# Patient Record
Sex: FEMALE | Race: WHITE | Employment: OTHER
[De-identification: names, ages, dates, MRNs, and addresses within clinical notes are randomized per-mention and may not be internally consistent; named-entity substitution may affect disease eponyms.]

---

## 2017-01-10 RX ORDER — ATORVASTATIN CALCIUM 20 MG/1
TABLET, FILM COATED ORAL
Qty: 90 TABLET | Refills: 3 | Status: SHIPPED | OUTPATIENT
Start: 2017-01-10 | End: 2019-04-04 | Stop reason: SDUPTHER

## 2017-01-10 RX ORDER — LEVOTHYROXINE SODIUM 0.12 MG/1
TABLET ORAL
Qty: 90 TABLET | Refills: 3 | Status: SHIPPED | OUTPATIENT
Start: 2017-01-10 | End: 2018-01-29 | Stop reason: SDUPTHER

## 2017-01-10 RX ORDER — LANSOPRAZOLE 15 MG/1
CAPSULE, DELAYED RELEASE ORAL
Qty: 90 CAPSULE | Refills: 3 | Status: SHIPPED | OUTPATIENT
Start: 2017-01-10 | End: 2018-01-29 | Stop reason: SDUPTHER

## 2017-01-10 RX ORDER — TRAMADOL HYDROCHLORIDE 50 MG/1
TABLET ORAL
Qty: 180 TABLET | Refills: 3 | Status: SHIPPED | OUTPATIENT
Start: 2017-01-10 | End: 2017-06-26 | Stop reason: SDUPTHER

## 2017-01-10 RX ORDER — CALCITRIOL 0.25 UG/1
CAPSULE, LIQUID FILLED ORAL
Qty: 36 CAPSULE | Refills: 3 | Status: SHIPPED | OUTPATIENT
Start: 2017-01-10 | End: 2017-11-08 | Stop reason: SDUPTHER

## 2017-01-10 RX ORDER — PEN NEEDLE, DIABETIC 29 G X1/2"
NEEDLE, DISPOSABLE MISCELLANEOUS
Qty: 100 EACH | Refills: 3 | Status: SHIPPED | OUTPATIENT
Start: 2017-01-10 | End: 2018-02-28 | Stop reason: SDUPTHER

## 2017-02-13 ENCOUNTER — OFFICE VISIT (OUTPATIENT)
Dept: INTERNAL MEDICINE | Facility: CLINIC | Age: 77
End: 2017-02-13

## 2017-02-13 VITALS
OXYGEN SATURATION: 95 % | HEIGHT: 60 IN | RESPIRATION RATE: 12 BRPM | WEIGHT: 243.8 LBS | DIASTOLIC BLOOD PRESSURE: 77 MMHG | BODY MASS INDEX: 47.87 KG/M2 | SYSTOLIC BLOOD PRESSURE: 140 MMHG | HEART RATE: 80 BPM

## 2017-02-13 DIAGNOSIS — E66.01 MORBID OBESITY WITH BMI OF 45.0-49.9, ADULT (HCC): ICD-10-CM

## 2017-02-13 DIAGNOSIS — Z12.39 BREAST CANCER SCREENING: ICD-10-CM

## 2017-02-13 DIAGNOSIS — E08.22 DIABETES MELLITUS DUE TO UNDERLYING CONDITION WITH STAGE 3 CHRONIC KIDNEY DISEASE, UNSPECIFIED LONG TERM INSULIN USE STATUS: Primary | ICD-10-CM

## 2017-02-13 DIAGNOSIS — E11.3299 NONPROLIFERATIVE DIABETIC RETINOPATHY (HCC): ICD-10-CM

## 2017-02-13 DIAGNOSIS — N18.3 DIABETES MELLITUS DUE TO UNDERLYING CONDITION WITH STAGE 3 CHRONIC KIDNEY DISEASE, UNSPECIFIED LONG TERM INSULIN USE STATUS: Primary | ICD-10-CM

## 2017-02-13 DIAGNOSIS — N25.81 SECONDARY HYPERPARATHYROIDISM OF RENAL ORIGIN (HCC): ICD-10-CM

## 2017-02-13 DIAGNOSIS — Z23 NEED FOR PNEUMOCOCCAL VACCINATION: ICD-10-CM

## 2017-02-13 DIAGNOSIS — Z12.31 ENCOUNTER FOR SCREENING MAMMOGRAM FOR MALIGNANT NEOPLASM OF BREAST: ICD-10-CM

## 2017-02-13 DIAGNOSIS — I73.9 PERIPHERAL VASCULAR DISEASE, UNSPECIFIED (HCC): ICD-10-CM

## 2017-02-13 DIAGNOSIS — J44.9 OBSTRUCTIVE CHRONIC BRONCHITIS WITHOUT EXACERBATION (HCC): ICD-10-CM

## 2017-02-13 LAB — HBA1C MFR BLD: 6.9 %

## 2017-02-13 PROCEDURE — 83036 HEMOGLOBIN GLYCOSYLATED A1C: CPT | Performed by: INTERNAL MEDICINE

## 2017-02-13 PROCEDURE — 99214 OFFICE O/P EST MOD 30 MIN: CPT | Performed by: INTERNAL MEDICINE

## 2017-02-13 PROCEDURE — 90732 PPSV23 VACC 2 YRS+ SUBQ/IM: CPT | Performed by: INTERNAL MEDICINE

## 2017-02-13 PROCEDURE — 3288F FALL RISK ASSESSMENT DOCD: CPT | Performed by: INTERNAL MEDICINE

## 2017-02-13 PROCEDURE — G0009 ADMIN PNEUMOCOCCAL VACCINE: HCPCS | Performed by: INTERNAL MEDICINE

## 2017-02-13 PROCEDURE — G8510 SCR DEP NEG, NO PLAN REQD: HCPCS | Performed by: INTERNAL MEDICINE

## 2017-02-13 RX ORDER — NITROGLYCERIN 0.4 MG/1
0.4 TABLET SUBLINGUAL PRN
Qty: 25 TABLET | Refills: 11 | Status: SHIPPED | OUTPATIENT
Start: 2017-02-13 | End: 2018-02-13 | Stop reason: SDUPTHER

## 2017-02-13 ASSESSMENT — PATIENT HEALTH QUESTIONNAIRE - PHQ9
2. FEELING DOWN, DEPRESSED OR HOPELESS: 0
1. LITTLE INTEREST OR PLEASURE IN DOING THINGS: 0
SUM OF ALL RESPONSES TO PHQ QUESTIONS 1-9: 0
SUM OF ALL RESPONSES TO PHQ9 QUESTIONS 1 & 2: 0

## 2017-02-13 ASSESSMENT — COPD QUESTIONNAIRES: COPD: 1

## 2017-02-13 ASSESSMENT — ENCOUNTER SYMPTOMS
DIFFICULTY BREATHING: 1
SHORTNESS OF BREATH: 1

## 2017-03-10 ENCOUNTER — TELEPHONE (OUTPATIENT)
Dept: INTERNAL MEDICINE | Facility: CLINIC | Age: 77
End: 2017-03-10

## 2017-04-03 ENCOUNTER — HOSPITAL ENCOUNTER (OUTPATIENT)
Dept: PAIN MANAGEMENT | Age: 77
Discharge: HOME | End: 2017-04-03
Payer: MEDICARE

## 2017-04-03 VITALS
DIASTOLIC BLOOD PRESSURE: 84 MMHG | TEMPERATURE: 98 F | SYSTOLIC BLOOD PRESSURE: 166 MMHG | HEART RATE: 78 BPM | RESPIRATION RATE: 16 BRPM

## 2017-04-03 PROCEDURE — 97032 APPL MODALITY 1+ESTIM EA 15: CPT

## 2017-04-03 PROCEDURE — 97016 VASOPNEUMATIC DEVICE THERAPY: CPT

## 2017-04-03 ASSESSMENT — PAIN SCALES - GENERAL: PAINLEVEL_OUTOF10: 4

## 2017-04-03 ASSESSMENT — PAIN DESCRIPTION - PROGRESSION: CLINICAL_PROGRESSION: NOT CHANGED

## 2017-04-03 ASSESSMENT — PAIN DESCRIPTION - LOCATION: LOCATION: BACK

## 2017-04-03 ASSESSMENT — PAIN DESCRIPTION - ONSET: ONSET: ON-GOING

## 2017-04-03 ASSESSMENT — PAIN DESCRIPTION - FREQUENCY: FREQUENCY: INTERMITTENT

## 2017-04-03 ASSESSMENT — PAIN DESCRIPTION - PAIN TYPE: TYPE: CHRONIC PAIN

## 2017-04-03 ASSESSMENT — PAIN DESCRIPTION - ORIENTATION: ORIENTATION: LOWER

## 2017-04-03 ASSESSMENT — PAIN DESCRIPTION - DESCRIPTORS: DESCRIPTORS: ACHING;NAGGING

## 2017-04-25 RX ORDER — CLOPIDOGREL BISULFATE 75 MG/1
TABLET ORAL
Qty: 90 TABLET | Refills: 3 | Status: SHIPPED | OUTPATIENT
Start: 2017-04-25 | End: 2018-05-04 | Stop reason: SDUPTHER

## 2017-04-25 RX ORDER — DILTIAZEM HYDROCHLORIDE 120 MG/1
CAPSULE, EXTENDED RELEASE ORAL
Qty: 90 CAPSULE | Refills: 3 | Status: SHIPPED | OUTPATIENT
Start: 2017-04-25 | End: 2018-05-04 | Stop reason: SDUPTHER

## 2017-04-25 RX ORDER — LISINOPRIL 20 MG/1
TABLET ORAL
Qty: 180 TABLET | Refills: 3 | Status: SHIPPED | OUTPATIENT
Start: 2017-04-25 | End: 2018-05-04 | Stop reason: SDUPTHER

## 2017-04-25 RX ORDER — METOPROLOL SUCCINATE 50 MG/1
TABLET, EXTENDED RELEASE ORAL
Qty: 90 TABLET | Refills: 3 | Status: ON HOLD | OUTPATIENT
Start: 2017-04-25 | End: 2018-03-27 | Stop reason: HOSPADM

## 2017-04-25 RX ORDER — BLOOD SUGAR DIAGNOSTIC
STRIP MISCELLANEOUS
Qty: 300 STRIP | Refills: 11 | Status: SHIPPED | OUTPATIENT
Start: 2017-04-25 | End: 2018-08-13 | Stop reason: SDUPTHER

## 2017-05-09 ENCOUNTER — HOSPITAL ENCOUNTER (OUTPATIENT)
Age: 77
Discharge: HOME | End: 2017-05-09
Payer: MEDICARE

## 2017-05-09 DIAGNOSIS — N18.30 CKD (CHRONIC KIDNEY DISEASE) STAGE 3, GFR 30-59 ML/MIN (HCC): ICD-10-CM

## 2017-05-09 LAB
ALBUMIN SERPL-MCNC: 4.2 G/DL (ref 3.5–5.2)
ANION GAP SERPL CALCULATED.3IONS-SCNC: 18 MMOL/L (ref 9–17)
BUN BLDV-MCNC: 25 MG/DL (ref 8–23)
BUN/CREAT BLD: ABNORMAL (ref 9–20)
CALCIUM SERPL-MCNC: 10.1 MG/DL (ref 8.6–10.4)
CHLORIDE BLD-SCNC: 103 MMOL/L (ref 98–107)
CO2: 22 MMOL/L (ref 20–31)
CREAT SERPL-MCNC: 1.7 MG/DL (ref 0.5–0.9)
CREATININE URINE: 142.6 MG/DL (ref 28–217)
GFR AFRICAN AMERICAN: 36 ML/MIN
GFR NON-AFRICAN AMERICAN: 29 ML/MIN
GFR SERPL CREATININE-BSD FRML MDRD: ABNORMAL ML/MIN/{1.73_M2}
GFR SERPL CREATININE-BSD FRML MDRD: ABNORMAL ML/MIN/{1.73_M2}
GLUCOSE BLD-MCNC: 63 MG/DL (ref 70–99)
HCT VFR BLD CALC: 38.3 % (ref 36–46)
HEMOGLOBIN: 12.6 G/DL (ref 12–16)
MCH RBC QN AUTO: 27.8 PG (ref 26–34)
MCHC RBC AUTO-ENTMCNC: 32.8 G/DL (ref 31–37)
MCV RBC AUTO: 85 FL (ref 80–100)
PDW BLD-RTO: 16 % (ref 12.5–15.4)
PHOSPHORUS: 3.9 MG/DL (ref 2.6–4.5)
PLATELET # BLD: 205 K/UL (ref 140–450)
PMV BLD AUTO: 9.9 FL (ref 6–12)
POTASSIUM SERPL-SCNC: 4.1 MMOL/L (ref 3.7–5.3)
RBC # BLD: 4.51 M/UL (ref 4–5.2)
SODIUM BLD-SCNC: 143 MMOL/L (ref 135–144)
TOTAL PROTEIN, URINE: 36 MG/DL
WBC # BLD: 7.8 K/UL (ref 3.5–11)

## 2017-05-09 PROCEDURE — 83970 ASSAY OF PARATHORMONE: CPT

## 2017-05-09 PROCEDURE — 82570 ASSAY OF URINE CREATININE: CPT

## 2017-05-09 PROCEDURE — 84100 ASSAY OF PHOSPHORUS: CPT

## 2017-05-09 PROCEDURE — 85027 COMPLETE CBC AUTOMATED: CPT

## 2017-05-09 PROCEDURE — 84156 ASSAY OF PROTEIN URINE: CPT

## 2017-05-09 PROCEDURE — 36415 COLL VENOUS BLD VENIPUNCTURE: CPT

## 2017-05-09 PROCEDURE — 82040 ASSAY OF SERUM ALBUMIN: CPT

## 2017-05-09 PROCEDURE — 80048 BASIC METABOLIC PNL TOTAL CA: CPT

## 2017-05-10 LAB — PTH INTACT: 98.72 PG/ML (ref 15–65)

## 2017-05-22 ENCOUNTER — OFFICE VISIT (OUTPATIENT)
Dept: INTERNAL MEDICINE | Age: 77
End: 2017-05-22

## 2017-05-22 VITALS
WEIGHT: 241.6 LBS | HEART RATE: 84 BPM | RESPIRATION RATE: 12 BRPM | DIASTOLIC BLOOD PRESSURE: 61 MMHG | OXYGEN SATURATION: 98 % | SYSTOLIC BLOOD PRESSURE: 150 MMHG | BODY MASS INDEX: 47.43 KG/M2 | HEIGHT: 60 IN

## 2017-05-22 DIAGNOSIS — N18.30 TYPE 2 DIABETES MELLITUS WITH STAGE 3 CHRONIC KIDNEY DISEASE, WITH LONG-TERM CURRENT USE OF INSULIN (HCC): Primary | ICD-10-CM

## 2017-05-22 DIAGNOSIS — I10 ESSENTIAL HYPERTENSION: ICD-10-CM

## 2017-05-22 DIAGNOSIS — E78.00 HYPERCHOLESTEREMIA: ICD-10-CM

## 2017-05-22 DIAGNOSIS — E11.22 TYPE 2 DIABETES MELLITUS WITH STAGE 3 CHRONIC KIDNEY DISEASE, WITH LONG-TERM CURRENT USE OF INSULIN (HCC): Primary | ICD-10-CM

## 2017-05-22 DIAGNOSIS — N18.4 CHRONIC KIDNEY DISEASE (CKD), STAGE IV (SEVERE) (HCC): ICD-10-CM

## 2017-05-22 DIAGNOSIS — Z79.4 TYPE 2 DIABETES MELLITUS WITH STAGE 3 CHRONIC KIDNEY DISEASE, WITH LONG-TERM CURRENT USE OF INSULIN (HCC): Primary | ICD-10-CM

## 2017-05-22 PROCEDURE — 99999 PR OFFICE/OUTPT VISIT,PROCEDURE ONLY: CPT | Performed by: INTERNAL MEDICINE

## 2017-05-22 RX ORDER — CYANOCOBALAMIN 1000 UG/ML
INJECTION INTRAMUSCULAR; SUBCUTANEOUS
Qty: 6 ML | Refills: 0 | Status: SHIPPED | OUTPATIENT
Start: 2017-05-22 | End: 2017-05-22 | Stop reason: SDUPTHER

## 2017-05-22 RX ORDER — SYRINGE W-NEEDLE,DISPOSAB,3 ML 25GX5/8"
SYRINGE, EMPTY DISPOSABLE MISCELLANEOUS
Qty: 6 EACH | Refills: 0 | Status: SHIPPED | OUTPATIENT
Start: 2017-05-22 | End: 2017-06-27 | Stop reason: SDUPTHER

## 2017-05-22 RX ORDER — CYANOCOBALAMIN 1000 UG/ML
INJECTION INTRAMUSCULAR; SUBCUTANEOUS
Qty: 6 ML | Refills: 0 | Status: SHIPPED | OUTPATIENT
Start: 2017-05-22 | End: 2017-07-12 | Stop reason: SDUPTHER

## 2017-06-09 ENCOUNTER — HOSPITAL ENCOUNTER (OUTPATIENT)
Dept: PAIN MANAGEMENT | Age: 77
Discharge: HOME | End: 2017-06-09
Payer: MEDICARE

## 2017-06-09 VITALS
HEIGHT: 60 IN | RESPIRATION RATE: 18 BRPM | TEMPERATURE: 98.3 F | BODY MASS INDEX: 47.32 KG/M2 | DIASTOLIC BLOOD PRESSURE: 60 MMHG | HEART RATE: 65 BPM | SYSTOLIC BLOOD PRESSURE: 184 MMHG | WEIGHT: 241 LBS

## 2017-06-09 PROCEDURE — 97032 APPL MODALITY 1+ESTIM EA 15: CPT

## 2017-06-09 PROCEDURE — 97016 VASOPNEUMATIC DEVICE THERAPY: CPT

## 2017-06-09 ASSESSMENT — PAIN DESCRIPTION - ORIENTATION: ORIENTATION: LOWER

## 2017-06-09 ASSESSMENT — PAIN SCALES - GENERAL: PAINLEVEL_OUTOF10: 7

## 2017-06-09 ASSESSMENT — PAIN DESCRIPTION - DESCRIPTORS: DESCRIPTORS: ACHING;NAGGING

## 2017-06-09 ASSESSMENT — PAIN DESCRIPTION - ONSET: ONSET: ON-GOING

## 2017-06-09 ASSESSMENT — PAIN DESCRIPTION - LOCATION: LOCATION: BACK

## 2017-06-09 ASSESSMENT — PAIN DESCRIPTION - PAIN TYPE: TYPE: CHRONIC PAIN

## 2017-06-09 ASSESSMENT — PAIN DESCRIPTION - FREQUENCY: FREQUENCY: INTERMITTENT

## 2017-06-09 ASSESSMENT — PAIN DESCRIPTION - PROGRESSION: CLINICAL_PROGRESSION: NOT CHANGED

## 2017-06-27 RX ORDER — CYANOCOBALAMIN 1000 UG/ML
1000 INJECTION INTRAMUSCULAR; SUBCUTANEOUS ONCE
Qty: 6 ML | Refills: 0 | Status: SHIPPED | OUTPATIENT
Start: 2017-06-27 | End: 2017-10-04 | Stop reason: SDUPTHER

## 2017-06-27 RX ORDER — SYRINGE W-NEEDLE,DISPOSAB,3 ML 25GX5/8"
SYRINGE, EMPTY DISPOSABLE MISCELLANEOUS
Qty: 6 EACH | Refills: 0 | Status: SHIPPED | OUTPATIENT
Start: 2017-06-27 | End: 2020-11-23 | Stop reason: SDUPTHER

## 2017-07-12 RX ORDER — CYANOCOBALAMIN 1000 UG/ML
INJECTION INTRAMUSCULAR; SUBCUTANEOUS
Qty: 6 ML | Refills: 0 | Status: SHIPPED | OUTPATIENT
Start: 2017-07-12 | End: 2018-10-17 | Stop reason: SDUPTHER

## 2017-07-27 RX ORDER — INSULIN GLARGINE 100 [IU]/ML
INJECTION, SOLUTION SUBCUTANEOUS
Qty: 70 ML | Refills: 2 | Status: SHIPPED | OUTPATIENT
Start: 2017-07-27 | End: 2018-02-13 | Stop reason: SDUPTHER

## 2017-08-04 ENCOUNTER — HOSPITAL ENCOUNTER (OUTPATIENT)
Dept: PAIN MANAGEMENT | Age: 77
Discharge: HOME | End: 2017-08-04
Payer: MEDICARE

## 2017-08-04 VITALS
HEART RATE: 68 BPM | SYSTOLIC BLOOD PRESSURE: 164 MMHG | TEMPERATURE: 97.8 F | DIASTOLIC BLOOD PRESSURE: 86 MMHG | RESPIRATION RATE: 18 BRPM

## 2017-08-04 PROCEDURE — 97032 APPL MODALITY 1+ESTIM EA 15: CPT

## 2017-08-04 ASSESSMENT — PAIN DESCRIPTION - PROGRESSION: CLINICAL_PROGRESSION: GRADUALLY IMPROVING

## 2017-08-04 ASSESSMENT — PAIN SCALES - GENERAL: PAINLEVEL_OUTOF10: 7

## 2017-08-04 ASSESSMENT — PAIN DESCRIPTION - DESCRIPTORS: DESCRIPTORS: ACHING;NAGGING

## 2017-08-04 ASSESSMENT — PAIN DESCRIPTION - FREQUENCY: FREQUENCY: INTERMITTENT

## 2017-08-04 ASSESSMENT — PAIN DESCRIPTION - ONSET: ONSET: ON-GOING

## 2017-08-04 ASSESSMENT — PAIN DESCRIPTION - ORIENTATION: ORIENTATION: LOWER

## 2017-08-04 ASSESSMENT — PAIN DESCRIPTION - PAIN TYPE: TYPE: CHRONIC PAIN

## 2017-08-04 ASSESSMENT — PAIN DESCRIPTION - LOCATION: LOCATION: BACK

## 2017-08-22 ENCOUNTER — OFFICE VISIT (OUTPATIENT)
Dept: INTERNAL MEDICINE | Age: 77
End: 2017-08-22
Payer: MEDICARE

## 2017-08-22 VITALS
SYSTOLIC BLOOD PRESSURE: 158 MMHG | OXYGEN SATURATION: 96 % | WEIGHT: 242 LBS | DIASTOLIC BLOOD PRESSURE: 72 MMHG | BODY MASS INDEX: 47.26 KG/M2 | HEART RATE: 80 BPM

## 2017-08-22 DIAGNOSIS — I10 ESSENTIAL HYPERTENSION: ICD-10-CM

## 2017-08-22 DIAGNOSIS — Z79.4 TYPE 2 DIABETES MELLITUS WITH STAGE 3 CHRONIC KIDNEY DISEASE, WITH LONG-TERM CURRENT USE OF INSULIN (HCC): Primary | ICD-10-CM

## 2017-08-22 DIAGNOSIS — N18.30 TYPE 2 DIABETES MELLITUS WITH STAGE 3 CHRONIC KIDNEY DISEASE, WITH LONG-TERM CURRENT USE OF INSULIN (HCC): Primary | ICD-10-CM

## 2017-08-22 DIAGNOSIS — E11.22 TYPE 2 DIABETES MELLITUS WITH STAGE 3 CHRONIC KIDNEY DISEASE, WITH LONG-TERM CURRENT USE OF INSULIN (HCC): Primary | ICD-10-CM

## 2017-08-22 DIAGNOSIS — R05.3 CHRONIC COUGH: ICD-10-CM

## 2017-08-22 LAB — HBA1C MFR BLD: 6.8 %

## 2017-08-22 PROCEDURE — 83036 HEMOGLOBIN GLYCOSYLATED A1C: CPT | Performed by: INTERNAL MEDICINE

## 2017-08-22 PROCEDURE — 99214 OFFICE O/P EST MOD 30 MIN: CPT | Performed by: INTERNAL MEDICINE

## 2017-08-22 RX ORDER — TOBRAMYCIN AND DEXAMETHASONE 3; 1 MG/ML; MG/ML
SUSPENSION/ DROPS OPHTHALMIC
COMMUNITY
Start: 2017-08-14 | End: 2017-10-04 | Stop reason: ALTCHOICE

## 2017-08-22 ASSESSMENT — ENCOUNTER SYMPTOMS
GASTROINTESTINAL NEGATIVE: 1
COUGH: 1

## 2017-08-22 ASSESSMENT — COPD QUESTIONNAIRES: COPD: 1

## 2017-10-04 ENCOUNTER — HOSPITAL ENCOUNTER (OUTPATIENT)
Dept: PAIN MANAGEMENT | Age: 77
Discharge: HOME | End: 2017-10-04
Payer: MEDICARE

## 2017-10-04 VITALS
SYSTOLIC BLOOD PRESSURE: 142 MMHG | RESPIRATION RATE: 18 BRPM | DIASTOLIC BLOOD PRESSURE: 56 MMHG | WEIGHT: 240 LBS | HEART RATE: 87 BPM | BODY MASS INDEX: 47.12 KG/M2 | TEMPERATURE: 98.2 F | HEIGHT: 60 IN

## 2017-10-04 PROCEDURE — G8987 SELF CARE CURRENT STATUS: HCPCS | Performed by: ANESTHESIOLOGY

## 2017-10-04 PROCEDURE — 97032 APPL MODALITY 1+ESTIM EA 15: CPT

## 2017-10-04 PROCEDURE — G8989 SELF CARE D/C STATUS: HCPCS | Performed by: ANESTHESIOLOGY

## 2017-10-04 PROCEDURE — 97032 APPL MODALITY 1+ESTIM EA 15: CPT | Performed by: ANESTHESIOLOGY

## 2017-10-04 PROCEDURE — G8988 SELF CARE GOAL STATUS: HCPCS | Performed by: ANESTHESIOLOGY

## 2017-10-04 ASSESSMENT — PAIN DESCRIPTION - ONSET: ONSET: ON-GOING

## 2017-10-04 ASSESSMENT — PAIN DESCRIPTION - FREQUENCY: FREQUENCY: INTERMITTENT

## 2017-10-04 ASSESSMENT — PAIN DESCRIPTION - PAIN TYPE: TYPE: CHRONIC PAIN

## 2017-10-04 ASSESSMENT — PAIN DESCRIPTION - DESCRIPTORS: DESCRIPTORS: ACHING;CONSTANT;NAGGING

## 2017-10-04 ASSESSMENT — PAIN DESCRIPTION - LOCATION: LOCATION: BACK

## 2017-10-04 ASSESSMENT — PAIN DESCRIPTION - PROGRESSION: CLINICAL_PROGRESSION: NOT CHANGED

## 2017-10-04 ASSESSMENT — PAIN SCALES - GENERAL: PAINLEVEL_OUTOF10: 7

## 2017-10-04 ASSESSMENT — PAIN DESCRIPTION - ORIENTATION: ORIENTATION: LOWER

## 2017-10-04 NOTE — PROGRESS NOTES
The patient is here today for Horizontal Treatment # Maintenance treatment     The patient's diagnosis is low back pain M54.5. Today's treatment is to reduce or alleviate pain symptoms, reduce edema, increase range of motion, improve circulation and relaxation of muscle spasms with electrical stimulation and neuromuscular re-education. Today's  treatment is one of a series of ten. A determination to continue treatment will be made after each session. Re-evaluations will be scheduled after every 6 treatments. The treatment plan was reviewed with the patient,  the patient agrees, and the consent was signed. The treatment goal is to reach 50- 70 % physical modality improvement and /or pain relief by the end of the series of treatments. Pain Assessment  Pain Assessment: 0-10  Pain Level: 7  Pain Type: Chronic pain  Pain Location: Back  Pain Orientation: Lower  Pain Descriptors: Aching, Constant, Nagging  Pain Frequency: Intermittent  Pain Onset: On-going  Clinical Progression: Not changed  Effect of Pain on Daily Activities: 4/5 doing basic ADL but limited in walking  or standing in one place   Patient's Stated Pain Goal: No pain    Date of last Horizontal treatment  8-4-17    Was there pain relief : Yes    How long was your pain relief : several days     What percentage of your pain was relieved  80%    ADL (activities of daily living) score is: 4/5    ADL description is stated as : No outpatient prescriptions have been marked as taking for the 10/4/17 encounter [facility] Encounter) with STV PAIN EXAM RM 2.        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      I have written this patient information acting as a scribe for     Electronically signed by Dina Melgoza RN on 10/4/2017 at 12:48 PM

## 2017-10-04 NOTE — IP AVS SNAPSHOT
After Visit Summary  (Discharge Instructions)    Medication List for Home    Based on the information you provided to us as well as any changes during this visit, the following is your updated medication list.  Compare this with your prescription bottles at home. If you have any questions or concerns, contact your primary care physician's office. Daily Medication List (This medication list can be shared with any healthcare provider who is helping you manage your medications)      ASK your doctor about these medications if you have questions        Last Dose    Next Dose Due AM NOON PM NIGHT    aspirin EC 81 MG EC tablet   Take 2 tablets by mouth daily. atorvastatin 20 MG tablet   Commonly known as:  LIPITOR   TAKE 1 TABLET EVERY DAY                                         B-D SINGLE USE SWABS REGULAR Pads                                             BLOOD GLUCOSE TEST STRIPS Strp   Test_3__times daily Diagnosis: 250.0   Diabetes Mellitus__x__Insulin Dependent____Non-Insulin Dependent Life Scan test strips ultra one touch                                         ONE TOUCH ULTRA TEST strip   Generic drug:  glucose blood VI test strips                                             ACCU-CHEK DONG PLUS strip   Generic drug:  glucose blood VI test strips   TEST THREE TIMES DAILY                                         calcitRIOL 0.25 MCG capsule   Commonly known as:  ROCALTROL   TAKE 1 CAPSULE ON MONDAY, WEDNESDAY AND FRIDAY                                         calcium carbonate 500 MG chewable tablet   Commonly known as:  TUMS   Take 2 tablets by mouth 3 times daily.                                          CARTIA  MG extended release capsule   Generic drug:  diltiazem   TAKE 1 CAPSULE EVERY DAY                                         cilostazol 50 MG tablet   Commonly known as:  PLETAL   Take 50 mg by mouth 2 times daily Cinnamon 500 MG Tabs   Take 1 tablet by mouth daily. clopidogrel 75 MG tablet   Commonly known as:  PLAVIX   TAKE 1 TABLET EVERY DAY                                         CRANBERRY PLUS VITAMIN C PO   Take 1 tablet by mouth daily. cyanocobalamin 1000 MCG/ML injection   Give patient 6 -1ml vials   What changed:  Another medication with the same name was removed. Continue taking this medication, and follow the directions you see here. docusate sodium 100 MG capsule   Commonly known as:  COLACE   Take 100 mg by mouth 2 times daily. fish oil 1000 MG Caps   Take 1,000 mg by mouth daily. Green Tea (Camillia sinensis) 315 MG Caps   Take 1 tablet by mouth 2 times daily. INSULIN SYRINGE 1CC/29G 29G X 1/2\" 1 ML Misc   Commonly known as:  CVS INSULIN SYRINGE 1CC/29G   1 each by Does not apply route daily. INSULIN SYRINGE .5CC/30GX1/2\" 30G X 1/2\" 0.5 ML Misc   Use__2__times daily. Dx: 250.00  Prescribe this syringe for insulin dosages under  50 units per injection.                                          B-D INS SYR ULTRAFINE 1CC/30G 30G X 1/2\" 1 ML Misc   Generic drug:  Insulin Syringe-Needle U-100   USE 1 SYRINGE EVERY DAY                                         lansoprazole 15 MG delayed release capsule   Commonly known as:  PREVACID   TAKE 1 CAPSULE EVERY DAY                                         LANTUS 100 UNIT/ML injection vial   Generic drug:  insulin glargine   INJECT 70 UNITS INTO THE SKIN NIGHTLY                                         levothyroxine 125 MCG tablet   Commonly known as:  SYNTHROID   TAKE 1 TABLET EVERY DAY                                         lisinopril 20 MG tablet   Commonly known as:  PRINIVIL;ZESTRIL TAKE 1 TABLET 2 TIMES DAILY NOTE INCREASE PER DR India Hamilton. metoprolol succinate 50 MG extended release tablet   Commonly known as:  TOPROL XL   TAKE 1 TABLET EVERY DAY                                         nitroGLYCERIN 0.4 MG SL tablet   Commonly known as:  NITROSTAT   Place 1 tablet under the tongue as needed for Chest pain                                         OCUVITE ADULT 50+ PO   Take 1 tablet by mouth 2 times daily. What changed:  Another medication with the same name was removed. Continue taking this medication, and follow the directions you see here. ONE TOUCH ULTRA   by Does not apply route. OT ULTRA/FASTTK CNTRL SOLN Soln                                             SYRINGE 3CC/25GX1\" 25G X 1\" 3 ML Misc   Used one every other month. traMADol 50 MG tablet   Commonly known as:  ULTRAM   TAKE 1 TABLET TWICE DAILY                                         vitamin C with mala hips 500 MG tablet   Take 500 mg by mouth daily.                                                  Allergies as of 10/4/2017     No Known Allergies      Immunizations as of 10/4/2017     Name Date Dose VIS Date Route    Influenza Vaccine, unspecified formulation 9/13/2014 -- -- --    Comment: uploaded via claim file    Influenza Virus Vaccine 11/11/2015 0.5 mL 8/7/2015 Intramuscular    Influenza, Quadv, 3 yrs and older, IM, Preservative Free 11/7/2016 -- -- --    External: Patient reported    Comment: [facility]    Pneumococcal 13-valent Conjugate ([id number]) 11/11/2015 0.5 mL 2/27/2013 Intramuscular    Pneumococcal Polysaccharide ([id number]) 2/13/2017 0.5 mL 4/24/2015 Intramuscular    Comment: ndc# [id number] LOT# [id number]    Tetanus 11/11/2015 0.5 mL -- Intramuscular    Zoster 11/11/2013 -- -- --    External: Patient reported    Comment: [facility]      Last Vitals Most Recent Value    Temp  98.2 °F (36.8 °C)    Pulse  87    Resp  18    BP  (!)  142/56         After Visit Summary    This summary was created for you. Thank you for entrusting your care to us. The following information includes details about your hospital/visit stay along with steps you should take to help with your recovery once you leave the hospital.  In this packet, you will find information about the topics listed below:    · Instructions about your medications including a list of your home medications  · A summary of your hospital visit  · Follow-up appointments once you have left the hospital  · Your care plan at home      You may receive a survey regarding the care you received during your stay. Your input is valuable to us. We encourage you to complete and return your survey in the envelope provided. We hope you will choose us in the future for your healthcare needs. Patient Information     Patient Name MONA Guerrero 1940      Care Provided at:     Name Address Phone       [address] [phone]            Your Visit    Here you will find information about your visit, including the reason for your visit. Please take this sheet with you when you visit your doctor or other health care provider in the future. It will help determine the best possible medical care for you at that time. If you have any questions once you leave the hospital, please call the department phone number listed below. Why you were here     Your primary diagnosis was:  Not on File      Visit Information     Date & Time Department Dept. Phone    10/4/2017 [facility] [phone]       Follow-up Appointments    Below is a list of your follow-up and future appointments.  This may not be a complete list as you may have made appointments directly with providers

## 2017-10-04 NOTE — IP AVS SNAPSHOT
Patient Information     Patient Name MONA Denson 1940         This is your updated medication list to keep with you all times      ASK your doctor about these medications     aspirin EC 81 MG EC tablet   Take 2 tablets by mouth daily. atorvastatin 20 MG tablet   Commonly known as:  LIPITOR   TAKE 1 TABLET EVERY DAY       B-D SINGLE USE SWABS REGULAR Pads       * BLOOD GLUCOSE TEST STRIPS Strp   Test_3__times daily Diagnosis: 250.0   Diabetes Mellitus__x__Insulin Dependent____Non-Insulin Dependent Life Scan test strips ultra one touch       * ONE TOUCH ULTRA TEST strip   Generic drug:  glucose blood VI test strips       * ACCU-CHEK DONG PLUS strip   Generic drug:  glucose blood VI test strips   TEST THREE TIMES DAILY       calcitRIOL 0.25 MCG capsule   Commonly known as:  ROCALTROL   TAKE 1 CAPSULE ON MONDAY, WEDNESDAY AND FRIDAY       calcium carbonate 500 MG chewable tablet   Commonly known as:  TUMS       CARTIA  MG extended release capsule   Generic drug:  diltiazem   TAKE 1 CAPSULE EVERY DAY       cilostazol 50 MG tablet   Commonly known as:  PLETAL       Cinnamon 500 MG Tabs       clopidogrel 75 MG tablet   Commonly known as:  PLAVIX   TAKE 1 TABLET EVERY DAY       CRANBERRY PLUS VITAMIN C PO       cyanocobalamin 1000 MCG/ML injection   Give patient 6 -1ml vials       docusate sodium 100 MG capsule   Commonly known as:  COLACE       fish oil 1000 MG Caps       Green Tea (Camillia sinensis) 315 MG Caps       * INSULIN SYRINGE 1CC/29G 29G X 1/2\" 1 ML Misc   Commonly known as:  CVS INSULIN SYRINGE 1CC/29G   1 each by Does not apply route daily. * INSULIN SYRINGE .5CC/30GX1/2\" 30G X 1/2\" 0.5 ML Misc   Use__2__times daily. Dx: 250.00  Prescribe this syringe for insulin dosages under  50 units per injection.        * B-D INS SYR ULTRAFINE 1CC/30G 30G X 1/2\" 1 ML Misc   Generic drug:  Insulin Syringe-Needle U-100   USE 1 SYRINGE EVERY DAY

## 2017-10-31 ENCOUNTER — HOSPITAL ENCOUNTER (OUTPATIENT)
Dept: GENERAL RADIOLOGY | Age: 77
Discharge: HOME | End: 2017-10-31
Payer: MEDICARE

## 2017-10-31 ENCOUNTER — HOSPITAL ENCOUNTER (OUTPATIENT)
Age: 77
Discharge: HOME | End: 2017-10-31
Payer: MEDICARE

## 2017-10-31 DIAGNOSIS — N18.30 TYPE 2 DIABETES MELLITUS WITH STAGE 3 CHRONIC KIDNEY DISEASE, WITH LONG-TERM CURRENT USE OF INSULIN (HCC): ICD-10-CM

## 2017-10-31 DIAGNOSIS — N18.30 CKD (CHRONIC KIDNEY DISEASE) STAGE 3, GFR 30-59 ML/MIN (HCC): ICD-10-CM

## 2017-10-31 DIAGNOSIS — E11.22 TYPE 2 DIABETES MELLITUS WITH STAGE 3 CHRONIC KIDNEY DISEASE, WITH LONG-TERM CURRENT USE OF INSULIN (HCC): ICD-10-CM

## 2017-10-31 DIAGNOSIS — R05.3 CHRONIC COUGH: ICD-10-CM

## 2017-10-31 DIAGNOSIS — Z79.4 TYPE 2 DIABETES MELLITUS WITH STAGE 3 CHRONIC KIDNEY DISEASE, WITH LONG-TERM CURRENT USE OF INSULIN (HCC): ICD-10-CM

## 2017-10-31 LAB
ALBUMIN SERPL-MCNC: 3.7 G/DL (ref 3.5–5.2)
ANION GAP SERPL CALCULATED.3IONS-SCNC: 17 MMOL/L (ref 9–17)
BUN BLDV-MCNC: 16 MG/DL (ref 8–23)
BUN/CREAT BLD: ABNORMAL (ref 9–20)
CALCIUM SERPL-MCNC: 10.1 MG/DL (ref 8.6–10.4)
CHLORIDE BLD-SCNC: 105 MMOL/L (ref 98–107)
CHOLESTEROL, FASTING: 113 MG/DL
CHOLESTEROL/HDL RATIO: 3
CO2: 21 MMOL/L (ref 20–31)
CREAT SERPL-MCNC: 1.49 MG/DL (ref 0.5–0.9)
CREATININE URINE: 98.3 MG/DL (ref 28–217)
GFR AFRICAN AMERICAN: 41 ML/MIN
GFR NON-AFRICAN AMERICAN: 34 ML/MIN
GFR SERPL CREATININE-BSD FRML MDRD: ABNORMAL ML/MIN/{1.73_M2}
GFR SERPL CREATININE-BSD FRML MDRD: ABNORMAL ML/MIN/{1.73_M2}
GLUCOSE BLD-MCNC: 101 MG/DL (ref 70–99)
HCT VFR BLD CALC: 42.7 % (ref 36.3–47.1)
HDLC SERPL-MCNC: 38 MG/DL
HEMOGLOBIN: 12.8 G/DL (ref 11.9–15.1)
LDL CHOLESTEROL: 34 MG/DL (ref 0–130)
MCH RBC QN AUTO: 27.4 PG (ref 25.2–33.5)
MCHC RBC AUTO-ENTMCNC: 30 G/DL (ref 29.9–34.7)
MCV RBC AUTO: 91.4 FL (ref 82.6–102.9)
PDW BLD-RTO: 13.7 % (ref 11.8–14.4)
PHOSPHORUS: 3.5 MG/DL (ref 2.6–4.5)
PLATELET # BLD: 272 K/UL (ref 138–453)
PMV BLD AUTO: 12.2 FL (ref 8.1–13.5)
POTASSIUM SERPL-SCNC: 4.3 MMOL/L (ref 3.7–5.3)
PTH INTACT: 105.5 PG/ML (ref 15–65)
RBC # BLD: 4.67 M/UL (ref 3.95–5.11)
SODIUM BLD-SCNC: 143 MMOL/L (ref 135–144)
TOTAL PROTEIN, URINE: 98 MG/DL
TRIGLYCERIDE, FASTING: 207 MG/DL
VLDLC SERPL CALC-MCNC: ABNORMAL MG/DL (ref 1–30)
WBC # BLD: 9.5 K/UL (ref 3.5–11.3)

## 2017-10-31 PROCEDURE — 85027 COMPLETE CBC AUTOMATED: CPT

## 2017-10-31 PROCEDURE — 82570 ASSAY OF URINE CREATININE: CPT

## 2017-10-31 PROCEDURE — 84100 ASSAY OF PHOSPHORUS: CPT

## 2017-10-31 PROCEDURE — 36415 COLL VENOUS BLD VENIPUNCTURE: CPT

## 2017-10-31 PROCEDURE — 80048 BASIC METABOLIC PNL TOTAL CA: CPT

## 2017-10-31 PROCEDURE — 84156 ASSAY OF PROTEIN URINE: CPT

## 2017-10-31 PROCEDURE — 80061 LIPID PANEL: CPT

## 2017-10-31 PROCEDURE — 83970 ASSAY OF PARATHORMONE: CPT

## 2017-10-31 PROCEDURE — 82040 ASSAY OF SERUM ALBUMIN: CPT

## 2017-10-31 PROCEDURE — 71020 XR CHEST STANDARD TWO VW: CPT

## 2017-11-07 ENCOUNTER — OFFICE VISIT (OUTPATIENT)
Dept: INTERNAL MEDICINE | Age: 77
End: 2017-11-07
Payer: MEDICARE

## 2017-11-07 ENCOUNTER — HOSPITAL ENCOUNTER (OUTPATIENT)
Age: 77
Setting detail: SPECIMEN
Discharge: HOME | End: 2017-11-07
Payer: MEDICARE

## 2017-11-07 VITALS
BODY MASS INDEX: 45.23 KG/M2 | WEIGHT: 230.4 LBS | SYSTOLIC BLOOD PRESSURE: 137 MMHG | HEIGHT: 60 IN | DIASTOLIC BLOOD PRESSURE: 72 MMHG | RESPIRATION RATE: 12 BRPM | HEART RATE: 93 BPM

## 2017-11-07 DIAGNOSIS — Z02.83 ENCOUNTER FOR DRUG SCREENING: ICD-10-CM

## 2017-11-07 DIAGNOSIS — I73.9 PAD (PERIPHERAL ARTERY DISEASE) (HCC): ICD-10-CM

## 2017-11-07 DIAGNOSIS — Z79.4 TYPE 2 DIABETES MELLITUS WITH STAGE 3 CHRONIC KIDNEY DISEASE, WITH LONG-TERM CURRENT USE OF INSULIN (HCC): Primary | ICD-10-CM

## 2017-11-07 DIAGNOSIS — E11.3293 MILD NONPROLIFERATIVE DIABETIC RETINOPATHY OF BOTH EYES WITHOUT MACULAR EDEMA ASSOCIATED WITH TYPE 2 DIABETES MELLITUS (HCC): ICD-10-CM

## 2017-11-07 DIAGNOSIS — E11.22 TYPE 2 DIABETES MELLITUS WITH STAGE 3 CHRONIC KIDNEY DISEASE, WITH LONG-TERM CURRENT USE OF INSULIN (HCC): Primary | ICD-10-CM

## 2017-11-07 DIAGNOSIS — N18.30 TYPE 2 DIABETES MELLITUS WITH STAGE 3 CHRONIC KIDNEY DISEASE, WITH LONG-TERM CURRENT USE OF INSULIN (HCC): Primary | ICD-10-CM

## 2017-11-07 LAB
AMPHETAMINE SCREEN URINE: NEGATIVE
BARBITURATE SCREEN URINE: NEGATIVE
BENZODIAZEPINE SCREEN, URINE: NEGATIVE
BUPRENORPHINE URINE: NORMAL
CANNABINOID SCREEN URINE: NEGATIVE
COCAINE METABOLITE, URINE: NEGATIVE
HBA1C MFR BLD: 6.7 %
MDMA URINE: NORMAL
METHADONE SCREEN, URINE: NEGATIVE
METHAMPHETAMINE, URINE: NORMAL
OPIATES, URINE: NEGATIVE
OXYCODONE SCREEN URINE: NEGATIVE
PHENCYCLIDINE, URINE: NEGATIVE
PROPOXYPHENE, URINE: NORMAL
TEST INFORMATION: NORMAL
TRICYCLIC ANTIDEPRESSANTS, UR: NORMAL

## 2017-11-07 PROCEDURE — 99214 OFFICE O/P EST MOD 30 MIN: CPT | Performed by: INTERNAL MEDICINE

## 2017-11-07 PROCEDURE — G8598 ASA/ANTIPLAT THER USED: HCPCS | Performed by: INTERNAL MEDICINE

## 2017-11-07 PROCEDURE — 1123F ACP DISCUSS/DSCN MKR DOCD: CPT | Performed by: INTERNAL MEDICINE

## 2017-11-07 PROCEDURE — G8484 FLU IMMUNIZE NO ADMIN: HCPCS | Performed by: INTERNAL MEDICINE

## 2017-11-07 PROCEDURE — 1090F PRES/ABSN URINE INCON ASSESS: CPT | Performed by: INTERNAL MEDICINE

## 2017-11-07 PROCEDURE — 4040F PNEUMOC VAC/ADMIN/RCVD: CPT | Performed by: INTERNAL MEDICINE

## 2017-11-07 PROCEDURE — 3044F HG A1C LEVEL LT 7.0%: CPT | Performed by: INTERNAL MEDICINE

## 2017-11-07 PROCEDURE — 3017F COLORECTAL CA SCREEN DOC REV: CPT | Performed by: INTERNAL MEDICINE

## 2017-11-07 PROCEDURE — G8427 DOCREV CUR MEDS BY ELIG CLIN: HCPCS | Performed by: INTERNAL MEDICINE

## 2017-11-07 PROCEDURE — 83036 HEMOGLOBIN GLYCOSYLATED A1C: CPT | Performed by: INTERNAL MEDICINE

## 2017-11-07 PROCEDURE — G8417 CALC BMI ABV UP PARAM F/U: HCPCS | Performed by: INTERNAL MEDICINE

## 2017-11-07 PROCEDURE — 1036F TOBACCO NON-USER: CPT | Performed by: INTERNAL MEDICINE

## 2017-11-07 PROCEDURE — G8399 PT W/DXA RESULTS DOCUMENT: HCPCS | Performed by: INTERNAL MEDICINE

## 2017-11-07 RX ORDER — TRAMADOL HYDROCHLORIDE 50 MG/1
TABLET ORAL
Qty: 180 TABLET | Refills: 0 | Status: SHIPPED | OUTPATIENT
Start: 2017-11-07 | End: 2018-02-06 | Stop reason: SDUPTHER

## 2017-11-07 ASSESSMENT — ENCOUNTER SYMPTOMS
EYES NEGATIVE: 1
ALLERGIC/IMMUNOLOGIC NEGATIVE: 1
COUGH: 1

## 2017-11-07 NOTE — PROGRESS NOTES
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 ([facility] 4/20/2016    Morbid obesity with BMI of 45.0-49.9, adult ([facility] 10/22/2014    Osteoarthritis     Other activity(E029.9)     Acute renal failure secondary to prerenal azotemia    Other activity(E029.9)     Atherosclerotic coronary artery disease status-post bypass surgery in 2003    [facility]     Sciatica     Secondary hyperparathyroidism (of renal origin) 10/22/2014    Not on VDRA    Shortness of breath     USES HOME OXYGEN    Sleep apnea     Sleep apnea     Type II or unspecified type diabetes mellitus without mention of complication, not stated as uncontrolled        Past Surgical History:   Procedure Laterality Date    APPENDECTOMY      CARDIAC CATHETERIZATION  2006, 2014   [address]  2003    CABG X 3/STENTS    NERVE BLOCK Right 10/21/2016    rt MBNB #1 kenalog 40mg       History reviewed. No pertinent family history. Social History   Substance Use Topics    Smoking status: Former Smoker    Smokeless tobacco: Never Used    Alcohol use Yes      Comment: soc. Current Outpatient Prescriptions   Medication Sig Dispense Refill    traMADol (ULTRAM) 50 MG tablet TAKE 1 TABLET TWICE DAILY. 180 tablet 0    Compression Stockings MISC by Does not apply route 20-40 mmHg.  2 each 0    LANTUS 100 UNIT/ML injection vial INJECT 70 UNITS INTO THE SKIN NIGHTLY 70 mL 2    cyanocobalamin 1000 MCG/ML injection Give patient 6 -1ml vials 6 mL 0    Syringe/Needle, Disp, (SYRINGE 3CC/25GX1\") 25G X 1\" 3 ML MISC Used one every other month. 6 each 0    ACCU-CHEK DONG PLUS strip TEST THREE TIMES DAILY 300 strip 11    metoprolol succinate (TOPROL XL) 50 MG extended release tablet TAKE 1 TABLET EVERY DAY 90 tablet 3    CARTIA  MG extended release capsule TAKE 1 CAPSULE EVERY DAY 90 capsule 3    clopidogrel (PLAVIX) 75 MG tablet TAKE 1 TABLET EVERY DAY 90 tablet 3    lisinopril (PRINIVIL;ZESTRIL) 20 MG tablet TAKE 1 TABLET 2 TIMES DAILY NOTE INCREASE PER DR Johny Reeves. 180 tablet 3    nitroGLYCERIN (NITROSTAT) 0.4 MG SL tablet Place 1 tablet under the tongue as needed for Chest pain 25 tablet 11    atorvastatin (LIPITOR) 20 MG tablet TAKE 1 TABLET EVERY DAY (Patient taking differently: 10 MG TAKE 1 TABLET EVERY DAY) 90 tablet 3    levothyroxine (SYNTHROID) 125 MCG tablet TAKE 1 TABLET EVERY DAY 90 tablet 3    B-D INS SYR ULTRAFINE 1CC/30G 30G X 1/2\" 1 ML MISC USE 1 SYRINGE EVERY  each 3    lansoprazole (PREVACID) 15 MG delayed release capsule TAKE 1 CAPSULE EVERY DAY 90 capsule 3    calcitRIOL (ROCALTROL) 0.25 MCG capsule TAKE 1 CAPSULE ON MONDAY, WEDNESDAY AND FRIDAY 36 capsule 3    cilostazol (PLETAL) 50 MG tablet Take 50 mg by mouth 2 times daily      Insulin Syringe-Needle U-100 (INSULIN SYRINGE .5CC/30GX1/2\") 30G X 1/2\" 0.5 ML MISC Use__2__times daily. Dx: 250.00  Prescribe this syringe for insulin dosages under  50 units per injection. 100 Syringe 6    Alcohol Swabs (B-D SINGLE USE SWABS REGULAR) PADS       Blood Glucose Calibration (OT ULTRA/FASTTK CNTRL SOLN) SOLN       ONE TOUCH ULTRA TEST strip       Glucose Blood (BLOOD GLUCOSE TEST STRIPS) STRP Test_3__times daily Diagnosis: 250.0   Diabetes Mellitus__x__Insulin Dependent____Non-Insulin Dependent Life Scan test strips ultra one touch 300 strip 3    INSULIN SYRINGE 1CC/29G (CVS INSULIN SYRINGE 1CC/29G) 29G X 1/2\" 1 ML MISC 1 each by Does not apply route daily.  100 3.  Discussed use, benefit, and side effects of prescribed medications. Barriers to medication compliance addressed. All her questions were answered. Pt voiced understanding. Frederic Beckett will continue current medications, diet and exercise. Orders Placed This Encounter   Medications    traMADol (ULTRAM) 50 MG tablet     Sig: TAKE 1 TABLET TWICE DAILY. Dispense:  180 tablet     Refill:  0    Compression Stockings MISC     Sig: by Does not apply route 20-40 mmHg. Dispense:  2 each     Refill:  0          Completed Refills               Requested Prescriptions     Signed Prescriptions Disp Refills    traMADol (ULTRAM) 50 MG tablet 180 tablet 0     Sig: TAKE 1 TABLET TWICE DAILY.  Compression Stockings MISC 2 each 0     Sig: by Does not apply route 20-40 mmHg. 4. Patient given educational materials - see patient instructions    5. Was a self-tracking handout given in paper form or via [employer]? No  If yes, see orders or list here. Orders Placed This Encounter   Procedures    Urine Drug Screen     Standing Status:   Future     Standing Expiration Date:   11/7/2018     Scheduling Instructions:      Please get this labwork done before your next visit.  POCT glycosylated hemoglobin (Hb A1C)       Return in about 3 months (around 2/7/2018). Patient agreed with treatment plan.     Electronically signed by Maggy Balderrama MD on 11/7/2017 at 10:38 AM

## 2017-11-08 RX ORDER — CALCITRIOL 0.25 UG/1
CAPSULE, LIQUID FILLED ORAL
Qty: 36 CAPSULE | Refills: 3 | Status: SHIPPED | OUTPATIENT
Start: 2017-11-08 | End: 2018-03-26 | Stop reason: ALTCHOICE

## 2017-11-08 NOTE — TELEPHONE ENCOUNTER
stenosis, asymptomatic     Claudication [facility] peripheral vascular disease ([facility]     Diabetes mellitus with peripheral artery disease (HCC)     PAD (peripheral artery disease) (HCC)     Type 2 diabetes mellitus with diabetic chronic kidney disease ([facility]     Coronary artery disease involving coronary bypass graft of native heart without angina pectoris     Essential hypertension     Localized edema     Type 2 diabetes mellitus with stage 3 chronic kidney disease, with long-term current use of insulin (HCC)     Mild nonproliferative diabetic retinopathy of both eyes without macular edema associated with type 2 diabetes mellitus ([facility]

## 2017-11-09 ENCOUNTER — OFFICE VISIT (OUTPATIENT)
Dept: PODIATRY | Age: 77
End: 2017-11-09
Payer: MEDICARE

## 2017-11-09 VITALS
DIASTOLIC BLOOD PRESSURE: 70 MMHG | BODY MASS INDEX: 45.16 KG/M2 | WEIGHT: 230 LBS | HEART RATE: 62 BPM | SYSTOLIC BLOOD PRESSURE: 164 MMHG | HEIGHT: 60 IN

## 2017-11-09 DIAGNOSIS — I73.9 PVD (PERIPHERAL VASCULAR DISEASE) (HCC): ICD-10-CM

## 2017-11-09 DIAGNOSIS — E11.51 TYPE II DIABETES MELLITUS WITH PERIPHERAL CIRCULATORY DISORDER (HCC): Primary | ICD-10-CM

## 2017-11-09 DIAGNOSIS — B35.1 DERMATOPHYTOSIS OF NAIL: ICD-10-CM

## 2017-11-09 DIAGNOSIS — E11.42 DIABETIC POLYNEUROPATHY ASSOCIATED WITH TYPE 2 DIABETES MELLITUS (HCC): ICD-10-CM

## 2017-11-09 PROCEDURE — G8417 CALC BMI ABV UP PARAM F/U: HCPCS | Performed by: PODIATRIST

## 2017-11-09 PROCEDURE — 1123F ACP DISCUSS/DSCN MKR DOCD: CPT | Performed by: PODIATRIST

## 2017-11-09 PROCEDURE — 4040F PNEUMOC VAC/ADMIN/RCVD: CPT | Performed by: PODIATRIST

## 2017-11-09 PROCEDURE — 3017F COLORECTAL CA SCREEN DOC REV: CPT | Performed by: PODIATRIST

## 2017-11-09 PROCEDURE — 3044F HG A1C LEVEL LT 7.0%: CPT | Performed by: PODIATRIST

## 2017-11-09 PROCEDURE — 1090F PRES/ABSN URINE INCON ASSESS: CPT | Performed by: PODIATRIST

## 2017-11-09 PROCEDURE — G8598 ASA/ANTIPLAT THER USED: HCPCS | Performed by: PODIATRIST

## 2017-11-09 PROCEDURE — G8427 DOCREV CUR MEDS BY ELIG CLIN: HCPCS | Performed by: PODIATRIST

## 2017-11-09 PROCEDURE — 99213 OFFICE O/P EST LOW 20 MIN: CPT | Performed by: PODIATRIST

## 2017-11-09 PROCEDURE — 11721 DEBRIDE NAIL 6 OR MORE: CPT | Performed by: PODIATRIST

## 2017-11-09 PROCEDURE — G8399 PT W/DXA RESULTS DOCUMENT: HCPCS | Performed by: PODIATRIST

## 2017-11-09 PROCEDURE — 1036F TOBACCO NON-USER: CPT | Performed by: PODIATRIST

## 2017-11-09 PROCEDURE — G8484 FLU IMMUNIZE NO ADMIN: HCPCS | Performed by: PODIATRIST

## 2017-11-09 NOTE — PROGRESS NOTES
hyperparathyroidism (of renal origin) 10/22/2014    Not on VDRA    Shortness of breath     USES HOME OXYGEN    Sleep apnea     Sleep apnea     Type II or unspecified type diabetes mellitus without mention of complication, not stated as uncontrolled        No Known Allergies  Current Outpatient Prescriptions on File Prior to Visit   Medication Sig Dispense Refill    calcitRIOL (ROCALTROL) 0.25 MCG capsule TAKE 1 CAPSULE ON MONDAY, WEDNESDAY AND FRIDAY 36 capsule 3    traMADol (ULTRAM) 50 MG tablet TAKE 1 TABLET TWICE DAILY. 180 tablet 0    Compression Stockings MISC by Does not apply route 20-40 mmHg. 2 each 0    LANTUS 100 UNIT/ML injection vial INJECT 70 UNITS INTO THE SKIN NIGHTLY 70 mL 2    cyanocobalamin 1000 MCG/ML injection Give patient 6 -1ml vials 6 mL 0    Syringe/Needle, Disp, (SYRINGE 3CC/25GX1\") 25G X 1\" 3 ML MISC Used one every other month.  6 each 0    ACCU-CHEK DONG PLUS strip TEST THREE TIMES DAILY 300 strip 11    metoprolol succinate (TOPROL XL) 50 MG extended release tablet TAKE 1 TABLET EVERY DAY 90 tablet 3    CARTIA  MG extended release capsule TAKE 1 CAPSULE EVERY DAY 90 capsule 3    clopidogrel (PLAVIX) 75 MG tablet TAKE 1 TABLET EVERY DAY 90 tablet 3    lisinopril (PRINIVIL;ZESTRIL) 20 MG tablet TAKE 1 TABLET 2 TIMES DAILY NOTE INCREASE PER DR Adrianna Ruiz. 180 tablet 3    nitroGLYCERIN (NITROSTAT) 0.4 MG SL tablet Place 1 tablet under the tongue as needed for Chest pain 25 tablet 11    atorvastatin (LIPITOR) 20 MG tablet TAKE 1 TABLET EVERY DAY (Patient taking differently: 10 MG TAKE 1 TABLET EVERY DAY) 90 tablet 3    levothyroxine (SYNTHROID) 125 MCG tablet TAKE 1 TABLET EVERY DAY 90 tablet 3    B-D INS SYR ULTRAFINE 1CC/30G 30G X 1/2\" 1 ML MISC USE 1 SYRINGE EVERY  each 3    lansoprazole (PREVACID) 15 MG delayed release capsule TAKE 1 CAPSULE EVERY DAY 90 capsule 3    cilostazol (PLETAL) 50 MG tablet Take 50 mg by mouth 2 times daily      Insulin Syringe-Needle U-100 (INSULIN SYRINGE .5CC/30GX1/2\") 30G X 1/2\" 0.5 ML MISC Use__2__times daily. Dx: 250.00  Prescribe this syringe for insulin dosages under  50 units per injection. 100 Syringe 6    Alcohol Swabs (B-D SINGLE USE SWABS REGULAR) PADS       Blood Glucose Calibration (OT ULTRA/FASTTK CNTRL SOLN) SOLN       ONE TOUCH ULTRA TEST strip       Glucose Blood (BLOOD GLUCOSE TEST STRIPS) STRP Test_3__times daily Diagnosis: 250.0   Diabetes Mellitus__x__Insulin Dependent____Non-Insulin Dependent Life Scan test strips ultra one touch 300 strip 3    INSULIN SYRINGE 1CC/29G (CVS INSULIN SYRINGE 1CC/29G) 29G X 1/2\" 1 ML MISC 1 each by Does not apply route daily. 100 each 11    aspirin EC 81 MG EC tablet Take 2 tablets by mouth daily. 30 tablet 3    calcium carbonate (TUMS) 500 MG chewable tablet Take 2 tablets by mouth 3 times daily.  Multiple Vitamins-Minerals (OCUVITE ADULT 50+ PO) Take 1 tablet by mouth 2 times daily.  Green Tea, Camillia sinensis, 315 MG CAPS Take 1 tablet by mouth 2 times daily.  Cinnamon 500 MG TABS Take 1 tablet by mouth daily.  Cranberry-Vitamin C-Vitamin E (CRANBERRY PLUS VITAMIN C PO) Take 1 tablet by mouth daily.  Ascorbic Acid (VITAMIN C WITH VIVIENNE HIPS) 500 MG tablet Take 500 mg by mouth daily.  Omega-3 Fatty Acids (FISH OIL) 1000 MG CAPS Take 1,000 mg by mouth daily.  docusate sodium (COLACE) 100 MG capsule Take 100 mg by mouth 2 times daily.  Blood Glucose Monitoring Suppl (ONE TOUCH ULTRA) by Does not apply route. No current facility-administered medications on file prior to visit. Review of Systems          Objective:  General: AAO x 3 in NAD.     Derm  Toenail Description  Sites of Onychomycosis Involvement (Check affected area)  [x] [x] [x] [x] [x] [x] [x] [x] [x] [x]  5 4 3 2 1 1 2 3 4 5                          Right                                        Left    Thickness  [x] [x] [x] [x] [x] [x] [x] [x] [x] [x]  5 4 3 2 1 1 2 3 4 5 DEBRIDEMENT OF NAILS, 6 OR MORE     DIABETES FOOT EXAM   2. Diabetic polyneuropathy associated with type 2 diabetes mellitus (HCC)  82703 - PA DEBRIDEMENT OF NAILS, 6 OR MORE    [facility] DIABETES FOOT EXAM   3. Dermatophytosis of nail  39251 - PA DEBRIDEMENT OF NAILS, 6 OR MORE           Q7   []Yes    []No                Q8   [x]Yes    []No                     Q9   []Yes    []No    Plan:   Pt was evaluated and examined. Patient was given personalized discharge instructions. Nails 1-10 were debrided sharply in length and thickness with a nipper and , without incident. Pt will follow up in 9 weeks or sooner if any problems arise. Diagnosis was discussed with the pt and all of their questions were answered in detail. Proper foot hygiene and care was discussed with the pt. Informed patient on proper diabetic foot care and importance of tight glycemic control. Patient to check feet daily and contact the office with any questions/problems/concerns. Other comorbidity noted and will be managed by PCP.   11/9/2017    Electronically signed by Dr. Burton Case DPM on 11/9/2017 at 11:45 AM

## 2017-12-05 ENCOUNTER — HOSPITAL ENCOUNTER (OUTPATIENT)
Dept: PAIN MANAGEMENT | Age: 77
Discharge: HOME | End: 2017-12-05

## 2017-12-06 ENCOUNTER — HOSPITAL ENCOUNTER (OUTPATIENT)
Dept: PAIN MANAGEMENT | Age: 77
Discharge: HOME | End: 2017-12-06
Payer: MEDICARE

## 2017-12-06 VITALS — TEMPERATURE: 98.4 F | DIASTOLIC BLOOD PRESSURE: 80 MMHG | SYSTOLIC BLOOD PRESSURE: 160 MMHG | HEART RATE: 55 BPM

## 2017-12-06 DIAGNOSIS — M47.16 SPONDYLOSIS WITH MYELOPATHY, LUMBAR REGION: Primary | Chronic | ICD-10-CM

## 2017-12-06 PROCEDURE — 99212 OFFICE O/P EST SF 10 MIN: CPT | Performed by: ANESTHESIOLOGY

## 2017-12-06 PROCEDURE — 97112 NEUROMUSCULAR REEDUCATION: CPT

## 2017-12-06 PROCEDURE — 97032 APPL MODALITY 1+ESTIM EA 15: CPT

## 2017-12-06 ASSESSMENT — PAIN DESCRIPTION - ONSET: ONSET: ON-GOING

## 2017-12-06 ASSESSMENT — PAIN DESCRIPTION - LOCATION: LOCATION: BACK

## 2017-12-06 ASSESSMENT — PAIN DESCRIPTION - PROGRESSION: CLINICAL_PROGRESSION: NOT CHANGED

## 2017-12-06 ASSESSMENT — PAIN DESCRIPTION - FREQUENCY: FREQUENCY: INTERMITTENT

## 2017-12-06 ASSESSMENT — PAIN DESCRIPTION - ORIENTATION: ORIENTATION: RIGHT;ANTERIOR;LOWER

## 2017-12-06 ASSESSMENT — PAIN DESCRIPTION - DESCRIPTORS: DESCRIPTORS: ACHING;CONSTANT;NAGGING

## 2017-12-06 ASSESSMENT — PAIN DESCRIPTION - PAIN TYPE: TYPE: CHRONIC PAIN

## 2017-12-06 ASSESSMENT — PAIN SCALES - GENERAL: PAINLEVEL_OUTOF10: 5

## 2017-12-06 NOTE — PROGRESS NOTES
BP (!) 160/80   Pulse 55   Temp 98.4 °F (36.9 °C)      Controlled Substances Monitoring:          General Appearance: alert and oriented to person, place and time, well-developed and well-nourished, in no acute distress and   Musculoskeletal: LBP: with neurostim treatment with positive response. Now on a 2 treatment regimen. Could benefit from TENS. Neurologic: no changes  Mood and affect: alert and oriented to person, place, time and situation    Impression  Patient Active Problem List   Diagnosis    Hypercholesteremia    CAD (coronary artery disease)    Hypothyroidism    Spondylosis with myelopathy, lumbar region    Osteoporosis screening    Screening mammogram, encounter for    S/P cardiac cath 8/19/14-Dr. Loc Rosales    CKD (chronic kidney disease) stage 3, GFR 30-59 ml/min    Morbid obesity with BMI of 45.0-49.9, adult ([address]    At high risk for hyperkalemia    Edema    COPD (chronic obstructive pulmonary disease) ([address]    Secondary renal hyperparathyroidism ([address]    Carotid stenosis, asymptomatic    Claudication in peripheral vascular disease ([address]    Diabetes mellitus with peripheral artery disease ([address]    PAD (peripheral artery disease) ([address]    Type 2 diabetes mellitus with diabetic chronic kidney disease ([address]    Coronary artery disease involving coronary bypass graft of native heart without angina pectoris    Essential hypertension    Localized edema    Type 2 diabetes mellitus with stage 3 chronic kidney disease, with long-term current use of insulin (HCC)    Mild nonproliferative diabetic retinopathy of both eyes without macular edema associated with type 2 diabetes mellitus ([address]       Plan of Care  Continue neurostim\  TENS may benefit         I have made any additions and/or corrections, if necessary, to the above scribed information. I have reviewed and agree with the above information.       The patient is here today for Horizontal Treatment # maint     The patient's diagnosis

## 2018-01-29 RX ORDER — LEVOTHYROXINE SODIUM 0.12 MG/1
TABLET ORAL
Qty: 90 TABLET | Refills: 3 | Status: SHIPPED | OUTPATIENT
Start: 2018-01-29 | End: 2018-10-23 | Stop reason: SDUPTHER

## 2018-01-29 RX ORDER — LANSOPRAZOLE 15 MG/1
CAPSULE, DELAYED RELEASE ORAL
Qty: 90 CAPSULE | Refills: 3 | Status: SHIPPED | OUTPATIENT
Start: 2018-01-29 | End: 2018-10-23 | Stop reason: SDUPTHER

## 2018-01-29 NOTE — TELEPHONE ENCOUNTER
Next Visit Date:  Future Appointments  Date Time Provider Lena Ledezma   2/6/2018 1:00 PM STV PAIN EXAM RM 2 [facility]   2/7/2018 10:40 AM Nichelle Stiles MD [facility]   2/22/2018 10:45 AM RODRÍGUEZ [facility]   5/9/2018 10:30 AM Laverne Mcgarry MD [facility] None       Health Maintenance   Topic Date Due    TSH testing  04/07/2015    DTaP/Tdap/Td vaccine (1 - Tdap) 11/12/2015    Potassium monitoring  10/31/2018    Creatinine monitoring  10/31/2018    Lipid screen  10/31/2022    Zostavax vaccine  Completed    DEXA (modify frequency per FRAX score)  Completed    Flu vaccine  Completed    Pneumococcal high/highest risk  Completed       Hemoglobin A1C (%)   Date Value   11/07/2017 6.7   08/22/2017 6.8   02/13/2017 6.9             ( goal A1C is < 7)   Microalb/Crt.  Ratio (mcg/mg creat)   Date Value   10/26/2016 259 (H)     LDL Cholesterol (mg/dL)   Date Value   10/31/2017 34       (goal LDL is <100)   AST (U/L)   Date Value   06/23/2016 15     ALT (U/L)   Date Value   06/23/2016 9     BUN (mg/dL)   Date Value   10/31/2017 16     BP Readings from Last 3 Encounters:   12/06/17 (!) 160/80   11/15/17 120/60   11/09/17 (!) 164/70          (goal 120/80)    All Future Testing planned in [facility]  Lab Frequency Next Occurrence   Phosphorus     PTH, Intact     CBC     Albumin     Basic Metabolic Panel     Creatinine, Random Urine     Protein, urine, random                 Patient Active Problem List:     Hypercholesteremia     CAD (coronary artery disease)     Hypothyroidism     Spondylosis with myelopathy, lumbar region     Osteoporosis screening     Screening mammogram, encounter for     S/P cardiac cath 8/19/14-Dr. covarrubias     CKD (chronic kidney disease) stage 3, GFR 30-59 ml/min     Morbid obesity with BMI of 45.0-49.9, adult (HCC)     At high risk for hyperkalemia     Edema     COPD (chronic obstructive pulmonary disease) ([facility])     Secondary renal hyperparathyroidism ([facility]     Carotid stenosis, asymptomatic     Claudication in peripheral vascular disease (HCC)     Diabetes mellitus with peripheral artery disease (HCC)     PAD (peripheral artery disease) ([facility]     Type 2 diabetes mellitus with diabetic chronic kidney disease ([facility]     Coronary artery disease involving coronary bypass graft of native heart without angina pectoris     Essential hypertension     Localized edema     Type 2 diabetes mellitus with stage 3 chronic kidney disease, with long-term current use of insulin ([facility])     Mild nonproliferative diabetic retinopathy of both eyes without macular edema associated with type 2 diabetes mellitus ([facility]

## 2018-02-06 DIAGNOSIS — G89.29 CHRONIC BILATERAL LOW BACK PAIN WITHOUT SCIATICA: Primary | ICD-10-CM

## 2018-02-06 DIAGNOSIS — M54.50 CHRONIC BILATERAL LOW BACK PAIN WITHOUT SCIATICA: Primary | ICD-10-CM

## 2018-02-06 RX ORDER — TRAMADOL HYDROCHLORIDE 50 MG/1
TABLET ORAL
Qty: 180 TABLET | Refills: 0 | Status: SHIPPED | OUTPATIENT
Start: 2018-02-06 | End: 2018-05-11 | Stop reason: SDUPTHER

## 2018-02-06 NOTE — TELEPHONE ENCOUNTER
Next Visit Date:  Future Appointments  Date Time Provider Lena Brisa   2/7/2018 10:40 AM MD [facility]   2/14/2018 1:00 PM STV PAIN EXAM RM 2 [facility] PAIN [facility]   2/22/2018 10:45 AM RODRÍGUEZ Frausto [facility]   5/9/2018 10:30 AM Laverne Alejandro MD [facility] None       Health Maintenance   Topic Date Due    TSH testing  04/07/2015    DTaP/Tdap/Td vaccine (1 - Tdap) 11/12/2015    Potassium monitoring  10/31/2018    Creatinine monitoring  10/31/2018    Lipid screen  10/31/2022    Zostavax vaccine  Completed    DEXA (modify frequency per FRAX score)  Completed    Flu vaccine  Completed    Pneumococcal high/highest risk  Completed       Hemoglobin A1C (%)   Date Value   11/07/2017 6.7   08/22/2017 6.8   02/13/2017 6.9             ( goal A1C is < 7)   Microalb/Crt.  Ratio (mcg/mg creat)   Date Value   10/26/2016 259 (H)     LDL Cholesterol (mg/dL)   Date Value   10/31/2017 34       (goal LDL is <100)   AST (U/L)   Date Value   06/23/2016 15     ALT (U/L)   Date Value   06/23/2016 9     BUN (mg/dL)   Date Value   10/31/2017 16     BP Readings from Last 3 Encounters:   12/06/17 (!) 160/80   11/15/17 120/60   11/09/17 (!) 164/70          (goal 120/80)    All Future Testing planned in [facility]  Lab Frequency Next Occurrence   Phosphorus     PTH, Intact     CBC     Albumin     Basic Metabolic Panel     Creatinine, Random Urine     Protein, urine, random                 Patient Active Problem List:     Hypercholesteremia     CAD (coronary artery disease)     Hypothyroidism     Spondylosis with myelopathy, lumbar region     Osteoporosis screening     Screening mammogram, encounter for     S/P cardiac cath 8/19/14-Dr. covarrubias     CKD (chronic kidney disease) stage 3, GFR 30-59 ml/min     Morbid obesity with BMI of 45.0-49.9, adult (HCC)     At high risk for hyperkalemia     Edema     COPD (chronic obstructive pulmonary disease) ([facility])     Secondary renal

## 2018-02-13 ENCOUNTER — HOSPITAL ENCOUNTER (OUTPATIENT)
Age: 78
Discharge: HOME | End: 2018-02-13
Payer: MEDICARE

## 2018-02-13 ENCOUNTER — OFFICE VISIT (OUTPATIENT)
Dept: INTERNAL MEDICINE | Age: 78
End: 2018-02-13
Payer: MEDICARE

## 2018-02-13 VITALS
RESPIRATION RATE: 12 BRPM | WEIGHT: 228.2 LBS | HEART RATE: 85 BPM | SYSTOLIC BLOOD PRESSURE: 160 MMHG | OXYGEN SATURATION: 98 % | DIASTOLIC BLOOD PRESSURE: 72 MMHG | HEIGHT: 60 IN | BODY MASS INDEX: 44.8 KG/M2

## 2018-02-13 DIAGNOSIS — N18.30 TYPE 2 DIABETES MELLITUS WITH STAGE 3 CHRONIC KIDNEY DISEASE, WITH LONG-TERM CURRENT USE OF INSULIN (HCC): Primary | ICD-10-CM

## 2018-02-13 DIAGNOSIS — I10 ESSENTIAL HYPERTENSION: ICD-10-CM

## 2018-02-13 DIAGNOSIS — E66.01 MORBID OBESITY WITH BMI OF 40.0-44.9, ADULT (HCC): ICD-10-CM

## 2018-02-13 DIAGNOSIS — E11.3293 NONPROLIFERATIVE DIABETIC RETINOPATHY OF BOTH EYES (HCC): ICD-10-CM

## 2018-02-13 DIAGNOSIS — I73.9 PAD (PERIPHERAL ARTERY DISEASE) (HCC): ICD-10-CM

## 2018-02-13 DIAGNOSIS — E11.3293 MILD NONPROLIFERATIVE DIABETIC RETINOPATHY OF BOTH EYES WITHOUT MACULAR EDEMA ASSOCIATED WITH TYPE 2 DIABETES MELLITUS (HCC): ICD-10-CM

## 2018-02-13 DIAGNOSIS — E11.51 DM (DIABETES MELLITUS), TYPE 2 WITH PERIPHERAL VASCULAR COMPLICATIONS (HCC): ICD-10-CM

## 2018-02-13 DIAGNOSIS — N18.30 CKD (CHRONIC KIDNEY DISEASE) STAGE 3, GFR 30-59 ML/MIN (HCC): ICD-10-CM

## 2018-02-13 DIAGNOSIS — Z79.4 TYPE 2 DIABETES MELLITUS WITH STAGE 3 CHRONIC KIDNEY DISEASE, WITH LONG-TERM CURRENT USE OF INSULIN (HCC): ICD-10-CM

## 2018-02-13 DIAGNOSIS — E11.22 TYPE 2 DIABETES MELLITUS WITH STAGE 3 CHRONIC KIDNEY DISEASE, WITH LONG-TERM CURRENT USE OF INSULIN (HCC): Primary | ICD-10-CM

## 2018-02-13 DIAGNOSIS — Z12.39 BREAST CANCER SCREENING: ICD-10-CM

## 2018-02-13 DIAGNOSIS — E11.22 TYPE 2 DIABETES MELLITUS WITH STAGE 3 CHRONIC KIDNEY DISEASE, WITH LONG-TERM CURRENT USE OF INSULIN (HCC): ICD-10-CM

## 2018-02-13 DIAGNOSIS — Z79.4 TYPE 2 DIABETES MELLITUS WITH STAGE 3 CHRONIC KIDNEY DISEASE, WITH LONG-TERM CURRENT USE OF INSULIN (HCC): Primary | ICD-10-CM

## 2018-02-13 DIAGNOSIS — N18.30 TYPE 2 DIABETES MELLITUS WITH STAGE 3 CHRONIC KIDNEY DISEASE, WITH LONG-TERM CURRENT USE OF INSULIN (HCC): ICD-10-CM

## 2018-02-13 DIAGNOSIS — J43.1 PANLOBULAR EMPHYSEMA (HCC): ICD-10-CM

## 2018-02-13 LAB
ALBUMIN SERPL-MCNC: 3.8 G/DL (ref 3.5–5.2)
ANION GAP SERPL CALCULATED.3IONS-SCNC: 17 MMOL/L (ref 9–17)
BUN BLDV-MCNC: 14 MG/DL (ref 8–23)
BUN/CREAT BLD: ABNORMAL (ref 9–20)
CALCIUM IONIZED: 1.35 MMOL/L (ref 1.13–1.33)
CALCIUM SERPL-MCNC: 9.5 MG/DL (ref 8.6–10.4)
CHLORIDE BLD-SCNC: 104 MMOL/L (ref 98–107)
CO2: 19 MMOL/L (ref 20–31)
CREAT SERPL-MCNC: 1.67 MG/DL (ref 0.5–0.9)
CREATININE URINE: 217.8 MG/DL (ref 28–217)
GFR AFRICAN AMERICAN: 36 ML/MIN
GFR NON-AFRICAN AMERICAN: 30 ML/MIN
GFR SERPL CREATININE-BSD FRML MDRD: ABNORMAL ML/MIN/{1.73_M2}
GFR SERPL CREATININE-BSD FRML MDRD: ABNORMAL ML/MIN/{1.73_M2}
GLUCOSE BLD-MCNC: 105 MG/DL (ref 70–99)
HBA1C MFR BLD: 6.3 %
HCT VFR BLD CALC: 39.6 % (ref 36.3–47.1)
HEMOGLOBIN: 11.9 G/DL (ref 11.9–15.1)
MCH RBC QN AUTO: 27.4 PG (ref 25.2–33.5)
MCHC RBC AUTO-ENTMCNC: 30.1 G/DL (ref 28.4–34.8)
MCV RBC AUTO: 91 FL (ref 82.6–102.9)
MICROALBUMIN/CREAT 24H UR: 609 MG/L
MICROALBUMIN/CREAT UR-RTO: 280 MCG/MG CREAT
NRBC AUTOMATED: 0 PER 100 WBC
PDW BLD-RTO: 14.4 % (ref 11.8–14.4)
PHOSPHORUS: 3.8 MG/DL (ref 2.6–4.5)
PLATELET # BLD: 192 K/UL (ref 138–453)
PMV BLD AUTO: 12 FL (ref 8.1–13.5)
POTASSIUM SERPL-SCNC: 4.6 MMOL/L (ref 3.7–5.3)
PTH INTACT: 127.8 PG/ML (ref 15–65)
RBC # BLD: 4.35 M/UL (ref 3.95–5.11)
SODIUM BLD-SCNC: 140 MMOL/L (ref 135–144)
TOTAL PROTEIN, URINE: 109 MG/DL
WBC # BLD: 7.4 K/UL (ref 3.5–11.3)

## 2018-02-13 PROCEDURE — 82330 ASSAY OF CALCIUM: CPT

## 2018-02-13 PROCEDURE — 3023F SPIROM DOC REV: CPT | Performed by: INTERNAL MEDICINE

## 2018-02-13 PROCEDURE — 1036F TOBACCO NON-USER: CPT | Performed by: INTERNAL MEDICINE

## 2018-02-13 PROCEDURE — G8926 SPIRO NO PERF OR DOC: HCPCS | Performed by: INTERNAL MEDICINE

## 2018-02-13 PROCEDURE — 84156 ASSAY OF PROTEIN URINE: CPT

## 2018-02-13 PROCEDURE — 83970 ASSAY OF PARATHORMONE: CPT

## 2018-02-13 PROCEDURE — 84100 ASSAY OF PHOSPHORUS: CPT

## 2018-02-13 PROCEDURE — G8484 FLU IMMUNIZE NO ADMIN: HCPCS | Performed by: INTERNAL MEDICINE

## 2018-02-13 PROCEDURE — 4040F PNEUMOC VAC/ADMIN/RCVD: CPT | Performed by: INTERNAL MEDICINE

## 2018-02-13 PROCEDURE — 85027 COMPLETE CBC AUTOMATED: CPT

## 2018-02-13 PROCEDURE — 1090F PRES/ABSN URINE INCON ASSESS: CPT | Performed by: INTERNAL MEDICINE

## 2018-02-13 PROCEDURE — 1123F ACP DISCUSS/DSCN MKR DOCD: CPT | Performed by: INTERNAL MEDICINE

## 2018-02-13 PROCEDURE — 82570 ASSAY OF URINE CREATININE: CPT

## 2018-02-13 PROCEDURE — G8427 DOCREV CUR MEDS BY ELIG CLIN: HCPCS | Performed by: INTERNAL MEDICINE

## 2018-02-13 PROCEDURE — G8598 ASA/ANTIPLAT THER USED: HCPCS | Performed by: INTERNAL MEDICINE

## 2018-02-13 PROCEDURE — 36415 COLL VENOUS BLD VENIPUNCTURE: CPT

## 2018-02-13 PROCEDURE — 83036 HEMOGLOBIN GLYCOSYLATED A1C: CPT | Performed by: INTERNAL MEDICINE

## 2018-02-13 PROCEDURE — G8417 CALC BMI ABV UP PARAM F/U: HCPCS | Performed by: INTERNAL MEDICINE

## 2018-02-13 PROCEDURE — 82043 UR ALBUMIN QUANTITATIVE: CPT

## 2018-02-13 PROCEDURE — G8399 PT W/DXA RESULTS DOCUMENT: HCPCS | Performed by: INTERNAL MEDICINE

## 2018-02-13 PROCEDURE — 99214 OFFICE O/P EST MOD 30 MIN: CPT | Performed by: INTERNAL MEDICINE

## 2018-02-13 PROCEDURE — 82040 ASSAY OF SERUM ALBUMIN: CPT

## 2018-02-13 PROCEDURE — 80048 BASIC METABOLIC PNL TOTAL CA: CPT

## 2018-02-13 RX ORDER — NITROGLYCERIN 0.4 MG/1
0.4 TABLET SUBLINGUAL PRN
Qty: 25 TABLET | Refills: 11 | Status: SHIPPED | OUTPATIENT
Start: 2018-02-13 | End: 2018-10-17 | Stop reason: SDUPTHER

## 2018-02-13 RX ORDER — INSULIN GLARGINE 100 [IU]/ML
INJECTION, SOLUTION SUBCUTANEOUS
Qty: 3 VIAL | Refills: 5 | Status: SHIPPED | OUTPATIENT
Start: 2018-02-13 | End: 2019-02-26 | Stop reason: SDUPTHER

## 2018-02-13 ASSESSMENT — ENCOUNTER SYMPTOMS
GASTROINTESTINAL NEGATIVE: 1
DIFFICULTY BREATHING: 1
WHEEZING: 1
EYES NEGATIVE: 1

## 2018-02-13 ASSESSMENT — COPD QUESTIONNAIRES: COPD: 1

## 2018-02-13 NOTE — PROGRESS NOTES
(ULTRAM) 50 MG tablet TAKE 1 TABLET TWICE DAILY 180 tablet 0    levothyroxine (SYNTHROID) 125 MCG tablet TAKE 1 TABLET EVERY DAY 90 tablet 3    lansoprazole (PREVACID) 15 MG delayed release capsule TAKE 1 CAPSULE EVERY DAY 90 capsule 3    sevelamer (RENVELA) 800 MG tablet Take 1 tablet by mouth 3 times daily (with meals) 270 tablet 3    calcitRIOL (ROCALTROL) 0.25 MCG capsule TAKE 1 CAPSULE ON MONDAY, WEDNESDAY AND FRIDAY 36 capsule 3    Compression Stockings MISC by Does not apply route 20-40 mmHg. 2 each 0    cyanocobalamin 1000 MCG/ML injection Give patient 6 -1ml vials 6 mL 0    Syringe/Needle, Disp, (SYRINGE 3CC/25GX1\") 25G X 1\" 3 ML MISC Used one every other month. 6 each 0    ACCU-CHEK DONG PLUS strip TEST THREE TIMES DAILY 300 strip 11    metoprolol succinate (TOPROL XL) 50 MG extended release tablet TAKE 1 TABLET EVERY DAY 90 tablet 3    CARTIA  MG extended release capsule TAKE 1 CAPSULE EVERY DAY 90 capsule 3    clopidogrel (PLAVIX) 75 MG tablet TAKE 1 TABLET EVERY DAY 90 tablet 3    lisinopril (PRINIVIL;ZESTRIL) 20 MG tablet TAKE 1 TABLET 2 TIMES DAILY NOTE INCREASE PER DR Tony Berrios 180 tablet 3    atorvastatin (LIPITOR) 20 MG tablet TAKE 1 TABLET EVERY DAY (Patient taking differently: 10 MG TAKE 1 TABLET EVERY DAY) 90 tablet 3    B-D INS SYR ULTRAFINE 1CC/30G 30G X 1/2\" 1 ML MISC USE 1 SYRINGE EVERY  each 3    cilostazol (PLETAL) 50 MG tablet Take 50 mg by mouth 2 times daily      Insulin Syringe-Needle U-100 (INSULIN SYRINGE .5CC/30GX1/2\") 30G X 1/2\" 0.5 ML MISC Use__2__times daily. Dx: 250.00  Prescribe this syringe for insulin dosages under  50 units per injection.  100 Syringe 6    Alcohol Swabs (B-D SINGLE USE SWABS REGULAR) PADS       Blood Glucose Calibration (OT ULTRA/FASTTK CNTRL SOLN) SOLN       Glucose Blood (BLOOD GLUCOSE TEST STRIPS) STRP Test_3__times daily Diagnosis: 250.0   Diabetes Mellitus__x__Insulin Dependent____Non-Insulin Dependent Life Scan test strips exposure. Current diabetic treatment includes insulin injections. She is compliant with treatment all of the time. She is following a diabetic diet. Home blood sugar record trend: A1C is 6.5. An ACE inhibitor/angiotensin II receptor blocker is being taken. COPD   She complains of difficulty breathing and wheezing. This is a chronic problem. The current episode started more than 1 year ago. The problem has been unchanged. Her symptoms are aggravated by exposure to smoke. Her symptoms are alleviated by steroid inhaler and beta-agonist. She reports moderate improvement on treatment. Risk factors for lung disease include smoking/tobacco exposure. Her past medical history is significant for emphysema. Hypertension   This is a chronic problem. The current episode started more than 1 year ago. The problem is unchanged. The problem is controlled. There are no associated agents to hypertension. Risk factors for coronary artery disease include diabetes mellitus, dyslipidemia and obesity. Past treatments include ACE inhibitors, beta blockers and calcium channel blockers. There are no compliance problems. Hypertensive end-organ damage includes kidney disease and retinopathy. ROS:     Review of Systems   Constitutional: Negative. HENT: Negative. Eyes: Negative. Respiratory: Positive for wheezing. Cardiovascular: Negative. Gastrointestinal: Negative. Endocrine: Negative. Genitourinary: Negative. Musculoskeletal: Negative. Skin: Negative. Neurological: Negative. Hematological: Negative. Psychiatric/Behavioral: Negative. All other systems reviewed and are negative. Objective:     Physical Exam   Constitutional: She is oriented to person, place, and time. She appears well-developed and well-nourished. HENT:   Head: Normocephalic and atraumatic. Neck: Neck supple. Cardiovascular: Normal rate and regular rhythm.     Pulmonary/Chest: Effort normal and breath sounds normal.

## 2018-02-14 ENCOUNTER — HOSPITAL ENCOUNTER (OUTPATIENT)
Dept: PAIN MANAGEMENT | Age: 78
Discharge: HOME | End: 2018-02-14
Payer: MEDICARE

## 2018-02-14 VITALS
HEART RATE: 77 BPM | TEMPERATURE: 98.1 F | SYSTOLIC BLOOD PRESSURE: 145 MMHG | RESPIRATION RATE: 16 BRPM | WEIGHT: 228 LBS | BODY MASS INDEX: 44.76 KG/M2 | DIASTOLIC BLOOD PRESSURE: 51 MMHG | HEIGHT: 60 IN

## 2018-02-14 DIAGNOSIS — M47.16 SPONDYLOSIS WITH MYELOPATHY, LUMBAR REGION: Primary | Chronic | ICD-10-CM

## 2018-02-14 PROCEDURE — 97032 APPL MODALITY 1+ESTIM EA 15: CPT

## 2018-02-14 PROCEDURE — 99213 OFFICE O/P EST LOW 20 MIN: CPT | Performed by: ANESTHESIOLOGY

## 2018-02-14 ASSESSMENT — PAIN DESCRIPTION - PROGRESSION: CLINICAL_PROGRESSION: NOT CHANGED

## 2018-02-14 ASSESSMENT — PAIN DESCRIPTION - LOCATION: LOCATION: BACK

## 2018-02-14 ASSESSMENT — PAIN DESCRIPTION - DESCRIPTORS: DESCRIPTORS: ACHING;CONSTANT

## 2018-02-14 ASSESSMENT — PAIN DESCRIPTION - ORIENTATION: ORIENTATION: LOWER

## 2018-02-14 ASSESSMENT — PAIN DESCRIPTION - ONSET: ONSET: ON-GOING

## 2018-02-14 ASSESSMENT — PAIN SCALES - GENERAL: PAINLEVEL_OUTOF10: 7

## 2018-02-14 ASSESSMENT — PAIN DESCRIPTION - FREQUENCY: FREQUENCY: CONTINUOUS

## 2018-02-14 NOTE — PROGRESS NOTES
mouth 3 times daily (with meals) 270 tablet 3    Compression Stockings MISC by Does not apply route 20-40 mmHg. 2 each 0    cyanocobalamin 1000 MCG/ML injection Give patient 6 -1ml vials 6 mL 0    Syringe/Needle, Disp, (SYRINGE 3CC/25GX1\") 25G X 1\" 3 ML MISC Used one every other month. 6 each 0    ACCU-CHEK DONG PLUS strip TEST THREE TIMES DAILY 300 strip 11    metoprolol succinate (TOPROL XL) 50 MG extended release tablet TAKE 1 TABLET EVERY DAY 90 tablet 3    CARTIA  MG extended release capsule TAKE 1 CAPSULE EVERY DAY 90 capsule 3    clopidogrel (PLAVIX) 75 MG tablet TAKE 1 TABLET EVERY DAY 90 tablet 3    lisinopril (PRINIVIL;ZESTRIL) 20 MG tablet TAKE 1 TABLET 2 TIMES DAILY NOTE INCREASE PER DR Anna Early. 180 tablet 3    atorvastatin (LIPITOR) 20 MG tablet TAKE 1 TABLET EVERY DAY (Patient taking differently: 10 MG TAKE 1 TABLET EVERY DAY) 90 tablet 3    B-D INS SYR ULTRAFINE 1CC/30G 30G X 1/2\" 1 ML MISC USE 1 SYRINGE EVERY  each 3    cilostazol (PLETAL) 50 MG tablet Take 50 mg by mouth 2 times daily      Insulin Syringe-Needle U-100 (INSULIN SYRINGE .5CC/30GX1/2\") 30G X 1/2\" 0.5 ML MISC Use__2__times daily. Dx: 250.00  Prescribe this syringe for insulin dosages under  50 units per injection. 100 Syringe 6    Alcohol Swabs (B-D SINGLE USE SWABS REGULAR) PADS       Blood Glucose Calibration (OT ULTRA/FASTTK CNT SOLN) SOLN       Glucose Blood (BLOOD GLUCOSE TEST STRIPS) STRP Test_3__times daily Diagnosis: 250.0   Diabetes Mellitus__x__Insulin Dependent____Non-Insulin Dependent Life Scan test strips ultra one touch 300 strip 3    INSULIN SYRINGE 1CC/29G ([employer] INSULIN SYRINGE 1CC/29G) 29G X 1/2\" 1 ML MISC 1 each by Does not apply route daily. 100 each 11    aspirin EC 81 MG EC tablet Take 2 tablets by mouth daily. 30 tablet 3    calcium carbonate (TUMS) 500 MG chewable tablet Take 2 tablets by mouth 3 times daily.       Multiple Vitamins-Minerals (OCUVITE ADULT 50+ PO) Take 1 tablet by mouth 2 times daily.  Green Tea, Camillia sinensis, 315 MG CAPS Take 1 tablet by mouth 2 times daily.  Cinnamon 500 MG TABS Take 1 tablet by mouth daily.  Cranberry-Vitamin C-Vitamin E (CRANBERRY PLUS VITAMIN C PO) Take 1 tablet by mouth daily.  Ascorbic Acid (VITAMIN C WITH VIVIENNE HIPS) 500 MG tablet Take 500 mg by mouth daily.  Omega-3 Fatty Acids (FISH OIL) 1000 MG CAPS Take 1,000 mg by mouth daily.  docusate sodium (COLACE) 100 MG capsule Take 100 mg by mouth 2 times daily.  Blood Glucose Monitoring Suppl (ONE TOUCH ULTRA) by Does not apply route. Are your Current Pain medication (s) controlling the pain  na    Medication Side Effects:  Constipation  No      Sedation  No    Urinary Retention  No  Other Medication Side Effect no        Sleep Pattern, 7 hours per night   generally restful sleep    Working  retired    Home Exercises rarely no regular exercise  occ ride stationary bike    The targeted treatment area today was low back using Channel #1 at program # 15 following the guidelines from page # 16 with the dosage at 63      The treatments for Channel #1 was 45 minutes in length. The patient's pain scale was \"almost a 1/10\" post treatment.      I have written this patient information acting as a scribe for Dr Sahra Abbott    Electronically signed by Edy Nogueira RN on 2/14/2018 at 1:26 PM

## 2018-02-20 ENCOUNTER — TELEPHONE (OUTPATIENT)
Dept: VASCULAR SURGERY | Age: 78
End: 2018-02-20

## 2018-02-22 ENCOUNTER — OFFICE VISIT (OUTPATIENT)
Dept: PODIATRY | Age: 78
End: 2018-02-22
Payer: MEDICARE

## 2018-02-22 VITALS
WEIGHT: 230 LBS | HEIGHT: 62 IN | HEART RATE: 68 BPM | RESPIRATION RATE: 16 BRPM | BODY MASS INDEX: 42.33 KG/M2 | TEMPERATURE: 98.4 F

## 2018-02-22 DIAGNOSIS — M79.672 PAIN IN BOTH FEET: ICD-10-CM

## 2018-02-22 DIAGNOSIS — R60.0 EDEMA OF LOWER EXTREMITY: ICD-10-CM

## 2018-02-22 DIAGNOSIS — D23.71 BENIGN NEOPLASM OF SKIN OF LOWER LIMB, INCLUDING HIP, RIGHT: Primary | ICD-10-CM

## 2018-02-22 DIAGNOSIS — I73.9 PVD (PERIPHERAL VASCULAR DISEASE) (HCC): ICD-10-CM

## 2018-02-22 DIAGNOSIS — M79.671 PAIN IN BOTH FEET: ICD-10-CM

## 2018-02-22 DIAGNOSIS — D23.72 BENIGN NEOPLASM OF SKIN OF LOWER LIMB, INCLUDING HIP, LEFT: ICD-10-CM

## 2018-02-22 DIAGNOSIS — E11.51 TYPE II DIABETES MELLITUS WITH PERIPHERAL CIRCULATORY DISORDER (HCC): ICD-10-CM

## 2018-02-22 DIAGNOSIS — B35.1 DERMATOPHYTOSIS OF NAIL: ICD-10-CM

## 2018-02-22 PROCEDURE — G8427 DOCREV CUR MEDS BY ELIG CLIN: HCPCS | Performed by: PODIATRIST

## 2018-02-22 PROCEDURE — G8484 FLU IMMUNIZE NO ADMIN: HCPCS | Performed by: PODIATRIST

## 2018-02-22 PROCEDURE — 11721 DEBRIDE NAIL 6 OR MORE: CPT | Performed by: PODIATRIST

## 2018-02-22 PROCEDURE — G8598 ASA/ANTIPLAT THER USED: HCPCS | Performed by: PODIATRIST

## 2018-02-22 PROCEDURE — 17110 DESTRUCTION B9 LES UP TO 14: CPT | Performed by: PODIATRIST

## 2018-02-22 PROCEDURE — 99213 OFFICE O/P EST LOW 20 MIN: CPT | Performed by: PODIATRIST

## 2018-02-22 PROCEDURE — G8417 CALC BMI ABV UP PARAM F/U: HCPCS | Performed by: PODIATRIST

## 2018-02-22 PROCEDURE — G8399 PT W/DXA RESULTS DOCUMENT: HCPCS | Performed by: PODIATRIST

## 2018-02-22 PROCEDURE — 1090F PRES/ABSN URINE INCON ASSESS: CPT | Performed by: PODIATRIST

## 2018-02-22 PROCEDURE — 1036F TOBACCO NON-USER: CPT | Performed by: PODIATRIST

## 2018-02-22 PROCEDURE — 1123F ACP DISCUSS/DSCN MKR DOCD: CPT | Performed by: PODIATRIST

## 2018-02-22 PROCEDURE — 4040F PNEUMOC VAC/ADMIN/RCVD: CPT | Performed by: PODIATRIST

## 2018-02-22 NOTE — PROGRESS NOTES
45.0-49.9, adult ([facility] 10/22/2014    Osteoarthritis     Other activity(E029.9)     Acute renal failure secondary to prerenal azotemia    Other activity(E029.9)     Atherosclerotic coronary artery disease status-post bypass surgery in 2003    PVD (peripheral vascular disease) [facility]     Sciatica     Secondary hyperparathyroidism (of renal origin) 10/22/2014    Not on VDRA    Shortness of breath     USES HOME OXYGEN    Sleep apnea     Sleep apnea     Type II or unspecified type diabetes mellitus without mention of complication, not stated as uncontrolled        No Known Allergies  Current Outpatient Prescriptions on File Prior to Visit   Medication Sig Dispense Refill    nitroGLYCERIN (NITROSTAT) 0.4 MG SL tablet Place 1 tablet under the tongue as needed for Chest pain 25 tablet 11    insulin glargine (LANTUS) 100 UNIT/ML injection vial INJECT 70 UNITS INTO THE SKIN NIGHTLY 3 vial 5    traMADol (ULTRAM) 50 MG tablet TAKE 1 TABLET TWICE DAILY 180 tablet 0    levothyroxine (SYNTHROID) 125 MCG tablet TAKE 1 TABLET EVERY DAY 90 tablet 3    lansoprazole (PREVACID) 15 MG delayed release capsule TAKE 1 CAPSULE EVERY DAY 90 capsule 3    sevelamer (RENVELA) 800 MG tablet Take 1 tablet by mouth 3 times daily (with meals) 270 tablet 3    calcitRIOL (ROCALTROL) 0.25 MCG capsule TAKE 1 CAPSULE ON MONDAY, WEDNESDAY AND FRIDAY 36 capsule 3    Compression Stockings MISC by Does not apply route 20-40 mmHg. 2 each 0    cyanocobalamin 1000 MCG/ML injection Give patient 6 -1ml vials 6 mL 0    Syringe/Needle, Disp, (SYRINGE 3CC/25GX1\") 25G X 1\" 3 ML MISC Used one every other month.  6 each 0    ACCU-CHEK DONG PLUS strip TEST THREE TIMES DAILY 300 strip 11    metoprolol succinate (TOPROL XL) 50 MG extended release tablet TAKE 1 TABLET EVERY DAY 90 tablet 3    CARTIA  MG extended release capsule TAKE 1 CAPSULE EVERY DAY 90 capsule 3    clopidogrel (PLAVIX) 75 MG tablet TAKE 1 TABLET EVERY DAY 90 tablet 3    DIABETES FOOT EXAM    14360 - MT DEBRIDEMENT OF NAILS, 6 OR MORE       Plan:    X rays 3 views left foot  taken and show the above findings. Pt was evaluated and examined. Patient was given personalized discharge instructions. The lesion was partially debrided and silver nitrate was applied with an apperature pad under occlusion. The patient will leave in place for 24-48 hours and than remove. Nails 1-10 were debrided sharply in length and thickness with a nipper and , without incident. The patient tolerated the procedure well and without complication.    Pt will follow up in 9 weeks       Electronically signed by Harley Chau DPM on 2/22/2018 at 11:03 AM  2/22/2018

## 2018-02-28 RX ORDER — PEN NEEDLE, DIABETIC 29 G X1/2"
NEEDLE, DISPOSABLE MISCELLANEOUS
Qty: 90 EACH | Refills: 5 | Status: SHIPPED | OUTPATIENT
Start: 2018-02-28

## 2018-03-10 ENCOUNTER — HOSPITAL ENCOUNTER (OUTPATIENT)
Dept: MAMMOGRAPHY | Age: 78
Discharge: HOME | End: 2018-03-12
Payer: MEDICARE

## 2018-03-10 DIAGNOSIS — Z12.39 BREAST CANCER SCREENING: ICD-10-CM

## 2018-03-10 PROCEDURE — 77063 BREAST TOMOSYNTHESIS BI: CPT

## 2018-03-12 ENCOUNTER — INITIAL CONSULT (OUTPATIENT)
Dept: VASCULAR SURGERY | Age: 78
End: 2018-03-12
Payer: MEDICARE

## 2018-03-12 VITALS
SYSTOLIC BLOOD PRESSURE: 122 MMHG | WEIGHT: 229.94 LBS | DIASTOLIC BLOOD PRESSURE: 82 MMHG | BODY MASS INDEX: 42.31 KG/M2 | HEIGHT: 62 IN | RESPIRATION RATE: 19 BRPM

## 2018-03-12 DIAGNOSIS — I73.9 CLAUDICATION IN PERIPHERAL VASCULAR DISEASE (HCC): Primary | ICD-10-CM

## 2018-03-12 PROCEDURE — G8482 FLU IMMUNIZE ORDER/ADMIN: HCPCS | Performed by: SURGERY

## 2018-03-12 PROCEDURE — 99203 OFFICE O/P NEW LOW 30 MIN: CPT | Performed by: SURGERY

## 2018-03-12 PROCEDURE — 1090F PRES/ABSN URINE INCON ASSESS: CPT | Performed by: SURGERY

## 2018-03-12 PROCEDURE — G8427 DOCREV CUR MEDS BY ELIG CLIN: HCPCS | Performed by: SURGERY

## 2018-03-12 PROCEDURE — 4040F PNEUMOC VAC/ADMIN/RCVD: CPT | Performed by: SURGERY

## 2018-03-12 PROCEDURE — G8417 CALC BMI ABV UP PARAM F/U: HCPCS | Performed by: SURGERY

## 2018-03-12 ASSESSMENT — ENCOUNTER SYMPTOMS
SHORTNESS OF BREATH: 0
EYES NEGATIVE: 1
COLOR CHANGE: 0
ALLERGIC/IMMUNOLOGIC NEGATIVE: 1
GASTROINTESTINAL NEGATIVE: 1

## 2018-03-13 NOTE — PROGRESS NOTES
4/20/2016    Morbid obesity with BMI of 45.0-49.9, adult ([facility] 10/22/2014    Osteoarthritis     Other activity(E029.9)     Acute renal failure secondary to prerenal azotemia    Other activity(E029.9)     Atherosclerotic coronary artery disease status-post bypass surgery in 2003    PVD (peripheral vascular disease) ([facility]     Sciatica     Secondary hyperparathyroidism (of renal origin) 10/22/2014    Not on VDRA    Shortness of breath     USES HOME OXYGEN    Sleep apnea     Sleep apnea     Type II or unspecified type diabetes mellitus without mention of complication, not stated as uncontrolled        Surgical History:     Past Surgical History:   Procedure Laterality Date    APPENDECTOMY      CARDIAC CATHETERIZATION  2006, 2014   [address]  2003    CABG X 3/STENTS    NERVE BLOCK Right 10/21/2016    rt MBNB #1 kenalog 40mg       Family History:     History reviewed. No pertinent family history. Allergies:       Patient has no known allergies. Medications:      Current Outpatient Prescriptions   Medication Sig Dispense Refill    B-D INS SYR ULTRAFINE 1CC/30G 30G X 1/2\" 1 ML MISC USE 1 SYRINGE EVERY DAY 90 each 5    nitroGLYCERIN (NITROSTAT) 0.4 MG SL tablet Place 1 tablet under the tongue as needed for Chest pain 25 tablet 11    insulin glargine (LANTUS) 100 UNIT/ML injection vial INJECT 70 UNITS INTO THE SKIN NIGHTLY 3 vial 5    traMADol (ULTRAM) 50 MG tablet TAKE 1 TABLET TWICE DAILY 180 tablet 0    levothyroxine (SYNTHROID) 125 MCG tablet TAKE 1 TABLET EVERY DAY 90 tablet 3    lansoprazole (PREVACID) 15 MG delayed release capsule TAKE 1 CAPSULE EVERY DAY 90 capsule 3    sevelamer (RENVELA) 800 MG tablet Take 1 tablet by mouth 3 times daily (with meals) 270 tablet 3    calcitRIOL (ROCALTROL) 0.25 MCG capsule TAKE 1 CAPSULE ON MONDAY, WEDNESDAY AND FRIDAY 36 capsule 3    Compression Stockings MISC by Does not apply route 20-40 mmHg.  2 each 0    cyanocobalamin 1000 MCG/ML injection Give patient 6 -1ml vials 6 mL 0    Syringe/Needle, Disp, (SYRINGE 3CC/25GX1\") 25G X 1\" 3 ML MISC Used one every other month. 6 each 0    ACCU-CHEK DONG PLUS strip TEST THREE TIMES DAILY 300 strip 11    metoprolol succinate (TOPROL XL) 50 MG extended release tablet TAKE 1 TABLET EVERY DAY 90 tablet 3    CARTIA  MG extended release capsule TAKE 1 CAPSULE EVERY DAY 90 capsule 3    clopidogrel (PLAVIX) 75 MG tablet TAKE 1 TABLET EVERY DAY 90 tablet 3    lisinopril (PRINIVIL;ZESTRIL) 20 MG tablet TAKE 1 TABLET 2 TIMES DAILY NOTE INCREASE PER DR Anna Early. 180 tablet 3    atorvastatin (LIPITOR) 20 MG tablet TAKE 1 TABLET EVERY DAY (Patient taking differently: 10 MG TAKE 1 TABLET EVERY DAY) 90 tablet 3    cilostazol (PLETAL) 50 MG tablet Take 50 mg by mouth 2 times daily      Alcohol Swabs (B-D SINGLE USE SWABS REGULAR) PADS       Blood Glucose Calibration (OT ULTRA/FASTTK CNTRL SOLN) SOLN       Glucose Blood (BLOOD GLUCOSE TEST STRIPS) STRP Test_3__times daily Diagnosis: 250.0   Diabetes Mellitus__x__Insulin Dependent____Non-Insulin Dependent Life Scan test strips ultra one touch 300 strip 3    aspirin EC 81 MG EC tablet Take 2 tablets by mouth daily. 30 tablet 3    calcium carbonate (TUMS) 500 MG chewable tablet Take 2 tablets by mouth 3 times daily.  Multiple Vitamins-Minerals (OCUVITE ADULT 50+ PO) Take 1 tablet by mouth 2 times daily.  Green Tea, Camillia sinensis, 315 MG CAPS Take 1 tablet by mouth 2 times daily.  Cinnamon 500 MG TABS Take 1 tablet by mouth daily.  Cranberry-Vitamin C-Vitamin E (CRANBERRY PLUS VITAMIN C PO) Take 1 tablet by mouth daily.  Ascorbic Acid (VITAMIN C WITH VIVIENNE HIPS) 500 MG tablet Take 500 mg by mouth daily.  Omega-3 Fatty Acids (FISH OIL) 1000 MG CAPS Take 1,000 mg by mouth daily.  docusate sodium (COLACE) 100 MG capsule Take 100 mg by mouth 2 times daily.  Blood Glucose Monitoring Suppl (ONE TOUCH ULTRA) by Does not apply route. No current facility-administered medications for this visit. Social History:     Tobacco:    reports that she has quit smoking. She has never used smokeless tobacco.  Alcohol:      reports that she drinks alcohol. Drug Use:  reports that she does not use drugs. Review of Systems:     Review of Systems   Constitutional: Negative for activity change and fever. HENT: Negative. Eyes: Negative. Respiratory: Negative for shortness of breath. Cardiovascular: Negative for chest pain and leg swelling. Gastrointestinal: Negative. Endocrine: Negative. Genitourinary: Negative. Musculoskeletal: Negative. Skin: Negative for color change and wound. Allergic/Immunologic: Negative. Neurological: Negative for weakness and numbness. Hematological: Negative. Psychiatric/Behavioral: Negative. Physical Exam:     Vitals:  /82 (Site: Right Arm, Position: Sitting, Cuff Size: Large Adult)   Resp 19   Ht 5' 2.01\" (1.575 m)   Wt 229 lb 15 oz (104.3 kg)   BMI 42.05 kg/m²     Physical Exam   Constitutional: She is oriented to person, place, and time. She appears well-developed and well-nourished. Eyes: Conjunctivae and EOM are normal.   Neck: Carotid bruit is not present. Cardiovascular: Normal rate, regular rhythm and normal heart sounds. Pulses:       Radial pulses are 2+ on the right side, and 2+ on the left side. Femoral pulses are 2+ on the right side, and 1+ on the left side. Popliteal pulses are 0 on the right side, and 0 on the left side. Dorsalis pedis pulses are 0 on the right side, and 0 on the left side. Posterior tibial pulses are 0 on the right side, and 0 on the left side. Pulmonary/Chest: Effort normal and breath sounds normal. No respiratory distress. Abdominal: Soft. Normal appearance. She exhibits no pulsatile midline mass. There is no tenderness. Feet:   Right Foot:   Skin Integrity: Negative for ulcer or skin breakdown.

## 2018-03-23 LAB
INR BLD: NORMAL
PARTIAL THROMBOPLASTIN TIME: NORMAL SEC (ref 20.5–30.5)
PROTHROMBIN TIME: NORMAL SEC (ref 9–12)
WBC # BLD: NORMAL K/UL (ref 3.5–11.3)

## 2018-03-23 RX ORDER — ASPIRIN 325 MG
325 TABLET ORAL ONCE
Status: DISCONTINUED | OUTPATIENT
Start: 2018-03-23 | End: 2018-03-23

## 2018-03-23 NOTE — RESEARCH
Asked by Anushka Manzano  to evaluate pt. For protocol Cobra 2015-01, Cobra PzF stenting to Reduce the Duration of Triple Therapy in Patients Treated with Oral Anticoagulation Undergoing Percutaneous Coronary Intervention. Pt met preliminary inclusion/exclusion criteria. Pt  approached at [facility] questions answered. Consent given to pt . Pt. Read study consent. Questions answered. Consent voluntarily signed at 1330. A copy of signed consent given to pt. Safety labs/Ekg obtained.

## 2018-03-26 ENCOUNTER — HOSPITAL ENCOUNTER (OUTPATIENT)
Dept: CARDIAC CATH/INVASIVE PROCEDURES | Age: 78
Discharge: HOME | End: 2018-03-27
Attending: INTERNAL MEDICINE | Admitting: INTERNAL MEDICINE
Payer: MEDICARE

## 2018-03-26 DIAGNOSIS — I25.10 CAD S/P PERCUTANEOUS CORONARY ANGIOPLASTY: ICD-10-CM

## 2018-03-26 DIAGNOSIS — Z98.61 CAD S/P PERCUTANEOUS CORONARY ANGIOPLASTY: ICD-10-CM

## 2018-03-26 DIAGNOSIS — Z95.5 S/P DRUG ELUTING CORONARY STENT PLACEMENT: ICD-10-CM

## 2018-03-26 LAB
ACTIVATED CLOTTING TIME: 266 SEC (ref 79–149)
GFR NON-AFRICAN AMERICAN: 32 ML/MIN
GFR SERPL CREATININE-BSD FRML MDRD: 39 ML/MIN
GFR SERPL CREATININE-BSD FRML MDRD: ABNORMAL ML/MIN/{1.73_M2}
GLUCOSE BLD-MCNC: 146 MG/DL (ref 74–100)
GLUCOSE BLD-MCNC: 147 MG/DL (ref 65–105)
PLATELET # BLD: 184 K/UL (ref 138–453)
POC CHLORIDE: 109 MMOL/L (ref 98–107)
POC CREATININE: 1.56 MG/DL (ref 0.51–1.19)
POC HEMATOCRIT: 39 % (ref 36–46)
POC HEMOGLOBIN: 13.3 G/DL (ref 12–16)
POC POTASSIUM: 3.8 MMOL/L (ref 3.5–4.5)
POC SODIUM: 142 MMOL/L (ref 138–146)
TROPONIN INTERP: NORMAL
TROPONIN T: <0.03 NG/ML

## 2018-03-26 PROCEDURE — 7100000011 HC PHASE II RECOVERY - ADDTL 15 MIN

## 2018-03-26 PROCEDURE — 6360000002 HC RX W HCPCS

## 2018-03-26 PROCEDURE — 6370000000 HC RX 637 (ALT 250 FOR IP): Performed by: INTERNAL MEDICINE

## 2018-03-26 PROCEDURE — 7100000010 HC PHASE II RECOVERY - FIRST 15 MIN

## 2018-03-26 PROCEDURE — C1887 CATHETER, GUIDING: HCPCS

## 2018-03-26 PROCEDURE — 85049 AUTOMATED PLATELET COUNT: CPT

## 2018-03-26 PROCEDURE — C1725 CATH, TRANSLUMIN NON-LASER: HCPCS

## 2018-03-26 PROCEDURE — 84132 ASSAY OF SERUM POTASSIUM: CPT

## 2018-03-26 PROCEDURE — 82435 ASSAY OF BLOOD CHLORIDE: CPT

## 2018-03-26 PROCEDURE — C1874 STENT, COATED/COV W/DEL SYS: HCPCS

## 2018-03-26 PROCEDURE — 84484 ASSAY OF TROPONIN QUANT: CPT

## 2018-03-26 PROCEDURE — 92928 PRQ TCAT PLMT NTRAC ST 1 LES: CPT | Performed by: INTERNAL MEDICINE

## 2018-03-26 PROCEDURE — 84295 ASSAY OF SERUM SODIUM: CPT

## 2018-03-26 PROCEDURE — 82565 ASSAY OF CREATININE: CPT

## 2018-03-26 PROCEDURE — 2709999900 HC NON-CHARGEABLE SUPPLY

## 2018-03-26 PROCEDURE — 85014 HEMATOCRIT: CPT

## 2018-03-26 PROCEDURE — 85347 COAGULATION TIME ACTIVATED: CPT

## 2018-03-26 PROCEDURE — 6360000004 HC RX CONTRAST MEDICATION

## 2018-03-26 PROCEDURE — 36415 COLL VENOUS BLD VENIPUNCTURE: CPT

## 2018-03-26 PROCEDURE — 6370000000 HC RX 637 (ALT 250 FOR IP)

## 2018-03-26 PROCEDURE — 82947 ASSAY GLUCOSE BLOOD QUANT: CPT

## 2018-03-26 PROCEDURE — C1769 GUIDE WIRE: HCPCS

## 2018-03-26 PROCEDURE — 92921 HC PRQ CARDIAC ANGIO ADDL ART: CPT | Performed by: INTERNAL MEDICINE

## 2018-03-26 PROCEDURE — 93459 L HRT ART/GRFT ANGIO: CPT | Performed by: INTERNAL MEDICINE

## 2018-03-26 PROCEDURE — 2580000003 HC RX 258: Performed by: INTERNAL MEDICINE

## 2018-03-26 PROCEDURE — 2500000003 HC RX 250 WO HCPCS

## 2018-03-26 PROCEDURE — C1894 INTRO/SHEATH, NON-LASER: HCPCS

## 2018-03-26 RX ORDER — ASPIRIN 81 MG/1
162 TABLET ORAL DAILY
Status: DISCONTINUED | OUTPATIENT
Start: 2018-03-27 | End: 2018-03-27

## 2018-03-26 RX ORDER — LABETALOL HYDROCHLORIDE 5 MG/ML
10 INJECTION, SOLUTION INTRAVENOUS EVERY 30 MIN PRN
Status: DISCONTINUED | OUTPATIENT
Start: 2018-03-26 | End: 2018-03-27 | Stop reason: HOSPADM

## 2018-03-26 RX ORDER — PANTOPRAZOLE SODIUM 40 MG/1
40 TABLET, DELAYED RELEASE ORAL
Status: DISCONTINUED | OUTPATIENT
Start: 2018-03-27 | End: 2018-03-27 | Stop reason: HOSPADM

## 2018-03-26 RX ORDER — METOPROLOL SUCCINATE 50 MG/1
50 TABLET, EXTENDED RELEASE ORAL DAILY
Status: DISCONTINUED | OUTPATIENT
Start: 2018-03-26 | End: 2018-03-27

## 2018-03-26 RX ORDER — ACETYLCYSTEINE 200 MG/ML
600 SOLUTION ORAL; RESPIRATORY (INHALATION) ONCE
Status: DISCONTINUED | OUTPATIENT
Start: 2018-03-26 | End: 2018-03-26

## 2018-03-26 RX ORDER — SODIUM CHLORIDE 0.9 % (FLUSH) 0.9 %
10 SYRINGE (ML) INJECTION EVERY 12 HOURS SCHEDULED
Status: DISCONTINUED | OUTPATIENT
Start: 2018-03-26 | End: 2018-03-27 | Stop reason: HOSPADM

## 2018-03-26 RX ORDER — DOCUSATE SODIUM 100 MG/1
100 CAPSULE, LIQUID FILLED ORAL 2 TIMES DAILY
Status: DISCONTINUED | OUTPATIENT
Start: 2018-03-26 | End: 2018-03-27 | Stop reason: HOSPADM

## 2018-03-26 RX ORDER — TRAMADOL HYDROCHLORIDE 50 MG/1
50 TABLET ORAL EVERY 8 HOURS PRN
Status: DISCONTINUED | OUTPATIENT
Start: 2018-03-26 | End: 2018-03-27 | Stop reason: HOSPADM

## 2018-03-26 RX ORDER — CILOSTAZOL 100 MG/1
50 TABLET ORAL 2 TIMES DAILY
Status: DISCONTINUED | OUTPATIENT
Start: 2018-03-26 | End: 2018-03-27 | Stop reason: HOSPADM

## 2018-03-26 RX ORDER — LEVOTHYROXINE SODIUM 0.12 MG/1
125 TABLET ORAL DAILY
Status: DISCONTINUED | OUTPATIENT
Start: 2018-03-26 | End: 2018-03-27 | Stop reason: HOSPADM

## 2018-03-26 RX ORDER — CHLORAL HYDRATE 500 MG
1000 CAPSULE ORAL DAILY
Status: DISCONTINUED | OUTPATIENT
Start: 2018-03-26 | End: 2018-03-26 | Stop reason: RX

## 2018-03-26 RX ORDER — ACETYLCYSTEINE 100 MG/ML
1200 SOLUTION ORAL; RESPIRATORY (INHALATION) ONCE
Status: COMPLETED | OUTPATIENT
Start: 2018-03-26 | End: 2018-03-26

## 2018-03-26 RX ORDER — ONDANSETRON 2 MG/ML
4 INJECTION INTRAMUSCULAR; INTRAVENOUS EVERY 6 HOURS PRN
Status: DISCONTINUED | OUTPATIENT
Start: 2018-03-26 | End: 2018-03-27 | Stop reason: HOSPADM

## 2018-03-26 RX ORDER — SODIUM CHLORIDE 0.9 % (FLUSH) 0.9 %
10 SYRINGE (ML) INJECTION PRN
Status: DISCONTINUED | OUTPATIENT
Start: 2018-03-26 | End: 2018-03-27 | Stop reason: HOSPADM

## 2018-03-26 RX ORDER — NITROGLYCERIN 0.4 MG/1
0.4 TABLET SUBLINGUAL PRN
Status: DISCONTINUED | OUTPATIENT
Start: 2018-03-26 | End: 2018-03-27 | Stop reason: HOSPADM

## 2018-03-26 RX ORDER — ATORVASTATIN CALCIUM 20 MG/1
20 TABLET, FILM COATED ORAL DAILY
Status: DISCONTINUED | OUTPATIENT
Start: 2018-03-26 | End: 2018-03-27 | Stop reason: HOSPADM

## 2018-03-26 RX ORDER — NICOTINE POLACRILEX 4 MG
15 LOZENGE BUCCAL PRN
Status: DISCONTINUED | OUTPATIENT
Start: 2018-03-26 | End: 2018-03-27 | Stop reason: HOSPADM

## 2018-03-26 RX ORDER — DEXTROSE MONOHYDRATE 25 G/50ML
12.5 INJECTION, SOLUTION INTRAVENOUS PRN
Status: DISCONTINUED | OUTPATIENT
Start: 2018-03-26 | End: 2018-03-27 | Stop reason: HOSPADM

## 2018-03-26 RX ORDER — INSULIN GLARGINE 100 [IU]/ML
25 INJECTION, SOLUTION SUBCUTANEOUS NIGHTLY
Status: DISCONTINUED | OUTPATIENT
Start: 2018-03-26 | End: 2018-03-27 | Stop reason: HOSPADM

## 2018-03-26 RX ORDER — SODIUM CHLORIDE 9 MG/ML
INJECTION, SOLUTION INTRAVENOUS CONTINUOUS
Status: DISCONTINUED | OUTPATIENT
Start: 2018-03-26 | End: 2018-03-27 | Stop reason: HOSPADM

## 2018-03-26 RX ORDER — CLOPIDOGREL BISULFATE 75 MG/1
75 TABLET ORAL DAILY
Status: DISCONTINUED | OUTPATIENT
Start: 2018-03-26 | End: 2018-03-27 | Stop reason: HOSPADM

## 2018-03-26 RX ORDER — LISINOPRIL 20 MG/1
20 TABLET ORAL DAILY
Status: DISCONTINUED | OUTPATIENT
Start: 2018-03-26 | End: 2018-03-27 | Stop reason: HOSPADM

## 2018-03-26 RX ORDER — DILTIAZEM HYDROCHLORIDE 120 MG/1
120 CAPSULE, COATED, EXTENDED RELEASE ORAL DAILY
Status: DISCONTINUED | OUTPATIENT
Start: 2018-03-26 | End: 2018-03-27 | Stop reason: HOSPADM

## 2018-03-26 RX ORDER — DEXTROSE MONOHYDRATE 50 MG/ML
100 INJECTION, SOLUTION INTRAVENOUS PRN
Status: DISCONTINUED | OUTPATIENT
Start: 2018-03-26 | End: 2018-03-27 | Stop reason: HOSPADM

## 2018-03-26 RX ORDER — ACETAMINOPHEN 325 MG/1
650 TABLET ORAL EVERY 4 HOURS PRN
Status: DISCONTINUED | OUTPATIENT
Start: 2018-03-26 | End: 2018-03-27 | Stop reason: HOSPADM

## 2018-03-26 RX ORDER — ASCORBIC ACID 500 MG
500 TABLET ORAL DAILY
Status: DISCONTINUED | OUTPATIENT
Start: 2018-03-27 | End: 2018-03-27 | Stop reason: HOSPADM

## 2018-03-26 RX ADMIN — DOCUSATE SODIUM 100 MG: 100 CAPSULE ORAL at 21:14

## 2018-03-26 RX ADMIN — SODIUM CHLORIDE: 9 INJECTION, SOLUTION INTRAVENOUS at 11:27

## 2018-03-26 RX ADMIN — ACETYLCYSTEINE 1200 MG: 100 SOLUTION ORAL; RESPIRATORY (INHALATION) at 22:29

## 2018-03-26 RX ADMIN — TRAMADOL HYDROCHLORIDE 50 MG: 50 TABLET, FILM COATED ORAL at 21:37

## 2018-03-26 RX ADMIN — LISINOPRIL 20 MG: 20 TABLET ORAL at 21:14

## 2018-03-26 RX ADMIN — SODIUM CHLORIDE: 9 INJECTION, SOLUTION INTRAVENOUS at 20:07

## 2018-03-26 RX ADMIN — CILOSTAZOL 50 MG: 100 TABLET ORAL at 21:14

## 2018-03-26 RX ADMIN — Medication 10 ML: at 20:04

## 2018-03-26 RX ADMIN — INSULIN GLARGINE 25 UNITS: 100 INJECTION, SOLUTION SUBCUTANEOUS at 21:15

## 2018-03-26 RX ADMIN — ATORVASTATIN CALCIUM 20 MG: 20 TABLET, FILM COATED ORAL at 21:14

## 2018-03-26 ASSESSMENT — PAIN SCALES - GENERAL
PAINLEVEL_OUTOF10: 0
PAINLEVEL_OUTOF10: 3

## 2018-03-26 ASSESSMENT — PAIN DESCRIPTION - LOCATION: LOCATION: HAND

## 2018-03-26 ASSESSMENT — PAIN DESCRIPTION - PAIN TYPE: TYPE: CHRONIC PAIN

## 2018-03-26 NOTE — PROGRESS NOTES
DR. Hoang Herrera called back. He said, \" If patient develops a firm hematoma tonight, have them place a femstop on her and we will evaluate in the AM.\"  Information passed onto the oncoming RN.

## 2018-03-26 NOTE — H&P
[facility] Cardiology Cardiology    H&P               Today's Date: 3/26/2018  Patient Name: Fran Rdz  Date of admission: 3/26/2018 10:43 AM  Patient's age: 70 y. o., 1940  Admission Dx: No admission diagnoses are documented for this encounter. Reason for Consult:  Cardiac evaluation    Requesting Physician: No admitting provider for patient encounter. CHIEF COMPLAINT:  Positive Stress Test    History Obtained From:  patient, electronic medical record    HISTORY OF PRESENT ILLNESS:      The patient is a 70 y.o. female who presents for an elective cardiac cath. Has exertional dyspnea, mainly on climbing or going down steps. Belinda angina which was a new finding. Denied any LE edema, orthopnea or PND. Had an outpatient stress test which was positive. Past Medical History:   has a past medical history of Abnormal stress test; Arthritis; At high risk for hyperkalemia; Back pain; CAD (coronary artery disease); Chronic kidney disease; Circulation problem; CKD (chronic kidney disease) stage 3, GFR 30-59 ml/min; COPD (chronic obstructive pulmonary disease) ([facility] [address] DM (diabetes mellitus) ([facility] [address] Edema; Foot pain, bilateral; Hyperkalemia; Hyperlipidemia; Hypertension; Hypothyroidism; Intermittent claudication ([facility] [address] Kidney disease; Lymphedema; MI (mitral incompetence); 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 ([facility] [address] Morbid obesity with BMI of 45.0-49.9, adult ([facility] [address] Osteoarthritis; Other activity(E029.9); Other activity(E029.9); PVD (peripheral vascular disease) ([facility] [address] Sciatica; Secondary hyperparathyroidism (of renal origin); Shortness of breath; Sleep apnea; Sleep apnea; and Type II or unspecified type diabetes mellitus without mention of complication, not stated as uncontrolled. Past Surgical History:   has a past surgical history that includes Appendectomy; Cardiac surgery (2003);  Cardiac catheterization (2006, 2014); and Nerve David (Right, 10/21/2016). Home Medications:    Prior to Admission medications    Medication Sig Start Date End Date Taking? Authorizing Provider   traMADol (ULTRAM) 50 MG tablet TAKE 1 TABLET TWICE DAILY 2/6/18 2/6/19 Yes Maggy Palencia, MD   levothyroxine (SYNTHROID) 125 MCG tablet TAKE 1 TABLET EVERY DAY 1/29/18  Yes Maggy Palencia MD   lansoprazole (PREVACID) 15 MG delayed release capsule TAKE 1 CAPSULE EVERY DAY 1/29/18  Yes Maggy Palencia MD   metoprolol succinate (TOPROL XL) 50 MG extended release tablet TAKE 1 TABLET EVERY DAY 4/25/17  Yes Keeine Im, MD   CARTIA  MG extended release capsule TAKE 1 CAPSULE EVERY DAY 4/25/17  Yes Maggy Palencia, MD   clopidogrel (PLAVIX) 75 MG tablet TAKE 1 TABLET EVERY DAY 4/25/17  Yes Maggy Palencia, MD   lisinopril (PRINIVIL;ZESTRIL) 20 MG tablet TAKE 1 TABLET 2 TIMES DAILY NOTE INCREASE PER DR Kenya Fraire. 4/25/17  Yes Maggy Palencia MD   atorvastatin (LIPITOR) 20 MG tablet TAKE 1 TABLET EVERY DAY  Patient taking differently: 10 MG TAKE 1 TABLET EVERY DAY 1/10/17  Yes Maggy Palencia MD   cilostazol (PLETAL) 50 MG tablet Take 50 mg by mouth 2 times daily   Yes Historical Provider, MD   aspirin EC 81 MG EC tablet Take 2 tablets by mouth daily. 11/12/14  Yes Maggy Palencia MD   Multiple Vitamins-Minerals (OCUVITE ADULT 50+ PO) Take 1 tablet by mouth 2 times daily. Yes Historical Provider, MD Castro Fu Tea, Camillia sinensis, 315 MG CAPS Take 1 tablet by mouth 2 times daily. Yes Historical Provider, MD   Cinnamon 500 MG TABS Take 1 tablet by mouth daily. Yes Historical Provider, MD   Cranberry-Vitamin C-Vitamin E (CRANBERRY PLUS VITAMIN C PO) Take 1 tablet by mouth daily. Yes Historical Provider, MD   Ascorbic Acid (VITAMIN C WITH VIVIENNE HIPS) 500 MG tablet Take 500 mg by mouth daily. Yes Historical Provider, MD   Omega-3 Fatty Acids (FISH OIL) 1000 MG CAPS Take 1,000 mg by mouth daily.    Yes Historical Provider, MD   docusate sodium (COLACE) 100 MG or diplopia. No scleral icterus. · ENT: No Headaches  · Cardiovascular: Known CAD with prior CABG  · Respiratory: No previous pulmonary problems, No cough  · Gastrointestinal: No abdominal pain. No change in bowel or bladder habits. · Genitourinary: No dysuria, trouble voiding, or hematuria. · Musculoskeletal:  No gait disturbance, No weakness or joint complaints. · Integumentary: No rash or pruritis. · Neurological: No headache, diplopia, change in muscle strength, numbness or tingling. No change in gait, balance, coordination, mood, affect, memory, mentation, behavior. · Psychiatric: No anxiety, or depression. · Endocrine: No temperature intolerance. No excessive thirst, fluid intake, or urination. No tremor. · Hematologic/Lymphatic: No abnormal bruising or bleeding, blood clots or swollen lymph nodes. · Allergic/Immunologic: No nasal congestion or hives. PHYSICAL EXAM:      BP (!) 149/66   Pulse 70   Temp 98.4 °F (36.9 °C) (Oral)   Resp 18   Ht 5' (1.524 m)   Wt 225 lb (102.1 kg)   BMI 43.94 kg/m²    Constitutional and General Appearance: alert, cooperative, no distress and appears stated age  HEENT: PERRL, no cervical lymphadenopathy. No masses palpable. Normal oral mucosa  Respiratory:  · Normal excursion and expansion without use of accessory muscles  · Resp Auscultation: Good respiratory effort. No for increased work of breathing. On auscultation: clear to auscultation bilaterally  Cardiovascular:  · The apical impulse is not displaced  · Heart tones are crisp and normal. regular S1 and S2.  · Jugular venous pulsation Normal  · The carotid upstroke is normal in amplitude and contour without delay or bruit  · Peripheral pulses are symmetrical and full   Abdomen:   · No masses or tenderness  · Bowel sounds present  Extremities:  ·  No Cyanosis or Clubbing  ·  Lower extremity edema: No  ·  Skin: Warm and dry  Neurological:  · Alert and oriented.   · Moves all extremities well  · No abnormalities of mood, affect, memory, mentation, or behavior are noted    DATA:    Diagnostics:      EKG:   normal sinus rhythm with nonspecific ST-T wave changes, occasional PVCs. ECHO:   LV dysfunction, EF 35%. Mild MR    Stress Test (3/2/18):     LVEF 32%     Cardiac Angiography:   CAD with previous CABG x 2, with all grafts occluded and nonfunctioning LIMA-LAD. Only radial-PDA was patent.    S/P angioplasty and stenting of the LAD and circumflex in November 2007. Heart cath 2014: Patent LAD stent with 40% stenosis in OM stent with difffuse disease in Radial -PDA    Labs:     CBC:   Recent Labs      03/23/18   1348   WBC  DISREGARD RESULTS. SPECIMEN SUBMITTED WAS INCORRECTLY IDENTIFIED. TEST CREDITED. BMP:   Recent Labs      03/26/18   1126   CREATININE  1.56*   LABGLOM  32*     BNP: No results for input(s): BNP in the last 72 hours. PT/INR:   Recent Labs      03/23/18   1348   PROTIME  SPECIMEN SUBMITTED WAS INCORRECTLY IDENTIFIED   INR  CANNOT BE CALCULATED     APTT:  Recent Labs      03/23/18   1348   APTT  SPECIMEN SUBMITTED WAS INCORRECTLY IDENTIFIED     CARDIAC ENZYMES:No results for input(s): CKTOTAL, CKMB, CKMBINDEX, TROPONINI in the last 72 hours. FASTING LIPID PANEL:  Lab Results   Component Value Date    HDL 38 10/31/2017    TRIG 176 06/23/2016     LIVER PROFILE:No results for input(s): AST, ALT, LABALBU in the last 72 hours. IMPRESSION:    1. Positive stress test  2. Known CAD with CABG x 2 and occluded grafts,  stenting of the LAD and circumflex in November 2007  3.  PAD with B/L claudication, seen by Vascular    Patient Active Problem List   Diagnosis    Hypercholesteremia    CAD (coronary artery disease)    Hypothyroidism    Spondylosis with myelopathy, lumbar region    Osteoporosis screening    Breast cancer screening    S/P cardiac cath 8/19/14-Dr. Stefani Armendariz    CKD (chronic kidney disease) stage 3, GFR 30-59 ml/min    Morbid obesity with BMI of 40.0-44.9, adult ([facility] [address]    At

## 2018-03-26 NOTE — PROGRESS NOTES
Arterial line dc'd / moderate size hematoma palpate. Manual pressure  held for 20 minutes, then band aid applied to area. Area much softer after applying pressure to area.

## 2018-03-27 VITALS
HEART RATE: 93 BPM | BODY MASS INDEX: 43.59 KG/M2 | RESPIRATION RATE: 20 BRPM | DIASTOLIC BLOOD PRESSURE: 76 MMHG | SYSTOLIC BLOOD PRESSURE: 159 MMHG | WEIGHT: 222 LBS | HEIGHT: 60 IN | OXYGEN SATURATION: 95 % | TEMPERATURE: 97.9 F

## 2018-03-27 LAB
GLUCOSE BLD-MCNC: 140 MG/DL (ref 65–105)
GLUCOSE BLD-MCNC: 96 MG/DL (ref 65–105)
TROPONIN INTERP: NORMAL
TROPONIN T: <0.03 NG/ML

## 2018-03-27 PROCEDURE — 6370000000 HC RX 637 (ALT 250 FOR IP): Performed by: INTERNAL MEDICINE

## 2018-03-27 PROCEDURE — 36415 COLL VENOUS BLD VENIPUNCTURE: CPT

## 2018-03-27 PROCEDURE — 84484 ASSAY OF TROPONIN QUANT: CPT

## 2018-03-27 PROCEDURE — 93926 LOWER EXTREMITY STUDY: CPT

## 2018-03-27 PROCEDURE — 94762 N-INVAS EAR/PLS OXIMTRY CONT: CPT

## 2018-03-27 PROCEDURE — 82947 ASSAY GLUCOSE BLOOD QUANT: CPT

## 2018-03-27 RX ORDER — METOPROLOL SUCCINATE 100 MG/1
100 TABLET, EXTENDED RELEASE ORAL DAILY
Status: DISCONTINUED | OUTPATIENT
Start: 2018-03-28 | End: 2018-03-27 | Stop reason: HOSPADM

## 2018-03-27 RX ORDER — ASPIRIN 81 MG/1
81 TABLET ORAL DAILY
Status: DISCONTINUED | OUTPATIENT
Start: 2018-03-28 | End: 2018-03-27 | Stop reason: HOSPADM

## 2018-03-27 RX ORDER — METOPROLOL SUCCINATE 100 MG/1
100 TABLET, EXTENDED RELEASE ORAL DAILY
Qty: 30 TABLET | Refills: 11 | Status: SHIPPED | OUTPATIENT
Start: 2018-03-28 | End: 2019-06-10 | Stop reason: SDUPTHER

## 2018-03-27 RX ADMIN — DOCUSATE SODIUM 100 MG: 100 CAPSULE ORAL at 08:42

## 2018-03-27 RX ADMIN — ASPIRIN 162 MG: 81 TABLET, COATED ORAL at 08:42

## 2018-03-27 RX ADMIN — ATORVASTATIN CALCIUM 20 MG: 20 TABLET, FILM COATED ORAL at 08:42

## 2018-03-27 RX ADMIN — LEVOTHYROXINE SODIUM 125 MCG: 125 TABLET ORAL at 08:42

## 2018-03-27 RX ADMIN — CILOSTAZOL 50 MG: 100 TABLET ORAL at 08:43

## 2018-03-27 RX ADMIN — CLOPIDOGREL 75 MG: 75 TABLET, FILM COATED ORAL at 08:42

## 2018-03-27 RX ADMIN — LISINOPRIL 20 MG: 20 TABLET ORAL at 08:42

## 2018-03-27 RX ADMIN — METOPROLOL SUCCINATE 50 MG: 50 TABLET, FILM COATED, EXTENDED RELEASE ORAL at 08:42

## 2018-03-27 RX ADMIN — PANTOPRAZOLE SODIUM 40 MG: 40 TABLET, DELAYED RELEASE ORAL at 06:46

## 2018-03-27 RX ADMIN — DILTIAZEM HYDROCHLORIDE 120 MG: 120 CAPSULE, COATED, EXTENDED RELEASE ORAL at 08:42

## 2018-03-27 ASSESSMENT — PAIN SCALES - GENERAL: PAINLEVEL_OUTOF10: 0

## 2018-03-27 NOTE — PLAN OF CARE
Problem: Pain:  Goal: Recognizes and communicates pain  Recognizes and communicates pain   Outcome: Ongoing  Ultram given for bilateral hand pain. Patient states she takes ultram at home as needed for chronic arthritic hand pain. Ultram given prn patient is currently asleep. Will continue to monitor. Problem: Tissue Perfusion - Cardiopulmonary, Altered:  Goal: Absence of angina  Absence of angina   Outcome: Ongoing  Patient is currently asleep. Patient hr stable, patient denies cp on assessment.

## 2018-03-27 NOTE — CARE COORDINATION
Discharge 1 [facility] Case Management Department  Written by: Dari Monsivais RN    Patient Name: Francesco Hamilton  Attending Provider: Gerard Parada MD  Admit Date: 3/26/2018  7:02 PM  MRN: [id number]  Account: [de-identified]                     : 1940  Discharge Date:       Disposition: home    Dari Monsivais RN

## 2018-03-27 NOTE — FLOWSHEET NOTE
Visit: Pt was resting in bed on this initial visit with no family at bedside. Pt was receptive to  presence and engaging. Pt appears to understand the reason for hospitalization and is coping. Pt state his family is supportive and will be in to visit tomorrow. Pt received word of comfort and Pt was thankful for [place] support. During this routine rounding, Pt was open to and welcomed  visit. Pt expressed at this time his social and emotional needs are currently met. Pt explained he has family support. Pt engaged this  in conversation and shared regarding his ricardo.  Spiritual Care support will remain available     03/26/18 2000   Encounter Summary   Services provided to: Patient   Referral/Consult From: Rounding   Continue Visiting (3/26/18)   Complexity of Encounter Moderate   Length of Encounter 30 minutes   Routine   Type Initial   Assessment Approachable;Calm   Intervention Active listening;Sustaining presence/ Ministry of presence   Outcome Coping

## 2018-03-29 ENCOUNTER — TELEPHONE (OUTPATIENT)
Dept: INTERNAL MEDICINE | Age: 78
End: 2018-03-29

## 2018-04-10 ENCOUNTER — HOSPITAL ENCOUNTER (OUTPATIENT)
Dept: PAIN MANAGEMENT | Age: 78
Discharge: HOME | End: 2018-04-10
Payer: MEDICARE

## 2018-04-10 VITALS
BODY MASS INDEX: 43.59 KG/M2 | TEMPERATURE: 99 F | RESPIRATION RATE: 16 BRPM | HEIGHT: 60 IN | SYSTOLIC BLOOD PRESSURE: 167 MMHG | HEART RATE: 83 BPM | WEIGHT: 222 LBS | DIASTOLIC BLOOD PRESSURE: 83 MMHG

## 2018-04-10 PROCEDURE — G0283 ELEC STIM OTHER THAN WOUND: HCPCS

## 2018-04-10 ASSESSMENT — PAIN DESCRIPTION - PROGRESSION: CLINICAL_PROGRESSION: GRADUALLY WORSENING

## 2018-04-10 ASSESSMENT — PAIN DESCRIPTION - ORIENTATION: ORIENTATION: RIGHT;LEFT;LOWER

## 2018-04-10 ASSESSMENT — PAIN DESCRIPTION - ONSET: ONSET: ON-GOING

## 2018-04-10 ASSESSMENT — PAIN SCALES - GENERAL: PAINLEVEL_OUTOF10: 6

## 2018-04-10 ASSESSMENT — PAIN DESCRIPTION - LOCATION: LOCATION: BACK

## 2018-04-10 ASSESSMENT — PAIN DESCRIPTION - PAIN TYPE: TYPE: CHRONIC PAIN

## 2018-04-10 ASSESSMENT — PAIN DESCRIPTION - FREQUENCY: FREQUENCY: INTERMITTENT

## 2018-04-10 ASSESSMENT — PAIN DESCRIPTION - DESCRIPTORS: DESCRIPTORS: SHARP

## 2018-04-12 ENCOUNTER — OFFICE VISIT (OUTPATIENT)
Dept: INTERNAL MEDICINE | Age: 78
End: 2018-04-12
Payer: MEDICARE

## 2018-04-12 VITALS
DIASTOLIC BLOOD PRESSURE: 63 MMHG | OXYGEN SATURATION: 98 % | WEIGHT: 227.2 LBS | BODY MASS INDEX: 44.61 KG/M2 | RESPIRATION RATE: 12 BRPM | SYSTOLIC BLOOD PRESSURE: 116 MMHG | HEART RATE: 80 BPM | HEIGHT: 60 IN

## 2018-04-12 DIAGNOSIS — I10 ESSENTIAL HYPERTENSION: ICD-10-CM

## 2018-04-12 DIAGNOSIS — Z98.61 CAD S/P PERCUTANEOUS CORONARY ANGIOPLASTY: Primary | ICD-10-CM

## 2018-04-12 DIAGNOSIS — I25.10 CAD S/P PERCUTANEOUS CORONARY ANGIOPLASTY: Primary | ICD-10-CM

## 2018-04-12 PROCEDURE — 1123F ACP DISCUSS/DSCN MKR DOCD: CPT | Performed by: INTERNAL MEDICINE

## 2018-04-12 PROCEDURE — 4040F PNEUMOC VAC/ADMIN/RCVD: CPT | Performed by: INTERNAL MEDICINE

## 2018-04-12 PROCEDURE — 99214 OFFICE O/P EST MOD 30 MIN: CPT | Performed by: INTERNAL MEDICINE

## 2018-04-12 PROCEDURE — G8598 ASA/ANTIPLAT THER USED: HCPCS | Performed by: INTERNAL MEDICINE

## 2018-04-12 PROCEDURE — 1090F PRES/ABSN URINE INCON ASSESS: CPT | Performed by: INTERNAL MEDICINE

## 2018-04-12 PROCEDURE — G8417 CALC BMI ABV UP PARAM F/U: HCPCS | Performed by: INTERNAL MEDICINE

## 2018-04-12 PROCEDURE — G8427 DOCREV CUR MEDS BY ELIG CLIN: HCPCS | Performed by: INTERNAL MEDICINE

## 2018-04-12 PROCEDURE — G8399 PT W/DXA RESULTS DOCUMENT: HCPCS | Performed by: INTERNAL MEDICINE

## 2018-04-12 PROCEDURE — 1036F TOBACCO NON-USER: CPT | Performed by: INTERNAL MEDICINE

## 2018-04-12 ASSESSMENT — PATIENT HEALTH QUESTIONNAIRE - PHQ9
SUM OF ALL RESPONSES TO PHQ QUESTIONS 1-9: 0
1. LITTLE INTEREST OR PLEASURE IN DOING THINGS: 0
SUM OF ALL RESPONSES TO PHQ9 QUESTIONS 1 & 2: 0
2. FEELING DOWN, DEPRESSED OR HOPELESS: 0

## 2018-04-12 ASSESSMENT — ENCOUNTER SYMPTOMS
CHEST TIGHTNESS: 1
EYES NEGATIVE: 1
GASTROINTESTINAL NEGATIVE: 1
COUGH: 1

## 2018-04-26 ENCOUNTER — OFFICE VISIT (OUTPATIENT)
Dept: PODIATRY | Age: 78
End: 2018-04-26
Payer: MEDICARE

## 2018-04-26 VITALS — HEIGHT: 63 IN | WEIGHT: 227 LBS | BODY MASS INDEX: 40.22 KG/M2

## 2018-04-26 DIAGNOSIS — M79.672 PAIN IN BOTH FEET: ICD-10-CM

## 2018-04-26 DIAGNOSIS — B35.1 DERMATOPHYTOSIS OF NAIL: Primary | ICD-10-CM

## 2018-04-26 DIAGNOSIS — M79.671 PAIN IN BOTH FEET: ICD-10-CM

## 2018-04-26 DIAGNOSIS — I73.9 PVD (PERIPHERAL VASCULAR DISEASE) (HCC): ICD-10-CM

## 2018-04-26 DIAGNOSIS — E11.51 TYPE II DIABETES MELLITUS WITH PERIPHERAL CIRCULATORY DISORDER (HCC): ICD-10-CM

## 2018-04-26 PROCEDURE — 11721 DEBRIDE NAIL 6 OR MORE: CPT | Performed by: PODIATRIST

## 2018-04-26 PROCEDURE — G8399 PT W/DXA RESULTS DOCUMENT: HCPCS | Performed by: PODIATRIST

## 2018-04-26 PROCEDURE — G8427 DOCREV CUR MEDS BY ELIG CLIN: HCPCS | Performed by: PODIATRIST

## 2018-04-26 PROCEDURE — 1090F PRES/ABSN URINE INCON ASSESS: CPT | Performed by: PODIATRIST

## 2018-04-26 PROCEDURE — 4040F PNEUMOC VAC/ADMIN/RCVD: CPT | Performed by: PODIATRIST

## 2018-04-26 PROCEDURE — G8417 CALC BMI ABV UP PARAM F/U: HCPCS | Performed by: PODIATRIST

## 2018-04-26 PROCEDURE — 1123F ACP DISCUSS/DSCN MKR DOCD: CPT | Performed by: PODIATRIST

## 2018-04-26 PROCEDURE — 99213 OFFICE O/P EST LOW 20 MIN: CPT | Performed by: PODIATRIST

## 2018-04-26 PROCEDURE — G8598 ASA/ANTIPLAT THER USED: HCPCS | Performed by: PODIATRIST

## 2018-04-26 PROCEDURE — 1036F TOBACCO NON-USER: CPT | Performed by: PODIATRIST

## 2018-05-04 RX ORDER — LISINOPRIL 20 MG/1
TABLET ORAL
Qty: 180 TABLET | Refills: 3 | Status: SHIPPED | OUTPATIENT
Start: 2018-05-04 | End: 2018-05-11 | Stop reason: SDUPTHER

## 2018-05-04 RX ORDER — DILTIAZEM HYDROCHLORIDE 120 MG/1
CAPSULE, EXTENDED RELEASE ORAL
Qty: 90 CAPSULE | Refills: 3 | Status: SHIPPED | OUTPATIENT
Start: 2018-05-04 | End: 2019-02-06 | Stop reason: SDUPTHER

## 2018-05-04 RX ORDER — CLOPIDOGREL BISULFATE 75 MG/1
TABLET ORAL
Qty: 90 TABLET | Refills: 3 | Status: SHIPPED | OUTPATIENT
Start: 2018-05-04 | End: 2019-02-06 | Stop reason: SDUPTHER

## 2018-05-07 ENCOUNTER — HOSPITAL ENCOUNTER (OUTPATIENT)
Age: 78
Discharge: HOME | End: 2018-05-07
Payer: MEDICARE

## 2018-05-07 DIAGNOSIS — N18.30 CKD (CHRONIC KIDNEY DISEASE) STAGE 3, GFR 30-59 ML/MIN (HCC): ICD-10-CM

## 2018-05-07 LAB
ALBUMIN SERPL-MCNC: 3.8 G/DL (ref 3.5–5.2)
ALT SERPL-CCNC: 8 U/L (ref 5–33)
ANION GAP SERPL CALCULATED.3IONS-SCNC: 15 MMOL/L (ref 9–17)
AST SERPL-CCNC: 14 U/L
BUN BLDV-MCNC: 21 MG/DL (ref 8–23)
BUN/CREAT BLD: ABNORMAL (ref 9–20)
CALCIUM SERPL-MCNC: 9.7 MG/DL (ref 8.6–10.4)
CHLORIDE BLD-SCNC: 108 MMOL/L (ref 98–107)
CHOLESTEROL, FASTING: 133 MG/DL
CHOLESTEROL/HDL RATIO: 3.4
CO2: 20 MMOL/L (ref 20–31)
CREAT SERPL-MCNC: 1.8 MG/DL (ref 0.5–0.9)
CREATININE URINE: 80 MG/DL (ref 28–217)
GFR AFRICAN AMERICAN: 33 ML/MIN
GFR NON-AFRICAN AMERICAN: 27 ML/MIN
GFR SERPL CREATININE-BSD FRML MDRD: ABNORMAL ML/MIN/{1.73_M2}
GFR SERPL CREATININE-BSD FRML MDRD: ABNORMAL ML/MIN/{1.73_M2}
GLUCOSE BLD-MCNC: 76 MG/DL (ref 70–99)
HCT VFR BLD CALC: 36.3 % (ref 36.3–47.1)
HDLC SERPL-MCNC: 39 MG/DL
HEMOGLOBIN: 11 G/DL (ref 11.9–15.1)
LDL CHOLESTEROL: 66 MG/DL (ref 0–130)
MCH RBC QN AUTO: 27.6 PG (ref 25.2–33.5)
MCHC RBC AUTO-ENTMCNC: 30.3 G/DL (ref 28.4–34.8)
MCV RBC AUTO: 91.2 FL (ref 82.6–102.9)
NRBC AUTOMATED: 0 PER 100 WBC
PDW BLD-RTO: 14.4 % (ref 11.8–14.4)
PHOSPHORUS: 4.1 MG/DL (ref 2.6–4.5)
PLATELET # BLD: 213 K/UL (ref 138–453)
PMV BLD AUTO: 12.3 FL (ref 8.1–13.5)
POTASSIUM SERPL-SCNC: 4.2 MMOL/L (ref 3.7–5.3)
PTH INTACT: 96.44 PG/ML (ref 15–65)
RBC # BLD: 3.98 M/UL (ref 3.95–5.11)
SODIUM BLD-SCNC: 143 MMOL/L (ref 135–144)
TOTAL PROTEIN, URINE: 43 MG/DL
TRIGLYCERIDE, FASTING: 142 MG/DL
VLDLC SERPL CALC-MCNC: ABNORMAL MG/DL (ref 1–30)
WBC # BLD: 6.7 K/UL (ref 3.5–11.3)

## 2018-05-07 PROCEDURE — 84450 TRANSFERASE (AST) (SGOT): CPT

## 2018-05-07 PROCEDURE — 36415 COLL VENOUS BLD VENIPUNCTURE: CPT

## 2018-05-07 PROCEDURE — 83970 ASSAY OF PARATHORMONE: CPT

## 2018-05-07 PROCEDURE — 82040 ASSAY OF SERUM ALBUMIN: CPT

## 2018-05-07 PROCEDURE — 80048 BASIC METABOLIC PNL TOTAL CA: CPT

## 2018-05-07 PROCEDURE — 84156 ASSAY OF PROTEIN URINE: CPT

## 2018-05-07 PROCEDURE — 80061 LIPID PANEL: CPT

## 2018-05-07 PROCEDURE — 82570 ASSAY OF URINE CREATININE: CPT

## 2018-05-07 PROCEDURE — 84100 ASSAY OF PHOSPHORUS: CPT

## 2018-05-07 PROCEDURE — 84460 ALANINE AMINO (ALT) (SGPT): CPT

## 2018-05-07 PROCEDURE — 85027 COMPLETE CBC AUTOMATED: CPT

## 2018-05-09 PROBLEM — R80.1 PERSISTENT PROTEINURIA: Status: ACTIVE | Noted: 2018-05-09

## 2018-05-11 DIAGNOSIS — M54.50 CHRONIC BILATERAL LOW BACK PAIN WITHOUT SCIATICA: ICD-10-CM

## 2018-05-11 DIAGNOSIS — G89.29 CHRONIC BILATERAL LOW BACK PAIN WITHOUT SCIATICA: ICD-10-CM

## 2018-05-11 RX ORDER — TRAMADOL HYDROCHLORIDE 50 MG/1
TABLET ORAL
Qty: 180 TABLET | Refills: 0 | Status: SHIPPED | OUTPATIENT
Start: 2018-05-11 | End: 2018-06-11

## 2018-05-11 RX ORDER — LISINOPRIL 20 MG/1
TABLET ORAL
Qty: 180 TABLET | Refills: 5 | Status: SHIPPED | OUTPATIENT
Start: 2018-05-11 | End: 2019-06-10 | Stop reason: SDUPTHER

## 2018-06-01 ENCOUNTER — HOSPITAL ENCOUNTER (OUTPATIENT)
Dept: VASCULAR LAB | Age: 78
Discharge: HOME | End: 2018-06-01
Payer: MEDICARE

## 2018-06-01 DIAGNOSIS — I73.9 CLAUDICATION IN PERIPHERAL VASCULAR DISEASE (HCC): ICD-10-CM

## 2018-06-01 PROCEDURE — 93923 UPR/LXTR ART STDY 3+ LVLS: CPT

## 2018-06-05 ENCOUNTER — HOSPITAL ENCOUNTER (OUTPATIENT)
Dept: PAIN MANAGEMENT | Age: 78
Discharge: HOME | End: 2018-06-05
Payer: MEDICARE

## 2018-06-05 VITALS
BODY MASS INDEX: 42.01 KG/M2 | TEMPERATURE: 98.9 F | WEIGHT: 214 LBS | SYSTOLIC BLOOD PRESSURE: 148 MMHG | HEIGHT: 60 IN | DIASTOLIC BLOOD PRESSURE: 59 MMHG | HEART RATE: 80 BPM

## 2018-06-05 PROCEDURE — 99212 OFFICE O/P EST SF 10 MIN: CPT | Performed by: ANESTHESIOLOGY

## 2018-06-05 PROCEDURE — G0283 ELEC STIM OTHER THAN WOUND: HCPCS

## 2018-06-05 ASSESSMENT — PAIN DESCRIPTION - LOCATION: LOCATION: BACK

## 2018-06-05 ASSESSMENT — PAIN DESCRIPTION - DESCRIPTORS: DESCRIPTORS: NAGGING;ACHING

## 2018-06-05 ASSESSMENT — PAIN DESCRIPTION - PAIN TYPE: TYPE: CHRONIC PAIN

## 2018-06-05 ASSESSMENT — PAIN DESCRIPTION - ORIENTATION: ORIENTATION: LOWER

## 2018-06-05 ASSESSMENT — PAIN DESCRIPTION - ONSET: ONSET: ON-GOING

## 2018-06-05 ASSESSMENT — PAIN DESCRIPTION - PROGRESSION: CLINICAL_PROGRESSION: NOT CHANGED

## 2018-06-05 ASSESSMENT — PAIN SCALES - GENERAL: PAINLEVEL_OUTOF10: 4

## 2018-06-05 ASSESSMENT — PAIN DESCRIPTION - FREQUENCY: FREQUENCY: INTERMITTENT

## 2018-06-11 ENCOUNTER — OFFICE VISIT (OUTPATIENT)
Dept: VASCULAR SURGERY | Age: 78
End: 2018-06-11
Payer: MEDICARE

## 2018-06-11 VITALS
DIASTOLIC BLOOD PRESSURE: 78 MMHG | HEIGHT: 60 IN | RESPIRATION RATE: 19 BRPM | WEIGHT: 214.07 LBS | SYSTOLIC BLOOD PRESSURE: 128 MMHG | BODY MASS INDEX: 42.03 KG/M2 | OXYGEN SATURATION: 95 % | HEART RATE: 68 BPM

## 2018-06-11 DIAGNOSIS — I73.9 CLAUDICATION IN PERIPHERAL VASCULAR DISEASE (HCC): Primary | ICD-10-CM

## 2018-06-11 PROCEDURE — G8399 PT W/DXA RESULTS DOCUMENT: HCPCS | Performed by: SURGERY

## 2018-06-11 PROCEDURE — G8417 CALC BMI ABV UP PARAM F/U: HCPCS | Performed by: SURGERY

## 2018-06-11 PROCEDURE — 99212 OFFICE O/P EST SF 10 MIN: CPT | Performed by: SURGERY

## 2018-06-11 PROCEDURE — G8598 ASA/ANTIPLAT THER USED: HCPCS | Performed by: SURGERY

## 2018-06-11 PROCEDURE — 1036F TOBACCO NON-USER: CPT | Performed by: SURGERY

## 2018-06-11 PROCEDURE — 1123F ACP DISCUSS/DSCN MKR DOCD: CPT | Performed by: SURGERY

## 2018-06-11 PROCEDURE — 4040F PNEUMOC VAC/ADMIN/RCVD: CPT | Performed by: SURGERY

## 2018-06-11 PROCEDURE — 1090F PRES/ABSN URINE INCON ASSESS: CPT | Performed by: SURGERY

## 2018-06-11 PROCEDURE — G8427 DOCREV CUR MEDS BY ELIG CLIN: HCPCS | Performed by: SURGERY

## 2018-06-15 ASSESSMENT — ENCOUNTER SYMPTOMS
ALLERGIC/IMMUNOLOGIC NEGATIVE: 1
COLOR CHANGE: 0
SHORTNESS OF BREATH: 0
EYES NEGATIVE: 1
GASTROINTESTINAL NEGATIVE: 1

## 2018-07-10 ENCOUNTER — OFFICE VISIT (OUTPATIENT)
Dept: PODIATRY | Age: 78
End: 2018-07-10
Payer: MEDICARE

## 2018-07-10 VITALS — BODY MASS INDEX: 42.33 KG/M2 | RESPIRATION RATE: 16 BRPM | HEIGHT: 62 IN | HEART RATE: 68 BPM | WEIGHT: 230 LBS

## 2018-07-10 DIAGNOSIS — M79.672 PAIN IN BOTH FEET: ICD-10-CM

## 2018-07-10 DIAGNOSIS — I73.9 PERIPHERAL VASCULAR DISEASE (HCC): ICD-10-CM

## 2018-07-10 DIAGNOSIS — B35.1 DERMATOPHYTOSIS OF NAIL: ICD-10-CM

## 2018-07-10 DIAGNOSIS — M79.671 PAIN IN BOTH FEET: ICD-10-CM

## 2018-07-10 DIAGNOSIS — E11.51 TYPE II DIABETES MELLITUS WITH PERIPHERAL CIRCULATORY DISORDER (HCC): Primary | ICD-10-CM

## 2018-07-10 PROCEDURE — G8598 ASA/ANTIPLAT THER USED: HCPCS | Performed by: PODIATRIST

## 2018-07-10 PROCEDURE — 1123F ACP DISCUSS/DSCN MKR DOCD: CPT | Performed by: PODIATRIST

## 2018-07-10 PROCEDURE — 99213 OFFICE O/P EST LOW 20 MIN: CPT | Performed by: PODIATRIST

## 2018-07-10 PROCEDURE — 11721 DEBRIDE NAIL 6 OR MORE: CPT | Performed by: PODIATRIST

## 2018-07-10 PROCEDURE — G8417 CALC BMI ABV UP PARAM F/U: HCPCS | Performed by: PODIATRIST

## 2018-07-10 PROCEDURE — 1090F PRES/ABSN URINE INCON ASSESS: CPT | Performed by: PODIATRIST

## 2018-07-10 PROCEDURE — 1101F PT FALLS ASSESS-DOCD LE1/YR: CPT | Performed by: PODIATRIST

## 2018-07-10 PROCEDURE — G8427 DOCREV CUR MEDS BY ELIG CLIN: HCPCS | Performed by: PODIATRIST

## 2018-07-10 PROCEDURE — 4040F PNEUMOC VAC/ADMIN/RCVD: CPT | Performed by: PODIATRIST

## 2018-07-10 PROCEDURE — 1036F TOBACCO NON-USER: CPT | Performed by: PODIATRIST

## 2018-07-10 PROCEDURE — G8399 PT W/DXA RESULTS DOCUMENT: HCPCS | Performed by: PODIATRIST

## 2018-07-10 NOTE — PROGRESS NOTES
adult [facility]) 10/22/2014    Osteoarthritis     Other activity(E029.9)     Acute renal failure secondary to prerenal azotemia    Other activity(E029.9)     Atherosclerotic coronary artery disease status-post bypass surgery in 2003    Persistent proteinuria 5/9/2018    From diabetic nephrosclerosis, urine protein creatinine ratio 0.81    PVD (peripheral vascular disease) (HCC)     Sciatica     Secondary hyperparathyroidism (of renal origin) 10/22/2014    Not on VDRA    Shortness of breath     USES HOME OXYGEN    Sleep apnea     Sleep apnea     Type II or unspecified type diabetes mellitus without mention of complication, not stated as uncontrolled        No Known Allergies  Current Outpatient Prescriptions on File Prior to Visit   Medication Sig Dispense Refill    lisinopril (PRINIVIL;ZESTRIL) 20 MG tablet TAKE 1 TABLET 2 TIMES DAILY NOTE INCREASE PER DR CERDA 180 tablet 5    CARTIA  MG extended release capsule TAKE 1 CAPSULE EVERY DAY 90 capsule 3    clopidogrel (PLAVIX) 75 MG tablet TAKE 1 TABLET EVERY DAY 90 tablet 3    metoprolol succinate (TOPROL XL) 100 MG extended release tablet Take 1 tablet by mouth daily 30 tablet 11    B-D INS SYR ULTRAFINE 1CC/30G 30G X 1/2\" 1 ML MISC USE 1 SYRINGE EVERY DAY 90 each 5    nitroGLYCERIN (NITROSTAT) 0.4 MG SL tablet Place 1 tablet under the tongue as needed for Chest pain 25 tablet 11    insulin glargine (LANTUS) 100 UNIT/ML injection vial INJECT 70 UNITS INTO THE SKIN NIGHTLY (Patient taking differently: INJECT 100 UNITS INTO THE SKIN NIGHTLY(scale)) 3 vial 5    levothyroxine (SYNTHROID) 125 MCG tablet TAKE 1 TABLET EVERY DAY 90 tablet 3    lansoprazole (PREVACID) 15 MG delayed release capsule TAKE 1 CAPSULE EVERY DAY 90 capsule 3    Compression Stockings MISC by Does not apply route 20-40 mmHg.  2 each 0    cyanocobalamin 1000 MCG/ML injection Give patient 6 -1ml vials 6 mL 0    Syringe/Needle, Disp, (SYRINGE 3CC/25GX1\") 25G X 1\" 3 ML MISC Used negative for - mole changes, rash  Cardiovascular: Negative for leg swelling. Gastrointestinal: Negative for constipation, diarrhea, nausea and vomiting. Objective:  General: AAO x 3 in NAD. Derm  Toenail Description  Sites of Onychomycosis Involvement (Check affected area)  [x] [x] [x] [x] [x] [x] [x] [x] [x] [x]  5 4 3 2 1 1 2 3 4 5                          Right                                        Left    Thickness  [x] [x] [x] [x] [x] [x] [x] [x] [x] [x]  5 4 3 2 1 1 2 3 4 5                         Right                                        Left    Dystrophic Changes   [x] [x] [x] [x] [x] [x] [x] [x] [x] [x]  5 4 3 2 1 1 2 3 4 5                         Right                                        Left    Color   [x] [x] [x] [x] [x] [x] [x] [x] [x] [x]  5 4 3 2 1 1 2 3 4 5                          Right                                        Left    Incurvation/Ingrowin   [] [] [] [] [] [] [] [] [] []  5 4 3 2 1 1 2 3 4 5                         Right                                        Left    Inflammation/Pain   [x] [x] [x] [x] [x] [x] [x] [x] [x] [x]  5 4 3 2 1 1 2 3 4 5                         Right                                        Left      Dermatologic Exam:  Skin lesion/ulceration Absent . Skin No rashes or nodules noted. .       Musculoskeletal:     1st MPJ ROM decreased, Bilateral.  Muscle strength 5/5, Bilateral.  Pain present upon palpation of toenails 1-5, Bilateral. decreased medial longitudinal arch, Bilateral.  Ankle ROM decreased,Bilateral.    Dorsally contracted digits present digits 2, Bilateral.     Vascular: DP and PT pulses palpable 1/4, Bilateral.  CFT <5 seconds, Bilateral.  Hair growth absent to the level of the digits, Bilateral.  Edema present, Bilateral.  Varicosities absent, Bilateral. Erythema absent, Bilateral    Neurological: Sensation diminshed to light touch to level of digits, Bilateral.  Protective sensation intact 6/10 sites via 5.07/10g [place] Monofilament, Bilateral.  negative Tinel's, Bilateral.  negative Valleix sign, Bilateral.      Integument: Warm, dry, supple, Bilateral.  Open lesion absent, Bilateral.  Interdigital maceration absent to web spaces 4, Bilateral.  Nails 1-5 left and 1-5 right thickened > 3.0 mm, dystrophic and crumbly, discolored with subungual debris. Fissures absent, Bilateral.     Visual inspection:  Deformity: hammertoe deformity zbigniew feet  amputation: absent  Skin lesions: absent  Edema: right- 2+ pitting edema, left- 2+ pitting edema    Sensory exam:  Monofilament sensation: abnormal - 6/10 via SW 5.07/10g monofilament to the plantar foot bilateral feet    Pulses: abnormal - 1/4 dorsalis pedis pulse and 1/4 Posterior tibial pulse,   Pinprick: Impaired  Proprioception: Impaired  Vibration (128 Hz): Impaired         DM with PVD       [x]Yes    []No      Assessment:  70 y.o. female with:   Diagnosis Orders   1. Type II diabetes mellitus with peripheral circulatory disorder (HCC)  26387 - [place] DEBRIDEMENT OF NAILS, 6 OR MORE     DIABETES FOOT EXAM   2. Peripheral vascular disease ([facility] [address]  00215 - [place] DEBRIDEMENT OF NAILS, 6 OR MORE   3. Pain in both feet  95798 - [place] DEBRIDEMENT OF NAILS, 6 OR MORE   4. Dermatophytosis of nail  74538 - [place] DEBRIDEMENT OF NAILS, 6 OR MORE           Q7   []Yes    []No                Q8   [x]Yes    []No                     Q9   []Yes    []No    Plan:   Pt was evaluated and examined. Patient was given personalized discharge instructions. Nails 1-10 were debrided sharply in length and thickness with a nipper and , without incident. Pt will follow up in 9 weeks or sooner if any problems arise. Diagnosis was discussed with the pt and all of their questions were answered in detail. Proper foot hygiene and care was discussed with the pt. Informed patient on proper diabetic foot care and importance of tight glycemic control.   Patient to check feet daily and contact the office with any questions/problems/concerns.    Other comorbidity noted and will be managed by PCP.  7/10/2018    Electronically signed by Judith Kirby DPM on 7/10/2018 at 11:33 AM  7/10/2018

## 2018-07-16 ENCOUNTER — OFFICE VISIT (OUTPATIENT)
Dept: INTERNAL MEDICINE | Age: 78
End: 2018-07-16
Payer: MEDICARE

## 2018-07-16 VITALS
WEIGHT: 203.13 LBS | DIASTOLIC BLOOD PRESSURE: 66 MMHG | OXYGEN SATURATION: 96 % | SYSTOLIC BLOOD PRESSURE: 124 MMHG | BODY MASS INDEX: 37.15 KG/M2 | HEART RATE: 74 BPM

## 2018-07-16 DIAGNOSIS — E11.22 TYPE 2 DIABETES MELLITUS WITH STAGE 3 CHRONIC KIDNEY DISEASE, WITH LONG-TERM CURRENT USE OF INSULIN (HCC): Primary | ICD-10-CM

## 2018-07-16 DIAGNOSIS — N18.30 TYPE 2 DIABETES MELLITUS WITH STAGE 3 CHRONIC KIDNEY DISEASE, WITH LONG-TERM CURRENT USE OF INSULIN (HCC): Primary | ICD-10-CM

## 2018-07-16 DIAGNOSIS — Z79.4 TYPE 2 DIABETES MELLITUS WITH STAGE 3 CHRONIC KIDNEY DISEASE, WITH LONG-TERM CURRENT USE OF INSULIN (HCC): Primary | ICD-10-CM

## 2018-07-16 DIAGNOSIS — J43.1 PANLOBULAR EMPHYSEMA (HCC): ICD-10-CM

## 2018-07-16 DIAGNOSIS — Z23 NEED FOR SHINGLES VACCINE: ICD-10-CM

## 2018-07-16 DIAGNOSIS — N18.30 CKD (CHRONIC KIDNEY DISEASE) STAGE 3, GFR 30-59 ML/MIN (HCC): ICD-10-CM

## 2018-07-16 PROCEDURE — 4040F PNEUMOC VAC/ADMIN/RCVD: CPT | Performed by: INTERNAL MEDICINE

## 2018-07-16 PROCEDURE — 1090F PRES/ABSN URINE INCON ASSESS: CPT | Performed by: INTERNAL MEDICINE

## 2018-07-16 PROCEDURE — 1123F ACP DISCUSS/DSCN MKR DOCD: CPT | Performed by: INTERNAL MEDICINE

## 2018-07-16 PROCEDURE — 1101F PT FALLS ASSESS-DOCD LE1/YR: CPT | Performed by: INTERNAL MEDICINE

## 2018-07-16 PROCEDURE — 99214 OFFICE O/P EST MOD 30 MIN: CPT | Performed by: INTERNAL MEDICINE

## 2018-07-16 PROCEDURE — 3023F SPIROM DOC REV: CPT | Performed by: INTERNAL MEDICINE

## 2018-07-16 PROCEDURE — G8427 DOCREV CUR MEDS BY ELIG CLIN: HCPCS | Performed by: INTERNAL MEDICINE

## 2018-07-16 PROCEDURE — G8399 PT W/DXA RESULTS DOCUMENT: HCPCS | Performed by: INTERNAL MEDICINE

## 2018-07-16 PROCEDURE — 1036F TOBACCO NON-USER: CPT | Performed by: INTERNAL MEDICINE

## 2018-07-16 PROCEDURE — G8417 CALC BMI ABV UP PARAM F/U: HCPCS | Performed by: INTERNAL MEDICINE

## 2018-07-16 PROCEDURE — G8926 SPIRO NO PERF OR DOC: HCPCS | Performed by: INTERNAL MEDICINE

## 2018-07-16 PROCEDURE — G8598 ASA/ANTIPLAT THER USED: HCPCS | Performed by: INTERNAL MEDICINE

## 2018-07-16 ASSESSMENT — COPD QUESTIONNAIRES: COPD: 1

## 2018-07-16 ASSESSMENT — ENCOUNTER SYMPTOMS
SHORTNESS OF BREATH: 1
DIFFICULTY BREATHING: 1

## 2018-07-16 NOTE — PROGRESS NOTES
[facility] INTERNAL MEDICINE [address]  [address] [address]  Dept: [phone]  Dept Fax: [phone]    Laura Gutierrez is a 70 y.o. female who presents today for   Chief Complaint   Patient presents with    3 Month Follow-Up    and follow up of chronic medical problems:   Patient Active Problem List   Diagnosis    Hypercholesteremia    CAD (coronary artery disease)    Hypothyroidism    Lumbar spondylosis with myelopathy    Osteoporosis screening    S/P cardiac cath 8/19/14-Dr. Sandrine Boo    CKD (chronic kidney disease) stage 3, GFR 30-59 ml/min    Morbid obesity with BMI of 40.0-44.9, adult ([address]    At high risk for hyperkalemia    Edema    COPD (chronic obstructive pulmonary disease) ([address]    Hyperparathyroidism due to renal insufficiency ([address]    Carotid stenosis, asymptomatic    Claudication in peripheral vascular disease ([address]    Nonproliferative diabetic retinopathy of both eyes ([address]    PAD (peripheral artery disease) ([address]    Type 2 diabetes mellitus with stage 3 chronic kidney disease, with long-term current use of insulin ([address]    Coronary artery disease involving coronary bypass graft of native heart without angina pectoris    Essential hypertension    Localized edema    DM (diabetes mellitus), type 2 with peripheral vascular complications (HCC)    Mild nonproliferative diabetic retinopathy of both eyes without macular edema associated with type 2 diabetes mellitus (HCC)    Panlobular emphysema (HCC)    S/P drug eluting coronary stent placement-OM1 3/26/18-Dr. Sandrine Boo    CAD S/P percutaneous coronary angioplasty    Persistent proteinuria   .     Past Medical History:   Diagnosis Date    Abnormal stress test 07/18/2014    going to do cath- not urgent just abnormal    Arthritis     At high risk for hyperkalemia 10/22/2014    From type 4 RTA    Back pain     CAD (coronary artery disease)     Chronic kidney disease      Cinnamon 500 MG TABS Take 1 tablet by mouth daily.  Cranberry-Vitamin C-Vitamin E (CRANBERRY PLUS VITAMIN C PO) Take 1 tablet by mouth daily.  Ascorbic Acid (VITAMIN C WITH VIVIENNE HIPS) 500 MG tablet Take 500 mg by mouth daily.  Omega-3 Fatty Acids (FISH OIL) 1000 MG CAPS Take 1,000 mg by mouth daily.  Blood Glucose Monitoring Suppl (ONE TOUCH ULTRA) by Does not apply route.  CARTIA  MG extended release capsule TAKE 1 CAPSULE EVERY DAY 90 capsule 3    cilostazol (PLETAL) 50 MG tablet Take 50 mg by mouth 2 times daily      Glucose Blood (BLOOD GLUCOSE TEST STRIPS) STRP Test_3__times daily Diagnosis: 250.0   Diabetes Mellitus__x__Insulin Dependent____Non-Insulin Dependent Life Scan test strips ultra one touch 300 strip 3    docusate sodium (COLACE) 100 MG capsule Take 100 mg by mouth 2 times daily. No current facility-administered medications for this visit. No Known Allergies    Health Maintenance   Topic Date Due    Shingles Vaccine (1 of 2 - 2 Dose Series) 01/11/2014    TSH testing  04/07/2015    DTaP/Tdap/Td vaccine (1 - Tdap) 08/13/2018 (Originally 11/12/2015)    Flu vaccine (1) 09/01/2018    Potassium monitoring  05/07/2019    Creatinine monitoring  05/07/2019    DEXA (modify frequency per FRAX score)  Completed    Pneumococcal low/med risk  Completed       Controlled Substances Monitoring:      HPI:     Hypertension   This is a chronic problem. The current episode started more than 1 year ago. The problem is unchanged. The problem is controlled. Associated symptoms include shortness of breath. Risk factors for coronary artery disease include diabetes mellitus and dyslipidemia. Past treatments include calcium channel blockers, beta blockers and ACE inhibitors. The current treatment provides significant improvement. There are no compliance problems. Hypertensive end-organ damage includes CAD/MI, heart failure and retinopathy.    Diabetes   She presents for her follow-up diabetic visit. She has type 2 diabetes mellitus. Her disease course has been stable. Diabetic complications include heart disease, nephropathy and retinopathy. Current diabetic treatment includes insulin injections. She is compliant with treatment all of the time. She is following a diabetic diet. An ACE inhibitor/angiotensin II receptor blocker is being taken. COPD   She complains of difficulty breathing and shortness of breath. This is a chronic problem. The current episode started more than 1 year ago. Her symptoms are aggravated by exposure to smoke. Her past medical history is significant for emphysema. ROS:     Review of Systems   HENT: Negative. Respiratory: Positive for shortness of breath. Genitourinary: Negative. Objective:     Physical Exam   Constitutional: She is oriented to person, place, and time. She appears well-developed and well-nourished. HENT:   Head: Normocephalic and atraumatic. Neck: Neck supple. Cardiovascular: Normal rate and regular rhythm. Pulmonary/Chest: Effort normal and breath sounds normal.   Abdominal: Soft. Musculoskeletal: She exhibits no edema. Neurological: She is alert and oriented to person, place, and time. Skin: Skin is warm and dry. Psychiatric: She has a normal mood and affect. Her behavior is normal.   Vitals reviewed.

## 2018-08-07 ENCOUNTER — HOSPITAL ENCOUNTER (OUTPATIENT)
Dept: PAIN MANAGEMENT | Age: 78
Discharge: HOME | End: 2018-08-07
Payer: MEDICARE

## 2018-08-07 VITALS
DIASTOLIC BLOOD PRESSURE: 56 MMHG | HEART RATE: 63 BPM | TEMPERATURE: 98.3 F | SYSTOLIC BLOOD PRESSURE: 110 MMHG | RESPIRATION RATE: 16 BRPM

## 2018-08-07 DIAGNOSIS — M47.16 LUMBAR SPONDYLOSIS WITH MYELOPATHY: Primary | Chronic | ICD-10-CM

## 2018-08-07 PROCEDURE — G0283 ELEC STIM OTHER THAN WOUND: HCPCS

## 2018-08-07 PROCEDURE — 99212 OFFICE O/P EST SF 10 MIN: CPT | Performed by: ANESTHESIOLOGY

## 2018-08-07 ASSESSMENT — PAIN DESCRIPTION - DESCRIPTORS: DESCRIPTORS: ACHING;DULL;NAGGING

## 2018-08-07 ASSESSMENT — PAIN DESCRIPTION - LOCATION: LOCATION: BACK;HIP

## 2018-08-07 ASSESSMENT — PAIN DESCRIPTION - ONSET: ONSET: ON-GOING

## 2018-08-07 ASSESSMENT — PAIN SCALES - GENERAL: PAINLEVEL_OUTOF10: 3

## 2018-08-07 ASSESSMENT — PAIN DESCRIPTION - PAIN TYPE: TYPE: CHRONIC PAIN

## 2018-08-07 ASSESSMENT — PAIN DESCRIPTION - PROGRESSION: CLINICAL_PROGRESSION: NOT CHANGED

## 2018-08-07 ASSESSMENT — PAIN DESCRIPTION - FREQUENCY: FREQUENCY: INTERMITTENT

## 2018-08-07 ASSESSMENT — PAIN DESCRIPTION - ORIENTATION: ORIENTATION: LOWER

## 2018-08-07 NOTE — PROGRESS NOTES
The patient is here today for Horizontal Treatment # maintanance    The patient's diagnosis is M54.5. Today's treatment is to reduce or alleviate pain symptoms, reduce edema, increase range of motion, improve circulation and relaxation of muscle spasms with electrical stimulation and neuromuscular re-education. Today's  treatment is one of a series of ten. A determination to continue treatment will be made after each session. Re-evaluations will be scheduled after every 6 treatments. The treatment plan was reviewed with the patient,  the patient agrees, and the consent was signed. The treatment goal is to reach 50- 70 % physical modality improvement and /or pain relief by the end of the series of treatments.       Pain Assessment  Pain Assessment: 0-10  Pain Level: 3  Pain Type: Chronic pain  Pain Location: Back, Hip  Pain Orientation: Lower  Pain Radiating Towards:  (zbigniew hip, more on right)  Pain Descriptors: Aching, Dull, Nagging  Pain Frequency: Intermittent  Pain Onset: On-going  Clinical Progression: Not changed  Effect of Pain on Daily Activities: 4/5 doing adl, rests if needed  Pain Intervention(s): Medication (see eMar), Repositioned, Other (Comment)  Response to Pain Intervention:  (NMS 6 WEEKS RELIEF)    Date of last Horizontal treatment  June / 5/2018    Was there pain relief : Yes    How long was your pain relief : 6 weeks    What percentage of your pain was relieved  85%    ADL (activities of daily living) score is: 4/5    ADL description is stated as being stationary aggravates         Outpatient Prescriptions Marked as Taking for the 8/7/18 encounter [facility] Encounter) with [facility] PAIN EXAM RM 1   Medication Sig Dispense Refill    lisinopril (PRINIVIL;ZESTRIL) 20 MG tablet TAKE 1 TABLET 2 TIMES DAILY NOTE INCREASE PER DR CERDA 180 tablet 5    CARTIA  MG extended release capsule TAKE 1 CAPSULE EVERY DAY 90 capsule 3    clopidogrel (PLAVIX) 75 MG tablet TAKE 1 TABLET EVERY DAY 90  Ascorbic Acid (VITAMIN C WITH VIVIENNE HIPS) 500 MG tablet Take 500 mg by mouth daily.  Omega-3 Fatty Acids (FISH OIL) 1000 MG CAPS Take 1,000 mg by mouth daily.  docusate sodium (COLACE) 100 MG capsule Take 100 mg by mouth 2 times daily.  Blood Glucose Monitoring Suppl (ONE TOUCH ULTRA) by Does not apply route. Are your Current Pain medication (s) controlling the pain  Yes    Medication Side Effects:  Constipation  No      Sedation  No    Urinary Retention  No  Other Medication Side Effect none    Are you on a Bowel Regime  :  Diet  No   Medication No    Sleep Pattern, 8 hours per night   generally restful sleep    Working  retired    Home Exercises intermittently bicycling(stationary)    The targeted treatment area today was low back using Channel #1 at program # 3 following the guidelines from page # 52 with the dosage at 45. The treatments for Channel #1 was 45 min  The patient's pain scale 0 was pending post treatment.      I have written this patient information acting as a scribe for Dr Janneth Serrano    Electronically signed by Cricket Calero RN on 8/7/2018 at 1:30 PM

## 2018-08-13 NOTE — TELEPHONE ENCOUNTER
Last visit:  Last Med refill:    Next Visit Date:  Future Appointments  Date Time Provider Lena Brisa   9/11/2018 11:00 AM Charliene Gitelman, DPM [facility]   10/9/2018 1:00 PM STV PAIN EXAM RM 1 [facility]IN [facility]   10/17/2018 11:00 AM Rhonda Fernandez MD [facility]   11/14/2018 10:30 AM Laverne Rose MD [facility] None       Health Maintenance   Topic Date Due    Shingles Vaccine (1 of 2 - 2 Dose Series) 01/11/2014    TSH testing  04/07/2015    DTaP/Tdap/Td vaccine (1 - Tdap) 08/13/2018 (Originally 11/12/2015)    Flu vaccine (1) 09/01/2018    Potassium monitoring  05/07/2019    Creatinine monitoring  05/07/2019    DEXA (modify frequency per FRAX score)  Completed    Pneumococcal low/med risk  Completed       Hemoglobin A1C (%)   Date Value   02/13/2018 6.3   11/07/2017 6.7   08/22/2017 6.8             ( goal A1C is < 7)   Microalb/Crt.  Ratio (mcg/mg creat)   Date Value   02/13/2018 280 (H)     LDL Cholesterol (mg/dL)   Date Value   05/07/2018 66   10/31/2017 34       (goal LDL is <100)   AST (U/L)   Date Value   05/07/2018 14     ALT (U/L)   Date Value   05/07/2018 8     BUN (mg/dL)   Date Value   05/07/2018 21     BP Readings from Last 3 Encounters:   08/07/18 (!) 110/56   07/16/18 124/66   06/11/18 128/78          (goal 120/80)    All Future Testing planned in [facility]  Lab Frequency Next Occurrence   Phosphorus     PTH, Intact     CBC     Albumin     Basic Metabolic Panel     Protein, urine, random                 Patient Active Problem List:     Hypercholesteremia     CAD (coronary artery disease)     Hypothyroidism     Lumbar spondylosis with myelopathy     Osteoporosis screening     S/P cardiac cath 8/19/14-Dr. covarrubias     CKD (chronic kidney disease) stage 3, GFR 30-59 ml/min     Morbid obesity with BMI of 40.0-44.9, adult ([facility])     At high risk for hyperkalemia     Edema     COPD (chronic obstructive pulmonary disease) ([facility])     Hyperparathyroidism due to renal

## 2018-08-15 RX ORDER — BLOOD SUGAR DIAGNOSTIC
STRIP MISCELLANEOUS
Qty: 300 STRIP | Refills: 11 | Status: SHIPPED | OUTPATIENT
Start: 2018-08-15 | End: 2019-11-20 | Stop reason: SDUPTHER

## 2018-09-10 DIAGNOSIS — M43.06 LUMBAR SPONDYLOLYSIS: Primary | ICD-10-CM

## 2018-09-10 RX ORDER — TRAMADOL HYDROCHLORIDE 50 MG/1
TABLET ORAL
Qty: 60 TABLET | Refills: 0 | Status: SHIPPED | OUTPATIENT
Start: 2018-09-10 | End: 2018-10-15 | Stop reason: SDUPTHER

## 2018-09-10 NOTE — TELEPHONE ENCOUNTER
obstructive pulmonary disease) (HCC)     Hyperparathyroidism due to renal insufficiency ([facility])     Carotid stenosis, asymptomatic     Claudication in peripheral vascular disease ([facility])     Nonproliferative diabetic retinopathy of both eyes ([facility])     PAD (peripheral artery disease) ([facility] [address] [address]     Type 2 diabetes mellitus with stage 3 chronic kidney disease, with long-term current use of insulin ([facility] [address]     Coronary artery disease involving coronary bypass graft of native heart without angina pectoris     Essential hypertension     Localized edema     DM (diabetes mellitus), type 2 with peripheral vascular complications ([facility])     Mild nonproliferative diabetic retinopathy of both eyes without macular edema associated with type 2 diabetes mellitus ([facility])     Panlobular emphysema ([facility])     S/P drug eluting coronary stent placement-OM1 3/26/18-Dr. covarrubias     CAD S/P percutaneous coronary angioplasty     Persistent proteinuria

## 2018-09-11 ENCOUNTER — OFFICE VISIT (OUTPATIENT)
Dept: PODIATRY | Age: 78
End: 2018-09-11
Payer: MEDICARE

## 2018-09-11 VITALS — BODY MASS INDEX: 37.36 KG/M2 | RESPIRATION RATE: 16 BRPM | WEIGHT: 203 LBS | HEIGHT: 62 IN

## 2018-09-11 DIAGNOSIS — M79.672 BILATERAL FOOT PAIN: ICD-10-CM

## 2018-09-11 DIAGNOSIS — E11.51 TYPE II DIABETES MELLITUS WITH PERIPHERAL CIRCULATORY DISORDER (HCC): Primary | ICD-10-CM

## 2018-09-11 DIAGNOSIS — M79.671 BILATERAL FOOT PAIN: ICD-10-CM

## 2018-09-11 DIAGNOSIS — I73.9 PVD (PERIPHERAL VASCULAR DISEASE) (HCC): ICD-10-CM

## 2018-09-11 DIAGNOSIS — B35.1 DERMATOPHYTOSIS OF NAIL: ICD-10-CM

## 2018-09-11 PROCEDURE — G8427 DOCREV CUR MEDS BY ELIG CLIN: HCPCS | Performed by: PODIATRIST

## 2018-09-11 PROCEDURE — G8598 ASA/ANTIPLAT THER USED: HCPCS | Performed by: PODIATRIST

## 2018-09-11 PROCEDURE — G8399 PT W/DXA RESULTS DOCUMENT: HCPCS | Performed by: PODIATRIST

## 2018-09-11 PROCEDURE — 1123F ACP DISCUSS/DSCN MKR DOCD: CPT | Performed by: PODIATRIST

## 2018-09-11 PROCEDURE — 1090F PRES/ABSN URINE INCON ASSESS: CPT | Performed by: PODIATRIST

## 2018-09-11 PROCEDURE — 11721 DEBRIDE NAIL 6 OR MORE: CPT | Performed by: PODIATRIST

## 2018-09-11 PROCEDURE — 1101F PT FALLS ASSESS-DOCD LE1/YR: CPT | Performed by: PODIATRIST

## 2018-09-11 PROCEDURE — G8417 CALC BMI ABV UP PARAM F/U: HCPCS | Performed by: PODIATRIST

## 2018-09-11 PROCEDURE — 4040F PNEUMOC VAC/ADMIN/RCVD: CPT | Performed by: PODIATRIST

## 2018-09-11 PROCEDURE — 1036F TOBACCO NON-USER: CPT | Performed by: PODIATRIST

## 2018-09-11 NOTE — PROGRESS NOTES
Negative for weakness and numbness. Dermatological ROS: negative for - mole changes, rash  Cardiovascular: Negative for leg swelling. Gastrointestinal: Negative for constipation, diarrhea, nausea and vomiting. Objective:  General: AAO x 3 in NAD. Derm  Toenail Description  Sites of Onychomycosis Involvement (Check affected area)  [x] [x] [x] [x] [x] [x] [x] [x] [x] [x]  5 4 3 2 1 1 2 3 4 5                          Right                                        Left    Thickness  [x] [x] [x] [x] [x] [x] [x] [x] [x] [x]  5 4 3 2 1 1 2 3 4 5                         Right                                        Left    Dystrophic Changes   [x] [x] [x] [x] [x] [x] [x] [x] [x] [x]  5 4 3 2 1 1 2 3 4 5                         Right                                        Left    Color   [x] [x] [x] [x] [x] [x] [x] [x] [x] [x]  5 4 3 2 1 1 2 3 4 5                          Right                                        Left    Incurvation/Ingrowin   [] [] [] [] [] [] [] [] [] []  5 4 3 2 1 1 2 3 4 5                         Right                                        Left    Inflammation/Pain   [x] [x] [x] [x] [x] [x] [x] [x] [x] [x]  5 4 3 2 1 1 2 3 4 5                         Right                                        Left      Dermatologic Exam:  Skin lesion/ulceration Absent . Skin No rashes or nodules noted. .       Musculoskeletal:     1st MPJ ROM decreased, Bilateral.  Muscle strength 5/5, Bilateral.  Pain present upon palpation of toenails 1-5, Bilateral. decreased medial longitudinal arch, Bilateral.  Ankle ROM decreased,Bilateral.    Dorsally contracted digits present digits 2, Bilateral.     Vascular: DP and PT pulses palpable 1/4, Bilateral.  CFT <5 seconds, Bilateral.  Hair growth absent to the level of the digits, Bilateral.  Edema present, Bilateral.  Varicosities absent, Bilateral. Erythema absent, Bilateral    Neurological: Sensation diminshed to light touch to level of digits, Informed patient on proper diabetic foot care and importance of tight glycemic control. Patient to check feet daily and contact the office with any questions/problems/concerns.    Other comorbidity noted and will be managed by PCP.  9/11/2018    Electronically signed by Kiley Zimmer DPM on 9/11/2018 at 11:36 AM  9/11/2018

## 2018-09-17 ENCOUNTER — OFFICE VISIT (OUTPATIENT)
Dept: INTERNAL MEDICINE | Age: 78
End: 2018-09-17
Payer: MEDICARE

## 2018-09-17 VITALS
HEIGHT: 62 IN | SYSTOLIC BLOOD PRESSURE: 132 MMHG | OXYGEN SATURATION: 97 % | HEART RATE: 83 BPM | WEIGHT: 197.8 LBS | DIASTOLIC BLOOD PRESSURE: 61 MMHG | RESPIRATION RATE: 12 BRPM | BODY MASS INDEX: 36.4 KG/M2

## 2018-09-17 DIAGNOSIS — I70.643: Primary | ICD-10-CM

## 2018-09-17 PROCEDURE — G8598 ASA/ANTIPLAT THER USED: HCPCS | Performed by: INTERNAL MEDICINE

## 2018-09-17 PROCEDURE — 1101F PT FALLS ASSESS-DOCD LE1/YR: CPT | Performed by: INTERNAL MEDICINE

## 2018-09-17 PROCEDURE — 1090F PRES/ABSN URINE INCON ASSESS: CPT | Performed by: INTERNAL MEDICINE

## 2018-09-17 PROCEDURE — 1123F ACP DISCUSS/DSCN MKR DOCD: CPT | Performed by: INTERNAL MEDICINE

## 2018-09-17 PROCEDURE — 1036F TOBACCO NON-USER: CPT | Performed by: INTERNAL MEDICINE

## 2018-09-17 PROCEDURE — G8399 PT W/DXA RESULTS DOCUMENT: HCPCS | Performed by: INTERNAL MEDICINE

## 2018-09-17 PROCEDURE — G8427 DOCREV CUR MEDS BY ELIG CLIN: HCPCS | Performed by: INTERNAL MEDICINE

## 2018-09-17 PROCEDURE — 99214 OFFICE O/P EST MOD 30 MIN: CPT | Performed by: INTERNAL MEDICINE

## 2018-09-17 PROCEDURE — G8417 CALC BMI ABV UP PARAM F/U: HCPCS | Performed by: INTERNAL MEDICINE

## 2018-09-17 PROCEDURE — 4040F PNEUMOC VAC/ADMIN/RCVD: CPT | Performed by: INTERNAL MEDICINE

## 2018-09-17 RX ORDER — CEPHALEXIN 500 MG/1
500 CAPSULE ORAL 3 TIMES DAILY
Qty: 30 CAPSULE | Refills: 0 | Status: SHIPPED | OUTPATIENT
Start: 2018-09-17 | End: 2018-10-09

## 2018-09-17 ASSESSMENT — ENCOUNTER SYMPTOMS
RESPIRATORY NEGATIVE: 1
ALLERGIC/IMMUNOLOGIC NEGATIVE: 1
EYES NEGATIVE: 1

## 2018-09-28 ENCOUNTER — HOSPITAL ENCOUNTER (OUTPATIENT)
Dept: WOUND CARE | Age: 78
Discharge: HOME | End: 2018-09-28
Payer: MEDICARE

## 2018-09-28 VITALS
HEIGHT: 62 IN | TEMPERATURE: 98 F | RESPIRATION RATE: 16 BRPM | WEIGHT: 197 LBS | SYSTOLIC BLOOD PRESSURE: 146 MMHG | BODY MASS INDEX: 36.25 KG/M2 | HEART RATE: 67 BPM | DIASTOLIC BLOOD PRESSURE: 53 MMHG

## 2018-09-28 DIAGNOSIS — I89.0 LYMPHEDEMA OF RIGHT LOWER EXTREMITY: Chronic | ICD-10-CM

## 2018-09-28 DIAGNOSIS — I73.9 PAD (PERIPHERAL ARTERY DISEASE) (HCC): Primary | ICD-10-CM

## 2018-09-28 PROCEDURE — 99201 HC NEW PT, E/M LEVEL 1: CPT

## 2018-09-28 ASSESSMENT — PAIN SCALES - GENERAL: PAINLEVEL_OUTOF10: 5

## 2018-09-28 ASSESSMENT — PAIN DESCRIPTION - ORIENTATION: ORIENTATION: RIGHT;LEFT

## 2018-09-28 ASSESSMENT — PAIN DESCRIPTION - DESCRIPTORS: DESCRIPTORS: ACHING

## 2018-09-28 ASSESSMENT — PAIN DESCRIPTION - LOCATION: LOCATION: LEG

## 2018-09-28 ASSESSMENT — PAIN DESCRIPTION - PAIN TYPE: TYPE: ACUTE PAIN

## 2018-09-28 NOTE — PLAN OF CARE
Problem: Wound:  Goal: Will show signs of wound healing; wound closure and no evidence of infection  Will show signs of wound healing; wound closure and no evidence of infection  Outcome: Completed Date Met: 09/28/18  Wounds referred for are healed. Problem: Arterial:  Goal: Optimize blood flow for wound healing  Optimize blood flow for wound healing  Outcome: Completed Date Met: 09/28/18  Dr. Jillian Yip discussed vascular status with patient and examined.      Her right JORY is 0.26 and left JORY is 0.07        Problem: Falls - Risk of:  Goal: Will remain free from falls  Will remain free from falls  Outcome: Completed Date Met: 09/28/18

## 2018-09-28 NOTE — PROGRESS NOTES
[facility]   Progress Note    9/28/2018 8:42 AM  Subjective:   Admit Date: 9/28/2018  PCP: Willie Lane MD  Interval History: This is a 70 yr old female with a history of lymphedema of the right leg. She describes a cellulitis with a wound that was weeping clear serous fluid. She has been in the lymphedema clinic in the past.  The wound has healed spontaneously. Her vascular history is significant for CABG and multiple stents in the past as recently as last year. Her non-invasive vascular studies show severe disease. Her right JORY is 0.26 and left JORY is 0.07. Her segmental pressures suggest aortoiliac occlusive disease. Despite these numbers, she wears bilateral 20-30 mmHg compression stockings and she has not formed any ischemic ulcers on her toes. She complains of claudication at less than 1 block, and describes her feet going numb when laying flat in bed which may be ischemia related. I reviewed her cardiac catheterization from 3/2018 which shows the right SFA to be occluded at its origin and the external iliac to be heavily diseased.         Medications:   Scheduled Meds:  Continuous Infusions:      Labs:       Objective:   Vitals: BP (!) 146/53   Pulse 67   Temp 98 °F (36.7 °C) (Oral)   Resp 16   Ht 5' 2\" (1.575 m)   Wt 197 lb (89.4 kg)   BMI 36.03 kg/m²   General appearance: alert, cooperative and no distress  Mental Status: oriented to person, place and time with normal affect  Neck: good carotid pulses, no JVD  Lungs: clear to auscultation bilaterally, normal effort  Heart: regular rate and rhythm, no murmur,  Abdomen: soft, non-tender, non-distended, bowel sounds present all four quadrants, no masses, hepatomegaly, splenomegaly or aortic enlargement  Extremities: right foot  Edema, + Stemmer's sign, no erythema or tenderness in the calves  Skin: no gross lesions, rashes, or induration    Wound Care Documentation:  Wound 03/26/18 Other (Comment) Other (Comment) Plantar small round partial thickness wound, less than 1 cm (Active)   Number of days: 185         Assessment:   1. 70 yr old female with PAD, lymphedema    Problem List Items Addressed This Visit     Lymphedema of right lower extremity (Chronic)    PAD (peripheral artery disease) ([facility] - Primary          Plan:   1. Wound is healed and does not need active treatment  2. Peripheral perfusion is severely limited and concerning that she will need a major intervention at some point. There is no imaging, no CTA, and no angiogram. Will discuss with her vascular surgeon. 3. Follow up as needed.     Electronically signed by Ricco Warner MD on 9/28/2018 at 8:42 AM

## 2018-10-09 ENCOUNTER — HOSPITAL ENCOUNTER (OUTPATIENT)
Dept: PAIN MANAGEMENT | Age: 78
Discharge: HOME | End: 2018-10-09
Payer: MEDICARE

## 2018-10-09 VITALS
DIASTOLIC BLOOD PRESSURE: 56 MMHG | SYSTOLIC BLOOD PRESSURE: 137 MMHG | RESPIRATION RATE: 16 BRPM | TEMPERATURE: 98 F | HEIGHT: 62 IN | WEIGHT: 197 LBS | BODY MASS INDEX: 36.25 KG/M2 | HEART RATE: 54 BPM

## 2018-10-09 PROCEDURE — 99212 OFFICE O/P EST SF 10 MIN: CPT | Performed by: ANESTHESIOLOGY

## 2018-10-09 PROCEDURE — G0283 ELEC STIM OTHER THAN WOUND: HCPCS

## 2018-10-09 ASSESSMENT — PAIN DESCRIPTION - DESCRIPTORS: DESCRIPTORS: ACHING;CONSTANT

## 2018-10-09 ASSESSMENT — PAIN SCALES - GENERAL: PAINLEVEL_OUTOF10: 2

## 2018-10-09 ASSESSMENT — PAIN DESCRIPTION - FREQUENCY: FREQUENCY: INTERMITTENT

## 2018-10-09 ASSESSMENT — PAIN DESCRIPTION - PAIN TYPE: TYPE: CHRONIC PAIN

## 2018-10-09 ASSESSMENT — PAIN DESCRIPTION - LOCATION: LOCATION: BACK

## 2018-10-15 DIAGNOSIS — M43.06 LUMBAR SPONDYLOLYSIS: ICD-10-CM

## 2018-10-16 RX ORDER — TRAMADOL HYDROCHLORIDE 50 MG/1
TABLET ORAL
Qty: 60 TABLET | Refills: 0 | Status: SHIPPED | OUTPATIENT
Start: 2018-10-16 | End: 2018-11-17 | Stop reason: SDUPTHER

## 2018-10-17 ENCOUNTER — OFFICE VISIT (OUTPATIENT)
Dept: INTERNAL MEDICINE | Age: 78
End: 2018-10-17
Payer: MEDICARE

## 2018-10-17 VITALS
OXYGEN SATURATION: 99 % | SYSTOLIC BLOOD PRESSURE: 153 MMHG | HEART RATE: 50 BPM | WEIGHT: 198 LBS | BODY MASS INDEX: 36.21 KG/M2 | DIASTOLIC BLOOD PRESSURE: 62 MMHG

## 2018-10-17 DIAGNOSIS — J43.1 PANLOBULAR EMPHYSEMA (HCC): ICD-10-CM

## 2018-10-17 DIAGNOSIS — I25.810 CORONARY ARTERY DISEASE INVOLVING CORONARY BYPASS GRAFT OF NATIVE HEART WITHOUT ANGINA PECTORIS: ICD-10-CM

## 2018-10-17 DIAGNOSIS — N18.30 TYPE 2 DIABETES MELLITUS WITH STAGE 3 CHRONIC KIDNEY DISEASE, WITH LONG-TERM CURRENT USE OF INSULIN (HCC): ICD-10-CM

## 2018-10-17 DIAGNOSIS — E11.22 TYPE 2 DIABETES MELLITUS WITH STAGE 3 CHRONIC KIDNEY DISEASE, WITH LONG-TERM CURRENT USE OF INSULIN (HCC): ICD-10-CM

## 2018-10-17 DIAGNOSIS — Z79.4 TYPE 2 DIABETES MELLITUS WITH STAGE 3 CHRONIC KIDNEY DISEASE, WITH LONG-TERM CURRENT USE OF INSULIN (HCC): ICD-10-CM

## 2018-10-17 DIAGNOSIS — Z23 FLU VACCINE NEED: Primary | ICD-10-CM

## 2018-10-17 PROCEDURE — 3023F SPIROM DOC REV: CPT | Performed by: INTERNAL MEDICINE

## 2018-10-17 PROCEDURE — 4040F PNEUMOC VAC/ADMIN/RCVD: CPT | Performed by: INTERNAL MEDICINE

## 2018-10-17 PROCEDURE — 1036F TOBACCO NON-USER: CPT | Performed by: INTERNAL MEDICINE

## 2018-10-17 PROCEDURE — 1101F PT FALLS ASSESS-DOCD LE1/YR: CPT | Performed by: INTERNAL MEDICINE

## 2018-10-17 PROCEDURE — G8417 CALC BMI ABV UP PARAM F/U: HCPCS | Performed by: INTERNAL MEDICINE

## 2018-10-17 PROCEDURE — G8482 FLU IMMUNIZE ORDER/ADMIN: HCPCS | Performed by: INTERNAL MEDICINE

## 2018-10-17 PROCEDURE — G0008 ADMIN INFLUENZA VIRUS VAC: HCPCS | Performed by: INTERNAL MEDICINE

## 2018-10-17 PROCEDURE — G8926 SPIRO NO PERF OR DOC: HCPCS | Performed by: INTERNAL MEDICINE

## 2018-10-17 PROCEDURE — 99214 OFFICE O/P EST MOD 30 MIN: CPT | Performed by: INTERNAL MEDICINE

## 2018-10-17 PROCEDURE — G8399 PT W/DXA RESULTS DOCUMENT: HCPCS | Performed by: INTERNAL MEDICINE

## 2018-10-17 PROCEDURE — 1090F PRES/ABSN URINE INCON ASSESS: CPT | Performed by: INTERNAL MEDICINE

## 2018-10-17 PROCEDURE — G8427 DOCREV CUR MEDS BY ELIG CLIN: HCPCS | Performed by: INTERNAL MEDICINE

## 2018-10-17 PROCEDURE — 1123F ACP DISCUSS/DSCN MKR DOCD: CPT | Performed by: INTERNAL MEDICINE

## 2018-10-17 PROCEDURE — G8598 ASA/ANTIPLAT THER USED: HCPCS | Performed by: INTERNAL MEDICINE

## 2018-10-17 PROCEDURE — 90688 IIV4 VACCINE SPLT 0.5 ML IM: CPT | Performed by: INTERNAL MEDICINE

## 2018-10-17 RX ORDER — NITROGLYCERIN 0.4 MG/1
0.4 TABLET SUBLINGUAL PRN
Qty: 25 TABLET | Refills: 11 | Status: SHIPPED | OUTPATIENT
Start: 2018-10-17 | End: 2019-06-10 | Stop reason: SDUPTHER

## 2018-10-17 RX ORDER — CYANOCOBALAMIN 1000 UG/ML
INJECTION INTRAMUSCULAR; SUBCUTANEOUS
Qty: 6 ML | Refills: 1 | Status: SHIPPED | OUTPATIENT
Start: 2018-10-17 | End: 2020-04-20 | Stop reason: SDUPTHER

## 2018-10-17 ASSESSMENT — ENCOUNTER SYMPTOMS
ALLERGIC/IMMUNOLOGIC NEGATIVE: 1
GASTROINTESTINAL NEGATIVE: 1
SHORTNESS OF BREATH: 1
EYES NEGATIVE: 1

## 2018-10-17 ASSESSMENT — COPD QUESTIONNAIRES: COPD: 1

## 2018-10-17 NOTE — PROGRESS NOTES
[facility] \[facility]\"" INTERNAL MEDICINE [address]  [address] [place] [id number]  Dept: [phone]  Dept Fax: [phone]    Nellie Dean is a 70 y.o. female who presents today for   Chief Complaint   Patient presents with    3 Month Follow-Up    and follow upof chronic medical problems:   Patient Active Problem List   Diagnosis    Hypercholesteremia    CAD (coronary artery disease)    Hypothyroidism    Lumbar spondylosis with myelopathy    Osteoporosis screening    S/P cardiac cath 8/19/14-Dr. Gene Nick    CKD (chronic kidney disease) stage 3, GFR 30-59 ml/min ([address]    Morbid obesity with BMI of 40.0-44.9, adult ([address]    At high risk for hyperkalemia    Edema    COPD (chronic obstructive pulmonary disease) ([address]    Hyperparathyroidism due to renal insufficiency ([address]    Carotid stenosis, asymptomatic    Claudication in peripheral vascular disease ([address]    Nonproliferative diabetic retinopathy of both eyes ([address]    PAD (peripheral artery disease) ([address]    Type 2 diabetes mellitus with stage 3 chronic kidney disease, with long-term current use of insulin ([address]    Coronary artery disease involving coronary bypass graft of native heart without angina pectoris    Essential hypertension    Localized edema    DM (diabetes mellitus), type 2 with peripheral vascular complications (HCC)    Mild nonproliferative diabetic retinopathy of both eyes without macular edema associated with type 2 diabetes mellitus (HCC)    Panlobular emphysema ([address]    S/P drug eluting coronary stent placement-OM1 3/26/18-Dr. Gene Nick    CAD S/P percutaneous coronary angioplasty    Persistent proteinuria    Lymphedema of right lower extremity    Chronic bilateral low back pain without sciatica   .     Past Medical History:   Diagnosis Date    Abnormal stress test 07/18/2014    going to do cath- not urgent just abnormal    Arthritis     At high risk for hyperkalemia 10/22/2014    From medications since your last visit including any over-the-counter medicines, vitamins, or herbal medicines? no   Are you having any side effects from any of your medications? -  no  Have you stopped taking any of your medications? Is so, why? -  no    Have you seen any other physician or provider since your last visit? No  Have you had any other diagnostic tests since your last visit? No  Have you been seen in the emergency room and/or had an admission to a hospital since we last saw you? No  Have you had your routine dental cleaning in the past 6 months? no    Have you activated your [employer] account? If not, what are your barriers?  Yes     Patient Care Team:  Ricardo Chowdary MD as PCP - General Barbara Hall DPM as Physician (Podiatry)    Medical History Review  Past Medical, Family, and Social History reviewed and does contribute to the patient presenting condition    Health Maintenance   Topic Date Due    Flu vaccine (1) 09/01/2018    DTaP/Tdap/Td vaccine (1 - Tdap) 12/17/2018 (Originally 11/12/2015)    Shingles Vaccine (1 of 2 - 2 Dose Series) 12/17/2018 (Originally 1/11/2014)    TSH testing  12/17/2018 (Originally 4/7/2015)    Potassium monitoring  05/07/2019    Creatinine monitoring  05/07/2019    DEXA (modify frequency per FRAX score)  Completed    Pneumococcal low/med risk  Completed

## 2018-10-24 RX ORDER — LEVOTHYROXINE SODIUM 0.12 MG/1
TABLET ORAL
Qty: 90 TABLET | Refills: 3 | Status: SHIPPED | OUTPATIENT
Start: 2018-10-24 | End: 2019-11-15 | Stop reason: SDUPTHER

## 2018-10-24 RX ORDER — LANSOPRAZOLE 15 MG/1
CAPSULE, DELAYED RELEASE ORAL
Qty: 90 CAPSULE | Refills: 3 | Status: SHIPPED | OUTPATIENT
Start: 2018-10-24 | End: 2019-11-15 | Stop reason: SDUPTHER

## 2018-11-09 ENCOUNTER — HOSPITAL ENCOUNTER (OUTPATIENT)
Age: 78
Discharge: HOME | End: 2018-11-09
Payer: MEDICARE

## 2018-11-09 DIAGNOSIS — N18.30 CKD (CHRONIC KIDNEY DISEASE) STAGE 3, GFR 30-59 ML/MIN (HCC): ICD-10-CM

## 2018-11-09 LAB
ALBUMIN SERPL-MCNC: 3.9 G/DL (ref 3.5–5.2)
ANION GAP SERPL CALCULATED.3IONS-SCNC: 15 MMOL/L (ref 9–17)
BUN BLDV-MCNC: 26 MG/DL (ref 8–23)
BUN/CREAT BLD: ABNORMAL (ref 9–20)
CALCIUM SERPL-MCNC: 10 MG/DL (ref 8.6–10.4)
CHLORIDE BLD-SCNC: 108 MMOL/L (ref 98–107)
CO2: 21 MMOL/L (ref 20–31)
CREAT SERPL-MCNC: 1.45 MG/DL (ref 0.5–0.9)
CREATININE URINE: 163.2 MG/DL (ref 28–217)
GFR AFRICAN AMERICAN: 43 ML/MIN
GFR NON-AFRICAN AMERICAN: 35 ML/MIN
GFR SERPL CREATININE-BSD FRML MDRD: ABNORMAL ML/MIN/{1.73_M2}
GFR SERPL CREATININE-BSD FRML MDRD: ABNORMAL ML/MIN/{1.73_M2}
GLUCOSE BLD-MCNC: 135 MG/DL (ref 70–99)
HCT VFR BLD CALC: 38.5 % (ref 36.3–47.1)
HEMOGLOBIN: 11.9 G/DL (ref 11.9–15.1)
MCH RBC QN AUTO: 28.1 PG (ref 25.2–33.5)
MCHC RBC AUTO-ENTMCNC: 30.9 G/DL (ref 28.4–34.8)
MCV RBC AUTO: 91 FL (ref 82.6–102.9)
NRBC AUTOMATED: 0 PER 100 WBC
PDW BLD-RTO: 14.4 % (ref 11.8–14.4)
PHOSPHORUS: 3.5 MG/DL (ref 2.6–4.5)
PLATELET # BLD: NORMAL K/UL (ref 138–453)
PLATELET, FLUORESCENCE: 161 K/UL (ref 138–453)
PLATELET, IMMATURE FRACTION: 8.5 % (ref 1.1–10.3)
PMV BLD AUTO: NORMAL FL (ref 8.1–13.5)
POTASSIUM SERPL-SCNC: 4.4 MMOL/L (ref 3.7–5.3)
PTH INTACT: 142.7 PG/ML (ref 15–65)
RBC # BLD: 4.23 M/UL (ref 3.95–5.11)
SODIUM BLD-SCNC: 144 MMOL/L (ref 135–144)
TOTAL PROTEIN, URINE: 133 MG/DL
WBC # BLD: 5.2 K/UL (ref 3.5–11.3)

## 2018-11-09 PROCEDURE — 36415 COLL VENOUS BLD VENIPUNCTURE: CPT

## 2018-11-09 PROCEDURE — 84100 ASSAY OF PHOSPHORUS: CPT

## 2018-11-09 PROCEDURE — 85055 RETICULATED PLATELET ASSAY: CPT

## 2018-11-09 PROCEDURE — 84156 ASSAY OF PROTEIN URINE: CPT

## 2018-11-09 PROCEDURE — 80048 BASIC METABOLIC PNL TOTAL CA: CPT

## 2018-11-09 PROCEDURE — 85027 COMPLETE CBC AUTOMATED: CPT

## 2018-11-09 PROCEDURE — 83970 ASSAY OF PARATHORMONE: CPT

## 2018-11-09 PROCEDURE — 82040 ASSAY OF SERUM ALBUMIN: CPT

## 2018-11-09 PROCEDURE — 82570 ASSAY OF URINE CREATININE: CPT

## 2018-11-13 ENCOUNTER — OFFICE VISIT (OUTPATIENT)
Dept: PODIATRY | Age: 78
End: 2018-11-13
Payer: MEDICARE

## 2018-11-13 VITALS — HEIGHT: 62 IN | RESPIRATION RATE: 16 BRPM | WEIGHT: 197 LBS | BODY MASS INDEX: 36.25 KG/M2

## 2018-11-13 DIAGNOSIS — E11.51 TYPE II DIABETES MELLITUS WITH PERIPHERAL CIRCULATORY DISORDER (HCC): Primary | ICD-10-CM

## 2018-11-13 DIAGNOSIS — I73.9 PVD (PERIPHERAL VASCULAR DISEASE) (HCC): ICD-10-CM

## 2018-11-13 DIAGNOSIS — B35.1 DERMATOPHYTOSIS OF NAIL: ICD-10-CM

## 2018-11-13 DIAGNOSIS — M79.672 BILATERAL FOOT PAIN: ICD-10-CM

## 2018-11-13 DIAGNOSIS — M79.671 BILATERAL FOOT PAIN: ICD-10-CM

## 2018-11-13 PROCEDURE — 11721 DEBRIDE NAIL 6 OR MORE: CPT | Performed by: PODIATRIST

## 2018-11-13 PROCEDURE — 1123F ACP DISCUSS/DSCN MKR DOCD: CPT | Performed by: PODIATRIST

## 2018-11-13 PROCEDURE — 1101F PT FALLS ASSESS-DOCD LE1/YR: CPT | Performed by: PODIATRIST

## 2018-11-13 PROCEDURE — G8417 CALC BMI ABV UP PARAM F/U: HCPCS | Performed by: PODIATRIST

## 2018-11-13 PROCEDURE — G8399 PT W/DXA RESULTS DOCUMENT: HCPCS | Performed by: PODIATRIST

## 2018-11-13 PROCEDURE — G8482 FLU IMMUNIZE ORDER/ADMIN: HCPCS | Performed by: PODIATRIST

## 2018-11-13 PROCEDURE — 4040F PNEUMOC VAC/ADMIN/RCVD: CPT | Performed by: PODIATRIST

## 2018-11-13 PROCEDURE — G8598 ASA/ANTIPLAT THER USED: HCPCS | Performed by: PODIATRIST

## 2018-11-13 PROCEDURE — 1090F PRES/ABSN URINE INCON ASSESS: CPT | Performed by: PODIATRIST

## 2018-11-13 PROCEDURE — G8427 DOCREV CUR MEDS BY ELIG CLIN: HCPCS | Performed by: PODIATRIST

## 2018-11-13 PROCEDURE — 1036F TOBACCO NON-USER: CPT | Performed by: PODIATRIST

## 2018-11-13 NOTE — PROGRESS NOTES
45.0-49.9, adult ([facility] [address] [address] 10/22/2014    Osteoarthritis     Other activity(E029.9)     Acute renal failure secondary to prerenal azotemia    Other activity(E029.9)     Atherosclerotic coronary artery disease status-post bypass surgery in 2003    Persistent proteinuria 5/9/2018    From diabetic nephrosclerosis, urine protein creatinine ratio 0.8-1    PVD (peripheral vascular disease) (HCC)     Sciatica     Secondary hyperparathyroidism (of renal origin) 10/22/2014    Not on VDRA    Shortness of breath     USES HOME OXYGEN    Sleep apnea     Sleep apnea     Type II or unspecified type diabetes mellitus without mention of complication, not stated as uncontrolled        No Known Allergies  Current Outpatient Prescriptions on File Prior to Visit   Medication Sig Dispense Refill    lansoprazole (PREVACID) 15 MG delayed release capsule TAKE 1 CAPSULE EVERY DAY 90 capsule 3    levothyroxine (SYNTHROID) 125 MCG tablet TAKE 1 TABLET EVERY DAY 90 tablet 3    cyanocobalamin 1000 MCG/ML injection Give patient 6 -1ml vials 6 mL 1    nitroGLYCERIN (NITROSTAT) 0.4 MG SL tablet Place 1 tablet under the tongue as needed for Chest pain 25 tablet 11    traMADol (ULTRAM) 50 MG tablet TAKE 1 TABLET TWICE DAILY 60 tablet 0    ACCU-CHEK DONG PLUS strip TEST THREE TIMES DAILY 300 strip 11    lisinopril (PRINIVIL;ZESTRIL) 20 MG tablet TAKE 1 TABLET 2 TIMES DAILY NOTE INCREASE PER DR CERDA 180 tablet 5    CARTIA  MG extended release capsule TAKE 1 CAPSULE EVERY DAY 90 capsule 3    clopidogrel (PLAVIX) 75 MG tablet TAKE 1 TABLET EVERY DAY 90 tablet 3    metoprolol succinate (TOPROL XL) 100 MG extended release tablet Take 1 tablet by mouth daily 30 tablet 11    B-D INS SYR ULTRAFINE 1CC/30G 30G X 1/2\" 1 ML MISC USE 1 SYRINGE EVERY DAY 90 each 5    insulin glargine (LANTUS) 100 UNIT/ML injection vial INJECT 70 UNITS INTO THE SKIN NIGHTLY (Patient taking differently: INJECT 100 UNITS INTO THE SKIN NIGHTLY(scale)) 3 vial

## 2018-11-17 DIAGNOSIS — M43.06 LUMBAR SPONDYLOLYSIS: ICD-10-CM

## 2018-11-17 NOTE — TELEPHONE ENCOUNTER
Next Visit Date:  Future Appointments  Date Time Provider Lena Ledezma   12/11/2018 1:00 PM STV PAIN EXAM RM 1 [facility] PAIN [facility]   12/14/2018 11:00 AM Palmer Elizabeth MD heartvasc [facility]   1/15/2019 11:00 AM Tamie Zuniga DPM [facility]   2/18/2019 10:40 AM Du Madison MD ST [facility] WALK IN [facility]   5/22/2019 10:30 AM Laverne Maldonado MD [facility] None       Health Maintenance   Topic Date Due    DTaP/Tdap/Td vaccine (1 - Tdap) 12/17/2018 (Originally 11/12/2015)    Shingles Vaccine (1 of 2 - 2 Dose Series) 12/17/2018 (Originally 1/11/2014)    TSH testing  12/17/2018 (Originally 4/7/2015)    Potassium monitoring  11/09/2019    Creatinine monitoring  11/09/2019    DEXA (modify frequency per FRAX score)  Completed    Flu vaccine  Completed    Pneumococcal low/med risk  Completed       Hemoglobin A1C (%)   Date Value   02/13/2018 6.3   11/07/2017 6.7   08/22/2017 6.8             ( goal A1C is < 7)   Microalb/Crt.  Ratio (mcg/mg creat)   Date Value   02/13/2018 280 (H)     LDL Cholesterol (mg/dL)   Date Value   05/07/2018 66   10/31/2017 34       (goal LDL is <100)   AST (U/L)   Date Value   05/07/2018 14     ALT (U/L)   Date Value   05/07/2018 8     BUN (mg/dL)   Date Value   11/09/2018 26 (H)     BP Readings from Last 3 Encounters:   11/14/18 124/70   10/17/18 (!) 153/62   10/09/18 (!) 137/56          (goal 120/80)    All Future Testing planned in [facility]  Lab Frequency Next Occurrence   CBC     Albumin     Phosphorus     PTH, Intact     Basic Metabolic Panel     Creatinine, Random Urine     Protein, urine, random                 Patient Active Problem List:     Hypercholesteremia     CAD (coronary artery disease)     Hypothyroidism     Lumbar spondylosis with myelopathy     S/P cardiac cath 8/19/14-Dr. covarrubias     CKD (chronic kidney disease) stage 3, GFR 30-59 ml/min (HCC)     Morbid obesity with BMI of 40.0-44.9, adult (HCC)     At high risk for hyperkalemia

## 2018-11-19 RX ORDER — TRAMADOL HYDROCHLORIDE 50 MG/1
TABLET ORAL
Qty: 60 TABLET | Refills: 0 | Status: SHIPPED | OUTPATIENT
Start: 2018-11-19 | End: 2019-01-04 | Stop reason: SDUPTHER

## 2018-12-11 ENCOUNTER — HOSPITAL ENCOUNTER (OUTPATIENT)
Dept: PAIN MANAGEMENT | Age: 78
Discharge: HOME | End: 2018-12-11
Payer: MEDICARE

## 2018-12-11 VITALS
RESPIRATION RATE: 16 BRPM | DIASTOLIC BLOOD PRESSURE: 59 MMHG | BODY MASS INDEX: 38.09 KG/M2 | HEIGHT: 60 IN | HEART RATE: 68 BPM | TEMPERATURE: 98.1 F | SYSTOLIC BLOOD PRESSURE: 141 MMHG | WEIGHT: 194 LBS

## 2018-12-11 PROCEDURE — G0283 ELEC STIM OTHER THAN WOUND: HCPCS

## 2018-12-11 ASSESSMENT — PAIN DESCRIPTION - FREQUENCY: FREQUENCY: INTERMITTENT

## 2018-12-11 ASSESSMENT — PAIN DESCRIPTION - PAIN TYPE: TYPE: CHRONIC PAIN

## 2018-12-11 ASSESSMENT — PAIN SCALES - GENERAL: PAINLEVEL_OUTOF10: 3

## 2018-12-11 ASSESSMENT — PAIN DESCRIPTION - LOCATION: LOCATION: BACK

## 2018-12-11 ASSESSMENT — PAIN DESCRIPTION - DESCRIPTORS: DESCRIPTORS: CONSTANT;ACHING

## 2018-12-11 ASSESSMENT — PAIN DESCRIPTION - ORIENTATION: ORIENTATION: LOWER;LEFT

## 2018-12-11 ASSESSMENT — PAIN DESCRIPTION - PROGRESSION: CLINICAL_PROGRESSION: GRADUALLY IMPROVING

## 2018-12-11 ASSESSMENT — PAIN DESCRIPTION - ONSET: ONSET: ON-GOING

## 2018-12-11 NOTE — PROGRESS NOTES
DONG PLUS strip TEST THREE TIMES DAILY 300 strip 11    lisinopril (PRINIVIL;ZESTRIL) 20 MG tablet TAKE 1 TABLET 2 TIMES DAILY NOTE INCREASE PER DR CERDA 180 tablet 5    CARTIA  MG extended release capsule TAKE 1 CAPSULE EVERY DAY 90 capsule 3    clopidogrel (PLAVIX) 75 MG tablet TAKE 1 TABLET EVERY DAY 90 tablet 3    metoprolol succinate (TOPROL XL) 100 MG extended release tablet Take 1 tablet by mouth daily 30 tablet 11    B-D INS SYR ULTRAFINE 1CC/30G 30G X 1/2\" 1 ML MISC USE 1 SYRINGE EVERY DAY 90 each 5    insulin glargine (LANTUS) 100 UNIT/ML injection vial INJECT 70 UNITS INTO THE SKIN NIGHTLY (Patient taking differently: INJECT 100 UNITS INTO THE SKIN NIGHTLY(scale)) 3 vial 5    Compression Stockings MISC by Does not apply route 20-40 mmHg. 2 each 0    Syringe/Needle, Disp, (SYRINGE 3CC/25GX1\") 25G X 1\" 3 ML MISC Used one every other month. 6 each 0    atorvastatin (LIPITOR) 20 MG tablet TAKE 1 TABLET EVERY DAY (Patient taking differently: 10 MG TAKE 1 TABLET EVERY DAY) 90 tablet 3    cilostazol (PLETAL) 50 MG tablet Take 50 mg by mouth 2 times daily      Alcohol Swabs (B-D SINGLE USE SWABS REGULAR) PADS       Blood Glucose Calibration (OT ULTRA/FASTTK CNTRL SOLN) SOLN       Glucose Blood (BLOOD GLUCOSE TEST STRIPS) STRP Test_3__times daily Diagnosis: 250.0   Diabetes Mellitus__x__Insulin Dependent____Non-Insulin Dependent Life Scan test strips ultra one touch 300 strip 3    aspirin EC 81 MG EC tablet Take 2 tablets by mouth daily. 30 tablet 3    Multiple Vitamins-Minerals (OCUVITE ADULT 50+ PO) Take 1 tablet by mouth 2 times daily.  Green Tea, Camillia sinensis, 315 MG CAPS Take 1 tablet by mouth 2 times daily.  Cinnamon 500 MG TABS Take 1 tablet by mouth daily.  Cranberry-Vitamin C-Vitamin E (CRANBERRY PLUS VITAMIN C PO) Take 1 tablet by mouth daily.  Ascorbic Acid (VITAMIN C WITH VIVIENNE HIPS) 500 MG tablet Take 500 mg by mouth daily.       Omega-3 Fatty Acids (FISH

## 2018-12-14 ENCOUNTER — OFFICE VISIT (OUTPATIENT)
Dept: VASCULAR SURGERY | Age: 78
End: 2018-12-14
Payer: MEDICARE

## 2018-12-14 VITALS
DIASTOLIC BLOOD PRESSURE: 72 MMHG | HEART RATE: 87 BPM | SYSTOLIC BLOOD PRESSURE: 136 MMHG | HEIGHT: 60 IN | OXYGEN SATURATION: 98 % | WEIGHT: 194 LBS | RESPIRATION RATE: 19 BRPM | BODY MASS INDEX: 38.09 KG/M2

## 2018-12-14 DIAGNOSIS — I73.9 CLAUDICATION IN PERIPHERAL VASCULAR DISEASE (HCC): Primary | ICD-10-CM

## 2018-12-14 PROCEDURE — 4040F PNEUMOC VAC/ADMIN/RCVD: CPT | Performed by: SURGERY

## 2018-12-14 PROCEDURE — 1123F ACP DISCUSS/DSCN MKR DOCD: CPT | Performed by: SURGERY

## 2018-12-14 PROCEDURE — 1036F TOBACCO NON-USER: CPT | Performed by: SURGERY

## 2018-12-14 PROCEDURE — G8598 ASA/ANTIPLAT THER USED: HCPCS | Performed by: SURGERY

## 2018-12-14 PROCEDURE — G8417 CALC BMI ABV UP PARAM F/U: HCPCS | Performed by: SURGERY

## 2018-12-14 PROCEDURE — 1090F PRES/ABSN URINE INCON ASSESS: CPT | Performed by: SURGERY

## 2018-12-14 PROCEDURE — G8399 PT W/DXA RESULTS DOCUMENT: HCPCS | Performed by: SURGERY

## 2018-12-14 PROCEDURE — G8427 DOCREV CUR MEDS BY ELIG CLIN: HCPCS | Performed by: SURGERY

## 2018-12-14 PROCEDURE — G8482 FLU IMMUNIZE ORDER/ADMIN: HCPCS | Performed by: SURGERY

## 2018-12-14 PROCEDURE — 1101F PT FALLS ASSESS-DOCD LE1/YR: CPT | Performed by: SURGERY

## 2018-12-14 PROCEDURE — 99212 OFFICE O/P EST SF 10 MIN: CPT | Performed by: SURGERY

## 2019-01-04 DIAGNOSIS — M43.06 LUMBAR SPONDYLOLYSIS: ICD-10-CM

## 2019-01-04 RX ORDER — TRAMADOL HYDROCHLORIDE 50 MG/1
TABLET ORAL
Qty: 60 TABLET | Refills: 0 | Status: SHIPPED | OUTPATIENT
Start: 2019-01-04 | End: 2019-02-06 | Stop reason: SDUPTHER

## 2019-01-15 ENCOUNTER — OFFICE VISIT (OUTPATIENT)
Dept: PODIATRY | Age: 79
End: 2019-01-15
Payer: MEDICARE

## 2019-01-15 VITALS — WEIGHT: 194 LBS | HEIGHT: 60 IN | BODY MASS INDEX: 38.09 KG/M2

## 2019-01-15 DIAGNOSIS — M79.672 PAIN IN BOTH FEET: Primary | ICD-10-CM

## 2019-01-15 DIAGNOSIS — E11.51 TYPE II DIABETES MELLITUS WITH PERIPHERAL CIRCULATORY DISORDER (HCC): ICD-10-CM

## 2019-01-15 DIAGNOSIS — B35.1 DERMATOPHYTOSIS OF NAIL: ICD-10-CM

## 2019-01-15 DIAGNOSIS — M79.671 PAIN IN BOTH FEET: Primary | ICD-10-CM

## 2019-01-15 DIAGNOSIS — I73.9 PERIPHERAL VASCULAR DISEASE (HCC): ICD-10-CM

## 2019-01-15 PROCEDURE — G8598 ASA/ANTIPLAT THER USED: HCPCS | Performed by: PODIATRIST

## 2019-01-15 PROCEDURE — G8427 DOCREV CUR MEDS BY ELIG CLIN: HCPCS | Performed by: PODIATRIST

## 2019-01-15 PROCEDURE — 1123F ACP DISCUSS/DSCN MKR DOCD: CPT | Performed by: PODIATRIST

## 2019-01-15 PROCEDURE — G8399 PT W/DXA RESULTS DOCUMENT: HCPCS | Performed by: PODIATRIST

## 2019-01-15 PROCEDURE — 1036F TOBACCO NON-USER: CPT | Performed by: PODIATRIST

## 2019-01-15 PROCEDURE — 11721 DEBRIDE NAIL 6 OR MORE: CPT | Performed by: PODIATRIST

## 2019-01-15 PROCEDURE — 1090F PRES/ABSN URINE INCON ASSESS: CPT | Performed by: PODIATRIST

## 2019-01-15 PROCEDURE — 1101F PT FALLS ASSESS-DOCD LE1/YR: CPT | Performed by: PODIATRIST

## 2019-01-15 PROCEDURE — 99213 OFFICE O/P EST LOW 20 MIN: CPT | Performed by: PODIATRIST

## 2019-01-15 PROCEDURE — G8417 CALC BMI ABV UP PARAM F/U: HCPCS | Performed by: PODIATRIST

## 2019-01-15 PROCEDURE — G8482 FLU IMMUNIZE ORDER/ADMIN: HCPCS | Performed by: PODIATRIST

## 2019-01-15 PROCEDURE — 4040F PNEUMOC VAC/ADMIN/RCVD: CPT | Performed by: PODIATRIST

## 2019-02-06 DIAGNOSIS — M43.06 LUMBAR SPONDYLOLYSIS: ICD-10-CM

## 2019-02-07 RX ORDER — CLOPIDOGREL BISULFATE 75 MG/1
TABLET ORAL
Qty: 90 TABLET | Refills: 3 | Status: SHIPPED | OUTPATIENT
Start: 2019-02-07 | End: 2020-05-15

## 2019-02-07 RX ORDER — DILTIAZEM HYDROCHLORIDE 120 MG/1
CAPSULE, EXTENDED RELEASE ORAL
Qty: 90 CAPSULE | Refills: 3 | Status: SHIPPED | OUTPATIENT
Start: 2019-02-07 | End: 2019-11-27 | Stop reason: ALTCHOICE

## 2019-02-07 RX ORDER — TRAMADOL HYDROCHLORIDE 50 MG/1
TABLET ORAL
Qty: 60 TABLET | Refills: 0 | Status: SHIPPED | OUTPATIENT
Start: 2019-02-07 | End: 2019-04-04 | Stop reason: SDUPTHER

## 2019-02-18 ENCOUNTER — OFFICE VISIT (OUTPATIENT)
Dept: PRIMARY CARE CLINIC | Age: 79
End: 2019-02-18
Payer: MEDICARE

## 2019-02-18 VITALS
DIASTOLIC BLOOD PRESSURE: 60 MMHG | WEIGHT: 213.8 LBS | HEIGHT: 60 IN | OXYGEN SATURATION: 100 % | BODY MASS INDEX: 41.98 KG/M2 | HEART RATE: 68 BPM | TEMPERATURE: 98.2 F | SYSTOLIC BLOOD PRESSURE: 160 MMHG

## 2019-02-18 DIAGNOSIS — E11.22 TYPE 2 DIABETES MELLITUS WITH STAGE 3 CHRONIC KIDNEY DISEASE, WITH LONG-TERM CURRENT USE OF INSULIN (HCC): Primary | ICD-10-CM

## 2019-02-18 DIAGNOSIS — N18.30 TYPE 2 DIABETES MELLITUS WITH STAGE 3 CHRONIC KIDNEY DISEASE, WITH LONG-TERM CURRENT USE OF INSULIN (HCC): Primary | ICD-10-CM

## 2019-02-18 DIAGNOSIS — I10 ESSENTIAL HYPERTENSION: ICD-10-CM

## 2019-02-18 DIAGNOSIS — Z79.4 TYPE 2 DIABETES MELLITUS WITH STAGE 3 CHRONIC KIDNEY DISEASE, WITH LONG-TERM CURRENT USE OF INSULIN (HCC): Primary | ICD-10-CM

## 2019-02-18 PROCEDURE — G8482 FLU IMMUNIZE ORDER/ADMIN: HCPCS | Performed by: INTERNAL MEDICINE

## 2019-02-18 PROCEDURE — G8427 DOCREV CUR MEDS BY ELIG CLIN: HCPCS | Performed by: INTERNAL MEDICINE

## 2019-02-18 PROCEDURE — G8399 PT W/DXA RESULTS DOCUMENT: HCPCS | Performed by: INTERNAL MEDICINE

## 2019-02-18 PROCEDURE — 1101F PT FALLS ASSESS-DOCD LE1/YR: CPT | Performed by: INTERNAL MEDICINE

## 2019-02-18 PROCEDURE — G8417 CALC BMI ABV UP PARAM F/U: HCPCS | Performed by: INTERNAL MEDICINE

## 2019-02-18 PROCEDURE — G8598 ASA/ANTIPLAT THER USED: HCPCS | Performed by: INTERNAL MEDICINE

## 2019-02-18 PROCEDURE — 99214 OFFICE O/P EST MOD 30 MIN: CPT | Performed by: INTERNAL MEDICINE

## 2019-02-18 PROCEDURE — 1123F ACP DISCUSS/DSCN MKR DOCD: CPT | Performed by: INTERNAL MEDICINE

## 2019-02-18 PROCEDURE — 1036F TOBACCO NON-USER: CPT | Performed by: INTERNAL MEDICINE

## 2019-02-18 PROCEDURE — 4040F PNEUMOC VAC/ADMIN/RCVD: CPT | Performed by: INTERNAL MEDICINE

## 2019-02-18 PROCEDURE — 1090F PRES/ABSN URINE INCON ASSESS: CPT | Performed by: INTERNAL MEDICINE

## 2019-02-18 RX ORDER — ATORVASTATIN CALCIUM 10 MG/1
TABLET, FILM COATED ORAL
COMMUNITY
Start: 2019-02-06 | End: 2019-04-04 | Stop reason: SDUPTHER

## 2019-02-18 ASSESSMENT — PATIENT HEALTH QUESTIONNAIRE - PHQ9
1. LITTLE INTEREST OR PLEASURE IN DOING THINGS: 0
SUM OF ALL RESPONSES TO PHQ QUESTIONS 1-9: 0
SUM OF ALL RESPONSES TO PHQ9 QUESTIONS 1 & 2: 0
2. FEELING DOWN, DEPRESSED OR HOPELESS: 0
SUM OF ALL RESPONSES TO PHQ QUESTIONS 1-9: 0

## 2019-02-26 RX ORDER — INSULIN GLARGINE 100 [IU]/ML
INJECTION, SOLUTION SUBCUTANEOUS
Qty: 30 ML | Refills: 3 | Status: SHIPPED | OUTPATIENT
Start: 2019-02-26 | End: 2019-04-04 | Stop reason: SDUPTHER

## 2019-03-20 ENCOUNTER — HOSPITAL ENCOUNTER (OUTPATIENT)
Dept: PAIN MANAGEMENT | Age: 79
Discharge: HOME | End: 2019-03-20
Payer: MEDICARE

## 2019-03-20 VITALS
DIASTOLIC BLOOD PRESSURE: 70 MMHG | BODY MASS INDEX: 41.82 KG/M2 | WEIGHT: 213 LBS | TEMPERATURE: 98.6 F | HEIGHT: 60 IN | SYSTOLIC BLOOD PRESSURE: 175 MMHG | HEART RATE: 59 BPM

## 2019-03-20 PROCEDURE — 99213 OFFICE O/P EST LOW 20 MIN: CPT | Performed by: ANESTHESIOLOGY

## 2019-03-20 PROCEDURE — G0283 ELEC STIM OTHER THAN WOUND: HCPCS

## 2019-03-20 ASSESSMENT — PAIN DESCRIPTION - ORIENTATION: ORIENTATION: LEFT;LOWER

## 2019-03-20 ASSESSMENT — PAIN DESCRIPTION - PAIN TYPE: TYPE: CHRONIC PAIN

## 2019-03-20 ASSESSMENT — PAIN DESCRIPTION - PROGRESSION: CLINICAL_PROGRESSION: NOT CHANGED

## 2019-03-20 ASSESSMENT — PAIN DESCRIPTION - ONSET: ONSET: ON-GOING

## 2019-03-20 ASSESSMENT — PAIN DESCRIPTION - LOCATION: LOCATION: BACK

## 2019-03-20 ASSESSMENT — PAIN DESCRIPTION - DESCRIPTORS: DESCRIPTORS: ACHING

## 2019-03-20 ASSESSMENT — PAIN DESCRIPTION - FREQUENCY: FREQUENCY: INTERMITTENT

## 2019-03-20 ASSESSMENT — ENCOUNTER SYMPTOMS: BACK PAIN: 1

## 2019-03-20 ASSESSMENT — PAIN SCALES - GENERAL: PAINLEVEL_OUTOF10: 3

## 2019-03-26 ENCOUNTER — OFFICE VISIT (OUTPATIENT)
Dept: PODIATRY | Age: 79
End: 2019-03-26
Payer: MEDICARE

## 2019-03-26 VITALS — BODY MASS INDEX: 41.82 KG/M2 | HEIGHT: 60 IN | WEIGHT: 213 LBS

## 2019-03-26 DIAGNOSIS — R60.0 EDEMA OF LOWER EXTREMITY: ICD-10-CM

## 2019-03-26 DIAGNOSIS — R26.2 TROUBLE WALKING: ICD-10-CM

## 2019-03-26 DIAGNOSIS — E11.51 TYPE II DIABETES MELLITUS WITH PERIPHERAL CIRCULATORY DISORDER (HCC): ICD-10-CM

## 2019-03-26 DIAGNOSIS — B35.1 DERMATOPHYTOSIS OF NAIL: ICD-10-CM

## 2019-03-26 DIAGNOSIS — M79.671 PAIN IN BOTH FEET: Primary | ICD-10-CM

## 2019-03-26 DIAGNOSIS — M79.672 PAIN IN BOTH FEET: Primary | ICD-10-CM

## 2019-03-26 DIAGNOSIS — D23.72 BENIGN NEOPLASM OF SKIN OF LOWER LIMB, INCLUDING HIP, LEFT: ICD-10-CM

## 2019-03-26 DIAGNOSIS — D23.71 BENIGN NEOPLASM OF SKIN OF LOWER LIMB, INCLUDING HIP, RIGHT: ICD-10-CM

## 2019-03-26 DIAGNOSIS — I73.9 PERIPHERAL VASCULAR DISEASE (HCC): ICD-10-CM

## 2019-03-26 PROCEDURE — 1123F ACP DISCUSS/DSCN MKR DOCD: CPT | Performed by: PODIATRIST

## 2019-03-26 PROCEDURE — G8598 ASA/ANTIPLAT THER USED: HCPCS | Performed by: PODIATRIST

## 2019-03-26 PROCEDURE — 1090F PRES/ABSN URINE INCON ASSESS: CPT | Performed by: PODIATRIST

## 2019-03-26 PROCEDURE — G8417 CALC BMI ABV UP PARAM F/U: HCPCS | Performed by: PODIATRIST

## 2019-03-26 PROCEDURE — G8399 PT W/DXA RESULTS DOCUMENT: HCPCS | Performed by: PODIATRIST

## 2019-03-26 PROCEDURE — 11721 DEBRIDE NAIL 6 OR MORE: CPT | Performed by: PODIATRIST

## 2019-03-26 PROCEDURE — 17110 DESTRUCTION B9 LES UP TO 14: CPT | Performed by: PODIATRIST

## 2019-03-26 PROCEDURE — 99213 OFFICE O/P EST LOW 20 MIN: CPT | Performed by: PODIATRIST

## 2019-03-26 PROCEDURE — 1036F TOBACCO NON-USER: CPT | Performed by: PODIATRIST

## 2019-03-26 PROCEDURE — G8482 FLU IMMUNIZE ORDER/ADMIN: HCPCS | Performed by: PODIATRIST

## 2019-03-26 PROCEDURE — 4040F PNEUMOC VAC/ADMIN/RCVD: CPT | Performed by: PODIATRIST

## 2019-03-26 PROCEDURE — G8427 DOCREV CUR MEDS BY ELIG CLIN: HCPCS | Performed by: PODIATRIST

## 2019-04-04 ENCOUNTER — HOSPITAL ENCOUNTER (OUTPATIENT)
Age: 79
Setting detail: SPECIMEN
Discharge: HOME | End: 2019-04-04
Payer: MEDICARE

## 2019-04-04 ENCOUNTER — OFFICE VISIT (OUTPATIENT)
Dept: FAMILY MEDICINE CLINIC | Age: 79
End: 2019-04-04
Payer: MEDICARE

## 2019-04-04 VITALS
DIASTOLIC BLOOD PRESSURE: 64 MMHG | BODY MASS INDEX: 41.54 KG/M2 | SYSTOLIC BLOOD PRESSURE: 129 MMHG | HEART RATE: 63 BPM | OXYGEN SATURATION: 100 % | WEIGHT: 211.6 LBS | TEMPERATURE: 98.6 F | HEIGHT: 60 IN

## 2019-04-04 DIAGNOSIS — Z79.4 TYPE 2 DIABETES MELLITUS WITH STAGE 3 CHRONIC KIDNEY DISEASE, WITH LONG-TERM CURRENT USE OF INSULIN (HCC): Primary | ICD-10-CM

## 2019-04-04 DIAGNOSIS — E03.9 ACQUIRED HYPOTHYROIDISM: ICD-10-CM

## 2019-04-04 DIAGNOSIS — E11.22 TYPE 2 DIABETES MELLITUS WITH STAGE 3 CHRONIC KIDNEY DISEASE, WITH LONG-TERM CURRENT USE OF INSULIN (HCC): Primary | ICD-10-CM

## 2019-04-04 DIAGNOSIS — N18.30 TYPE 2 DIABETES MELLITUS WITH STAGE 3 CHRONIC KIDNEY DISEASE, WITH LONG-TERM CURRENT USE OF INSULIN (HCC): Primary | ICD-10-CM

## 2019-04-04 DIAGNOSIS — Z12.31 ENCOUNTER FOR SCREENING MAMMOGRAM FOR MALIGNANT NEOPLASM OF BREAST: ICD-10-CM

## 2019-04-04 DIAGNOSIS — Z00.00 HEALTHCARE MAINTENANCE: ICD-10-CM

## 2019-04-04 DIAGNOSIS — M19.039 WRIST ARTHRITIS: ICD-10-CM

## 2019-04-04 LAB — TSH SERPL DL<=0.05 MIU/L-ACNC: 0.69 MIU/L (ref 0.3–5)

## 2019-04-04 PROCEDURE — G8417 CALC BMI ABV UP PARAM F/U: HCPCS | Performed by: STUDENT IN AN ORGANIZED HEALTH CARE EDUCATION/TRAINING PROGRAM

## 2019-04-04 PROCEDURE — 99203 OFFICE O/P NEW LOW 30 MIN: CPT | Performed by: STUDENT IN AN ORGANIZED HEALTH CARE EDUCATION/TRAINING PROGRAM

## 2019-04-04 PROCEDURE — 99211 OFF/OP EST MAY X REQ PHY/QHP: CPT | Performed by: STUDENT IN AN ORGANIZED HEALTH CARE EDUCATION/TRAINING PROGRAM

## 2019-04-04 PROCEDURE — G8427 DOCREV CUR MEDS BY ELIG CLIN: HCPCS | Performed by: STUDENT IN AN ORGANIZED HEALTH CARE EDUCATION/TRAINING PROGRAM

## 2019-04-04 PROCEDURE — G8399 PT W/DXA RESULTS DOCUMENT: HCPCS | Performed by: STUDENT IN AN ORGANIZED HEALTH CARE EDUCATION/TRAINING PROGRAM

## 2019-04-04 PROCEDURE — 1123F ACP DISCUSS/DSCN MKR DOCD: CPT | Performed by: STUDENT IN AN ORGANIZED HEALTH CARE EDUCATION/TRAINING PROGRAM

## 2019-04-04 PROCEDURE — G8598 ASA/ANTIPLAT THER USED: HCPCS | Performed by: STUDENT IN AN ORGANIZED HEALTH CARE EDUCATION/TRAINING PROGRAM

## 2019-04-04 PROCEDURE — 4040F PNEUMOC VAC/ADMIN/RCVD: CPT | Performed by: STUDENT IN AN ORGANIZED HEALTH CARE EDUCATION/TRAINING PROGRAM

## 2019-04-04 PROCEDURE — 1036F TOBACCO NON-USER: CPT | Performed by: STUDENT IN AN ORGANIZED HEALTH CARE EDUCATION/TRAINING PROGRAM

## 2019-04-04 PROCEDURE — 1090F PRES/ABSN URINE INCON ASSESS: CPT | Performed by: STUDENT IN AN ORGANIZED HEALTH CARE EDUCATION/TRAINING PROGRAM

## 2019-04-04 RX ORDER — TRAMADOL HYDROCHLORIDE 50 MG/1
50 TABLET ORAL EVERY 12 HOURS PRN
Qty: 12 TABLET | Refills: 0 | Status: SHIPPED | OUTPATIENT
Start: 2019-04-04 | End: 2019-04-07

## 2019-04-04 RX ORDER — INSULIN GLARGINE 100 [IU]/ML
INJECTION, SOLUTION SUBCUTANEOUS
Qty: 30 ML | Refills: 3 | Status: SHIPPED | OUTPATIENT
Start: 2019-04-04 | End: 2019-05-07 | Stop reason: SDUPTHER

## 2019-04-04 RX ORDER — ACETAMINOPHEN 500 MG
1000 TABLET ORAL EVERY 8 HOURS PRN
Qty: 270 TABLET | Refills: 1 | Status: SHIPPED | OUTPATIENT
Start: 2019-04-04 | End: 2020-11-23 | Stop reason: SDUPTHER

## 2019-04-04 RX ORDER — ATORVASTATIN CALCIUM 20 MG/1
20 TABLET, FILM COATED ORAL DAILY
Qty: 60 TABLET | Refills: 2 | Status: SHIPPED | OUTPATIENT
Start: 2019-04-04 | End: 2019-11-20 | Stop reason: SDUPTHER

## 2019-04-04 ASSESSMENT — ENCOUNTER SYMPTOMS
CONSTIPATION: 0
NAUSEA: 0
WHEEZING: 0
SHORTNESS OF BREATH: 0
COUGH: 0
DIARRHEA: 0
ABDOMINAL PAIN: 0

## 2019-04-04 NOTE — PROGRESS NOTES
Attending Physician Statement  I  have discussed the care of Mandie Green including pertinent history and exam findings with the resident. I agree with the assessment, plan and orders as documented by the resident. /64 (Site: Left Upper Arm, Position: Sitting, Cuff Size: Large Adult) Comment: machine  Pulse 63   Temp 98.6 °F (37 °C) (Temporal)   Ht 5' (1.524 m)   Wt 211 lb 9.6 oz (96 kg)   SpO2 100%   BMI 41.33 kg/m²    BP Readings from Last 3 Encounters:   04/04/19 129/64   03/20/19 (!) 175/70   02/18/19 (!) 160/60     Wt Readings from Last 3 Encounters:   04/04/19 211 lb 9.6 oz (96 kg)   03/26/19 213 lb (96.6 kg)   03/20/19 213 lb (96.6 kg)          Diagnosis Orders   1. Type 2 diabetes mellitus with stage 3 chronic kidney disease, with long-term current use of insulin (HCC)  insulin glargine (LANTUS) 100 UNIT/ML injection vial   2. Wrist arthritis  acetaminophen (TYLENOL) 500 MG tablet    traMADol (ULTRAM) 50 MG tablet   3. Acquired hypothyroidism  TSH With Reflex Ft4   4. Encounter for screening mammogram for malignant neoplasm of breast   GLENNY DIGITAL SCREEN W CAD BILATERAL   5. Healthcare maintenance  GLENNY DIGITAL SCREEN W CAD BILATERAL       See orders. Chart reviewed - discussed PMHx.   Meds reviewed - adjusted HMGCoA  OARRS reviewed    Recheck office visit - [address], DO 4/4/2019 11:57 AM

## 2019-04-04 NOTE — PATIENT INSTRUCTIONS
Thank you for letting us take care of you today. We hope all your questions were addressed. If a question was overlooked or something else comes to mind after you return home, please contact a member of your Care Team listed below. Please make sure you have a routine office visit set up to follow-up on [address]. Your Care Team at [place] [address] is Team #3  Fernanda Scheuermann, MD (Faculty)  Iza Calixto MD (Faculty  Doryuko Shaver MD (Resident)  Nichola Aase, MD (Resident)   Lloyd Noel MD (Resident)  Arin Hendrickson MD (Resident)  Lisa Briesno MD (Resident)  Yenny Medel LPN  Lalo Braden., [facility]  Josefa Agee., [facility] office)  [facility] office)  Chavo Maradiaga ([address] [facility])  [place], [place] ([address] [address] )  Karolyn John, Ph.D., (Behavioral Services)  Candance Cypher, [facility] (Clinical Pharmacist)     Office phone number: [phone]    If you need to get in right away due to illness, please be advised we have \"Same Day\" appointments available Monday-Friday. Please call us at [phone] option #3 to schedule your \"Same Day\" appointment.

## 2019-04-04 NOTE — PROGRESS NOTES
Subjective:      Patient ID: Katarzyna Will is a 70 y.o. female here to establish care. Needs mammogram. Asking for refill on tramadol. Has been on it for 10 yrs. Her old pcp is doing urgent care now. Has dm is taking 10 units or 5 units lantus nightly. Says she is using sliding scale for that. Has wrist arthritis and has been taking tramadol 50 mg twice daily for the past 10 years. She says she hasn't tried Tylenol for her wrist pain. Used to smoke 24 years ago. Had CAD status post PCI. No pertinent family history. HPI    Review of Systems   Constitutional: Negative for fatigue and fever. Respiratory: Negative for cough, shortness of breath and wheezing. Cardiovascular: Negative for chest pain and palpitations. Gastrointestinal: Negative for abdominal pain, constipation, diarrhea and nausea. Neurological: Negative for dizziness, seizures and light-headedness. Objective:   Physical Exam   Constitutional: She is oriented to person, place, and time. She appears well-developed and well-nourished. No distress. Cardiovascular: Normal rate, regular rhythm and normal heart sounds. Pulmonary/Chest: Effort normal and breath sounds normal.   Neurological: She is alert and oriented to person, place, and time. Skin: She is not diaphoretic. Nursing note and vitals reviewed. /64 (Site: Left Upper Arm, Position: Sitting, Cuff Size: Large Adult) Comment: machine  Pulse 63   Temp 98.6 °F (37 °C) (Temporal)   Ht 5' (1.524 m)   Wt 211 lb 9.6 oz (96 kg)   SpO2 100%   BMI 41.33 kg/m²       Assessment:      1. Type 2 diabetes mellitus with stage 3 chronic kidney disease, with long-term current use of insulin ([facility])  Advised to stick with 7 units and recheck BS and f/u next visit. Her A1C is too tightly controlled    - insulin glargine (LANTUS) 100 UNIT/ML injection vial; INJECT 7 UNITS INTO THE SKIN NIGHTLY  Dispense: 30 mL; Refill: 3    2.  Wrist arthritis    - acetaminophen (TYLENOL) 500 MG tablet; Take 2 tablets by mouth every 8 hours as needed for Pain  Dispense: 270 tablet; Refill: 1  - traMADol (ULTRAM) 50 MG tablet; Take 1 tablet by mouth every 12 hours as needed for Pain for up to 3 days. Intended supply: 3 days. Take lowest dose possible to manage pain  Dispense: 12 tablet; Refill: 0    3. Acquired hypothyroidism    - TSH With Reflex Ft4; Future    4. Encounter for screening mammogram for malignant neoplasm of breast     - [facility] DIGITAL SCREEN W CAD BILATERAL; Future    5. Healthcare maintenance    - [facility] DIGITAL SCREEN W CAD BILATERAL; Future      1. Rody Moreno received counseling on the following healthy behaviors: medication adherence  2. Reviewed prior labs and health maintenance  3. Continue current medications, diet and exercise. 4.  Discussed use, benefit, and side effects of prescribed medications. Barriers to medication compliance addressed. All patient questions answered. Pt voiced understanding. 5.  Patient given educational materials - see patient instructions  6. Was a self-tracking handout given in paper form or via algranot? No  If yes, see orders or list here.     PHQ Scores 2/18/2019 4/12/2018 2/13/2017 8/13/2015 5/13/2015   PHQ2 Score 0 0 0 0 0   PHQ9 Score 0 0 0 0 0     Interpretation of Total Score Depression Severity: 1-4 = Minimal depression, 5-9 = Mild depression, 10-14 = Moderate depression, 15-19 = Moderately severe depression, 20-27 = Severe depression  Normal    Completed Refills   Requested Prescriptions     Signed Prescriptions Disp Refills    atorvastatin (LIPITOR) 20 MG tablet 60 tablet 2     Sig: Take 1 tablet by mouth daily    acetaminophen (TYLENOL) 500 MG tablet 270 tablet 1     Sig: Take 2 tablets by mouth every 8 hours as needed for Pain    insulin glargine (LANTUS) 100 UNIT/ML injection vial 30 mL 3     Sig: INJECT 7 UNITS INTO THE SKIN NIGHTLY    traMADol (ULTRAM) 50 MG tablet 12 tablet 0     Sig: Take 1 tablet by mouth every 12 hours as needed for Pain for up to 3 days. Intended supply: 3 days. Take lowest dose possible to manage pain       Return in about 1 month (around 5/4/2019) for dm, arthritis.           Asha Shah MD

## 2019-04-10 ENCOUNTER — TELEPHONE (OUTPATIENT)
Dept: FAMILY MEDICINE CLINIC | Age: 79
End: 2019-04-10

## 2019-04-10 NOTE — TELEPHONE ENCOUNTER
Patient called stating that a script for lantus was sent to [address] [place] [address]  Patient just picked up a script at [facility] and has 3 vials at home. Called the pharmacy to see if we can stop this fill with them and have the patient call when further refills.

## 2019-04-17 ENCOUNTER — HOSPITAL ENCOUNTER (OUTPATIENT)
Dept: MAMMOGRAPHY | Age: 79
Discharge: HOME | End: 2019-04-19
Payer: MEDICARE

## 2019-04-17 DIAGNOSIS — Z00.00 HEALTHCARE MAINTENANCE: ICD-10-CM

## 2019-04-17 DIAGNOSIS — Z12.31 ENCOUNTER FOR SCREENING MAMMOGRAM FOR MALIGNANT NEOPLASM OF BREAST: ICD-10-CM

## 2019-04-17 PROCEDURE — 77063 BREAST TOMOSYNTHESIS BI: CPT

## 2019-04-29 ENCOUNTER — HOSPITAL ENCOUNTER (OUTPATIENT)
Age: 79
Discharge: HOME | End: 2019-04-29
Payer: MEDICARE

## 2019-04-29 DIAGNOSIS — R80.1 PERSISTENT PROTEINURIA: ICD-10-CM

## 2019-04-29 DIAGNOSIS — N18.30 CKD (CHRONIC KIDNEY DISEASE) STAGE 3, GFR 30-59 ML/MIN (HCC): ICD-10-CM

## 2019-04-29 LAB
ALBUMIN SERPL-MCNC: 4.1 G/DL (ref 3.5–5.2)
ALT SERPL-CCNC: 8 U/L (ref 5–33)
ANION GAP SERPL CALCULATED.3IONS-SCNC: 11 MMOL/L (ref 9–17)
AST SERPL-CCNC: 11 U/L
BUN BLDV-MCNC: 26 MG/DL (ref 8–23)
BUN/CREAT BLD: ABNORMAL (ref 9–20)
CALCIUM SERPL-MCNC: 9.8 MG/DL (ref 8.6–10.4)
CHLORIDE BLD-SCNC: 109 MMOL/L (ref 98–107)
CHOLESTEROL, FASTING: 129 MG/DL
CHOLESTEROL/HDL RATIO: 2
CO2: 22 MMOL/L (ref 20–31)
CREAT SERPL-MCNC: 1.48 MG/DL (ref 0.5–0.9)
CREATININE URINE: 71.9 MG/DL (ref 28–217)
GFR AFRICAN AMERICAN: 41 ML/MIN
GFR NON-AFRICAN AMERICAN: 34 ML/MIN
GFR SERPL CREATININE-BSD FRML MDRD: ABNORMAL ML/MIN/{1.73_M2}
GFR SERPL CREATININE-BSD FRML MDRD: ABNORMAL ML/MIN/{1.73_M2}
GLUCOSE BLD-MCNC: 76 MG/DL (ref 70–99)
HCT VFR BLD CALC: 37.8 % (ref 36.3–47.1)
HDLC SERPL-MCNC: 64 MG/DL
HEMOGLOBIN: 11.5 G/DL (ref 11.9–15.1)
LDL CHOLESTEROL: 46 MG/DL (ref 0–130)
MCH RBC QN AUTO: 28.3 PG (ref 25.2–33.5)
MCHC RBC AUTO-ENTMCNC: 30.4 G/DL (ref 28.4–34.8)
MCV RBC AUTO: 92.9 FL (ref 82.6–102.9)
NRBC AUTOMATED: 0 PER 100 WBC
PDW BLD-RTO: 14.4 % (ref 11.8–14.4)
PHOSPHORUS: 3.9 MG/DL (ref 2.6–4.5)
PLATELET # BLD: 167 K/UL (ref 138–453)
PMV BLD AUTO: 11.7 FL (ref 8.1–13.5)
POTASSIUM SERPL-SCNC: 4.9 MMOL/L (ref 3.7–5.3)
PTH INTACT: 145.8 PG/ML (ref 15–65)
RBC # BLD: 4.07 M/UL (ref 3.95–5.11)
SODIUM BLD-SCNC: 142 MMOL/L (ref 135–144)
TOTAL PROTEIN, URINE: 104 MG/DL
TRIGLYCERIDE, FASTING: 96 MG/DL
VLDLC SERPL CALC-MCNC: NORMAL MG/DL (ref 1–30)
WBC # BLD: 5.7 K/UL (ref 3.5–11.3)

## 2019-04-29 PROCEDURE — 84460 ALANINE AMINO (ALT) (SGPT): CPT

## 2019-04-29 PROCEDURE — 82570 ASSAY OF URINE CREATININE: CPT

## 2019-04-29 PROCEDURE — 85027 COMPLETE CBC AUTOMATED: CPT

## 2019-04-29 PROCEDURE — 84100 ASSAY OF PHOSPHORUS: CPT

## 2019-04-29 PROCEDURE — 82040 ASSAY OF SERUM ALBUMIN: CPT

## 2019-04-29 PROCEDURE — 80061 LIPID PANEL: CPT

## 2019-04-29 PROCEDURE — 84450 TRANSFERASE (AST) (SGOT): CPT

## 2019-04-29 PROCEDURE — 36415 COLL VENOUS BLD VENIPUNCTURE: CPT

## 2019-04-29 PROCEDURE — 83970 ASSAY OF PARATHORMONE: CPT

## 2019-04-29 PROCEDURE — 80048 BASIC METABOLIC PNL TOTAL CA: CPT

## 2019-04-29 PROCEDURE — 84156 ASSAY OF PROTEIN URINE: CPT

## 2019-05-07 ENCOUNTER — OFFICE VISIT (OUTPATIENT)
Dept: FAMILY MEDICINE CLINIC | Age: 79
End: 2019-05-07
Payer: MEDICARE

## 2019-05-07 ENCOUNTER — HOSPITAL ENCOUNTER (OUTPATIENT)
Age: 79
Setting detail: SPECIMEN
Discharge: HOME | End: 2019-05-07
Payer: MEDICARE

## 2019-05-07 VITALS
WEIGHT: 220 LBS | DIASTOLIC BLOOD PRESSURE: 60 MMHG | HEART RATE: 76 BPM | RESPIRATION RATE: 16 BRPM | SYSTOLIC BLOOD PRESSURE: 140 MMHG | BODY MASS INDEX: 42.97 KG/M2

## 2019-05-07 DIAGNOSIS — N18.30 TYPE 2 DIABETES MELLITUS WITH STAGE 3 CHRONIC KIDNEY DISEASE, WITH LONG-TERM CURRENT USE OF INSULIN (HCC): Primary | ICD-10-CM

## 2019-05-07 DIAGNOSIS — Z79.4 TYPE 2 DIABETES MELLITUS WITH STAGE 3 CHRONIC KIDNEY DISEASE, WITH LONG-TERM CURRENT USE OF INSULIN (HCC): Primary | ICD-10-CM

## 2019-05-07 DIAGNOSIS — N25.81 SECONDARY HYPERPARATHYROIDISM (HCC): ICD-10-CM

## 2019-05-07 DIAGNOSIS — E11.22 TYPE 2 DIABETES MELLITUS WITH STAGE 3 CHRONIC KIDNEY DISEASE, WITH LONG-TERM CURRENT USE OF INSULIN (HCC): Primary | ICD-10-CM

## 2019-05-07 DIAGNOSIS — Z23 NEED FOR SHINGLES VACCINE: ICD-10-CM

## 2019-05-07 DIAGNOSIS — M19.039 WRIST ARTHRITIS: ICD-10-CM

## 2019-05-07 DIAGNOSIS — Z23 NEED FOR TETANUS BOOSTER: ICD-10-CM

## 2019-05-07 LAB — VITAMIN D 25-HYDROXY: 37.2 NG/ML (ref 30–100)

## 2019-05-07 PROCEDURE — G8427 DOCREV CUR MEDS BY ELIG CLIN: HCPCS | Performed by: STUDENT IN AN ORGANIZED HEALTH CARE EDUCATION/TRAINING PROGRAM

## 2019-05-07 PROCEDURE — 1123F ACP DISCUSS/DSCN MKR DOCD: CPT | Performed by: STUDENT IN AN ORGANIZED HEALTH CARE EDUCATION/TRAINING PROGRAM

## 2019-05-07 PROCEDURE — G8598 ASA/ANTIPLAT THER USED: HCPCS | Performed by: STUDENT IN AN ORGANIZED HEALTH CARE EDUCATION/TRAINING PROGRAM

## 2019-05-07 PROCEDURE — G8399 PT W/DXA RESULTS DOCUMENT: HCPCS | Performed by: STUDENT IN AN ORGANIZED HEALTH CARE EDUCATION/TRAINING PROGRAM

## 2019-05-07 PROCEDURE — 4040F PNEUMOC VAC/ADMIN/RCVD: CPT | Performed by: STUDENT IN AN ORGANIZED HEALTH CARE EDUCATION/TRAINING PROGRAM

## 2019-05-07 PROCEDURE — G8417 CALC BMI ABV UP PARAM F/U: HCPCS | Performed by: STUDENT IN AN ORGANIZED HEALTH CARE EDUCATION/TRAINING PROGRAM

## 2019-05-07 PROCEDURE — 1036F TOBACCO NON-USER: CPT | Performed by: STUDENT IN AN ORGANIZED HEALTH CARE EDUCATION/TRAINING PROGRAM

## 2019-05-07 PROCEDURE — 99213 OFFICE O/P EST LOW 20 MIN: CPT | Performed by: STUDENT IN AN ORGANIZED HEALTH CARE EDUCATION/TRAINING PROGRAM

## 2019-05-07 PROCEDURE — 1090F PRES/ABSN URINE INCON ASSESS: CPT | Performed by: STUDENT IN AN ORGANIZED HEALTH CARE EDUCATION/TRAINING PROGRAM

## 2019-05-07 RX ORDER — TRAMADOL HYDROCHLORIDE 50 MG/1
50 TABLET ORAL DAILY PRN
Qty: 12 TABLET | Refills: 0 | Status: SHIPPED | OUTPATIENT
Start: 2019-06-07 | End: 2019-07-07

## 2019-05-07 RX ORDER — TRAMADOL HYDROCHLORIDE 50 MG/1
1 TABLET ORAL DAILY PRN
COMMUNITY
Start: 2019-04-16 | End: 2019-05-07 | Stop reason: SDUPTHER

## 2019-05-07 RX ORDER — INSULIN GLARGINE 100 [IU]/ML
INJECTION, SOLUTION SUBCUTANEOUS
Qty: 30 ML | Refills: 3 | Status: SHIPPED | OUTPATIENT
Start: 2019-05-07 | End: 2020-04-20 | Stop reason: SDUPTHER

## 2019-05-07 RX ORDER — TRAMADOL HYDROCHLORIDE 50 MG/1
50 TABLET ORAL DAILY PRN
Qty: 30 TABLET | Refills: 0 | Status: SHIPPED | OUTPATIENT
Start: 2019-05-07 | End: 2019-05-22

## 2019-05-07 ASSESSMENT — ENCOUNTER SYMPTOMS
ABDOMINAL PAIN: 0
VOMITING: 0
DIARRHEA: 0
COUGH: 0
CONSTIPATION: 0
NAUSEA: 0

## 2019-05-07 NOTE — PROGRESS NOTES
Subjective:      Patient ID: Doris Ly is a 70 y.o. female. Visit Information    Have you changed or started any medications since your last visit including any over-the-counter medicines, vitamins, or herbal medicines? no   Have you stopped taking any of your medications? Is so, why? -  no  Are you having any side effects from any of your medications? - no    Have you seen any other physician or provider since your last visit?  no   Have you had any other diagnostic tests since your last visit? Yes, Labs  Have you been seen in the emergency room and/or had an admission in a hospital since we last saw you?  no   Have you had your routine dental cleaning in the past 6 months?  no     Do you have an active MyChart account? If no, what is the barrier?   Yes    Patient Care Team:  Shania Santiago MD as PCP - General (Family Medicine)  Heber Rodrigez DPM as Physician (Podiatry)    Medical History Review  Past Medical, Family, and Social History reviewed and does contribute to the patient presenting condition    Health Maintenance   Topic Date Due    DTaP/Tdap/Td vaccine (1 - Tdap) 01/04/1961    Shingles Vaccine (2 of 3) 01/06/2014    TSH testing  04/04/2020    Potassium monitoring  04/29/2020    Creatinine monitoring  04/29/2020    DEXA (modify frequency per FRAX score)  Completed    Flu vaccine  Completed    Pneumococcal 65+ years Vaccine  Completed

## 2019-05-10 NOTE — PROGRESS NOTES
I have reviewed and discussed key elements of [address] [place] [address] with the resident including plan of care and follow up and agree with the care anam plan.

## 2019-05-28 ENCOUNTER — OFFICE VISIT (OUTPATIENT)
Dept: PODIATRY | Age: 79
End: 2019-05-28
Payer: MEDICARE

## 2019-05-28 VITALS — HEIGHT: 64 IN | WEIGHT: 218 LBS | BODY MASS INDEX: 37.22 KG/M2 | RESPIRATION RATE: 16 BRPM

## 2019-05-28 DIAGNOSIS — D23.71 BENIGN NEOPLASM OF SKIN OF LOWER LIMB, INCLUDING HIP, RIGHT: ICD-10-CM

## 2019-05-28 DIAGNOSIS — R60.0 EDEMA OF LOWER EXTREMITY: ICD-10-CM

## 2019-05-28 DIAGNOSIS — B35.1 DERMATOPHYTOSIS OF NAIL: ICD-10-CM

## 2019-05-28 DIAGNOSIS — E11.51 TYPE II DIABETES MELLITUS WITH PERIPHERAL CIRCULATORY DISORDER (HCC): ICD-10-CM

## 2019-05-28 DIAGNOSIS — I73.9 PERIPHERAL VASCULAR DISEASE (HCC): ICD-10-CM

## 2019-05-28 DIAGNOSIS — M79.671 PAIN IN BOTH FEET: Primary | ICD-10-CM

## 2019-05-28 DIAGNOSIS — R26.2 TROUBLE WALKING: ICD-10-CM

## 2019-05-28 DIAGNOSIS — M79.672 PAIN IN BOTH FEET: Primary | ICD-10-CM

## 2019-05-28 PROCEDURE — 1036F TOBACCO NON-USER: CPT | Performed by: PODIATRIST

## 2019-05-28 PROCEDURE — G8399 PT W/DXA RESULTS DOCUMENT: HCPCS | Performed by: PODIATRIST

## 2019-05-28 PROCEDURE — G8417 CALC BMI ABV UP PARAM F/U: HCPCS | Performed by: PODIATRIST

## 2019-05-28 PROCEDURE — 17110 DESTRUCTION B9 LES UP TO 14: CPT | Performed by: PODIATRIST

## 2019-05-28 PROCEDURE — 11721 DEBRIDE NAIL 6 OR MORE: CPT | Performed by: PODIATRIST

## 2019-05-28 PROCEDURE — G8598 ASA/ANTIPLAT THER USED: HCPCS | Performed by: PODIATRIST

## 2019-05-28 PROCEDURE — 1123F ACP DISCUSS/DSCN MKR DOCD: CPT | Performed by: PODIATRIST

## 2019-05-28 PROCEDURE — 4040F PNEUMOC VAC/ADMIN/RCVD: CPT | Performed by: PODIATRIST

## 2019-05-28 PROCEDURE — G8427 DOCREV CUR MEDS BY ELIG CLIN: HCPCS | Performed by: PODIATRIST

## 2019-05-28 PROCEDURE — 1090F PRES/ABSN URINE INCON ASSESS: CPT | Performed by: PODIATRIST

## 2019-05-28 NOTE — PROGRESS NOTES
 Hypertension     Hypothyroidism     Intermittent claudication (HCC)     loly to left leg    Kidney disease     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 ([facility] 4/20/2016    Morbid obesity with BMI of 45.0-49.9, adult ([facility] 10/22/2014    Osteoarthritis     Other activity(E029.9)     Acute renal failure secondary to prerenal azotemia    Other activity(E029.9)     Atherosclerotic coronary artery disease status-post bypass surgery in 2003    Persistent proteinuria 5/9/2018    From diabetic nephrosclerosis, urine protein creatinine ratio 0.8-1    [facility]peripheral vascular disease) [facility]     Sciatica     Secondary hyperparathyroidism (of renal origin) 10/22/2014    Not on VDRA    Shortness of breath     USES HOME OXYGEN    Sleep apnea     Sleep apnea     Type II or unspecified type diabetes mellitus without mention of complication, not stated as uncontrolled        No Known Allergies  Current Outpatient Medications on File Prior to Visit   Medication Sig Dispense Refill    [START ON 6/7/2019] traMADol (ULTRAM) 50 MG tablet Take 1 tablet by mouth daily as needed for Pain for up to 30 days. Intended supply: 3 days.  Take lowest dose possible to manage pain 12 tablet 0    insulin glargine (LANTUS) 100 UNIT/ML injection vial INJECT 5 UNITS INTO THE SKIN NIGHTLY 30 mL 3    atorvastatin (LIPITOR) 20 MG tablet Take 1 tablet by mouth daily 60 tablet 2    acetaminophen (TYLENOL) 500 MG tablet Take 2 tablets by mouth every 8 hours as needed for Pain 270 tablet 1    clopidogrel (PLAVIX) 75 MG tablet TAKE 1 TABLET EVERY DAY 90 tablet 3    CARTIA  MG extended release capsule TAKE 1 CAPSULE EVERY DAY 90 capsule 3    lansoprazole (PREVACID) 15 MG delayed release capsule TAKE 1 CAPSULE EVERY DAY 90 capsule 3    levothyroxine (SYNTHROID) 125 MCG tablet TAKE 1 TABLET EVERY DAY 90 tablet 3  cyanocobalamin 1000 MCG/ML injection Give patient 6 -1ml vials (Patient taking differently: Give patient 6 -1ml vials, once every other month) 6 mL 1    nitroGLYCERIN (NITROSTAT) 0.4 MG SL tablet Place 1 tablet under the tongue as needed for Chest pain 25 tablet 11    ACCU-CHEK DONG PLUS strip TEST THREE TIMES DAILY 300 strip 11    lisinopril (PRINIVIL;ZESTRIL) 20 MG tablet TAKE 1 TABLET 2 TIMES DAILY NOTE INCREASE PER DR Chente Mcneil. 180 tablet 5    metoprolol succinate (TOPROL XL) 100 MG extended release tablet Take 1 tablet by mouth daily (Patient taking differently: Take 50 mg by mouth 2 times daily ) 30 tablet 11    B-D INS SYR ULTRAFINE 1CC/30G 30G X 1/2\" 1 ML MISC USE 1 SYRINGE EVERY DAY 90 each 5    Compression Stockings MISC by Does not apply route 20-40 mmHg. 2 each 0    Syringe/Needle, Disp, (SYRINGE 3CC/25GX1\") 25G X 1\" 3 ML MISC Used one every other month. 6 each 0    Alcohol Swabs (B-D SINGLE USE SWABS REGULAR) PADS       Blood Glucose Calibration (OT ULTRA/FASTTK CNTRL SOLN) SOLN       Glucose Blood (BLOOD GLUCOSE TEST STRIPS) STRP Test_3__times daily Diagnosis: 250.0   Diabetes Mellitus__x__Insulin Dependent____Non-Insulin Dependent Life Scan test strips ultra one touch 300 strip 3    aspirin EC 81 MG EC tablet Take 2 tablets by mouth daily. 30 tablet 3    Multiple Vitamins-Minerals (OCUVITE ADULT 50+ PO) Take 1 tablet by mouth 2 times daily.  Green Tea, Camillia sinensis, 315 MG CAPS Take 1 tablet by mouth daily       Cinnamon 500 MG TABS Take 1 tablet by mouth daily.  Cranberry-Vitamin C-Vitamin E (CRANBERRY PLUS VITAMIN C PO) Take 1 tablet by mouth daily.  Ascorbic Acid (VITAMIN C WITH VIVIENNE HIPS) 500 MG tablet Take 500 mg by mouth daily.  Omega-3 Fatty Acids (FISH OIL) 1000 MG CAPS Take 1,000 mg by mouth daily.  docusate sodium (COLACE) 100 MG capsule Take 100 mg by mouth 2 times daily.       Blood Glucose Monitoring Suppl (ONE TOUCH ULTRA) by Does not apply route.       No current facility-administered medications on file prior to visit. Review of Systems    Review of Systems:   History obtained from chart review and the patient  General ROS: negative for - chills, fatigue, fever, night sweats or weight gain  Constitutional: Negative for chills, diaphoresis, fatigue, fever and unexpected weight change. Musculoskeletal: Positive for arthralgias, gait problem and joint swelling. Neurological ROS: negative for - behavioral changes, confusion, headaches or seizures. Negative for weakness and numbness. Dermatological ROS: negative for - mole changes, rash  Cardiovascular: Negative for leg swelling. Gastrointestinal: Negative for constipation, diarrhea, nausea and vomiting. Objective:  General: AAO x 3 in NAD. Derm  Toenail Description  Sites of Onychomycosis Involvement (Check affected area)  [x] [x] [x] [x] [x] [x] [x] [x] [x] [x]  5 4 3 2 1 1 2 3 4 5                          Right                                        Left    Thickness  [x] [x] [x] [x] [x] [x] [x] [x] [x] [x]  5 4 3 2 1 1 2 3 4 5                         Right                                        Left    Dystrophic Changes   [x] [x] [x] [x] [x] [x] [x] [x] [x] [x]  5 4 3 2 1 1 2 3 4 5                         Right                                        Left    Color   [x] [x] [x] [x] [x] [x] [x] [x] [x] [x]  5 4 3 2 1 1 2 3 4 5                          Right                                        Left    Incurvation/Ingrowin   [] [] [] [] [] [] [] [] [] []  5 4 3 2 1 1 2 3 4 5                         Right                                        Left    Inflammation/Pain   [x] [x] [x] [x] [x] [x] [x] [x] [x] [x]  5 4 3 2 1 1 2 3 4 5                         Right                                        Left      Dermatologic Exam:  Skin lesion present to the right and left plantar foot with a central core and petchaie noted to the lesions periphery.   Pain on palpation of the lesion    Musculoskeletal:     1st MPJ ROM decreased, Bilateral.  Muscle strength 5/5, Bilateral.  Pain present upon palpation of toenails 1-5, Bilateral. decreased medial longitudinal arch, Bilateral.  Ankle ROM decreased,Bilateral.    Dorsally contracted digits present digits 2, Bilateral.     Vascular: DP and PT pulses palpable 1/4, Bilateral.  CFT <5 seconds, Bilateral.  Hair growth absent to the level of the digits, Bilateral.  Edema present, Bilateral.  Varicosities absent, Bilateral. Erythema absent, Bilateral    Neurological: Sensation diminshed to light touch to level of digits, Bilateral.  Protective sensation intact 6/10 sites via 5.07/10g [place]-Mabel Monofilament, Bilateral.  negative Tinel's, Bilateral.  negative Valleix sign, Bilateral.      Integument: Warm, dry, supple, Bilateral.  Open lesion absent, Bilateral.  Interdigital maceration absent to web spaces 4, Bilateral.  Nails 1-5 left and 1-5 right thickened > 3.0 mm, dystrophic and crumbly, discolored with subungual debris. Fissures absent, Bilateral.     Visual inspection:  Deformity: hammertoe deformity zbigniew feet  amputation: absent  Skin lesions: present -  As above  Edema: right- 2+ pitting edema, left- 2+ pitting edema    Sensory exam:  Monofilament sensation: abnormal - 6/10 via SW 5.07/10g monofilament to the plantar foot bilateral feet    Pulses: abnormal - 1/4 dorsalis pedis pulse and 1/4 Posterior tibial pulse,   Pinprick: Impaired  Proprioception: Impaired  Vibration (128 Hz): Impaired       DM with PVD       [x]Yes    []No      Assessment:  70 y.o. female with:   Diagnosis Orders   1. Pain in both feet   DIABETES FOOT EXAM    00353 - TN DEBRIDEMENT OF NAILS, 6 OR MORE    71171 - TN DESTRUCTION BENIGN LESIONS UP TO 14   2. Type II diabetes mellitus with peripheral circulatory disorder (HCC)   DIABETES FOOT EXAM    51608 - TN DEBRIDEMENT OF NAILS, 6 OR MORE    48476 - TN DESTRUCTION BENIGN LESIONS UP TO 14   3.  Dermatophytosis of nail   DIABETES FOOT EXAM    14284 - HI DEBRIDEMENT OF NAILS, 6 OR MORE   4. Peripheral vascular disease (HCC)  HM DIABETES FOOT EXAM    73317 - HI DEBRIDEMENT OF NAILS, 6 OR MORE    98560 - HI DESTRUCTION BENIGN LESIONS UP TO 14   5. Edema of lower extremity  HM DIABETES FOOT EXAM    15576 - HI DEBRIDEMENT OF NAILS, 6 OR MORE    31203 - HI DESTRUCTION BENIGN LESIONS UP TO 14   6. Trouble walking   DIABETES FOOT EXAM    80322 - HI DEBRIDEMENT OF NAILS, 6 OR MORE    61446 - HI DESTRUCTION BENIGN LESIONS UP TO 14   7. Benign neoplasm of skin of lower limb, including hip, right   DIABETES FOOT EXAM    17281 - HI DESTRUCTION BENIGN LESIONS UP TO 14           Q7   []Yes    []No                Q8   [x]Yes    []No                     Q9   []Yes    []No    Plan:   Pt was evaluated and examined. Patient was given personalized discharge instructions. Skin lesion present to the right and left plantar foot with a central core and petchaie noted to the lesions periphery. Pain on palpation of the lesion    Nails 1-10 were debrided sharply in length and thickness with a nipper and , without incident. Pt will follow up in 9 weeks or sooner if any problems arise. Diagnosis was discussed with the pt and all of their questions were answered in detail. Proper foot hygiene and care was discussed with the pt. Informed patient on proper diabetic foot care and importance of tight glycemic control. Patient to check feet daily and contact the office with any questions/problems/concerns.    Other comorbidity noted and will be managed by PCP.  5/28/2019    Electronically signed by Judy Clay DPM on 5/28/2019 at 10:24 AM  5/28/2019

## 2019-06-10 ENCOUNTER — OFFICE VISIT (OUTPATIENT)
Dept: FAMILY MEDICINE CLINIC | Age: 79
End: 2019-06-10
Payer: MEDICARE

## 2019-06-10 VITALS
BODY MASS INDEX: 38.14 KG/M2 | HEIGHT: 64 IN | WEIGHT: 223.4 LBS | HEART RATE: 67 BPM | TEMPERATURE: 97.4 F | DIASTOLIC BLOOD PRESSURE: 68 MMHG | SYSTOLIC BLOOD PRESSURE: 138 MMHG

## 2019-06-10 DIAGNOSIS — I25.810 CORONARY ARTERY DISEASE INVOLVING CORONARY BYPASS GRAFT OF NATIVE HEART WITHOUT ANGINA PECTORIS: Primary | ICD-10-CM

## 2019-06-10 DIAGNOSIS — N18.30 TYPE 2 DIABETES MELLITUS WITH STAGE 3 CHRONIC KIDNEY DISEASE, WITH LONG-TERM CURRENT USE OF INSULIN (HCC): ICD-10-CM

## 2019-06-10 DIAGNOSIS — E11.22 TYPE 2 DIABETES MELLITUS WITH STAGE 3 CHRONIC KIDNEY DISEASE, WITH LONG-TERM CURRENT USE OF INSULIN (HCC): ICD-10-CM

## 2019-06-10 DIAGNOSIS — Z79.4 TYPE 2 DIABETES MELLITUS WITH STAGE 3 CHRONIC KIDNEY DISEASE, WITH LONG-TERM CURRENT USE OF INSULIN (HCC): ICD-10-CM

## 2019-06-10 PROCEDURE — 4040F PNEUMOC VAC/ADMIN/RCVD: CPT | Performed by: FAMILY MEDICINE

## 2019-06-10 PROCEDURE — G8399 PT W/DXA RESULTS DOCUMENT: HCPCS | Performed by: FAMILY MEDICINE

## 2019-06-10 PROCEDURE — G8417 CALC BMI ABV UP PARAM F/U: HCPCS | Performed by: FAMILY MEDICINE

## 2019-06-10 PROCEDURE — 1123F ACP DISCUSS/DSCN MKR DOCD: CPT | Performed by: FAMILY MEDICINE

## 2019-06-10 PROCEDURE — G8598 ASA/ANTIPLAT THER USED: HCPCS | Performed by: FAMILY MEDICINE

## 2019-06-10 PROCEDURE — G8427 DOCREV CUR MEDS BY ELIG CLIN: HCPCS | Performed by: FAMILY MEDICINE

## 2019-06-10 PROCEDURE — 1090F PRES/ABSN URINE INCON ASSESS: CPT | Performed by: FAMILY MEDICINE

## 2019-06-10 PROCEDURE — 99211 OFF/OP EST MAY X REQ PHY/QHP: CPT | Performed by: FAMILY MEDICINE

## 2019-06-10 PROCEDURE — 1036F TOBACCO NON-USER: CPT | Performed by: FAMILY MEDICINE

## 2019-06-10 PROCEDURE — 99213 OFFICE O/P EST LOW 20 MIN: CPT | Performed by: FAMILY MEDICINE

## 2019-06-10 RX ORDER — LISINOPRIL 20 MG/1
TABLET ORAL
Qty: 180 TABLET | Refills: 3 | Status: SHIPPED | OUTPATIENT
Start: 2019-06-10 | End: 2020-05-15

## 2019-06-10 RX ORDER — NITROGLYCERIN 0.4 MG/1
0.4 TABLET SUBLINGUAL PRN
Qty: 25 TABLET | Refills: 11 | Status: SHIPPED | OUTPATIENT
Start: 2019-06-10 | End: 2020-11-23 | Stop reason: SDUPTHER

## 2019-06-10 RX ORDER — METOPROLOL SUCCINATE 100 MG/1
100 TABLET, EXTENDED RELEASE ORAL DAILY
Qty: 90 TABLET | Refills: 3 | Status: SHIPPED | OUTPATIENT
Start: 2019-06-10 | End: 2020-05-15

## 2019-06-10 ASSESSMENT — ENCOUNTER SYMPTOMS
ABDOMINAL PAIN: 0
SHORTNESS OF BREATH: 0
COUGH: 0

## 2019-06-10 NOTE — PATIENT INSTRUCTIONS
Thank you for letting us take care of you today. We hope all your questions were addressed. If a question was overlooked or something else comes to mind after you return home, please contact a member of your Care Team listed below. Please make sure you have a routine office visit set up to follow-up on [address]. Your Care Team at [place] [address] is Team #2  Maureen Hernández DO (Faculty)  Rosas Rosado MD (Faculty)  Mariangel Montoya MD (Resident)  Herb Campos MD (Resident)  Na Jimenez MD (Resident)  Jayjay Howard MD (Resident)  Gisel Khan MD (Resident)  CHARMAINE Gonsalves., CHANA Cole., [facility] office)  [facility] office)  Bob Adrian ([facility])  Da [place], [place] ([address] [facility])  Barry Melendez, Ph.D., (Behavioral Services)  Mana Hugo, [address] [place] [address] (Clinical Pharmacist)     Office phone number: [phone]    If you need to get in right away due to illness, please be advised we have \"Same Day\" appointments available Monday-Friday. Please call us at [phone] option #3 to schedule your \"Same Day\" appointment.

## 2019-06-10 NOTE — PROGRESS NOTES
Visit Information    Have you changed or started any medications since your last visit including any over-the-counter medicines, vitamins, or herbal medicines? no   Have you stopped taking any of your medications? Is so, why? -  no  Are you having any side effects from any of your medications? - no    Have you seen any other physician or provider since your last visit?  no   Have you had any other diagnostic tests since your last visit?  no   Have you been seen in the emergency room and/or had an admission in a hospital since we last saw you?  no   Have you had your routine dental cleaning in the past 6 months?  no     Do you have an active MyChart account? If no, what is the barrier?   Yes    Patient Care Team:  Asha Shah MD as PCP - General (Family Medicine)  Sharath Hunt DPM as Physician (Podiatry)    Medical History Review  Past Medical, Family, and Social History reviewed and does not contribute to the patient presenting condition    Health Maintenance   Topic Date Due    DTaP/Tdap/Td vaccine (1 - Tdap) 01/04/1961    Shingles Vaccine (2 of 3) 01/06/2014    TSH testing  04/04/2020    Potassium monitoring  04/29/2020    Creatinine monitoring  04/29/2020    DEXA (modify frequency per FRAX score)  Completed    Flu vaccine  Completed    Pneumococcal 65+ years Vaccine  Completed

## 2019-06-10 NOTE — PROGRESS NOTES
Follow-up    Needs a PCP (attending) - formerly was Dr. Vin Farah - (6 m) labs [address] - 1 yr (stent - 3) ((this coming Thursday)  Vascular - Dr. Jerry Torrez  (vascular) was Dr. 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 Respiratory: Negative for cough and shortness of breath. Cardiovascular: Negative for chest pain and leg swelling. Gastrointestinal: Negative for abdominal pain. Neurological: Negative for dizziness, weakness and numbness. Hematological: Negative for adenopathy. Does not bruise/bleed easily. Psychiatric/Behavioral: Negative for dysphoric mood. Physical Exam   Constitutional: She appears well-developed and well-nourished. HENT:   Head: Normocephalic. Cardiovascular: Normal rate and regular rhythm. Pulmonary/Chest: Effort normal and breath sounds normal.   Abdominal: Soft. Skin: Skin is warm and dry. Psychiatric: She has a normal mood and affect. Her behavior is normal. Thought content normal.   Vitals reviewed. Vitals:    06/10/19 1538   BP: 138/68   Pulse: 67   Temp: 97.4 °F (36.3 °C)         ASSESSMENT AND PLAN      Diagnosis Orders   1. Coronary artery disease involving coronary bypass graft of native heart without angina pectoris  lisinopril (PRINIVIL;ZESTRIL) 20 MG tablet    nitroGLYCERIN (NITROSTAT) 0.4 MG SL tablet    metoprolol succinate (TOPROL XL) 100 MG extended release tablet   2.  Type 2 diabetes mellitus with stage 3 chronic kidney disease, with long-term current use of insulin ([facility]           See orders. Hold Insulin - A1c trends reduce likelihood of on-going need for exogenous insulin. Renew meds- see orders. Home Glucose monitoring - daily and 5 pm glucose testing. Encouraged to discuss medication load with Cardiology - goal is to reduce pill burden (if possible).     Recheck office visit - 3-4 m       Electronicallysigned by Reuben Marquez DO on 6/10/2019 at 3:55 PM

## 2019-06-19 ENCOUNTER — HOSPITAL ENCOUNTER (OUTPATIENT)
Dept: PAIN MANAGEMENT | Age: 79
Discharge: HOME | End: 2019-06-19
Payer: MEDICARE

## 2019-06-19 VITALS — HEART RATE: 66 BPM

## 2019-06-19 DIAGNOSIS — M47.16 LUMBAR SPONDYLOSIS WITH MYELOPATHY: Primary | Chronic | ICD-10-CM

## 2019-06-19 PROCEDURE — G0283 ELEC STIM OTHER THAN WOUND: HCPCS

## 2019-06-19 ASSESSMENT — PAIN DESCRIPTION - ONSET: ONSET: ON-GOING

## 2019-06-19 ASSESSMENT — PAIN DESCRIPTION - ORIENTATION: ORIENTATION: LEFT;RIGHT

## 2019-06-19 ASSESSMENT — PAIN DESCRIPTION - DESCRIPTORS: DESCRIPTORS: ACHING;CONSTANT

## 2019-06-19 ASSESSMENT — PAIN SCALES - GENERAL: PAINLEVEL_OUTOF10: 2

## 2019-06-19 ASSESSMENT — PAIN DESCRIPTION - LOCATION: LOCATION: BACK

## 2019-06-19 ASSESSMENT — PAIN DESCRIPTION - PROGRESSION: CLINICAL_PROGRESSION: NOT CHANGED

## 2019-06-19 NOTE — PROGRESS NOTES
85%    ADL (activities of daily living) score is: 4/5     ADL description is stated as : Today's [place] Score : n/a    (evaluated at treatment #1 and final treatment)    Outpatient Medications Marked as Taking for the 6/19/19 encounter [facility] Encounter) with STV PAIN EXAM RM 1   Medication Sig Dispense Refill    lisinopril (PRINIVIL;ZESTRIL) 20 MG tablet TAKE 1 TABLET 2 TIMES DAILY NOTE INCREASE PER DR Raquel Bella. 180 tablet 3    nitroGLYCERIN (NITROSTAT) 0.4 MG SL tablet Place 1 tablet under the tongue as needed for Chest pain 25 tablet 11    metoprolol succinate (TOPROL XL) 100 MG extended release tablet Take 1 tablet by mouth daily 90 tablet 3    traMADol (ULTRAM) 50 MG tablet Take 1 tablet by mouth daily as needed for Pain for up to 30 days. Intended supply: 3 days. Take lowest dose possible to manage pain 12 tablet 0    insulin glargine (LANTUS) 100 UNIT/ML injection vial INJECT 5 UNITS INTO THE SKIN NIGHTLY 30 mL 3    atorvastatin (LIPITOR) 20 MG tablet Take 1 tablet by mouth daily 60 tablet 2    acetaminophen (TYLENOL) 500 MG tablet Take 2 tablets by mouth every 8 hours as needed for Pain 270 tablet 1    clopidogrel (PLAVIX) 75 MG tablet TAKE 1 TABLET EVERY DAY 90 tablet 3    CARTIA  MG extended release capsule TAKE 1 CAPSULE EVERY DAY 90 capsule 3    lansoprazole (PREVACID) 15 MG delayed release capsule TAKE 1 CAPSULE EVERY DAY 90 capsule 3    levothyroxine (SYNTHROID) 125 MCG tablet TAKE 1 TABLET EVERY DAY 90 tablet 3    cyanocobalamin 1000 MCG/ML injection Give patient 6 -1ml vials (Patient taking differently: Give patient 6 -1ml vials, once every other month) 6 mL 1    ACCU-CHEK DONG PLUS strip TEST THREE TIMES DAILY 300 strip 11    B-D INS SYR ULTRAFINE 1CC/30G 30G X 1/2\" 1 ML MISC USE 1 SYRINGE EVERY DAY 90 each 5    Compression Stockings MISC by Does not apply route 20-40 mmHg. 2 each 0    Syringe/Needle, Disp, (SYRINGE 3CC/25GX1\") 25G X 1\" 3 ML MISC Used one every other month. 6 each 0    Alcohol Swabs (B-D SINGLE USE SWABS REGULAR) PADS       Blood Glucose Calibration (OT ULTRA/FASTTK CNTRL SOLN) SOLN       Glucose Blood (BLOOD GLUCOSE TEST STRIPS) STRP Test_3__times daily Diagnosis: 250.0   Diabetes Mellitus__x__Insulin Dependent____Non-Insulin Dependent Life Scan test strips ultra one touch 300 strip 3    aspirin EC 81 MG EC tablet Take 2 tablets by mouth daily. 30 tablet 3    Multiple Vitamins-Minerals (OCUVITE ADULT 50+ PO) Take 1 tablet by mouth 2 times daily.  Green Tea, Camillia sinensis, 315 MG CAPS Take 1 tablet by mouth daily       Cinnamon 500 MG TABS Take 1 tablet by mouth daily.  Cranberry-Vitamin C-Vitamin E (CRANBERRY PLUS VITAMIN C PO) Take 1 tablet by mouth daily.  Ascorbic Acid (VITAMIN C WITH VIVIENNE HIPS) 500 MG tablet Take 500 mg by mouth daily.  Omega-3 Fatty Acids (FISH OIL) 1000 MG CAPS Take 1,000 mg by mouth daily.  Blood Glucose Monitoring Suppl (ONE TOUCH ULTRA) by Does not apply route. Are your Current Pain medication (s) controlling the pain  Yes    Medication Side Effects:  Constipation  No      Sedation  No    Urinary Retention  No  Other Medication Side Effect n/a     Are you on a Bowel Regime  :  Diet  No   Medication No    Sleep Pattern, 6 hours per night   generally restful sleep    Working  Retired     Home Exercises rarely walking     Dx:   Lumbar spondylosis without myelopathy  Chronic axial nonradicular lumbar spinal pain        The targeted treatment area today was low back  using Channel #3 at program #3 following the guidelines from page #52  with the dosage at 93. The treatments for Channel #1 and were each 45 minutes in length. The patient's pain scale was 2 post treatment.      Plan  Continue neuro stim treatment  - patient plans to find another clinic to continue treatments - discussed try chiropractor or PT facilities   No other intervention indicated at this        I have written this patient information acting as a scribe for [employer] signed by Jazmine Shannon RN on 6/19/2019 at 12:59 PM

## 2019-07-30 ENCOUNTER — OFFICE VISIT (OUTPATIENT)
Dept: PODIATRY | Age: 79
End: 2019-07-30
Payer: MEDICARE

## 2019-07-30 VITALS — WEIGHT: 223 LBS | BODY MASS INDEX: 43.78 KG/M2 | HEIGHT: 60 IN | RESPIRATION RATE: 16 BRPM

## 2019-07-30 DIAGNOSIS — M79.672 PAIN IN BOTH FEET: Primary | ICD-10-CM

## 2019-07-30 DIAGNOSIS — I73.9 PERIPHERAL VASCULAR DISEASE (HCC): ICD-10-CM

## 2019-07-30 DIAGNOSIS — E11.51 TYPE II DIABETES MELLITUS WITH PERIPHERAL CIRCULATORY DISORDER (HCC): ICD-10-CM

## 2019-07-30 DIAGNOSIS — M79.671 PAIN IN BOTH FEET: Primary | ICD-10-CM

## 2019-07-30 DIAGNOSIS — B35.1 DERMATOPHYTOSIS OF NAIL: ICD-10-CM

## 2019-07-30 DIAGNOSIS — L85.3 XEROSIS OF SKIN: ICD-10-CM

## 2019-07-30 PROCEDURE — 11721 DEBRIDE NAIL 6 OR MORE: CPT | Performed by: PODIATRIST

## 2019-07-30 PROCEDURE — 99999 PR OFFICE/OUTPT VISIT,PROCEDURE ONLY: CPT | Performed by: PODIATRIST

## 2019-07-30 NOTE — PROGRESS NOTES
associated with type 2 diabetes mellitus ([facility] 4/20/2016    Morbid obesity with BMI of 45.0-49.9, adult ([facility] 10/22/2014    Osteoarthritis     Other activity(E029.9)     Acute renal failure secondary to prerenal azotemia    Other activity(E029.9)     Atherosclerotic coronary artery disease status-post bypass surgery in 2003    Persistent proteinuria 5/9/2018    From diabetic nephrosclerosis, urine protein creatinine ratio 0.8-1    PVD (peripheral vascular disease) (HCC)     Sciatica     Secondary hyperparathyroidism (of renal origin) 10/22/2014    Not on VDRA    Shortness of breath     USES HOME OXYGEN    Sleep apnea     Sleep apnea     Type II or unspecified type diabetes mellitus without mention of complication, not stated as uncontrolled        No Known Allergies  Current Outpatient Medications on File Prior to Visit   Medication Sig Dispense Refill    lisinopril (PRINIVIL;ZESTRIL) 20 MG tablet TAKE 1 TABLET 2 TIMES DAILY NOTE INCREASE PER DR Shannon Maria. 180 tablet 3    nitroGLYCERIN (NITROSTAT) 0.4 MG SL tablet Place 1 tablet under the tongue as needed for Chest pain 25 tablet 11    metoprolol succinate (TOPROL XL) 100 MG extended release tablet Take 1 tablet by mouth daily 90 tablet 3    insulin glargine (LANTUS) 100 UNIT/ML injection vial INJECT 5 UNITS INTO THE SKIN NIGHTLY 30 mL 3    atorvastatin (LIPITOR) 20 MG tablet Take 1 tablet by mouth daily 60 tablet 2    acetaminophen (TYLENOL) 500 MG tablet Take 2 tablets by mouth every 8 hours as needed for Pain 270 tablet 1    clopidogrel (PLAVIX) 75 MG tablet TAKE 1 TABLET EVERY DAY 90 tablet 3    CARTIA  MG extended release capsule TAKE 1 CAPSULE EVERY DAY 90 capsule 3    lansoprazole (PREVACID) 15 MG delayed release capsule TAKE 1 CAPSULE EVERY DAY 90 capsule 3    levothyroxine (SYNTHROID) 125 MCG tablet TAKE 1 TABLET EVERY DAY 90 tablet 3    cyanocobalamin 1000 MCG/ML injection Give patient 6 -1ml vials (Patient taking differently: Give patient 6 -1ml vials, once every other month) 6 mL 1    ACCU-CHEK DONG PLUS strip TEST THREE TIMES DAILY 300 strip 11    B-D INS SYR ULTRAFINE 1CC/30G 30G X 1/2\" 1 ML MISC USE 1 SYRINGE EVERY DAY 90 each 5    Compression Stockings MISC by Does not apply route 20-40 mmHg. 2 each 0    Syringe/Needle, Disp, (SYRINGE 3CC/25GX1\") 25G X 1\" 3 ML MISC Used one every other month. 6 each 0    Alcohol Swabs (B-D SINGLE USE SWABS REGULAR) PADS       Blood Glucose Calibration (OT ULTRA/FASTTK CNTRL SOLN) SOLN       Glucose Blood (BLOOD GLUCOSE TEST STRIPS) STRP Test_3__times daily Diagnosis: 250.0   Diabetes Mellitus__x__Insulin Dependent____Non-Insulin Dependent Life Scan test strips ultra one touch 300 strip 3    aspirin EC 81 MG EC tablet Take 2 tablets by mouth daily. 30 tablet 3    Multiple Vitamins-Minerals (OCUVITE ADULT 50+ PO) Take 1 tablet by mouth 2 times daily.  Green Tea, Camillia sinensis, 315 MG CAPS Take 1 tablet by mouth daily       Cinnamon 500 MG TABS Take 1 tablet by mouth daily.  Cranberry-Vitamin C-Vitamin E (CRANBERRY PLUS VITAMIN C PO) Take 1 tablet by mouth daily.  Ascorbic Acid (VITAMIN C WITH VIVIENNE HIPS) 500 MG tablet Take 500 mg by mouth daily.  Omega-3 Fatty Acids (FISH OIL) 1000 MG CAPS Take 1,000 mg by mouth daily.  docusate sodium (COLACE) 100 MG capsule Take 100 mg by mouth 2 times daily.  Blood Glucose Monitoring Suppl (ONE TOUCH ULTRA) by Does not apply route. No current facility-administered medications on file prior to visit. Review of Systems    Review of Systems:   History obtained from chart review and the patient  General ROS: negative for - chills, fatigue, fever, night sweats or weight gain  Constitutional: Negative for chills, diaphoresis, fatigue, fever and unexpected weight change. Musculoskeletal: Positive for arthralgias, gait problem and joint swelling.   Neurological ROS: negative for - behavioral changes, confusion, headaches

## 2019-10-01 ENCOUNTER — OFFICE VISIT (OUTPATIENT)
Dept: PODIATRY | Age: 79
End: 2019-10-01
Payer: MEDICARE

## 2019-10-01 VITALS — BODY MASS INDEX: 35.88 KG/M2 | HEIGHT: 62 IN | WEIGHT: 195 LBS | RESPIRATION RATE: 16 BRPM

## 2019-10-01 DIAGNOSIS — D23.72 BENIGN NEOPLASM OF SKIN OF LOWER LIMB, INCLUDING HIP, LEFT: ICD-10-CM

## 2019-10-01 DIAGNOSIS — B35.1 DERMATOPHYTOSIS OF NAIL: ICD-10-CM

## 2019-10-01 DIAGNOSIS — R26.2 TROUBLE WALKING: Primary | ICD-10-CM

## 2019-10-01 DIAGNOSIS — E11.51 TYPE II DIABETES MELLITUS WITH PERIPHERAL CIRCULATORY DISORDER (HCC): ICD-10-CM

## 2019-10-01 DIAGNOSIS — D23.71 BENIGN NEOPLASM OF SKIN OF LOWER LIMB, INCLUDING HIP, RIGHT: ICD-10-CM

## 2019-10-01 DIAGNOSIS — I73.9 PERIPHERAL VASCULAR DISEASE (HCC): ICD-10-CM

## 2019-10-01 PROCEDURE — 11721 DEBRIDE NAIL 6 OR MORE: CPT | Performed by: PODIATRIST

## 2019-10-01 PROCEDURE — 17110 DESTRUCTION B9 LES UP TO 14: CPT | Performed by: PODIATRIST

## 2019-11-18 ENCOUNTER — HOSPITAL ENCOUNTER (OUTPATIENT)
Age: 79
Discharge: HOME | End: 2019-11-18
Payer: MEDICARE

## 2019-11-18 LAB
ALBUMIN SERPL-MCNC: 3.6 G/DL (ref 3.5–5.2)
ANION GAP SERPL CALCULATED.3IONS-SCNC: 12 MMOL/L (ref 9–17)
BUN BLDV-MCNC: 23 MG/DL (ref 8–23)
BUN/CREAT BLD: ABNORMAL (ref 9–20)
CALCIUM IONIZED: 1.32 MMOL/L (ref 1.13–1.33)
CALCIUM SERPL-MCNC: 9.6 MG/DL (ref 8.6–10.4)
CHLORIDE BLD-SCNC: 109 MMOL/L (ref 98–107)
CO2: 20 MMOL/L (ref 20–31)
CREAT SERPL-MCNC: 1.38 MG/DL (ref 0.5–0.9)
CREATININE URINE: 116.4 MG/DL (ref 28–217)
GFR AFRICAN AMERICAN: 45 ML/MIN
GFR NON-AFRICAN AMERICAN: 37 ML/MIN
GFR SERPL CREATININE-BSD FRML MDRD: ABNORMAL ML/MIN/{1.73_M2}
GFR SERPL CREATININE-BSD FRML MDRD: ABNORMAL ML/MIN/{1.73_M2}
GLUCOSE BLD-MCNC: 118 MG/DL (ref 70–99)
HCT VFR BLD CALC: 38.6 % (ref 36.3–47.1)
HEMOGLOBIN: 11.7 G/DL (ref 11.9–15.1)
MCH RBC QN AUTO: 27.7 PG (ref 25.2–33.5)
MCHC RBC AUTO-ENTMCNC: 30.3 G/DL (ref 28.4–34.8)
MCV RBC AUTO: 91.5 FL (ref 82.6–102.9)
NRBC AUTOMATED: 0 PER 100 WBC
PDW BLD-RTO: 14.4 % (ref 11.8–14.4)
PHOSPHORUS: 3.6 MG/DL (ref 2.6–4.5)
PLATELET # BLD: 171 K/UL (ref 138–453)
PMV BLD AUTO: 12.3 FL (ref 8.1–13.5)
POTASSIUM SERPL-SCNC: 4.3 MMOL/L (ref 3.7–5.3)
PTH INTACT: 230 PG/ML (ref 15–65)
RBC # BLD: 4.22 M/UL (ref 3.95–5.11)
SODIUM BLD-SCNC: 141 MMOL/L (ref 135–144)
TOTAL PROTEIN, URINE: 192 MG/DL
WBC # BLD: 6.4 K/UL (ref 3.5–11.3)

## 2019-11-18 PROCEDURE — 84156 ASSAY OF PROTEIN URINE: CPT

## 2019-11-18 PROCEDURE — 85027 COMPLETE CBC AUTOMATED: CPT

## 2019-11-18 PROCEDURE — 84100 ASSAY OF PHOSPHORUS: CPT

## 2019-11-18 PROCEDURE — 83970 ASSAY OF PARATHORMONE: CPT

## 2019-11-18 PROCEDURE — 82040 ASSAY OF SERUM ALBUMIN: CPT

## 2019-11-18 PROCEDURE — 82570 ASSAY OF URINE CREATININE: CPT

## 2019-11-18 PROCEDURE — 36415 COLL VENOUS BLD VENIPUNCTURE: CPT

## 2019-11-18 PROCEDURE — 82330 ASSAY OF CALCIUM: CPT

## 2019-11-18 PROCEDURE — 80048 BASIC METABOLIC PNL TOTAL CA: CPT

## 2019-11-18 RX ORDER — LANSOPRAZOLE 15 MG/1
CAPSULE, DELAYED RELEASE ORAL
Qty: 90 CAPSULE | Refills: 3 | Status: SHIPPED | OUTPATIENT
Start: 2019-11-18 | End: 2020-11-23 | Stop reason: SDUPTHER

## 2019-11-18 RX ORDER — LEVOTHYROXINE SODIUM 0.12 MG/1
TABLET ORAL
Qty: 90 TABLET | Refills: 3 | Status: SHIPPED | OUTPATIENT
Start: 2019-11-18 | End: 2020-11-23 | Stop reason: SDUPTHER

## 2019-11-18 RX ORDER — BLOOD SUGAR DIAGNOSTIC
1 STRIP MISCELLANEOUS DAILY
Qty: 100 EACH | Refills: 3 | Status: SHIPPED | OUTPATIENT
Start: 2019-11-18 | End: 2020-11-23 | Stop reason: SDUPTHER

## 2019-11-20 RX ORDER — ATORVASTATIN CALCIUM 20 MG/1
20 TABLET, FILM COATED ORAL DAILY
Qty: 60 TABLET | Refills: 2 | Status: SHIPPED | OUTPATIENT
Start: 2019-11-20 | End: 2020-05-15

## 2019-11-27 PROBLEM — E11.3299 MILD NONPROLIFERATIVE DIABETIC RETINOPATHY ASSOCIATED WITH TYPE 2 DIABETES MELLITUS (HCC): Status: ACTIVE | Noted: 2017-11-07

## 2019-12-03 ENCOUNTER — OFFICE VISIT (OUTPATIENT)
Dept: PODIATRY | Age: 79
End: 2019-12-03
Payer: MEDICARE

## 2019-12-03 VITALS — WEIGHT: 227 LBS | HEIGHT: 60 IN | BODY MASS INDEX: 44.57 KG/M2 | RESPIRATION RATE: 16 BRPM

## 2019-12-03 DIAGNOSIS — R26.2 TROUBLE WALKING: Primary | ICD-10-CM

## 2019-12-03 DIAGNOSIS — B35.1 DERMATOPHYTOSIS OF NAIL: ICD-10-CM

## 2019-12-03 DIAGNOSIS — E11.51 TYPE II DIABETES MELLITUS WITH PERIPHERAL CIRCULATORY DISORDER (HCC): ICD-10-CM

## 2019-12-03 DIAGNOSIS — I73.9 PERIPHERAL VASCULAR DISEASE (HCC): ICD-10-CM

## 2019-12-03 PROCEDURE — 11721 DEBRIDE NAIL 6 OR MORE: CPT | Performed by: PODIATRIST

## 2019-12-03 PROCEDURE — 99999 PR OFFICE/OUTPT VISIT,PROCEDURE ONLY: CPT | Performed by: PODIATRIST

## 2019-12-17 ENCOUNTER — TELEPHONE (OUTPATIENT)
Dept: VASCULAR SURGERY | Age: 79
End: 2019-12-17

## 2019-12-17 DIAGNOSIS — I73.9 CLAUDICATION IN PERIPHERAL VASCULAR DISEASE (HCC): ICD-10-CM

## 2019-12-17 DIAGNOSIS — I73.9 PAD (PERIPHERAL ARTERY DISEASE) (HCC): Primary | ICD-10-CM

## 2019-12-30 ENCOUNTER — HOSPITAL ENCOUNTER (OUTPATIENT)
Dept: VASCULAR LAB | Age: 79
Discharge: HOME | End: 2019-12-30
Payer: MEDICARE

## 2019-12-30 DIAGNOSIS — I73.9 CLAUDICATION IN PERIPHERAL VASCULAR DISEASE (HCC): ICD-10-CM

## 2019-12-30 PROCEDURE — 93923 UPR/LXTR ART STDY 3+ LVLS: CPT

## 2020-01-17 ENCOUNTER — OFFICE VISIT (OUTPATIENT)
Dept: VASCULAR SURGERY | Age: 80
End: 2020-01-17
Payer: MEDICARE

## 2020-01-17 VITALS
WEIGHT: 228 LBS | HEIGHT: 60 IN | TEMPERATURE: 96.9 F | SYSTOLIC BLOOD PRESSURE: 174 MMHG | BODY MASS INDEX: 44.76 KG/M2 | OXYGEN SATURATION: 97 % | HEART RATE: 72 BPM | DIASTOLIC BLOOD PRESSURE: 78 MMHG

## 2020-01-17 PROCEDURE — 1036F TOBACCO NON-USER: CPT | Performed by: SURGERY

## 2020-01-17 PROCEDURE — 1123F ACP DISCUSS/DSCN MKR DOCD: CPT | Performed by: SURGERY

## 2020-01-17 PROCEDURE — 4040F PNEUMOC VAC/ADMIN/RCVD: CPT | Performed by: SURGERY

## 2020-01-17 PROCEDURE — G8399 PT W/DXA RESULTS DOCUMENT: HCPCS | Performed by: SURGERY

## 2020-01-17 PROCEDURE — 1090F PRES/ABSN URINE INCON ASSESS: CPT | Performed by: SURGERY

## 2020-01-17 PROCEDURE — G8427 DOCREV CUR MEDS BY ELIG CLIN: HCPCS | Performed by: SURGERY

## 2020-01-17 PROCEDURE — G8484 FLU IMMUNIZE NO ADMIN: HCPCS | Performed by: SURGERY

## 2020-01-17 PROCEDURE — 99213 OFFICE O/P EST LOW 20 MIN: CPT | Performed by: SURGERY

## 2020-01-17 PROCEDURE — G8417 CALC BMI ABV UP PARAM F/U: HCPCS | Performed by: SURGERY

## 2020-01-17 NOTE — PROGRESS NOTES
Division of Vascular Surgery        Follow Up      PAD    Chief Complaint:     PAD    History of Present Illness:      Adiel Coto is a 68 y.o. woman who presents for follow up regarding her PAD, she denies any symptoms suggestive of claudication or ischemic rest pain. She does have chronic venous insufficiency and does where compression stockings daily to help. She does not have any open wounds or sores on her feet. She is quite active and is always moving. I have been following her ever since she had PVRs done last year which showed critical ischemia based on her ABIs and diminished waveforms.     Medical History:     Past Medical History:   Diagnosis Date    Abnormal stress test 07/18/2014    going to do cath- not urgent just abnormal    Arthritis     At high risk for hyperkalemia 10/22/2014    From type 4 RTA    Back pain     CAD (coronary artery disease)     Chronic kidney disease     Circulation problem     decreased circulation to  zbigniew extremities    CKD (chronic kidney disease) stage 3, GFR 30-59 ml/min ([facility]) 10/22/2014    From diabetic and ischemic nephrosclerosis, 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 ([facility] 4/20/2016    Morbid obesity with BMI of 45.0-49.9, adult ([facility] 10/22/2014    Osteoarthritis     Other activity(E029.9)     Acute renal failure secondary to prerenal azotemia    Other activity(E029.9)     Atherosclerotic coronary artery disease status-post bypass surgery in 2003  Persistent proteinuria 5/9/2018    From diabetic nephrosclerosis, urine protein creatinine ratio 0.8-1    PVD (peripheral vascular disease) ([facility]     Sciatica     Secondary hyperparathyroidism (of renal origin) 10/22/2014    Not on VDRA    Shortness of breath     USES HOME OXYGEN    Sleep apnea     Sleep apnea     Type II or unspecified type diabetes mellitus without mention of complication, not stated as uncontrolled        Surgical History:     Past Surgical History:   Procedure Laterality Date    APPENDECTOMY      CARDIAC CATHETERIZATION  2006, [address] [facility] SURGERY  2003 2018    CABG X 3/STENTS    CORONARY ANGIOPLASTY WITH STENT PLACEMENT      NERVE BLOCK Right 10/21/2016    rt MBNB #1 kenalog 40mg       Family History:     Non-contributary    Allergies:       Patient has no known allergies.     Medications:      Current Outpatient Medications   Medication Sig Dispense Refill    calcitRIOL (ROCALTROL) 0.25 MCG capsule Take 1 capsule by mouth three times a week mon wed fri 30 capsule 3    blood glucose test strips (ACCU-CHEK DONG PLUS) strip TEST THREE TIMES DAILY 300 strip 11    atorvastatin (LIPITOR) 20 MG tablet Take 1 tablet by mouth daily 60 tablet 2    lansoprazole (PREVACID) 15 MG delayed release capsule TAKE 1 CAPSULE EVERY DAY 90 capsule 3    levothyroxine (SYNTHROID) 125 MCG tablet TAKE 1 TABLET EVERY DAY 90 tablet 3    Insulin Syringe-Needle U-100 31G X 5/16\" 0.3 ML MISC 1 each by Does not apply route daily 100 each 3    lisinopril (PRINIVIL;ZESTRIL) 20 MG tablet TAKE 1 TABLET 2 TIMES DAILY NOTE INCREASE PER DR Kady Nava. 180 tablet 3    nitroGLYCERIN (NITROSTAT) 0.4 MG SL tablet Place 1 tablet under the tongue as needed for Chest pain 25 tablet 11    metoprolol succinate (TOPROL XL) 100 MG extended release tablet Take 1 tablet by mouth daily 90 tablet 3    insulin glargine (LANTUS) 100 UNIT/ML injection vial INJECT 5 UNITS INTO THE SKIN NIGHTLY 30 mL 3    acetaminophen (TYLENOL) 500 MG tablet Take 2 tablets by mouth every 8 hours as needed for Pain 270 tablet 1    clopidogrel (PLAVIX) 75 MG tablet TAKE 1 TABLET EVERY DAY 90 tablet 3    cyanocobalamin 1000 MCG/ML injection Give patient 6 -1ml vials (Patient taking differently: Give patient 6 -1ml vials, once every other month) 6 mL 1    B-D INS SYR ULTRAFINE 1CC/30G 30G X 1/2\" 1 ML MISC USE 1 SYRINGE EVERY DAY 90 each 5    Compression Stockings MISC by Does not apply route 20-40 mmHg. 2 each 0    Syringe/Needle, Disp, (SYRINGE 3CC/25GX1\") 25G X 1\" 3 ML MISC Used one every other month. 6 each 0    Alcohol Swabs (B-D SINGLE USE SWABS REGULAR) PADS       Blood Glucose Calibration (OT ULTRA/FASTTK CNTRL SOLN) SOLN       Glucose Blood (BLOOD GLUCOSE TEST STRIPS) STRP Test_3__times daily Diagnosis: 250.0   Diabetes Mellitus__x__Insulin Dependent____Non-Insulin Dependent Life Scan test strips ultra one touch 300 strip 3    aspirin EC 81 MG EC tablet Take 2 tablets by mouth daily. 30 tablet 3    Multiple Vitamins-Minerals (OCUVITE ADULT 50+ PO) Take 1 tablet by mouth 2 times daily.  Green Tea, Camillia sinensis, 315 MG CAPS Take 1 tablet by mouth daily       Cinnamon 500 MG TABS Take 1 tablet by mouth daily.  Cranberry-Vitamin C-Vitamin E (CRANBERRY PLUS VITAMIN C PO) Take 1 tablet by mouth daily.  Ascorbic Acid (VITAMIN C WITH VIVIENNE HIPS) 500 MG tablet Take 500 mg by mouth daily.  Omega-3 Fatty Acids (FISH OIL) 1000 MG CAPS Take 1,000 mg by mouth daily.  docusate sodium (COLACE) 100 MG capsule Take 100 mg by mouth 2 times daily.  Blood Glucose Monitoring Suppl (ONE TOUCH ULTRA) by Does not apply route. No current facility-administered medications for this visit. Social History:     Tobacco:    reports that she quit smoking about 24 years ago. She has a 80.00 pack-year smoking history. She has never used smokeless tobacco.  Alcohol:      reports current alcohol use.   Drug Use:  reports no history of drug use. Review of Systems:     Review of Systems   Constitutional: Negative for chills and fever. HENT: Negative. Eyes: Negative for visual disturbance. Respiratory: Negative for chest tightness and shortness of breath. Cardiovascular: Positive for leg swelling. Negative for chest pain. Gastrointestinal: Negative for abdominal pain. Endocrine: Negative. Genitourinary: Negative. Musculoskeletal: Positive for arthralgias. Skin: Negative for color change and wound. Allergic/Immunologic: Negative. Neurological: Negative for weakness and numbness. Hematological: Negative. Psychiatric/Behavioral: Negative. Physical Exam:     Vitals: There were no vitals taken for this visit. Physical Exam  Constitutional:       Appearance: She is well-developed and well-groomed. Eyes:      Extraocular Movements: Extraocular movements intact. Conjunctiva/sclera: Conjunctivae normal.   Neck:      Vascular: No carotid bruit. Cardiovascular:      Rate and Rhythm: Normal rate and regular rhythm. Pulses:           Radial pulses are 2+ on the right side and 2+ on the left side. Femoral pulses are 1+ on the right side and 1+ on the left side. Dorsalis pedis pulses are detected w/ Doppler on the right side and detected w/ Doppler on the left side. Posterior tibial pulses are detected w/ Doppler on the right side and detected w/ Doppler on the left side. Pulmonary:      Effort: Pulmonary effort is normal. No respiratory distress. Abdominal:      Palpations: Abdomen is soft. Tenderness: There is no tenderness. Musculoskeletal:      Right foot: Normal capillary refill. No tenderness. Left foot: Normal capillary refill. No tenderness. Feet:      Right foot:      Skin integrity: No ulcer or skin breakdown. Left foot:      Skin integrity: No ulcer or skin breakdown. Skin:     General: Skin is warm.       Capillary Refill: Capillary refill takes less than 2 seconds. Neurological:      Mental Status: She is alert and oriented to person, place, and time. GCS: GCS eye subscore is 4. GCS verbal subscore is 5. GCS motor subscore is 6. Sensory: Sensation is intact. Motor: Motor function is intact. Psychiatric:         Mood and Affect: Mood normal.         Speech: Speech normal.         Behavior: Behavior normal.         Thought Content: Thought content normal.       Imaging/Labs:         Previous JORY right 0.26 left 0.07    Assessment and Plan:     Peripheral arterial disease  · Despite significant disease based on ABIs, she does not have clinical correlation  · Continue optimal medical therapy with antiplatelet and statins  · Daily exercise to improve overall cardiovascular health and function  · Yearly surveillance with non-invasive studies with PVRs, sooner if she develops symptoms suggestive of claudication or ischemic rest pain    Electronically signed by Carol Masterson MD on 1/17/20 at 10:56 AM      [address] [place] [address],[address] [facility]: [phone]  C: [phone]  Email: [email or web address]

## 2020-01-20 ASSESSMENT — ENCOUNTER SYMPTOMS
ABDOMINAL PAIN: 0
ALLERGIC/IMMUNOLOGIC NEGATIVE: 1
CHEST TIGHTNESS: 0
SHORTNESS OF BREATH: 0
COLOR CHANGE: 0

## 2020-01-29 RX ORDER — CYANOCOBALAMIN 1000 UG/ML
INJECTION INTRAMUSCULAR; SUBCUTANEOUS
Qty: 6 ML | Refills: 1 | OUTPATIENT
Start: 2020-01-29

## 2020-02-04 ENCOUNTER — OFFICE VISIT (OUTPATIENT)
Dept: PODIATRY | Age: 80
End: 2020-02-04
Payer: MEDICARE

## 2020-02-04 VITALS — BODY MASS INDEX: 44.76 KG/M2 | WEIGHT: 228 LBS | RESPIRATION RATE: 16 BRPM | HEIGHT: 60 IN

## 2020-02-04 PROCEDURE — 11721 DEBRIDE NAIL 6 OR MORE: CPT | Performed by: PODIATRIST

## 2020-02-04 PROCEDURE — 99999 PR OFFICE/OUTPT VISIT,PROCEDURE ONLY: CPT | Performed by: PODIATRIST

## 2020-02-04 PROCEDURE — 17110 DESTRUCTION B9 LES UP TO 14: CPT | Performed by: PODIATRIST

## 2020-02-04 NOTE — PROGRESS NOTES
History obtained from chart review and the patient  General ROS: negative for - chills, fatigue, fever, night sweats or weight gain  Constitutional: Negative for chills, diaphoresis, fatigue, fever and unexpected weight change. Musculoskeletal: Positive for arthralgias, gait problem and joint swelling. Neurological ROS: negative for - behavioral changes, confusion, headaches or seizures. Negative for weakness and numbness. Dermatological ROS: negative for - mole changes, rash  Cardiovascular: Negative for leg swelling. Gastrointestinal: Negative for constipation, diarrhea, nausea and vomiting. Objective:  Dermatologic Exam:  Skin lesion present to the right and left plantar foot with a central core and petchaie noted to the lesions periphery.   Pain on palpation of the lesion       Skin is thin, with flaky sloughing skin as well as decreased hair growth to the lower leg  Small red hemosiderin deposits seen dorsal foot   Musculoskeletal:     1st MPJ ROM decreased, Bilateral.  Muscle strength 5/5, Bilateral.  Pain present upon palpation of toenails 1-5, Bilateral. decreased medial longitudinal arch, Bilateral.  Ankle ROM decreased,Bilateral.    Dorsally contracted digits present digits 2, Bilateral.     Vascular: DP pulses 1/4 bilateral.  PT pulses 0/4 bilateral.   CFT <5 seconds, Bilateral.  Hair growth absent to the level of the digits, Bilateral.  Edema present, Bilateral.  Varicosities absent, Bilateral. Erythema absent, Bilateral    Neurological: Sensation diminshed to light touch to level of digits, Bilateral.  Protective sensation intact 6/10 sites via 5.07/10g [place] Monofilament, Bilateral.  negative Tinel's, Bilateral.  negative Valleix sign, Bilateral.      Integument: Warm, dry, supple, Bilateral.  Open lesion absent, Bilateral.  Interdigital maceration absent to web spaces 4, Bilateral.  Nails 1-5 left and 1-5 right thickened > 3.0 mm, dystrophic and crumbly, discolored with DIABETES FOOT EXAM    78720 - [place] DEBRIDEMENT OF NAILS, 6 OR MORE    04307 - [place] DESTRUCTION BENIGN LESIONS UP TO 14   4. Pain in both feet  HM DIABETES FOOT EXAM    67530 - [place] DEBRIDEMENT OF NAILS, 6 OR MORE    34951 - [place] DESTRUCTION BENIGN LESIONS UP TO 14   5. Benign neoplasm of skin of lower limb, including hip, left  55117 - [place] DESTRUCTION BENIGN LESIONS UP TO 14   6. Benign neoplasm of skin of lower limb, including hip, right  77422 - [place] DESTRUCTION BENIGN LESIONS UP TO 14       Q7   []Yes    []No                Q8   [x]Yes    []No                     Q9   []Yes    []No    Plan:   Pt was evaluated and examined. Patient was given personalized discharge instructions. The lesion was partially excised and Pyrogallic acid was applied under occlusion. The patient will leave in place for 24-48 hours and than remove. The patient tolerated the procedure well and without complication. Nails 1-10 were debrided sharply in length and thickness with a nipper and , without incident. Pt will follow up in 9 weeks or sooner if any problems arise. Diagnosis was discussed with the pt and all of their questions were answered in detail. Proper foot hygiene and care was discussed with the pt. Informed patient on proper diabetic foot care and importance of tight glycemic control. Patient to check feet daily and contact the office with any questions/problems/concerns.    Other comorbidity noted and will be managed by PCP.  2/4/2020    Electronically signed by Jim Kinney DPM on 2/4/2020 at 11:17 AM  2/4/2020

## 2020-02-06 RX ORDER — CYANOCOBALAMIN 1000 UG/ML
INJECTION INTRAMUSCULAR; SUBCUTANEOUS
Qty: 6 ML | Refills: 1 | OUTPATIENT
Start: 2020-02-06

## 2020-02-06 NOTE — TELEPHONE ENCOUNTER
myelopathy     S/P cardiac cath 8/19/14-Dr. covarrubias     CKD (chronic kidney disease) stage 3, GFR 30-59 ml/min ([facility])     Morbid obesity with BMI of 40.0-44.9, adult ([facility])     At high risk for hyperkalemia     Edema     COPD (chronic obstructive pulmonary disease) ([facility])     Hyperparathyroidism due to renal insufficiency ([facility])     Carotid stenosis, asymptomatic     Claudication in peripheral vascular disease ([facility])     Nonproliferative diabetic retinopathy of both eyes ([facility])     PAD (peripheral artery disease) ([facility])     Type 2 diabetes mellitus with stage 3 chronic kidney disease, with long-term current use of insulin ([facility]     Coronary artery disease involving coronary bypass graft of native heart without angina pectoris     Essential hypertension     Localized edema     DM (diabetes mellitus), type 2 with peripheral vascular complications ([facility])     Mild nonproliferative diabetic retinopathy associated with type 2 diabetes mellitus ([address]     Panlobular emphysema ([facility])     S/P drug eluting coronary stent placement-OM1 3/26/18-Dr. covarrubias     CAD S/P percutaneous coronary angioplasty     Persistent proteinuria     Lymphedema of right lower extremity     Chronic bilateral low back pain without sciatica           Please address the medication refill and close the encounter. If I can be of assistance, please route to the applicable pool. Thank you.

## 2020-02-19 RX ORDER — CYANOCOBALAMIN 1000 UG/ML
INJECTION INTRAMUSCULAR; SUBCUTANEOUS
Qty: 6 ML | Refills: 1 | OUTPATIENT
Start: 2020-02-19

## 2020-02-19 NOTE — TELEPHONE ENCOUNTER
Please return a call to the patient - offer an appointment with first available PGY Resident.     Eleazar Kinsey, DO

## 2020-02-20 ENCOUNTER — HOSPITAL ENCOUNTER (OUTPATIENT)
Age: 80
Discharge: HOME | End: 2020-02-20
Payer: MEDICARE

## 2020-02-20 LAB
ALBUMIN SERPL-MCNC: 3.8 G/DL (ref 3.5–5.2)
ANION GAP SERPL CALCULATED.3IONS-SCNC: 13 MMOL/L (ref 9–17)
BUN BLDV-MCNC: 19 MG/DL (ref 8–23)
BUN/CREAT BLD: ABNORMAL (ref 9–20)
CALCIUM SERPL-MCNC: 9.3 MG/DL (ref 8.6–10.4)
CHLORIDE BLD-SCNC: 110 MMOL/L (ref 98–107)
CO2: 22 MMOL/L (ref 20–31)
CREAT SERPL-MCNC: 1.45 MG/DL (ref 0.5–0.9)
CREATININE URINE: 57.2 MG/DL (ref 28–217)
GFR AFRICAN AMERICAN: 42 ML/MIN
GFR NON-AFRICAN AMERICAN: 35 ML/MIN
GFR SERPL CREATININE-BSD FRML MDRD: ABNORMAL ML/MIN/{1.73_M2}
GFR SERPL CREATININE-BSD FRML MDRD: ABNORMAL ML/MIN/{1.73_M2}
GLUCOSE BLD-MCNC: 117 MG/DL (ref 70–99)
HCT VFR BLD CALC: 39.4 % (ref 36.3–47.1)
HEMOGLOBIN: 11.9 G/DL (ref 11.9–15.1)
MCH RBC QN AUTO: 27.4 PG (ref 25.2–33.5)
MCHC RBC AUTO-ENTMCNC: 30.2 G/DL (ref 28.4–34.8)
MCV RBC AUTO: 90.6 FL (ref 82.6–102.9)
NRBC AUTOMATED: 0 PER 100 WBC
PDW BLD-RTO: 14.8 % (ref 11.8–14.4)
PHOSPHORUS: 3.4 MG/DL (ref 2.6–4.5)
PLATELET # BLD: 173 K/UL (ref 138–453)
PMV BLD AUTO: 12.8 FL (ref 8.1–13.5)
POTASSIUM SERPL-SCNC: 4.3 MMOL/L (ref 3.7–5.3)
PTH INTACT: 183.4 PG/ML (ref 15–65)
RBC # BLD: 4.35 M/UL (ref 3.95–5.11)
SODIUM BLD-SCNC: 145 MMOL/L (ref 135–144)
TOTAL PROTEIN, URINE: 157 MG/DL
URINE TOTAL PROTEIN CREATININE RATIO: 2.74 (ref 0–0.2)
WBC # BLD: 6.3 K/UL (ref 3.5–11.3)

## 2020-02-20 PROCEDURE — 82570 ASSAY OF URINE CREATININE: CPT

## 2020-02-20 PROCEDURE — 83970 ASSAY OF PARATHORMONE: CPT

## 2020-02-20 PROCEDURE — 84156 ASSAY OF PROTEIN URINE: CPT

## 2020-02-20 PROCEDURE — 82040 ASSAY OF SERUM ALBUMIN: CPT

## 2020-02-20 PROCEDURE — 80048 BASIC METABOLIC PNL TOTAL CA: CPT

## 2020-02-20 PROCEDURE — 85027 COMPLETE CBC AUTOMATED: CPT

## 2020-02-20 PROCEDURE — 84100 ASSAY OF PHOSPHORUS: CPT

## 2020-02-20 PROCEDURE — 36415 COLL VENOUS BLD VENIPUNCTURE: CPT

## 2020-04-07 ENCOUNTER — OFFICE VISIT (OUTPATIENT)
Dept: PODIATRY | Age: 80
End: 2020-04-07
Payer: MEDICARE

## 2020-04-07 VITALS — TEMPERATURE: 97.9 F | WEIGHT: 228 LBS | HEIGHT: 60 IN | BODY MASS INDEX: 44.76 KG/M2 | RESPIRATION RATE: 18 BRPM

## 2020-04-07 PROCEDURE — 17110 DESTRUCTION B9 LES UP TO 14: CPT | Performed by: PODIATRIST

## 2020-04-07 PROCEDURE — 99999 PR OFFICE/OUTPT VISIT,PROCEDURE ONLY: CPT | Performed by: PODIATRIST

## 2020-04-07 PROCEDURE — 11721 DEBRIDE NAIL 6 OR MORE: CPT | Performed by: PODIATRIST

## 2020-04-07 NOTE — PROGRESS NOTES
[facility] PHYSICIANS  [facility]  [address] [facility] [address] [place] [address]  Dept: [phone]  Dept Fax: [phone]    DIABETIC PROGRESS NOTE  Date of patient's visit: 4/7/2020  Patient's Name:  Zully Ruiz YOB: 1940            Patient Care Team:  Angelica Porter DO as PCP - General (Family Medicine)  Angelica Porter DO as PCP - [facility] Empaneled Provider  Arlet Rico DPM as Physician (Podiatry)          Chief Complaint   Patient presents with    Diabetes    Peripheral Neuropathy    Foot Pain    Nail Problem    Benign Neoplasm       Subjective:   Zully Ruiz comes to clinic for Diabetes; Peripheral Neuropathy; Foot Pain; Nail Problem; and Benign Neoplasm    she is a diabetic and states that she is doing well. Pt currently has complaint of thickened, elongated nails that they cannot manage by themselves. Pt's primary care physician is Shira Murry DO last seen 06/12/2019. Pt's last blood sugar was 70 . Pt also relates to painful skin spots to the bottom of both feet. Pt has tried pads and changing shoes but it has note helped the pain       Lab Results   Component Value Date    LABA1C 6.3 02/13/2018      Complains of numbness in the feet bilat.   Past Medical History:   Diagnosis Date    Abnormal stress test 07/18/2014    going to do cath- not urgent just abnormal    Arthritis     At high risk for hyperkalemia 10/22/2014    From type 4 RTA    Back pain     CAD (coronary artery disease)     Chronic kidney disease     Circulation problem     decreased circulation to  zbigniew extremities    CKD (chronic kidney disease) stage 3, GFR 30-59 ml/min ([address] 10/22/2014    From diabetic and ischemic nephrosclerosis, baseline 1.4, GFR 40-45 ml/min, UPC 0.3    COPD (chronic obstructive pulmonary disease) (HCC)     DM (diabetes mellitus) (HCC)     Edema     chronic lower extremity    Foot pain, bilateral     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    Hyperlipidemia     Hypertension     Hypothyroidism     Intermittent claudication (HCC)     loly to left leg    Kidney disease     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 ([facility] 4/20/2016    Morbid obesity with BMI of 45.0-49.9, adult ([facility] 10/22/2014    Osteoarthritis     Other activity(E029.9)     Acute renal failure secondary to prerenal azotemia    Other activity(E029.9)     Atherosclerotic coronary artery disease status-post bypass surgery in 2003    Persistent proteinuria 5/9/2018    From diabetic nephrosclerosis, urine protein creatinine ratio 0.8-1    PVD (peripheral vascular disease) (HCC)     Sciatica     Secondary hyperparathyroidism (of renal origin) 10/22/2014    Not on VDRA    Shortness of breath     USES HOME OXYGEN    Sleep apnea     Sleep apnea     Type II or unspecified type diabetes mellitus without mention of complication, not stated as uncontrolled        No Known Allergies  Current Outpatient Medications on File Prior to Visit   Medication Sig Dispense Refill    calcitRIOL (ROCALTROL) 0.25 MCG capsule Take 1 capsule by mouth three times a week mon wed fri 30 capsule 3    blood glucose test strips (ACCU-CHEK DONG PLUS) strip TEST THREE TIMES DAILY 300 strip 11    atorvastatin (LIPITOR) 20 MG tablet Take 1 tablet by mouth daily 60 tablet 2    lansoprazole (PREVACID) 15 MG delayed release capsule TAKE 1 CAPSULE EVERY DAY 90 capsule 3    levothyroxine (SYNTHROID) 125 MCG tablet TAKE 1 TABLET EVERY DAY 90 tablet 3    Insulin Syringe-Needle U-100 31G X 5/16\" 0.3 ML MISC 1 each by Does not apply route daily 100 each 3    lisinopril (PRINIVIL;ZESTRIL) 20 MG tablet TAKE 1 TABLET 2 TIMES DAILY NOTE INCREASE PER DR Pierre Ball. 180 tablet 3    nitroGLYCERIN (NITROSTAT) 0.4 MG SL tablet Place 1 tablet under the tongue as needed for Chest file prior to visit. Review of Systems    Review of Systems:   History obtained from chart review and the patient  General ROS: negative for - chills, fatigue, fever, night sweats or weight gain  Constitutional: Negative for chills, diaphoresis, fatigue, fever and unexpected weight change. Musculoskeletal: Positive for arthralgias, gait problem and joint swelling. Neurological ROS: negative for - behavioral changes, confusion, headaches or seizures. Negative for weakness and numbness. Dermatological ROS: negative for - mole changes, rash  Cardiovascular: Negative for leg swelling. Gastrointestinal: Negative for constipation, diarrhea, nausea and vomiting. Objective:  Dermatologic Exam:  Skin lesion present to the right and left plantar foot with a central core and petchaie noted to the lesions periphery.   Pain on palpation of the lesion    Skin is thin, with flaky sloughing skin as well as decreased hair growth to the lower leg  Small red hemosiderin deposits seen dorsal foot   Musculoskeletal:     1st MPJ ROM decreased, Bilateral.  Muscle strength 5/5, Bilateral.  Pain present upon palpation of toenails 1-5, Bilateral. decreased medial longitudinal arch, Bilateral.  Ankle ROM decreased,Bilateral.    Dorsally contracted digits present digits 2, Bilateral.     Vascular: DP pulses 1/4 bilateral.  PT pulses 0/4 bilateral.   CFT <5 seconds, Bilateral.  Hair growth absent to the level of the digits, Bilateral.  Edema present, Bilateral.  Varicosities absent, Bilateral. Erythema absent, Bilateral    Neurological: Sensation diminshed to light touch to level of digits, Bilateral.  Protective sensation intact 6/10 sites via 5.07/10g [place] Monofilament, Bilateral.  negative Tinel's, Bilateral.  negative Valleix sign, Bilateral.      Integument: Warm, dry, supple, Bilateral.  Open lesion absent, Bilateral.  Interdigital maceration absent to web spaces 4, Bilateral.  Nails 1-5 left and 1-5 right thickened > 3.0 mm, dystrophic and crumbly, discolored with subungual debris. Fissures absent, Bilateral.   General: AAO x 3 in NAD. Derm  Toenail Description  Sites of Onychomycosis Involvement (Check affected area)  [x] [x] [x] [x] [x] [x] [x] [x] [x] [x]  5 4 3 2 1 1 2 3 4 5                          Right                                        Left    Thickness  [x] [x] [x] [x] [x] [x] [x] [x] [x] [x]  5 4 3 2 1 1 2 3 4 5                         Right                                        Left    Dystrophic Changes   [x] [x] [x] [x] [x] [x] [x] [x] [x] [x]  5 4 3 2 1 1 2 3 4 5                         Right                                        Left    Color   [x] [x] [x] [x] [x] [x] [x] [x] [x] [x]  5 4 3 2 1 1 2 3 4 5                          Right                                        Left    Incurvation/Ingrowin   [] [] [] [] [] [] [] [] [] []  5 4 3 2 1 1 2 3 4 5                         Right                                        Left    Inflammation/Pain   [x] [x] [x] [x] [x] [x] [x] [x] [x] [x]  5 4 3 2 1 1 2 3 4 5                         Right                                        Left        Visual inspection:  Deformity: hammertoe deformity zbigniew feet  amputation: absent  Skin lesions: present - right and left foot as above  Edema: right- 2+ pitting edema, left- 2+ pitting edema    Sensory exam:  Monofilament sensation: abnormal - 6/10 via SW 5.07/10g monofilament to the plantar foot bilateral feet    Pulses: abnormal - 1/4 dorsalis pedis pulse and 1/4 Posterior tibial pulse,   Pinprick: Impaired  Proprioception: Impaired  Vibration (128 Hz): Impaired       DM with PVD       [x]Yes    []No      Assessment:  68 y.o. female with:   Diagnosis Orders   1. Type II diabetes mellitus with peripheral circulatory disorder (HCC)   DIABETES FOOT EXAM    29409 - CA DEBRIDEMENT OF NAILS, 6 OR MORE    80449 - CA DESTRUCTION BENIGN LESIONS UP TO 14   2.  Benign neoplasm of skin of lower limb, including hip,

## 2020-04-20 ENCOUNTER — VIRTUAL VISIT (OUTPATIENT)
Dept: FAMILY MEDICINE CLINIC | Age: 80
End: 2020-04-20
Payer: MEDICARE

## 2020-04-20 PROCEDURE — 99441 PR PHYS/QHP TELEPHONE EVALUATION 5-10 MIN: CPT | Performed by: FAMILY MEDICINE

## 2020-04-20 RX ORDER — INSULIN GLARGINE 100 [IU]/ML
INJECTION, SOLUTION SUBCUTANEOUS
Qty: 30 ML | Refills: 2 | Status: SHIPPED | OUTPATIENT
Start: 2020-04-20 | End: 2020-11-23 | Stop reason: SDUPTHER

## 2020-04-20 RX ORDER — CYANOCOBALAMIN 1000 UG/ML
INJECTION INTRAMUSCULAR; SUBCUTANEOUS
Qty: 6 ML | Refills: 1 | Status: SHIPPED | OUTPATIENT
Start: 2020-04-20 | End: 2020-05-05 | Stop reason: SDUPTHER

## 2020-04-20 ASSESSMENT — PATIENT HEALTH QUESTIONNAIRE - PHQ9
SUM OF ALL RESPONSES TO PHQ9 QUESTIONS 1 & 2: 0
SUM OF ALL RESPONSES TO PHQ QUESTIONS 1-9: 0
1. LITTLE INTEREST OR PLEASURE IN DOING THINGS: 0
2. FEELING DOWN, DEPRESSED OR HOPELESS: 0
SUM OF ALL RESPONSES TO PHQ QUESTIONS 1-9: 0

## 2020-05-05 ENCOUNTER — HOSPITAL ENCOUNTER (OUTPATIENT)
Age: 80
Discharge: HOME | End: 2020-05-05
Payer: MEDICARE

## 2020-05-05 LAB
ALBUMIN SERPL-MCNC: 4 G/DL (ref 3.5–5.2)
ANION GAP SERPL CALCULATED.3IONS-SCNC: 12 MMOL/L (ref 9–17)
BUN BLDV-MCNC: 22 MG/DL (ref 8–23)
BUN/CREAT BLD: ABNORMAL (ref 9–20)
CALCIUM SERPL-MCNC: 9.6 MG/DL (ref 8.6–10.4)
CHLORIDE BLD-SCNC: 108 MMOL/L (ref 98–107)
CO2: 23 MMOL/L (ref 20–31)
CREAT SERPL-MCNC: 1.67 MG/DL (ref 0.5–0.9)
CREATININE URINE: 40.1 MG/DL (ref 28–217)
GFR AFRICAN AMERICAN: 36 ML/MIN
GFR NON-AFRICAN AMERICAN: 30 ML/MIN
GFR SERPL CREATININE-BSD FRML MDRD: ABNORMAL ML/MIN/{1.73_M2}
GFR SERPL CREATININE-BSD FRML MDRD: ABNORMAL ML/MIN/{1.73_M2}
GLUCOSE BLD-MCNC: 72 MG/DL (ref 70–99)
HCT VFR BLD CALC: 38.3 % (ref 36.3–47.1)
HEMOGLOBIN: 12.1 G/DL (ref 11.9–15.1)
MCH RBC QN AUTO: 28.8 PG (ref 25.2–33.5)
MCHC RBC AUTO-ENTMCNC: 31.6 G/DL (ref 28.4–34.8)
MCV RBC AUTO: 91.2 FL (ref 82.6–102.9)
NRBC AUTOMATED: 0 PER 100 WBC
PDW BLD-RTO: 14.4 % (ref 11.8–14.4)
PHOSPHORUS: 4 MG/DL (ref 2.6–4.5)
PLATELET # BLD: 175 K/UL (ref 138–453)
PMV BLD AUTO: 12.2 FL (ref 8.1–13.5)
POTASSIUM SERPL-SCNC: 4.7 MMOL/L (ref 3.7–5.3)
PTH INTACT: 191.2 PG/ML (ref 15–65)
RBC # BLD: 4.2 M/UL (ref 3.95–5.11)
SODIUM BLD-SCNC: 143 MMOL/L (ref 135–144)
TOTAL PROTEIN, URINE: 107 MG/DL
URINE TOTAL PROTEIN CREATININE RATIO: 2.67 (ref 0–0.2)
WBC # BLD: 6.1 K/UL (ref 3.5–11.3)

## 2020-05-05 PROCEDURE — 83970 ASSAY OF PARATHORMONE: CPT

## 2020-05-05 PROCEDURE — 85027 COMPLETE CBC AUTOMATED: CPT

## 2020-05-05 PROCEDURE — 80048 BASIC METABOLIC PNL TOTAL CA: CPT

## 2020-05-05 PROCEDURE — 82040 ASSAY OF SERUM ALBUMIN: CPT

## 2020-05-05 PROCEDURE — 84156 ASSAY OF PROTEIN URINE: CPT

## 2020-05-05 PROCEDURE — 82570 ASSAY OF URINE CREATININE: CPT

## 2020-05-05 PROCEDURE — 36415 COLL VENOUS BLD VENIPUNCTURE: CPT

## 2020-05-05 PROCEDURE — 84100 ASSAY OF PHOSPHORUS: CPT

## 2020-05-06 RX ORDER — CYANOCOBALAMIN 1000 UG/ML
INJECTION INTRAMUSCULAR; SUBCUTANEOUS
Qty: 6 ML | Refills: 3 | Status: SHIPPED | OUTPATIENT
Start: 2020-05-06 | End: 2020-11-23 | Stop reason: SDUPTHER

## 2020-05-15 RX ORDER — CLOPIDOGREL BISULFATE 75 MG/1
TABLET ORAL
Qty: 90 TABLET | Refills: 3 | Status: SHIPPED | OUTPATIENT
Start: 2020-05-15 | End: 2020-11-23 | Stop reason: SDUPTHER

## 2020-05-15 RX ORDER — LISINOPRIL 20 MG/1
TABLET ORAL
Qty: 180 TABLET | Refills: 3 | Status: SHIPPED | OUTPATIENT
Start: 2020-05-15 | End: 2020-11-23 | Stop reason: SDUPTHER

## 2020-05-15 RX ORDER — ATORVASTATIN CALCIUM 20 MG/1
TABLET, FILM COATED ORAL
Qty: 90 TABLET | Refills: 1 | Status: SHIPPED | OUTPATIENT
Start: 2020-05-15 | End: 2020-11-23 | Stop reason: SDUPTHER

## 2020-05-15 RX ORDER — METOPROLOL SUCCINATE 100 MG/1
TABLET, EXTENDED RELEASE ORAL
Qty: 90 TABLET | Refills: 3 | Status: SHIPPED | OUTPATIENT
Start: 2020-05-15 | End: 2020-11-23 | Stop reason: SDUPTHER

## 2020-05-15 NOTE — TELEPHONE ENCOUNTER
Lymphedema of right lower extremity     Chronic bilateral low back pain without sciatica      ----Svetlana Traylor

## 2020-06-09 ENCOUNTER — OFFICE VISIT (OUTPATIENT)
Dept: PODIATRY | Age: 80
End: 2020-06-09
Payer: MEDICARE

## 2020-06-09 VITALS — TEMPERATURE: 98.8 F | RESPIRATION RATE: 16 BRPM | WEIGHT: 228 LBS | HEIGHT: 60 IN | BODY MASS INDEX: 44.76 KG/M2

## 2020-06-09 PROCEDURE — 17110 DESTRUCTION B9 LES UP TO 14: CPT | Performed by: PODIATRIST

## 2020-06-09 PROCEDURE — 11721 DEBRIDE NAIL 6 OR MORE: CPT | Performed by: PODIATRIST

## 2020-06-09 NOTE — PROGRESS NOTES
DONG PLUS) strip TEST THREE TIMES DAILY 300 strip 11    lansoprazole (PREVACID) 15 MG delayed release capsule TAKE 1 CAPSULE EVERY DAY 90 capsule 3    levothyroxine (SYNTHROID) 125 MCG tablet TAKE 1 TABLET EVERY DAY 90 tablet 3    Insulin Syringe-Needle U-100 31G X 5/16\" 0.3 ML MISC 1 each by Does not apply route daily 100 each 3    nitroGLYCERIN (NITROSTAT) 0.4 MG SL tablet Place 1 tablet under the tongue as needed for Chest pain 25 tablet 11    acetaminophen (TYLENOL) 500 MG tablet Take 2 tablets by mouth every 8 hours as needed for Pain 270 tablet 1    B-D INS SYR ULTRAFINE 1CC/30G 30G X 1/2\" 1 ML MISC USE 1 SYRINGE EVERY DAY 90 each 5    Compression Stockings MISC by Does not apply route 20-40 mmHg. 2 each 0    Syringe/Needle, Disp, (SYRINGE 3CC/25GX1\") 25G X 1\" 3 ML MISC Used one every other month. 6 each 0    Alcohol Swabs (B-D SINGLE USE SWABS REGULAR) PADS       Blood Glucose Calibration (OT ULTRA/FASTTK CNTRL SOLN) SOLN       Glucose Blood (BLOOD GLUCOSE TEST STRIPS) STRP Test_3__times daily Diagnosis: 250.0   Diabetes Mellitus__x__Insulin Dependent____Non-Insulin Dependent Life Scan test strips ultra one touch 300 strip 3    aspirin EC 81 MG EC tablet Take 2 tablets by mouth daily. 30 tablet 3    Multiple Vitamins-Minerals (OCUVITE ADULT 50+ PO) Take 1 tablet by mouth 2 times daily.  Green Tea, Camillia sinensis, 315 MG CAPS Take 1 tablet by mouth daily       Cinnamon 500 MG TABS Take 1 tablet by mouth daily.  Cranberry-Vitamin C-Vitamin E (CRANBERRY PLUS VITAMIN C PO) Take 1 tablet by mouth daily.  Ascorbic Acid (VITAMIN C WITH VIVIENNE HIPS) 500 MG tablet Take 500 mg by mouth daily.  Omega-3 Fatty Acids (FISH OIL) 1000 MG CAPS Take 1,000 mg by mouth daily.  docusate sodium (COLACE) 100 MG capsule Take 100 mg by mouth 2 times daily.  Blood Glucose Monitoring Suppl (ONE TOUCH ULTRA) by Does not apply route.        No current facility-administered medications on file

## 2020-06-17 ENCOUNTER — TELEPHONE (OUTPATIENT)
Dept: FAMILY MEDICINE CLINIC | Age: 80
End: 2020-06-17

## 2020-06-17 NOTE — LETTER
[facility] [address]  [address] [place] [id number]  Phone: [phone]  Fax: [phone]    Jennyfer Median        June 17, 2020     Patient: Zully Ruiz   YOB: 1940   Date of Visit: 6/17/2020       To Whom It May Concern: It is my medical opinion that Zully Ruiz requires a disability parking placard for the following reasons:  She cannot walk 200 feet without stopping to rest.  Duration of need: permanent    If you have any questions or concerns, please don't hesitate to call.     Sincerely,        Shira Murry, DO

## 2020-06-20 ENCOUNTER — HOSPITAL ENCOUNTER (OUTPATIENT)
Dept: MAMMOGRAPHY | Age: 80
Discharge: HOME | End: 2020-06-22
Payer: MEDICARE

## 2020-06-20 PROCEDURE — 77063 BREAST TOMOSYNTHESIS BI: CPT

## 2020-07-14 ENCOUNTER — OFFICE VISIT (OUTPATIENT)
Dept: PODIATRY | Age: 80
End: 2020-07-14
Payer: MEDICARE

## 2020-07-14 ENCOUNTER — TELEPHONE (OUTPATIENT)
Dept: FAMILY MEDICINE CLINIC | Age: 80
End: 2020-07-14

## 2020-07-14 VITALS — HEIGHT: 60 IN | BODY MASS INDEX: 44.76 KG/M2 | TEMPERATURE: 98.5 F | RESPIRATION RATE: 16 BRPM | WEIGHT: 228 LBS

## 2020-07-14 PROCEDURE — 1123F ACP DISCUSS/DSCN MKR DOCD: CPT | Performed by: PODIATRIST

## 2020-07-14 PROCEDURE — 99213 OFFICE O/P EST LOW 20 MIN: CPT | Performed by: PODIATRIST

## 2020-07-14 PROCEDURE — 1090F PRES/ABSN URINE INCON ASSESS: CPT | Performed by: PODIATRIST

## 2020-07-14 PROCEDURE — G8427 DOCREV CUR MEDS BY ELIG CLIN: HCPCS | Performed by: PODIATRIST

## 2020-07-14 PROCEDURE — 28470 CLTX METATARSAL FX WO MNP EA: CPT | Performed by: PODIATRIST

## 2020-07-14 PROCEDURE — 4040F PNEUMOC VAC/ADMIN/RCVD: CPT | Performed by: PODIATRIST

## 2020-07-14 PROCEDURE — 1036F TOBACCO NON-USER: CPT | Performed by: PODIATRIST

## 2020-07-14 PROCEDURE — L3260 AMBULATORY SURGICAL BOOT EAC: HCPCS | Performed by: PODIATRIST

## 2020-07-14 PROCEDURE — G8399 PT W/DXA RESULTS DOCUMENT: HCPCS | Performed by: PODIATRIST

## 2020-07-14 PROCEDURE — G8417 CALC BMI ABV UP PARAM F/U: HCPCS | Performed by: PODIATRIST

## 2020-07-14 NOTE — TELEPHONE ENCOUNTER
Patient called to say leave a message if she doesn't answer. She will callback to let us know if she wants it mailed or not.

## 2020-07-14 NOTE — TELEPHONE ENCOUNTER
Patient called office to inquire about her handicap card. Patient stated she never received a script for it. Did not see an order in her chart for one. Please advise and address.

## 2020-07-14 NOTE — PROGRESS NOTES
[facility] PHYSICIANS  [facility]  [address] [facility] [address] [place] [address]  Dept: [phone]  Dept Fax: [phone]    RETURN PATIENT PROGRESS NOTE  Date of patient's visit: 7/14/2020  Patient's Name:  Kim Nelson YOB: 1940            Patient Care Team:  Samara Barnes DO as PCP - General (Family Medicine)  Samara Barnes DO as PCP [facility]  Kamila Alvarado DPM as Physician (Podiatry)       Kim Nelson 68 y.o. female that presents for right foot pain  Chief Complaint   Patient presents with    Diabetes    Foot Pain     right     Pt's primary care physician is Michael Cheng DO last seen 6/17/20  Symptoms began 5 day(s) ago and are unchanged . Patient relates pain is Present. Pain is rated 8 out of 10 and is described as intermittent. Treatments prior to today's visit include: none. She states it hurts when she is walking on it. Currently denies F/C/N/V.      No Known Allergies    Past Medical History:   Diagnosis Date    Abnormal stress test 07/18/2014    going to do cath- not urgent just abnormal    Arthritis     At high risk for hyperkalemia 10/22/2014    From type 4 RTA    Back pain     CAD (coronary artery disease)     Chronic kidney disease     Circulation problem     decreased circulation to  zbigniew extremities    CKD (chronic kidney disease) stage 3, GFR 30-59 ml/min ([facility]) 10/22/2014    From diabetic and ischemic nephrosclerosis, baseline 1.4, GFR 40-45 ml/min, UPC 0.3    COPD (chronic obstructive pulmonary disease) ([facility])     DM (diabetes mellitus) ([facility])     Edema     chronic lower extremity    Foot pain, bilateral     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    Hyperlipidemia     Hypertension     Hypothyroidism     Intermittent claudication ([facility])     loly to left leg    Kidney disease     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 ([facility] 4/20/2016    Morbid obesity with BMI of 45.0-49.9, adult ([facility] 10/22/2014    Osteoarthritis     Other activity(E029.9)     Acute renal failure secondary to prerenal azotemia    Other activity(E029.9)     Atherosclerotic coronary artery disease status-post bypass surgery in 2003    Persistent proteinuria 5/9/2018    From diabetic nephrosclerosis, urine protein creatinine ratio 0.8-1    PVD (peripheral vascular disease) ([facility]     Sciatica     Secondary hyperparathyroidism (of renal origin) 10/22/2014    Not on VDRA    Shortness of breath     USES HOME OXYGEN    Sleep apnea     Sleep apnea     Type II or unspecified type diabetes mellitus without mention of complication, not stated as uncontrolled        Prior to Admission medications    Medication Sig Start Date End Date Taking? Authorizing Provider   clopidogrel (PLAVIX) 75 MG tablet TAKE 1 TABLET EVERY DAY 5/15/20   Mc Castellanos,    atorvastatin (LIPITOR) 20 MG tablet TAKE 1 TABLET EVERY DAY 5/15/20   Mc Castellanos DO   lisinopril (PRINIVIL;ZESTRIL) 20 MG tablet TAKE 1 TABLET TWICE DAILY (DOSE INCREASE PER DR Herb Owen) 5/15/20   Mc Castellanos DO   metoprolol succinate (TOPROL XL) 100 MG extended release tablet TAKE 1 TABLET EVERY DAY 5/15/20   Mc Castellanos, DO   cyanocobalamin 1000 MCG/ML injection Inject 1 ML every other month   please give patient (6) 1 ml vials.  5/6/20   Mc Castellanos DO   insulin glargine (LANTUS) 100 UNIT/ML injection vial INJECT 5 UNITS INTO THE SKIN NIGHTLY 4/20/20   Mc Castellanos DO   calcitRIOL (ROCALTROL) 0.25 MCG capsule Take 1 capsule by mouth three times a week mon wed fri 11/27/19   Zechariah Prado MD   blood glucose test strips (ACCU-CHEK DONG PLUS) strip TEST THREE TIMES DAILY 11/21/19   Mc Castellanos DO   lansoprazole (PREVACID) 15 MG delayed release capsule TAKE 1 CAPSULE EVERY DAY 11/18/19 Historical Provider, MD   docusate sodium (COLACE) 100 MG capsule Take 100 mg by mouth 2 times daily. Historical Provider, MD   Blood Glucose Monitoring Suppl (ONE TOUCH ULTRA) by Does not apply route. Historical Provider, MD       Review of Systems    Review of Systems:  History obtained from chart review and the patient  General ROS: negative for - chills, fatigue, fever, night sweats or weight gain  Constitutional: Negative for chills, diaphoresis, fatigue, fever and unexpected weight change. Musculoskeletal: Positive for arthralgias, gait problem and joint swelling. Neurological ROS: negative for - behavioral changes, confusion, headaches or seizures. Negative for weakness and numbness. Dermatological ROS: negative for - mole changes, rash  Cardiovascular: Negative for leg swelling. Gastrointestinal: Negative for constipation, diarrhea, nausea and vomiting. Lower Extremity Physical Examination:     Vitals:   Vitals:    07/14/20 1306   Resp: 16   Temp: 98.5 °F (36.9 °C)     General: AAO x 3 in NAD. Dermatologic Exam:  Skin lesion/ulceration Absent . Skin No rashes or nodules noted. .       Musculoskeletal:     1st MPJ ROM decreased, Bilateral.  Muscle strength 5/5, Bilateral.  Pain present upon palpation of right 5th mettatasl base with ecchymosis and edema present. Medial longitudinal arch, Bilateral WNL.   Ankle ROM WNL,Bilateral.    Dorsally contracted digits absent digits 1-5 Bilateral.     Vascular: DP and PT pulses palpable 2/4, Bilateral.  CFT <3 seconds, Bilateral.  Hair growth present to the level of the digits, Bilateral.  Edema absent, Bilateral.  Varicosities absent, Bilateral. Erythema absent, Bilateral    Neurological: Sensation intact to light touch to level of digits, Bilateral.  Protective sensation intact 10/10 sites via 5.07/10g [place] Monofilament, Bilateral.  negative Tinel's, Bilateral.  negative Valleix sign, Bilateral.      Integument: Warm, dry, supple,

## 2020-07-16 NOTE — TELEPHONE ENCOUNTER
I re-printed this letter. In case she asks, this letter originally was created on June 17, 2020. I suspect it was mailed to her but can't confirm this. Please mail it to her or she can pick it up.

## 2020-08-11 ENCOUNTER — OFFICE VISIT (OUTPATIENT)
Dept: PODIATRY | Age: 80
End: 2020-08-11
Payer: MEDICARE

## 2020-08-11 VITALS — WEIGHT: 228 LBS | RESPIRATION RATE: 16 BRPM | BODY MASS INDEX: 44.76 KG/M2 | TEMPERATURE: 98.5 F | HEIGHT: 60 IN

## 2020-08-11 PROCEDURE — 11721 DEBRIDE NAIL 6 OR MORE: CPT | Performed by: PODIATRIST

## 2020-08-11 PROCEDURE — G8428 CUR MEDS NOT DOCUMENT: HCPCS | Performed by: PODIATRIST

## 2020-08-11 PROCEDURE — 4040F PNEUMOC VAC/ADMIN/RCVD: CPT | Performed by: PODIATRIST

## 2020-08-11 PROCEDURE — G8399 PT W/DXA RESULTS DOCUMENT: HCPCS | Performed by: PODIATRIST

## 2020-08-11 PROCEDURE — 99024 POSTOP FOLLOW-UP VISIT: CPT | Performed by: PODIATRIST

## 2020-08-11 PROCEDURE — 1123F ACP DISCUSS/DSCN MKR DOCD: CPT | Performed by: PODIATRIST

## 2020-08-11 PROCEDURE — 1090F PRES/ABSN URINE INCON ASSESS: CPT | Performed by: PODIATRIST

## 2020-08-11 PROCEDURE — G8417 CALC BMI ABV UP PARAM F/U: HCPCS | Performed by: PODIATRIST

## 2020-08-11 PROCEDURE — 1036F TOBACCO NON-USER: CPT | Performed by: PODIATRIST

## 2020-08-11 NOTE — PROGRESS NOTES
[facility] PHYSICIANS  [facility]  [address] [address] [address] [place] [address]  Dept: [phone]  Dept Fax: [phone]    DIABETIC PROGRESS NOTE  Date of patient's visit: 8/11/2020  Patient's Name:  Michele Nolasco YOB: 1940            Patient Care Team:  Pili Mendoza DO as PCP - General (Family Medicine)  Pili Mendoza DO as PCP - [facility] Empaneled Provider  Evertt Gaucher, DPM as Physician (Podiatry)          Chief Complaint   Patient presents with    Diabetes    Foot Pain    Nail Problem       Subjective:   Michele Nolasco comes to clinic for Diabetes; Foot Pain; and Nail Problem    she is a diabetic and states that  She checks sugar regularly. Pt currently has complaint of thickened, elongated nails that they cannot manage by themselves. Pt's primary care physician is Ruben Aviles DO last seen 6/24/20   Pt's last blood sugar was 100 . Pt has  no new complaint of  Ankle or foot, but is here to follow up on fracture to right foot. .  Pt has tried changing shoes but it has not helped the pain  Patient is taking over the counter medications with no relief. Lab Results   Component Value Date    LABA1C 6.3 02/13/2018      Complains of numbness in the feet bilat.   Past Medical History:   Diagnosis Date    Abnormal stress test 07/18/2014    going to do cath- not urgent just abnormal    Arthritis     At high risk for hyperkalemia 10/22/2014    From type 4 RTA    Back pain     CAD (coronary artery disease)     Chronic kidney disease     Circulation problem     decreased circulation to  zbigniew extremities    CKD (chronic kidney disease) stage 3, GFR 30-59 ml/min ([address] 10/22/2014    From diabetic and ischemic nephrosclerosis, baseline 1.4, GFR 40-45 ml/min, UPC 0.3    COPD (chronic obstructive pulmonary disease) (HCC)     DM (diabetes mellitus) (HCC)     Edema     chronic lower extremity    Foot pain, bilateral     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    Hyperlipidemia     Hypertension     Hypothyroidism     Intermittent claudication (HCC)     loly to left leg    Kidney disease     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 ([facility] 4/20/2016    Morbid obesity with BMI of 45.0-49.9, adult ([facility] 10/22/2014    Osteoarthritis     Other activity(E029.9)     Acute renal failure secondary to prerenal azotemia    Other activity(E029.9)     Atherosclerotic coronary artery disease status-post bypass surgery in 2003    Persistent proteinuria 5/9/2018    From diabetic nephrosclerosis, urine protein creatinine ratio 0.8-1    PVD (peripheral vascular disease) (HCC)     Sciatica     Secondary hyperparathyroidism (of renal origin) 10/22/2014    Not on VDRA    Shortness of breath     USES HOME OXYGEN    Sleep apnea     Sleep apnea     Type II or unspecified type diabetes mellitus without mention of complication, not stated as uncontrolled        No Known Allergies  Current Outpatient Medications on File Prior to Visit   Medication Sig Dispense Refill    clopidogrel (PLAVIX) 75 MG tablet TAKE 1 TABLET EVERY DAY 90 tablet 3    atorvastatin (LIPITOR) 20 MG tablet TAKE 1 TABLET EVERY DAY 90 tablet 1    lisinopril (PRINIVIL;ZESTRIL) 20 MG tablet TAKE 1 TABLET TWICE DAILY (DOSE INCREASE PER DR Yara Bauman) 180 tablet 3    metoprolol succinate (TOPROL XL) 100 MG extended release tablet TAKE 1 TABLET EVERY DAY 90 tablet 3    cyanocobalamin 1000 MCG/ML injection Inject 1 ML every other month   please give patient (6) 1 ml vials.  6 mL 3    insulin glargine (LANTUS) 100 UNIT/ML injection vial INJECT 5 UNITS INTO THE SKIN NIGHTLY 30 mL 2    calcitRIOL (ROCALTROL) 0.25 MCG capsule Take 1 capsule by mouth three times a week mon wed fri 30 capsule 3    blood glucose test strips (ACCU-CHEK DONG PLUS) strip TEST THREE TIMES DAILY 300 strip 11    lansoprazole (PREVACID) 15 MG delayed release capsule TAKE 1 CAPSULE EVERY DAY 90 capsule 3    levothyroxine (SYNTHROID) 125 MCG tablet TAKE 1 TABLET EVERY DAY 90 tablet 3    Insulin Syringe-Needle U-100 31G X 5/16\" 0.3 ML MISC 1 each by Does not apply route daily 100 each 3    nitroGLYCERIN (NITROSTAT) 0.4 MG SL tablet Place 1 tablet under the tongue as needed for Chest pain 25 tablet 11    acetaminophen (TYLENOL) 500 MG tablet Take 2 tablets by mouth every 8 hours as needed for Pain 270 tablet 1    B-D INS SYR ULTRAFINE 1CC/30G 30G X 1/2\" 1 ML MISC USE 1 SYRINGE EVERY DAY 90 each 5    Compression Stockings MISC by Does not apply route 20-40 mmHg. 2 each 0    Syringe/Needle, Disp, (SYRINGE 3CC/25GX1\") 25G X 1\" 3 ML MISC Used one every other month. 6 each 0    Alcohol Swabs (B-D SINGLE USE SWABS REGULAR) PADS       Blood Glucose Calibration (OT ULTRA/FASTTK CNTRL SOLN) SOLN       Glucose Blood (BLOOD GLUCOSE TEST STRIPS) STRP Test_3__times daily Diagnosis: 250.0   Diabetes Mellitus__x__Insulin Dependent____Non-Insulin Dependent Life Scan test strips ultra one touch 300 strip 3    aspirin EC 81 MG EC tablet Take 2 tablets by mouth daily. 30 tablet 3    Multiple Vitamins-Minerals (OCUVITE ADULT 50+ PO) Take 1 tablet by mouth 2 times daily.  Green Tea, Camillia sinensis, 315 MG CAPS Take 1 tablet by mouth daily       Cinnamon 500 MG TABS Take 1 tablet by mouth daily.  Cranberry-Vitamin C-Vitamin E (CRANBERRY PLUS VITAMIN C PO) Take 1 tablet by mouth daily.  Ascorbic Acid (VITAMIN C WITH VIVIENNE HIPS) 500 MG tablet Take 500 mg by mouth daily.  Omega-3 Fatty Acids (FISH OIL) 1000 MG CAPS Take 1,000 mg by mouth daily.  docusate sodium (COLACE) 100 MG capsule Take 100 mg by mouth 2 times daily.  Blood Glucose Monitoring Suppl (ONE TOUCH ULTRA) by Does not apply route. No current facility-administered medications on file prior to visit.         Review of Systems    Review of Systems:   History obtained from chart review and the patient  General ROS: negative for - chills, fatigue, fever, night sweats or weight gain  Constitutional: Negative for chills, diaphoresis, fatigue, fever and unexpected weight change. Musculoskeletal: Positive for arthralgias, gait problem and joint swelling. Neurological ROS: negative for - behavioral changes, confusion, headaches or seizures. Negative for weakness and numbness. Dermatological ROS: negative for - mole changes, rash  Cardiovascular: Negative for leg swelling. Gastrointestinal: Negative for constipation, diarrhea, nausea and vomiting. Objective:  Dermatologic Exam:  Skin lesion/ulceration Absent . Skin No rashes or nodules noted. .   Skin is thin, with flaky sloughing skin as well as decreased hair growth to the lower leg  Small red hemosiderin deposits seen dorsal foot   Musculoskeletal:     1st MPJ ROM decreased, Bilateral.  Muscle strength 5/5, Bilateral.  POP to the right 5ht met base but much improeved.   No pitting edema seen    Pain present upon palpation of toenails 1-5, Bilateral. decreased medial longitudinal arch, Bilateral.  Ankle ROM decreased,Bilateral.    Dorsally contracted digits present digits 2, Bilateral.     Vascular: DP pulses 1/4 bilateral.  PT pulses 0/4 bilateral.   CFT <5 seconds, Bilateral.  Hair growth absent to the level of the digits, Bilateral.  Edema present, Bilateral.  Varicosities absent, Bilateral. Erythema absent, Bilateral    Neurological: Sensation diminshed to light touch to level of digits, Bilateral.  Protective sensation intact 6/10 sites via 5.07/10g [place]abel Monofilament, Bilateral.  negative Tinel's, Bilateral.  negative Valleix sign, Bilateral.      Integument: Warm, dry, supple, Bilateral.  Open lesion absent, Bilateral.  Interdigital maceration absent to web spaces 4, Bilateral.  Nails 1-5 left and 1-5 right thickened > 3.0 mm, dystrophic and crumbly, discolored with subungual debris. Fissures absent, Bilateral.   General: AAO x 3 in NAD. Derm  Toenail Description  Sites of Onychomycosis Involvement (Check affected area)  [x] [x] [x] [x] [x] [x] [x] [x] [x] [x]  5 4 3 2 1 1 2 3 4 5                          Right                                        Left    Thickness  [x] [x] [x] [x] [x] [x] [x] [x] [x] [x]  5 4 3 2 1 1 2 3 4 5                         Right                                        Left    Dystrophic Changes   [x] [x] [x] [x] [x] [x] [x] [x] [x] [x]  5 4 3 2 1 1 2 3 4 5                         Right                                        Left    Color   [x] [x] [x] [x] [x] [x] [x] [x] [x] [x]  5 4 3 2 1 1 2 3 4 5                          Right                                        Left    Incurvation/Ingrowin   [] [] [] [] [] [] [] [] [] []  5 4 3 2 1 1 2 3 4 5                         Right                                        Left    Inflammation/Pain   [x] [x] [x] [x] [x] [x] [x] [x] [x] [x]  5 4 3 2 1 1 2 3 4 5                         Right                                        Left        Visual inspection:  Deformity: hammertoe deformity zbigniew feet  amputation: absent  Skin lesions: absent  Edema: right- 2+ pitting edema, left- 2+ pitting edema    Sensory exam:  Monofilament sensation: abnormal - 6/10 via SW 5.07/10g monofilament to the plantar foot bilateral feet    Pulses: abnormal - 1/4 dorsalis pedis pulse and 1/4 Posterior tibial pulse,   Pinprick: Impaired  Proprioception: Impaired  Vibration (128 Hz): Impaired       DM with PVD       [x]Yes    []No      Assessment:  68 y.o. female with:   Diagnosis Orders   1. Trouble walking   DIABETES FOOT EXAM    74792 - [place] DEBRIDEMENT OF NAILS, 6 OR MORE   2. Type II diabetes mellitus with peripheral circulatory disorder (HCC)   DIABETES FOOT EXAM    54255 - [place] DEBRIDEMENT OF NAILS, 6 OR MORE   3.  Dermatophytosis of nail   DIABETES FOOT EXAM    49985 - [place] DEBRIDEMENT OF NAILS, 6 OR MORE

## 2020-09-08 ENCOUNTER — OFFICE VISIT (OUTPATIENT)
Dept: PODIATRY | Age: 80
End: 2020-09-08

## 2020-09-08 VITALS — BODY MASS INDEX: 44.76 KG/M2 | RESPIRATION RATE: 18 BRPM | TEMPERATURE: 97 F | HEIGHT: 60 IN | WEIGHT: 228 LBS

## 2020-09-08 PROCEDURE — 99024 POSTOP FOLLOW-UP VISIT: CPT | Performed by: PODIATRIST

## 2020-09-08 NOTE — PROGRESS NOTES
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 ([facility] 4/20/2016    Morbid obesity with BMI of 45.0-49.9, adult ([facility] 10/22/2014    Osteoarthritis     Other activity(E029.9)     Acute renal failure secondary to prerenal azotemia    Other activity(E029.9)     Atherosclerotic coronary artery disease status-post bypass surgery in 2003    Persistent proteinuria 5/9/2018    From diabetic nephrosclerosis, urine protein creatinine ratio 0.8-1    PVD (peripheral vascular disease) ([facility]     Sciatica     Secondary hyperparathyroidism (of renal origin) 10/22/2014    Not on VDRA    Shortness of breath     USES HOME OXYGEN    Sleep apnea     Sleep apnea     Type II or unspecified type diabetes mellitus without mention of complication, not stated as uncontrolled        Prior to Admission medications    Medication Sig Start Date End Date Taking? Authorizing Provider   clopidogrel (PLAVIX) 75 MG tablet TAKE 1 TABLET EVERY DAY 5/15/20  Yes Huntingdon El, DO   atorvastatin (LIPITOR) 20 MG tablet TAKE 1 TABLET EVERY DAY 5/15/20  Yes Huntingdon El, DO   lisinopril (PRINIVIL;ZESTRIL) 20 MG tablet TAKE 1 TABLET TWICE DAILY (DOSE INCREASE PER DR Conway Face) 5/15/20  Yes Huntingdon El, DO   metoprolol succinate (TOPROL XL) 100 MG extended release tablet TAKE 1 TABLET EVERY DAY 5/15/20  Yes Huntingdon El, DO   cyanocobalamin 1000 MCG/ML injection Inject 1 ML every other month   please give patient (6) 1 ml vials.  5/6/20  Yes Huntingdon El, DO   insulin glargine (LANTUS) 100 UNIT/ML injection vial INJECT 5 UNITS INTO THE SKIN NIGHTLY 4/20/20  Yes Huntingdon El, DO   calcitRIOL (ROCALTROL) 0.25 MCG capsule Take 1 capsule by mouth three times a week mon wed fri 11/27/19  Yes Roselinda Bence, MD   blood glucose test strips (ACCU-CHEK DONG PLUS) strip TEST THREE TIMES DAILY 11/21/19  Yes Jyo Murillo, DO   lansoprazole (PREVACID) 15 MG delayed release capsule TAKE 1 CAPSULE EVERY DAY 11/18/19  Yes Joy Murillo, DO   levothyroxine (SYNTHROID) 125 MCG tablet TAKE 1 TABLET EVERY DAY 11/18/19  Yes Joy Murillo, DO   Insulin Syringe-Needle U-100 31G X 5/16\" 0.3 ML MISC 1 each by Does not apply route daily 11/18/19  Yes Joy Murillo DO   nitroGLYCERIN (NITROSTAT) 0.4 MG SL tablet Place 1 tablet under the tongue as needed for Chest pain 6/10/19  Yes Joy Murillo DO   acetaminophen (TYLENOL) 500 MG tablet Take 2 tablets by mouth every 8 hours as needed for Pain 4/4/19  Yes Leandra Jensen MD   B-D INS SYR ULTRAFINE 1CC/30G 30G X 1/2\" 1 ML MISC USE 1 SYRINGE EVERY DAY 2/28/18  Yes Vaughn Smith MD   Compression Stockings MISC by Does not apply route 20-40 mmHg. 11/7/17  Yes Vish Rodriguez MD   Syringe/Needle, Disp, (SYRINGE 3CC/25GX1\") 25G X 1\" 3 ML MISC Used one every other month. 6/27/17  Yes Vish Rodriguez MD   Alcohol Swabs (B-D SINGLE USE SWABS REGULAR) PADS  3/2/15  Yes Historical Provider, MD   Blood Glucose Calibration (OT ULTRA/FASTTK CNTRL SOLN) SOLN  3/2/15  Yes Historical Provider, MD   Glucose Blood (BLOOD GLUCOSE TEST STRIPS) STRP Test_3__times daily Diagnosis: 250.0   Diabetes Mellitus__x__Insulin Dependent____Non-Insulin Dependent Life Scan test strips ultra one touch 3/19/15  Yes Vish Rodriguez MD   aspirin EC 81 MG EC tablet Take 2 tablets by mouth daily. 11/12/14  Yes Vish Rodriguez MD   Multiple Vitamins-Minerals (OCUVITE ADULT 50+ PO) Take 1 tablet by mouth 2 times daily. Yes Historical Provider, MD Valerio Estimable Tea, Camillia sinensis, 315 MG CAPS Take 1 tablet by mouth daily    Yes Historical Provider, MD   Cinnamon 500 MG TABS Take 1 tablet by mouth daily. Yes Historical Provider, MD   Cranberry-Vitamin C-Vitamin E (CRANBERRY PLUS VITAMIN C PO) Take 1 tablet by mouth daily.    Yes Historical Provider, MD   Ascorbic Acid (VITAMIN C WITH VIVIENNE HIPS) 500 MG tablet Take 500 mg by mouth daily. Yes Historical Provider, MD   Omega-3 Fatty Acids (FISH OIL) 1000 MG CAPS Take 1,000 mg by mouth daily. Yes Historical Provider, MD   docusate sodium (COLACE) 100 MG capsule Take 100 mg by mouth 2 times daily. Yes Historical Provider, MD   Blood Glucose Monitoring Suppl (ONE TOUCH ULTRA) by Does not apply route. Yes Historical Provider, MD       Review of Systems    Review of Systems:  History obtained from chart review and the patient  General ROS: negative for - chills, fatigue, fever, night sweats or weight gain  Constitutional: Negative for chills, diaphoresis, fatigue, fever and unexpected weight change. Musculoskeletal: Positive for arthralgias, gait problem and joint swelling. Neurological ROS: negative for - behavioral changes, confusion, headaches or seizures. Negative for weakness and numbness. Dermatological ROS: negative for - mole changes, rash  Cardiovascular: Negative for leg swelling. Gastrointestinal: Negative for constipation, diarrhea, nausea and vomiting. Lower Extremity Physical Examination:     Vitals:   Vitals:    09/08/20 1049   Resp: 18   Temp: 97 °F (36.1 °C)     General: AAO x 3 in NAD. Dermatologic Exam:  Skin lesion/ulceration Absent . Skin No rashes or nodules noted. .       Musculoskeletal:     1st MPJ ROM decreased, Bilateral.  Muscle strength 5/5, Bilateral.  Pain present upon palpation of  Of the 5th met base but minimal.  Medial longitudinal arch, Bilateral WNL.   Ankle ROM WNL,Bilateral.    Dorsally contracted digits absent digits 1-5 Bilateral.     Vascular: DP and PT pulses palpable 2/4, Bilateral.  CFT <3 seconds, Bilateral.  Hair growth present to the level of the digits, Bilateral.  Edema absent, Bilateral.  Varicosities absent, Bilateral. Erythema absent, Bilateral    Neurological: Sensation intact to light touch to level of digits, Bilateral.  Protective sensation intact 10/10 sites via 5.07/10g [place]-Mabel Monofilament, Bilateral.  negative Tinel's, Bilateral.  negative Valleix sign, Bilateral.      Integument: Warm, dry, supple, Bilateral.  Open lesion absent, Bilateral.  Interdigital maceration absent to web spaces 1-4, Bilateral.  Nails are normal in length, thickness and color 1-5 bilateral.  Fissures absent, Bilateral.     Right foot x rays 3 views. Healed fractrue of the base of the 5th met at the site of the peroneal tendon insertion     Asessment: Patient is a 68 y.o. female with:    Diagnosis Orders   1. Trouble walking     2. Type II diabetes mellitus with peripheral circulatory disorder (HCC)     3. Right foot pain  XR FOOT RIGHT (MIN 3 VIEWS)   4. Closed fracture of base of fifth metatarsal bone with delayed healing, right  XR FOOT RIGHT (MIN 3 VIEWS)       Plan: Patient examined and evaluated. Current condition and treatment options discussed in detail. Advised pt to the right foot x rays. The pain is very minimal at the site of the fracture. X rays look great. Pt is able wt bear as tolerated. Verbal and written instructions given to patient. Contact office with any questions/problems/concerns. Orders Placed This Encounter   Procedures    XR FOOT RIGHT (MIN 3 VIEWS)     Standing Status:   Future     Number of Occurrences:   1     Standing Expiration Date:   9/8/2021     No orders of the defined types were placed in this encounter.        RTC in 2 months     9/8/2020      Electronically signed by Caroline Welch DPM on 9/15/2020 at 7:56 AM  9/8/2020

## 2020-10-13 ENCOUNTER — OFFICE VISIT (OUTPATIENT)
Dept: PODIATRY | Age: 80
End: 2020-10-13
Payer: MEDICARE

## 2020-10-13 VITALS — RESPIRATION RATE: 16 BRPM | WEIGHT: 228 LBS | BODY MASS INDEX: 44.76 KG/M2 | HEIGHT: 60 IN | TEMPERATURE: 97.6 F

## 2020-10-13 PROCEDURE — 17110 DESTRUCTION B9 LES UP TO 14: CPT | Performed by: PODIATRIST

## 2020-10-13 PROCEDURE — 99999 PR OFFICE/OUTPT VISIT,PROCEDURE ONLY: CPT | Performed by: PODIATRIST

## 2020-10-13 PROCEDURE — 11721 DEBRIDE NAIL 6 OR MORE: CPT | Performed by: PODIATRIST

## 2020-10-13 NOTE — PROGRESS NOTES
[facility] PHYSICIANS  [facility]  [address] [address] [place] [address]  Dept: [phone]  Dept Fax: [phone]    DIABETIC PROGRESS NOTE  Date of patient's visit: 10/13/2020  Patient's Name:  Jose Guadalupe Reed YOB: 1940            Patient Care Team:  Cesar Ferraro DO as PCP - General (Family Medicine)  Cesar Ferraro DO as PCP - [facility] Empaneled Provider  Jackelyn Bourgeois DPM as Physician (Podiatry)          Chief Complaint   Patient presents with    Diabetes    Peripheral Neuropathy    Foot Pain    Nail Problem    Benign Neoplasm       Subjective:   Jose Guadalupe Reed comes to clinic for Diabetes; Peripheral Neuropathy; Foot Pain; Nail Problem; and Benign Neoplasm    she is a diabetic and states that she is doing well. Pt currently has complaint of thickened, elongated nails that they cannot manage by themselves. Pt's primary care physician is Eduardo MondayDO last seen 04/20/2020   Pt's last blood sugar was 113 this morning . Pt also relates to painful skin spots to the bottom of both feet. Pt has tried pads and changing shoes but it has note helped the pain    Pt has a new complaint of NONE. Lab Results   Component Value Date    LABA1C 6.3 02/13/2018      Complains of numbness in the feet bilat.   Past Medical History:   Diagnosis Date    Abnormal stress test 07/18/2014    going to do cath- not urgent just abnormal    Arthritis     At high risk for hyperkalemia 10/22/2014    From type 4 RTA    Back pain     CAD (coronary artery disease)     Chronic kidney disease     Circulation problem     decreased circulation to  zbigniew extremities    CKD (chronic kidney disease) stage 3, GFR 30-59 ml/min ([address] 10/22/2014    From diabetic and ischemic nephrosclerosis, baseline 1.4, GFR 40-45 ml/min, UPC 0.3    COPD (chronic obstructive pulmonary disease) (HCC)     DM (diabetes mellitus) (HCC)     Edema     chronic lower extremity    Foot pain, bilateral     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    Hyperlipidemia     Hypertension     Hypothyroidism     Intermittent claudication (HCC)     loly to left leg    Kidney disease     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 ([facility] 4/20/2016    Morbid obesity with BMI of 45.0-49.9, adult ([facility] 10/22/2014    Osteoarthritis     Other activity(E029.9)     Acute renal failure secondary to prerenal azotemia    Other activity(E029.9)     Atherosclerotic coronary artery disease status-post bypass surgery in 2003    Persistent proteinuria 5/9/2018    From diabetic nephrosclerosis, urine protein creatinine ratio 0.8-1    PVD (peripheral vascular disease) (HCC)     Sciatica     Secondary hyperparathyroidism (of renal origin) 10/22/2014    Not on VDRA    Shortness of breath     USES HOME OXYGEN    Sleep apnea     Sleep apnea     Type II or unspecified type diabetes mellitus without mention of complication, not stated as uncontrolled        No Known Allergies  Current Outpatient Medications on File Prior to Visit   Medication Sig Dispense Refill    clopidogrel (PLAVIX) 75 MG tablet TAKE 1 TABLET EVERY DAY 90 tablet 3    atorvastatin (LIPITOR) 20 MG tablet TAKE 1 TABLET EVERY DAY 90 tablet 1    lisinopril (PRINIVIL;ZESTRIL) 20 MG tablet TAKE 1 TABLET TWICE DAILY (DOSE INCREASE PER DR Katrin Amaya) 180 tablet 3    metoprolol succinate (TOPROL XL) 100 MG extended release tablet TAKE 1 TABLET EVERY DAY 90 tablet 3    cyanocobalamin 1000 MCG/ML injection Inject 1 ML every other month   please give patient (6) 1 ml vials.  6 mL 3    insulin glargine (LANTUS) 100 UNIT/ML injection vial INJECT 5 UNITS INTO THE SKIN NIGHTLY 30 mL 2    calcitRIOL (ROCALTROL) 0.25 MCG capsule Take 1 capsule by mouth three times a week mon wed fri 30 capsule 3    blood glucose test strips medications on file prior to visit. Review of Systems    Review of Systems:   History obtained from chart review and the patient  General ROS: negative for - chills, fatigue, fever, night sweats or weight gain  Constitutional: Negative for chills, diaphoresis, fatigue, fever and unexpected weight change. Musculoskeletal: Positive for arthralgias, gait problem and joint swelling. Neurological ROS: negative for - behavioral changes, confusion, headaches or seizures. Negative for weakness and numbness. Dermatological ROS: negative for - mole changes, rash  Cardiovascular: Negative for leg swelling. Gastrointestinal: Negative for constipation, diarrhea, nausea and vomiting. Objective:  Dermatologic Exam:  Skin lesion present to the right and left plantar foot with a central core and petchaie noted to the lesions periphery.   Pain on palpation of the lesion     Skin is thin, with flaky sloughing skin as well as decreased hair growth to the lower leg  Small red hemosiderin deposits seen dorsal foot   Musculoskeletal:       No pain to the right 5th metatarsal base       1st MPJ ROM decreased, Bilateral.  Muscle strength 5/5, Bilateral.  Pain present upon palpation of toenails 1-5, Bilateral. decreased medial longitudinal arch, Bilateral.  Ankle ROM decreased,Bilateral.    Dorsally contracted digits present digits 2, Bilateral.     Vascular: DP pulses 1/4 bilateral.  PT pulses 0/4 bilateral.   CFT <5 seconds, Bilateral.  Hair growth absent to the level of the digits, Bilateral.  Edema present, Bilateral.  Varicosities absent, Bilateral. Erythema absent, Bilateral    Neurological: Sensation diminshed to light touch to level of digits, Bilateral.  Protective sensation intact 6/10 sites via 5.07/10g Mullin-Mabel Monofilament, Bilateral.  negative Tinel's, Bilateral.  negative Valleix sign, Bilateral.      Integument: Warm, dry, supple, Bilateral.  Open lesion absent, Bilateral.  Interdigital maceration absent to web spaces 4, Bilateral.  Nails 1-5 left and 1-5 right thickened > 3.0 mm, dystrophic and crumbly, discolored with subungual debris. Fissures absent, Bilateral.   General: AAO x 3 in NAD. Derm  Toenail Description  Sites of Onychomycosis Involvement (Check affected area)  [x] [x] [x] [x] [x] [x] [x] [x] [x] [x]  5 4 3 2 1 1 2 3 4 5                          Right                                        Left    Thickness  [x] [x] [x] [x] [x] [x] [x] [x] [x] [x]  5 4 3 2 1 1 2 3 4 5                         Right                                        Left    Dystrophic Changes   [x] [x] [x] [x] [x] [x] [x] [x] [x] [x]  5 4 3 2 1 1 2 3 4 5                         Right                                        Left    Color   [x] [x] [x] [x] [x] [x] [x] [x] [x] [x]  5 4 3 2 1 1 2 3 4 5                          Right                                        Left    Incurvation/Ingrowin   [] [] [] [] [] [] [] [] [] []  5 4 3 2 1 1 2 3 4 5                         Right                                        Left    Inflammation/Pain   [x] [x] [x] [x] [x] [x] [x] [x] [x] [x]  5 4 3 2 1 1 2 3 4 5                         Right                                        Left        Visual inspection:  Deformity: hammertoe deformity zbigniew feet  amputation: absent  Skin lesions: present - as above  Edema: right- 2+ pitting edema, left- 2+ pitting edema    Sensory exam:  Monofilament sensation: abnormal - 6/10 via SW 5.07/10g monofilament to the plantar foot bilateral feet    Pulses: abnormal - 1/4 dorsalis pedis pulse and 1/4 Posterior tibial pulse,   Pinprick: Impaired  Proprioception: Impaired  Vibration (128 Hz): Impaired       DM with PVD       [x]Yes    []No      Assessment:  68 y.o. female with:   Diagnosis Orders   1. Trouble walking  HM DIABETES FOOT EXAM    12620 - [place] DEBRIDEMENT OF NAILS, 6 OR MORE    63538 - [place] DESTRUCTION BENIGN LESIONS UP TO 14   2.  Type II diabetes mellitus with peripheral circulatory disorder ([facility]   DIABETES FOOT EXAM    67613 - [place] DEBRIDEMENT OF NAILS, 6 OR MORE    59470 - [place] DESTRUCTION BENIGN LESIONS UP TO 14   3. Dermatophytosis of nail   DIABETES FOOT EXAM    31586 - [place] DEBRIDEMENT OF NAILS, 6 OR MORE   4. Benign neoplasm of skin of lower limb, including hip, right   DIABETES FOOT EXAM    96064 - [place] DESTRUCTION BENIGN LESIONS UP TO 14   5. Benign neoplasm of skin of lower limb, including hip, left   DIABETES FOOT EXAM    18185 - [place] DESTRUCTION BENIGN LESIONS UP TO 14           Q7   []Yes    []No                Q8   [x]Yes    []No                     Q9   []Yes    []No    Plan:   Pt was evaluated and examined. Patient was given personalized discharge instructions. The lesion was partially excised and Pyrogallic acid was applied under occlusion. The patient will leave in place for 24-48 hours and than remove. The patient tolerated the procedure well and without complication. Nails 1-10 were debrided sharply in length and thickness with a nipper and , without incident. Pt will follow up in 9 weeks or sooner if any problems arise. Diagnosis was discussed with the pt and all of their questions were answered in detail. Proper foot hygiene and care was discussed with the pt. Informed patient on proper diabetic foot care and importance of tight glycemic control. Patient to check feet daily and contact the office with any questions/problems/concerns. Other comorbidity noted and will be managed by PCP.   10/13/2020    Electronically signed by Garrett Peralta DPM on 10/13/2020 at 11:22 AM  10/13/2020

## 2020-11-04 ENCOUNTER — HOSPITAL ENCOUNTER (OUTPATIENT)
Age: 80
Discharge: HOME | End: 2020-11-04
Payer: MEDICARE

## 2020-11-04 LAB
ALBUMIN SERPL-MCNC: 3.8 G/DL (ref 3.5–5.2)
ALT SERPL-CCNC: 7 U/L (ref 5–33)
ANION GAP SERPL CALCULATED.3IONS-SCNC: 10 MMOL/L (ref 9–17)
AST SERPL-CCNC: 13 U/L
BUN BLDV-MCNC: 22 MG/DL (ref 8–23)
BUN/CREAT BLD: ABNORMAL (ref 9–20)
CALCIUM SERPL-MCNC: 10 MG/DL (ref 8.6–10.4)
CHLORIDE BLD-SCNC: 107 MMOL/L (ref 98–107)
CHOLESTEROL, FASTING: 120 MG/DL
CHOLESTEROL/HDL RATIO: 2.4
CO2: 22 MMOL/L (ref 20–31)
CREAT SERPL-MCNC: 1.76 MG/DL (ref 0.5–0.9)
CREATININE URINE: 195.1 MG/DL (ref 28–217)
GFR AFRICAN AMERICAN: 34 ML/MIN
GFR NON-AFRICAN AMERICAN: 28 ML/MIN
GFR SERPL CREATININE-BSD FRML MDRD: ABNORMAL ML/MIN/{1.73_M2}
GFR SERPL CREATININE-BSD FRML MDRD: ABNORMAL ML/MIN/{1.73_M2}
GLUCOSE BLD-MCNC: 90 MG/DL (ref 70–99)
HCT VFR BLD CALC: 38.8 % (ref 36.3–47.1)
HDLC SERPL-MCNC: 51 MG/DL
HEMOGLOBIN: 11.8 G/DL (ref 11.9–15.1)
LDL CHOLESTEROL: 47 MG/DL (ref 0–130)
MCH RBC QN AUTO: 28.2 PG (ref 25.2–33.5)
MCHC RBC AUTO-ENTMCNC: 30.4 G/DL (ref 28.4–34.8)
MCV RBC AUTO: 92.6 FL (ref 82.6–102.9)
NRBC AUTOMATED: 0 PER 100 WBC
PDW BLD-RTO: 14.1 % (ref 11.8–14.4)
PHOSPHORUS: 3.8 MG/DL (ref 2.6–4.5)
PLATELET # BLD: 196 K/UL (ref 138–453)
PMV BLD AUTO: 12.1 FL (ref 8.1–13.5)
POTASSIUM SERPL-SCNC: 5.4 MMOL/L (ref 3.7–5.3)
PTH INTACT: 224.7 PG/ML (ref 15–65)
RBC # BLD: 4.19 M/UL (ref 3.95–5.11)
SODIUM BLD-SCNC: 139 MMOL/L (ref 135–144)
TOTAL PROTEIN, URINE: 248 MG/DL
TRIGLYCERIDE, FASTING: 112 MG/DL
URINE TOTAL PROTEIN CREATININE RATIO: 1.27 (ref 0–0.2)
VLDLC SERPL CALC-MCNC: NORMAL MG/DL (ref 1–30)
WBC # BLD: 7.5 K/UL (ref 3.5–11.3)

## 2020-11-04 PROCEDURE — 84450 TRANSFERASE (AST) (SGOT): CPT

## 2020-11-04 PROCEDURE — 84100 ASSAY OF PHOSPHORUS: CPT

## 2020-11-04 PROCEDURE — 83970 ASSAY OF PARATHORMONE: CPT

## 2020-11-04 PROCEDURE — 82040 ASSAY OF SERUM ALBUMIN: CPT

## 2020-11-04 PROCEDURE — 82570 ASSAY OF URINE CREATININE: CPT

## 2020-11-04 PROCEDURE — 84156 ASSAY OF PROTEIN URINE: CPT

## 2020-11-04 PROCEDURE — 85027 COMPLETE CBC AUTOMATED: CPT

## 2020-11-04 PROCEDURE — 80061 LIPID PANEL: CPT

## 2020-11-04 PROCEDURE — 84460 ALANINE AMINO (ALT) (SGPT): CPT

## 2020-11-04 PROCEDURE — 80048 BASIC METABOLIC PNL TOTAL CA: CPT

## 2020-11-04 PROCEDURE — 36415 COLL VENOUS BLD VENIPUNCTURE: CPT

## 2020-11-16 RX ORDER — LANSOPRAZOLE 15 MG/1
CAPSULE, DELAYED RELEASE ORAL
Qty: 90 CAPSULE | Refills: 3 | OUTPATIENT
Start: 2020-11-16

## 2020-11-16 RX ORDER — ATORVASTATIN CALCIUM 20 MG/1
TABLET, FILM COATED ORAL
Qty: 90 TABLET | Refills: 1 | OUTPATIENT
Start: 2020-11-16

## 2020-11-16 RX ORDER — LEVOTHYROXINE SODIUM 0.12 MG/1
TABLET ORAL
Qty: 90 TABLET | Refills: 3 | OUTPATIENT
Start: 2020-11-16

## 2020-11-16 NOTE — TELEPHONE ENCOUNTER
Please return a call to the patient - offer an appointment with first available PGY Resident.     Gin Hernandez, DO

## 2020-11-23 ENCOUNTER — HOSPITAL ENCOUNTER (OUTPATIENT)
Age: 80
Setting detail: SPECIMEN
Discharge: HOME | End: 2020-11-23
Payer: MEDICARE

## 2020-11-23 ENCOUNTER — OFFICE VISIT (OUTPATIENT)
Dept: FAMILY MEDICINE CLINIC | Age: 80
End: 2020-11-23
Payer: MEDICARE

## 2020-11-23 VITALS
HEIGHT: 60 IN | TEMPERATURE: 97.2 F | SYSTOLIC BLOOD PRESSURE: 139 MMHG | WEIGHT: 219 LBS | BODY MASS INDEX: 43 KG/M2 | DIASTOLIC BLOOD PRESSURE: 76 MMHG | HEART RATE: 60 BPM

## 2020-11-23 PROBLEM — E53.8 DEFICIENCY OF VITAMIN B12: Status: ACTIVE | Noted: 2020-11-23

## 2020-11-23 PROBLEM — M19.039 WRIST ARTHRITIS: Status: ACTIVE | Noted: 2020-11-23

## 2020-11-23 PROBLEM — K21.9 GASTROESOPHAGEAL REFLUX DISEASE WITHOUT ESOPHAGITIS: Status: ACTIVE | Noted: 2020-11-23

## 2020-11-23 LAB
ESTIMATED AVERAGE GLUCOSE: 126 MG/DL
HBA1C MFR BLD: 6 % (ref 4–6)
TSH SERPL DL<=0.05 MIU/L-ACNC: 0.17 MIU/L (ref 0.3–5)

## 2020-11-23 PROCEDURE — 99213 OFFICE O/P EST LOW 20 MIN: CPT | Performed by: STUDENT IN AN ORGANIZED HEALTH CARE EDUCATION/TRAINING PROGRAM

## 2020-11-23 PROCEDURE — 99211 OFF/OP EST MAY X REQ PHY/QHP: CPT | Performed by: STUDENT IN AN ORGANIZED HEALTH CARE EDUCATION/TRAINING PROGRAM

## 2020-11-23 RX ORDER — CYANOCOBALAMIN 1000 UG/ML
INJECTION INTRAMUSCULAR; SUBCUTANEOUS
Qty: 6 ML | Refills: 5 | Status: SHIPPED | OUTPATIENT
Start: 2020-11-23 | End: 2022-04-28

## 2020-11-23 RX ORDER — ASPIRIN 81 MG/1
162 TABLET ORAL DAILY
Qty: 30 TABLET | Refills: 5 | Status: SHIPPED | OUTPATIENT
Start: 2020-11-23

## 2020-11-23 RX ORDER — BLOOD SUGAR DIAGNOSTIC
1 STRIP MISCELLANEOUS DAILY
Qty: 100 EACH | Refills: 5 | Status: SHIPPED | OUTPATIENT
Start: 2020-11-23 | End: 2022-01-10 | Stop reason: SDUPTHER

## 2020-11-23 RX ORDER — ACETAMINOPHEN 500 MG
1000 TABLET ORAL EVERY 8 HOURS PRN
Qty: 270 TABLET | Refills: 1 | Status: SHIPPED | OUTPATIENT
Start: 2020-11-23

## 2020-11-23 RX ORDER — SYRINGE W-NEEDLE,DISPOSAB,3 ML 25GX5/8"
SYRINGE, EMPTY DISPOSABLE MISCELLANEOUS
Qty: 6 EACH | Refills: 5 | Status: SHIPPED | OUTPATIENT
Start: 2020-11-23 | End: 2022-01-10 | Stop reason: SDUPTHER

## 2020-11-23 RX ORDER — ISOPROPYL ALCOHOL 0.75 G/1
1 SWAB TOPICAL 3 TIMES DAILY PRN
Qty: 1 EACH | Refills: 5 | Status: SHIPPED | OUTPATIENT
Start: 2020-11-23 | End: 2021-08-16 | Stop reason: SDUPTHER

## 2020-11-23 RX ORDER — NITROGLYCERIN 0.4 MG/1
0.4 TABLET SUBLINGUAL PRN
Qty: 25 TABLET | Refills: 5 | Status: SHIPPED | OUTPATIENT
Start: 2020-11-23 | End: 2021-07-19 | Stop reason: SDUPTHER

## 2020-11-23 RX ORDER — BLOOD SUGAR DIAGNOSTIC
STRIP MISCELLANEOUS
Qty: 300 STRIP | Refills: 5 | Status: SHIPPED | OUTPATIENT
Start: 2020-11-23 | End: 2021-02-08

## 2020-11-23 RX ORDER — GLUCOSAMINE HCL/CHONDROITIN SU 500-400 MG
CAPSULE ORAL
Qty: 300 STRIP | Refills: 5 | Status: SHIPPED | OUTPATIENT
Start: 2020-11-23 | End: 2022-04-27 | Stop reason: ALTCHOICE

## 2020-11-23 RX ORDER — METOPROLOL SUCCINATE 100 MG/1
TABLET, EXTENDED RELEASE ORAL
Qty: 90 TABLET | Refills: 5 | Status: SHIPPED | OUTPATIENT
Start: 2020-11-23 | End: 2021-07-19 | Stop reason: SDUPTHER

## 2020-11-23 RX ORDER — LEVOTHYROXINE SODIUM 0.12 MG/1
TABLET ORAL
Qty: 90 TABLET | Refills: 5 | Status: SHIPPED | OUTPATIENT
Start: 2020-11-23 | End: 2021-01-06

## 2020-11-23 RX ORDER — CALCITRIOL 0.25 UG/1
0.25 CAPSULE, LIQUID FILLED ORAL
Qty: 30 CAPSULE | Refills: 5 | Status: SHIPPED | OUTPATIENT
Start: 2020-11-23 | End: 2021-07-22 | Stop reason: SDUPTHER

## 2020-11-23 RX ORDER — INSULIN GLARGINE 100 [IU]/ML
INJECTION, SOLUTION SUBCUTANEOUS
Qty: 30 ML | Refills: 5 | Status: SHIPPED | OUTPATIENT
Start: 2020-11-23 | End: 2021-07-19 | Stop reason: SDUPTHER

## 2020-11-23 RX ORDER — LANSOPRAZOLE 15 MG/1
CAPSULE, DELAYED RELEASE ORAL
Qty: 90 CAPSULE | Refills: 5 | Status: SHIPPED | OUTPATIENT
Start: 2020-11-23 | End: 2021-07-19 | Stop reason: SDUPTHER

## 2020-11-23 RX ORDER — ATORVASTATIN CALCIUM 20 MG/1
TABLET, FILM COATED ORAL
Qty: 90 TABLET | Refills: 5 | Status: SHIPPED | OUTPATIENT
Start: 2020-11-23 | End: 2021-07-19 | Stop reason: SDUPTHER

## 2020-11-23 RX ORDER — LISINOPRIL 20 MG/1
TABLET ORAL
Qty: 180 TABLET | Refills: 5 | Status: SHIPPED | OUTPATIENT
Start: 2020-11-23 | End: 2021-07-19 | Stop reason: SDUPTHER

## 2020-11-23 RX ORDER — CLOPIDOGREL BISULFATE 75 MG/1
TABLET ORAL
Qty: 90 TABLET | Refills: 5 | Status: SHIPPED | OUTPATIENT
Start: 2020-11-23 | End: 2021-07-19 | Stop reason: SDUPTHER

## 2020-11-23 ASSESSMENT — ENCOUNTER SYMPTOMS
ABDOMINAL PAIN: 0
DIARRHEA: 0
VOMITING: 0
SHORTNESS OF BREATH: 0
BLOOD IN STOOL: 0
WHEEZING: 0
CONSTIPATION: 0
NAUSEA: 0
BACK PAIN: 0
COUGH: 0

## 2020-11-23 NOTE — PROGRESS NOTES
Attending Physician Statement  I have seen and discussed the care of Dony Moeller 68 y.o. female, including pertinent history and exam findings, with the resident Dr. Ruthie Black MD.    History and Exam:   Chief Complaint   Patient presents with   [facility]     Past Medical History:   Diagnosis Date    Abnormal stress test 07/18/2014    going to do cath- not urgent just abnormal    Arthritis     At high risk for hyperkalemia 10/22/2014    From type 4 RTA    Back pain     CAD (coronary artery disease)     Chronic kidney disease     Circulation problem     decreased circulation to  zbigniew extremities    CKD (chronic kidney disease) stage 3, GFR 30-59 ml/min 10/22/2014    From diabetic and ischemic nephrosclerosis, baseline 1.4, GFR 40-45 ml/min, UPC 0.3    COPD (chronic obstructive pulmonary disease) ([address]     DM (diabetes mellitus) ([address]     Edema     chronic lower extremity    Foot pain, bilateral     Gastroesophageal reflux disease without esophagitis 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    Hyperlipidemia     Hypertension     Hypothyroidism     Intermittent claudication (HCC)     loly to left leg    Kidney disease     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 ([address] 4/20/2016    Morbid obesity with BMI of 45.0-49.9, adult ([address] 10/22/2014    Osteoarthritis     Other activity(E029.9)     Acute renal failure secondary to prerenal azotemia    Other activity(E029.9)     Atherosclerotic coronary artery disease status-post bypass surgery in 2003    Persistent proteinuria 5/9/2018    From diabetic nephrosclerosis, urine protein creatinine ratio 0.8-1    PVD (peripheral vascular disease) ([address]     Sciatica     Secondary hyperparathyroidism (of renal origin) 10/22/2014    Not on VDRA    Shortness of breath     USES HOME OXYGEN    Sleep apnea     5/16\" 0.3 ML MISC    Alcohol Swabs (B-D SINGLE USE SWABS REGULAR) PADS    Hemoglobin A1C   6. Type 2 diabetes mellitus with stage 3 chronic kidney disease, with long-term current use of insulin (HCC)  insulin glargine (LANTUS) 100 UNIT/ML injection vial   7. Coronary artery disease involving coronary bypass graft of native heart without angina pectoris  lisinopril (PRINIVIL;ZESTRIL) 20 MG tablet    metoprolol succinate (TOPROL XL) 100 MG extended release tablet    nitroGLYCERIN (NITROSTAT) 0.4 MG SL tablet   8. Gastroesophageal reflux disease without esophagitis  lansoprazole (PREVACID) 15 MG delayed release capsule    . I agree with orders as documented by the resident. Recommendations:  Patient is a 68year old female here for check up. Needs medication refills and is due for blood work (TSH and A1C). No current complaints. Would like to only follow up with Dr. Jed Canas. More than 25 minutes spent  in face to face encounter with the patient and more than half in counseling. Patient's questions were answered. Patient Voiced understanding to the counseling.   Return in about 3 months (around 2/23/2021) for DM, HTN.   (GC Modifier)-Dr. Starlyn Lesches, MD

## 2020-11-23 NOTE — PROGRESS NOTES
CALCIUM 10.0 11/04/2020    PHOS 3.8 11/04/2020     Lab Results   Component Value Date    LDLCHOLESTEROL 47 11/04/2020       Assessment and Plan:    1. Wrist arthritis  - acetaminophen (TYLENOL) 500 MG tablet; Take 2 tablets by mouth every 8 hours as needed for Pain  Dispense: 270 tablet; Refill: 1    2. CAD (coronary artery disease)  - aspirin EC 81 MG EC tablet; Take 2 tablets by mouth daily  Dispense: 30 tablet; Refill: 5  - atorvastatin (LIPITOR) 20 MG tablet; TAKE 1 TABLET EVERY DAY  Dispense: 90 tablet; Refill: 5  - clopidogrel (PLAVIX) 75 MG tablet; TAKE 1 TABLET EVERY DAY  Dispense: 90 tablet; Refill: 5    3. Deficiency of vitamin B12  - cyanocobalamin 1000 MCG/ML injection; Inject 1 ML every other month   please give patient (6) 1 ml vials. Dispense: 6 mL; Refill: 5  - Syringe/Needle, Disp, (SYRINGE 3CC/25GX1\") 25G X 1\" 3 ML MISC; Used one every other month. Dispense: 6 each; Refill: 5    4. DM (diabetes mellitus) ([facility]  - blood glucose test strips (ACCU-CHEK DONG PLUS) strip; TEST THREE TIMES DAILY  Dispense: 300 strip; Refill: 5  - calcitRIOL (ROCALTROL) 0.25 MCG capsule; Take 1 capsule by mouth three times a week mon wed fri  Dispense: 30 capsule; Refill: 5  - blood glucose monitor strips; Test_3__times daily Diagnosis: 250.0   Diabetes Mellitus__x__Insulin Dependent____Non-Insulin Dependent Life Scan test strips ultra one touch  Dispense: 300 strip; Refill: 5  - Insulin Syringe-Needle U-100 31G X 5/16\" 0.3 ML MISC; 1 each by Does not apply route daily  Dispense: 100 each; Refill: 5  - Alcohol Swabs (B-D SINGLE USE SWABS REGULAR) PADS; 1 box by Other route 3 times daily as needed (When pt checks glucose)  Dispense: 1 each; Refill: 5  - Hemoglobin A1C; Future    5. Type 2 diabetes mellitus with stage 3 chronic kidney disease, with long-term current use of insulin (HCC)  - insulin glargine (LANTUS) 100 UNIT/ML injection vial; INJECT 5 UNITS INTO THE SKIN NIGHTLY  Dispense: 30 mL; Refill: 5    6.  Coronary artery disease involving coronary bypass graft of native heart without angina pectoris  - lisinopril (PRINIVIL;ZESTRIL) 20 MG tablet; TAKE 1 TABLET TWICE DAILY (DOSE INCREASE PER DR Smita Goff)  Dispense: 180 tablet; Refill: 5  - metoprolol succinate (TOPROL XL) 100 MG extended release tablet; TAKE 1 TABLET EVERY DAY  Dispense: 90 tablet; Refill: 5  - nitroGLYCERIN (NITROSTAT) 0.4 MG SL tablet; Place 1 tablet under the tongue as needed for Chest pain  Dispense: 25 tablet; Refill: 5    7. Hypothyroidism, unspecified type  - TSH; Future  - levothyroxine (SYNTHROID) 125 MCG tablet; TAKE 1 TABLET EVERY DAY  Dispense: 90 tablet; Refill: 5    8. Gastroesophageal reflux disease without esophagitis  - lansoprazole (PREVACID) 15 MG delayed release capsule; TAKE 1 CAPSULE EVERY DAY  Dispense: 90 capsule; Refill: 5    9. Essential hypertension  - BP: 139 /76  - Lisinopril 20 mg daily  - Metoprolol succinate 100 mg daily        Requested Prescriptions     Signed Prescriptions Disp Refills    acetaminophen (TYLENOL) 500 MG tablet 270 tablet 1     Sig: Take 2 tablets by mouth every 8 hours as needed for Pain    aspirin EC 81 MG EC tablet 30 tablet 5     Sig: Take 2 tablets by mouth daily    atorvastatin (LIPITOR) 20 MG tablet 90 tablet 5     Sig: TAKE 1 TABLET EVERY DAY    blood glucose test strips (ACCU-CHEK DONG PLUS) strip 300 strip 5     Sig: TEST THREE TIMES DAILY    calcitRIOL (ROCALTROL) 0.25 MCG capsule 30 capsule 5     Sig: Take 1 capsule by mouth three times a week mon wed fri    clopidogrel (PLAVIX) 75 MG tablet 90 tablet 5     Sig: TAKE 1 TABLET EVERY DAY    cyanocobalamin 1000 MCG/ML injection 6 mL 5     Sig: Inject 1 ML every other month   please give patient (6) 1 ml vials.     blood glucose monitor strips 300 strip 5     Sig: Test_3__times daily Diagnosis: 250.0   Diabetes Mellitus__x__Insulin Dependent____Non-Insulin Dependent Life Scan test strips ultra one touch    insulin glargine (LANTUS) 100 UNIT/ML injection vial 30 mL 5     Sig: INJECT 5 UNITS INTO THE SKIN NIGHTLY    Insulin Syringe-Needle U-100 31G X 5/16\" 0.3 ML MISC 100 each 5     Si each by Does not apply route daily    lansoprazole (PREVACID) 15 MG delayed release capsule 90 capsule 5     Sig: TAKE 1 CAPSULE EVERY DAY    levothyroxine (SYNTHROID) 125 MCG tablet 90 tablet 5     Sig: TAKE 1 TABLET EVERY DAY    lisinopril (PRINIVIL;ZESTRIL) 20 MG tablet 180 tablet 5     Sig: TAKE 1 TABLET TWICE DAILY (DOSE INCREASE PER DR Juliette Goode)    metoprolol succinate (TOPROL XL) 100 MG extended release tablet 90 tablet 5     Sig: TAKE 1 TABLET EVERY DAY    nitroGLYCERIN (NITROSTAT) 0.4 MG SL tablet 25 tablet 5     Sig: Place 1 tablet under the tongue as needed for Chest pain    Syringe/Needle, Disp, (SYRINGE 3CC/25GX1\") 25G X 1\" 3 ML MISC 6 each 5     Sig: Used one every other month.     Alcohol Swabs (B-D SINGLE USE SWABS REGULAR) PADS 1 each 5     Si box by Other route 3 times daily as needed (When pt checks glucose)       Medications Discontinued During This Encounter   Medication Reason    acetaminophen (TYLENOL) 500 MG tablet REORDER    aspirin EC 81 MG EC tablet REORDER    atorvastatin (LIPITOR) 20 MG tablet REORDER    blood glucose test strips (ACCU-CHEK DONG PLUS) strip REORDER    calcitRIOL (ROCALTROL) 0.25 MCG capsule REORDER    clopidogrel (PLAVIX) 75 MG tablet REORDER    cyanocobalamin 1000 MCG/ML injection REORDER    Glucose Blood (BLOOD GLUCOSE TEST STRIPS) STRP REORDER    insulin glargine (LANTUS) 100 UNIT/ML injection vial REORDER    Insulin Syringe-Needle U-100 31G X 5/16\" 0.3 ML MISC REORDER    lansoprazole (PREVACID) 15 MG delayed release capsule REORDER    levothyroxine (SYNTHROID) 125 MCG tablet REORDER    lisinopril (PRINIVIL;ZESTRIL) 20 MG tablet REORDER    metoprolol succinate (TOPROL XL) 100 MG extended release tablet REORDER    nitroGLYCERIN (NITROSTAT) 0.4 MG SL tablet REORDER    Syringe/Needle, Disp, (SYRINGE 3CC/25GX1\") 25G X 1\" 3 ML MISC REORDER    Alcohol Swabs (B-D SINGLE USE SWABS REGULAR) PADS REORDER       Pablo Salinas received counseling on the following healthy behaviors: nutrition, exercise and medication adherence    Discussed use,benefit, and side effects of prescribed medications. Barriers to medication compliance addressed. All patient questions answered. Pt voiced understanding. Return in about 3 months (around 2/23/2021) for DM, HTN. Disclaimer: Some orall of this note was transcribed using voice-recognition software. This may cause typographical errors occasionally. Although all effort is made to fix these errors, please do not hesitate to contact our office if there Laci Goodness concern with the understanding of this note.

## 2020-11-23 NOTE — PROGRESS NOTES
Visit Information    Have you changed or started any medications since your last visit including any over-the-counter medicines, vitamins, or herbal medicines? no   Have you stopped taking any of your medications? Is so, why? -  no  Are you having any side effects from any of your medications? - no    Have you seen any other physician or provider since your last visit?  no   Have you had any other diagnostic tests since your last visit?  no   Have you been seen in the emergency room and/or had an admission in a hospital since we last saw you?  no   Have you had your routine dental cleaning in the past 6 months?  no     Do you have an active MyChart account? If no, what is the barrier?   Yes    Patient Care Team:  Cait Maria DO as PCP - General (Family Medicine)  Cait Maria DO as PCP - [facility] Provider  Kristine Kirkland DPM as Physician (Podiatry)    Medical History Review  Past Medical, Family, and Social History reviewed and does contribute to the patient presenting condition    Health Maintenance   Topic Date Due    Annual Wellness Visit (AWV)  06/10/2019    TSH testing  04/04/2020    Lipid screen  11/04/2021    Potassium monitoring  11/04/2021    Creatinine monitoring  11/04/2021    DTaP/Tdap/Td vaccine (2 - Td) 07/02/2029    DEXA (modify frequency per FRAX score)  Completed    Flu vaccine  Completed    Shingles Vaccine  Completed    Pneumococcal 65+ years Vaccine  Completed    Hepatitis A vaccine  Aged Out    Hib vaccine  Aged Out    Meningococcal (ACWY) vaccine  Aged Out

## 2020-12-15 ENCOUNTER — OFFICE VISIT (OUTPATIENT)
Dept: PODIATRY | Age: 80
End: 2020-12-15
Payer: MEDICARE

## 2020-12-15 VITALS — BODY MASS INDEX: 43 KG/M2 | HEIGHT: 60 IN | WEIGHT: 219 LBS | RESPIRATION RATE: 16 BRPM | TEMPERATURE: 98.1 F

## 2020-12-15 PROCEDURE — 17110 DESTRUCTION B9 LES UP TO 14: CPT | Performed by: PODIATRIST

## 2020-12-15 PROCEDURE — 11721 DEBRIDE NAIL 6 OR MORE: CPT | Performed by: PODIATRIST

## 2020-12-15 NOTE — PROGRESS NOTES
[facility] PHYSICIANS  [facility]  [address] [place] [address]  Dept: [phone]  Dept Fax: [phone]    DIABETIC PROGRESS NOTE  Date of patient's visit: 12/15/2020  Patient's Name:  Valeriano Castillo YOB: 1940            Patient Care Team:  Sofia Cherry DO as PCP - General (Family Medicine)  Sofia Cherry DO as PCP - [facility] Empaneled Provider  Leandro Crowe DPM as Physician (Podiatry)          Chief Complaint   Patient presents with    Foot Pain    Nail Problem    Diabetes    Peripheral Neuropathy    Benign Neoplasm       Subjective:   Valeriano Castillo comes to clinic for Foot Pain, Nail Problem, Diabetes, Peripheral Neuropathy, and Benign Neoplasm    she is a diabetic and states that she is doing well. Pt currently has complaint of thickened, elongated nails that they cannot manage by themselves. Pt's primary care physician is Noah Smith DO last seen 11/23/2020   Pt's last blood sugar was 104 this morning . Pt also relates to painful skin spots to the bottom of both feet. Pt has tried pads and changing shoes but it has note helped the pain    Pt has a new complaint of NONE. Lab Results   Component Value Date    LABA1C 6.0 11/23/2020      Complains of numbness in the feet bilat.   Past Medical History:   Diagnosis Date    Abnormal stress test 07/18/2014    going to do cath- not urgent just abnormal    Arthritis     At high risk for hyperkalemia 10/22/2014    From type 4 RTA    Back pain     CAD (coronary artery disease)     Chronic kidney disease     Circulation problem     decreased circulation to  zbigniew extremities    CKD (chronic kidney disease) stage 3, GFR 30-59 ml/min 10/22/2014    From diabetic and ischemic nephrosclerosis, baseline 1.4, GFR 40-45 ml/min, UPC 0.3    COPD (chronic obstructive pulmonary disease) (HCC)     DM (diabetes mellitus) (HCC)     Edema     chronic lower extremity  Foot pain, bilateral     Gastroesophageal reflux disease without esophagitis 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    Hyperlipidemia     Hypertension     Hypothyroidism     Intermittent claudication (HCC)     loly to left leg    Kidney disease     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 ([facility] 4/20/2016    Morbid obesity with BMI of 45.0-49.9, adult ([facility] 10/22/2014    Osteoarthritis     Other activity(E029.9)     Acute renal failure secondary to prerenal azotemia    Other activity(E029.9)     Atherosclerotic coronary artery disease status-post bypass surgery in 2003    Persistent proteinuria 5/9/2018    From diabetic nephrosclerosis, urine protein creatinine ratio 0.8-1    PVD (peripheral vascular disease) (HCC)     Sciatica     Secondary hyperparathyroidism (of renal origin) 10/22/2014    Not on VDRA    Shortness of breath     USES HOME OXYGEN    Sleep apnea     Sleep apnea     Type II or unspecified type diabetes mellitus without mention of complication, not stated as uncontrolled        No Known Allergies  Current Outpatient Medications on File Prior to Visit   Medication Sig Dispense Refill    acetaminophen (TYLENOL) 500 MG tablet Take 2 tablets by mouth every 8 hours as needed for Pain 270 tablet 1    aspirin EC 81 MG EC tablet Take 2 tablets by mouth daily 30 tablet 5    atorvastatin (LIPITOR) 20 MG tablet TAKE 1 TABLET EVERY DAY 90 tablet 5    blood glucose test strips (ACCU-CHEK DONG PLUS) strip TEST THREE TIMES DAILY 300 strip 5    calcitRIOL (ROCALTROL) 0.25 MCG capsule Take 1 capsule by mouth three times a week mon wed fri 30 capsule 5    clopidogrel (PLAVIX) 75 MG tablet TAKE 1 TABLET EVERY DAY 90 tablet 5    cyanocobalamin 1000 MCG/ML injection Inject 1 ML every other month   please give patient (6) 1 ml vials.  6 mL 5    blood glucose monitor strips Test_3__times daily Diagnosis: 250.0   Diabetes Mellitus__x__Insulin Dependent____Non-Insulin Dependent Life Scan test strips ultra one touch 300 strip 5    insulin glargine (LANTUS) 100 UNIT/ML injection vial INJECT 5 UNITS INTO THE SKIN NIGHTLY 30 mL 5    Insulin Syringe-Needle U-100 31G X 5/16\" 0.3 ML MISC 1 each by Does not apply route daily 100 each 5    lansoprazole (PREVACID) 15 MG delayed release capsule TAKE 1 CAPSULE EVERY DAY 90 capsule 5    levothyroxine (SYNTHROID) 125 MCG tablet TAKE 1 TABLET EVERY DAY 90 tablet 5    lisinopril (PRINIVIL;ZESTRIL) 20 MG tablet TAKE 1 TABLET TWICE DAILY (DOSE INCREASE PER DR Renan Pacheco) 180 tablet 5    metoprolol succinate (TOPROL XL) 100 MG extended release tablet TAKE 1 TABLET EVERY DAY 90 tablet 5    nitroGLYCERIN (NITROSTAT) 0.4 MG SL tablet Place 1 tablet under the tongue as needed for Chest pain 25 tablet 5    Syringe/Needle, Disp, (SYRINGE 3CC/25GX1\") 25G X 1\" 3 ML MISC Used one every other month. 6 each 5    Alcohol Swabs (B-D SINGLE USE SWABS REGULAR) PADS 1 box by Other route 3 times daily as needed (When pt checks glucose) 1 each 5    B-D INS SYR ULTRAFINE 1CC/30G 30G X 1/2\" 1 ML MISC USE 1 SYRINGE EVERY DAY 90 each 5    Compression Stockings MISC by Does not apply route 20-40 mmHg. 2 each 0    Blood Glucose Calibration (OT ULTRA/FASTTK CNTRL SOLN) SOLN       Multiple Vitamins-Minerals (OCUVITE ADULT 50+ PO) Take 1 tablet by mouth 2 times daily.  Green Tea, Camillia sinensis, 315 MG CAPS Take 1 tablet by mouth daily       Cinnamon 500 MG TABS Take 1 tablet by mouth daily.  Cranberry-Vitamin C-Vitamin E (CRANBERRY PLUS VITAMIN C PO) Take 1 tablet by mouth daily.  Ascorbic Acid (VITAMIN C WITH VIVIENNE HIPS) 500 MG tablet Take 500 mg by mouth daily.  Omega-3 Fatty Acids (FISH OIL) 1000 MG CAPS Take 1,000 mg by mouth daily.  docusate sodium (COLACE) 100 MG capsule Take 100 mg by mouth 2 times daily.       Blood Glucose Monitoring Suppl (ONE TOUCH ULTRA) by Does not apply route. No current facility-administered medications on file prior to visit. Review of Systems    Review of Systems:   History obtained from chart review and the patient  General ROS: negative for - chills, fatigue, fever, night sweats or weight gain  Constitutional: Negative for chills, diaphoresis, fatigue, fever and unexpected weight change. Musculoskeletal: Positive for arthralgias, gait problem and joint swelling. Neurological ROS: negative for - behavioral changes, confusion, headaches or seizures. Negative for weakness and numbness. Dermatological ROS: negative for - mole changes, rash  Cardiovascular: Negative for leg swelling. Gastrointestinal: Negative for constipation, diarrhea, nausea and vomiting. Objective:  Dermatologic Exam:  Skin lesion present to the right and left plantar foot with a central core and petchaie noted to the lesions periphery.   Pain on palpation of the lesion    Skin is thin, with flaky sloughing skin as well as decreased hair growth to the lower leg  Small red hemosiderin deposits seen dorsal foot   Musculoskeletal:     1st MPJ ROM decreased, Bilateral.  Muscle strength 5/5, Bilateral.  Pain present upon palpation of toenails 1-5, Bilateral. decreased medial longitudinal arch, Bilateral.  Ankle ROM decreased,Bilateral.    Dorsally contracted digits present digits 2, Bilateral.     Vascular: DP pulses 1/4 bilateral.  PT pulses 0/4 bilateral.   CFT <5 seconds, Bilateral.  Hair growth absent to the level of the digits, Bilateral.  Edema present, Bilateral.  Varicosities absent, Bilateral. Erythema absent, Bilateral    Neurological: Sensation diminshed to light touch to level of digits, Bilateral.  Protective sensation intact 6/10 sites via 5.07/10g Hurley-Mabel Monofilament, Bilateral.  negative Tinel's, Bilateral.  negative Valleix sign, Bilateral.      Integument: Warm, dry, supple, Bilateral. Open lesion absent, Bilateral.  Interdigital maceration absent to web spaces 4, Bilateral.  Nails 1-5 left and 1-5 right thickened > 3.0 mm, dystrophic and crumbly, discolored with subungual debris. Fissures absent, Bilateral.   General: AAO x 3 in NAD. Derm  Toenail Description  Sites of Onychomycosis Involvement (Check affected area)  [x] [x] [x] [x] [x] [x] [x] [x] [x] [x]  5 4 3 2 1 1 2 3 4 5                          Right                                        Left    Thickness  [x] [x] [x] [x] [x] [x] [x] [x] [x] [x]  5 4 3 2 1 1 2 3 4 5                         Right                                        Left    Dystrophic Changes   [x] [x] [x] [x] [x] [x] [x] [x] [x] [x]  5 4 3 2 1 1 2 3 4 5                         Right                                        Left    Color   [x] [x] [x] [x] [x] [x] [x] [x] [x] [x]  5 4 3 2 1 1 2 3 4 5                          Right                                        Left    Incurvation/Ingrowin   [] [] [] [] [] [] [] [] [] []  5 4 3 2 1 1 2 3 4 5                         Right                                        Left    Inflammation/Pain   [x] [x] [x] [x] [x] [x] [x] [x] [x] [x]  5 4 3 2 1 1 2 3 4 5                         Right                                        Left        Visual inspection:  Deformity: hammertoe deformity zbigniew feet  amputation: absent  Skin lesions: present as abovce  Edema: right- 2+ pitting edema, left- 2+ pitting edema    Sensory exam:  Monofilament sensation: abnormal - 6/10 via SW 5.07/10g monofilament to the plantar foot bilateral feet    Pulses: abnormal - 1/4 dorsalis pedis pulse and 1/4 Posterior tibial pulse,   Pinprick: Impaired  Proprioception: Impaired  Vibration (128 Hz): Impaired       DM with PVD       [x]Yes    []No      Assessment:  68 y.o. female with:   Diagnosis Orders   1. Trouble walking  HM DIABETES FOOT EXAM    71683 - [place] DEBRIDEMENT OF NAILS, 6 OR MORE    86635 - [place] DESTRUCTION BENIGN LESIONS UP TO 14   2.  Type II

## 2021-01-04 ENCOUNTER — OFFICE VISIT (OUTPATIENT)
Dept: FAMILY MEDICINE CLINIC | Age: 81
End: 2021-01-04
Payer: MEDICARE

## 2021-01-04 VITALS
RESPIRATION RATE: 20 BRPM | SYSTOLIC BLOOD PRESSURE: 150 MMHG | BODY MASS INDEX: 41.78 KG/M2 | HEART RATE: 59 BPM | WEIGHT: 212.8 LBS | DIASTOLIC BLOOD PRESSURE: 78 MMHG | OXYGEN SATURATION: 98 % | HEIGHT: 60 IN | TEMPERATURE: 96.9 F
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
## 2021-01-04 DIAGNOSIS — M79.601 ARM PAIN, RIGHT: ICD-10-CM

## 2021-01-04 DIAGNOSIS — M25.521 ELBOW PAIN, RIGHT: ICD-10-CM

## 2021-01-04 DIAGNOSIS — S43.401A SPRAIN OF RIGHT SHOULDER, UNSPECIFIED SHOULDER SPRAIN TYPE, INITIAL ENCOUNTER: ICD-10-CM

## 2021-01-04 PROCEDURE — 99213 OFFICE O/P EST LOW 20 MIN: CPT | Performed by: FAMILY MEDICINE

## 2021-01-04 PROCEDURE — 1090F PRES/ABSN URINE INCON ASSESS: CPT | Performed by: FAMILY MEDICINE

## 2021-01-04 PROCEDURE — 1036F TOBACCO NON-USER: CPT | Performed by: FAMILY MEDICINE

## 2021-01-04 PROCEDURE — 1123F ACP DISCUSS/DSCN MKR DOCD: CPT | Performed by: FAMILY MEDICINE

## 2021-01-04 PROCEDURE — G8484 FLU IMMUNIZE NO ADMIN: HCPCS | Performed by: FAMILY MEDICINE

## 2021-01-04 PROCEDURE — 4040F PNEUMOC VAC/ADMIN/RCVD: CPT | Performed by: FAMILY MEDICINE

## 2021-01-04 PROCEDURE — G8428 CUR MEDS NOT DOCUMENT: HCPCS | Performed by: FAMILY MEDICINE

## 2021-01-04 PROCEDURE — G8417 CALC BMI ABV UP PARAM F/U: HCPCS | Performed by: FAMILY MEDICINE

## 2021-01-04 PROCEDURE — G8399 PT W/DXA RESULTS DOCUMENT: HCPCS | Performed by: FAMILY MEDICINE

## 2021-01-04 ASSESSMENT — ENCOUNTER SYMPTOMS
SORE THROAT: 0
BACK PAIN: 0
EYE PAIN: 0
COUGH: 0
CONSTIPATION: 0
NAUSEA: 0
SHORTNESS OF BREATH: 0
VOMITING: 0

## 2021-01-04 NOTE — PROGRESS NOTES
Visit Information    Have you changed or started any medications since your last visit including any over-the-counter medicines, vitamins, or herbal medicines? no   Have you stopped taking any of your medications? Is so, why? -  no  Are you having any side effects from any of your medications? - no    Have you seen any other physician or provider since your last visit? Yes Podiatry and [facility] Neph[facility]ist   Have you had any other diagnostic tests since your last visit? yes - labs   Have you been seen in the emergency room and/or had an admission in a hospital since we last saw you?  no   Have you had your routine dental cleaning in the past 6 months?  no     Do you have an active MyChart account? If no, what is the barrier?   No:      Patient Care Team:  Froy Mir DO as PCP - General (Family Medicine)  Froy Mir DO as PCP - [facility]  Rica Greenwood DPM as Physician (Podiatry)    Medical History Review  Past Medical, Family, and Social History reviewed and does contribute to the patient presenting condition    Health Maintenance   Topic Date Due    Hepatitis C screen  1940    Annual Wellness Visit (AWV)  06/10/2019    Lipid screen  11/04/2021    Potassium monitoring  11/04/2021    Creatinine monitoring  11/04/2021    TSH testing  11/23/2021    DTaP/Tdap/Td vaccine (2 - Td) 07/02/2029    DEXA (modify frequency per FRAX score)  Completed    Flu vaccine  Completed    Shingles Vaccine  Completed    Pneumococcal 65+ years Vaccine  Completed    Hepatitis A vaccine  Aged Out    Hib vaccine  Aged Out    Meningococcal (ACWY) vaccine  Aged Out

## 2021-01-04 NOTE — PATIENT INSTRUCTIONS
Thank you for letting us take care of you today. We hope all your questions were addressed. If a question was overlooked or something else comes to mind after you return home, please contact a member of your Care Team listed below. Your Care Team at [facility] is Team #2  Selam Mejia DO (Faculty)  Zach Vazquez (Faculty)  Dewayne Linder MD (Resident)  Danay Christian MD (Resident)  Chai Felipe MD (Resident)  Damon Gilbert MD (Resident)  Lashawn Prado MD (Resident)  CHARMAINE Moya ,JANAE TAYLOR [facility] ([address] [facility] office)  Karla Parker, [address] (Clinical Practice Manager)  Bridgette Mcgee, [facility] (Clinical Pharmacist)     Office phone number: [phone]    If you need to get in right away due to illness, please be advised we have \"Same Day\" appointments available Monday-Friday. Please call us at [phone] option #3 to schedule your \"Same Day\" appointment.

## 2021-01-04 NOTE — PROGRESS NOTES
Sukhjinder Chilel (:  1940) is a 80 y.o. female,Established patient, here for evaluation of the following chief complaint(s):  Joint Pain (right shoulder fell on 20 in her kitchen limited ROM)    Patient reports a \"trip and fall\" occurred at her home this past week. Isidor Grist on outstretched right arm. Bruised and sore, reduced usage for several days, her for check up. No N/T in right hand, digits. Noted bruising on distal upper arm overlying biceps  No exam evidence of pop-eye deformity    Reduced AROM and PROM to FF above 120 degrees, no crepitus, no drop arm weakness  No visual deformity at shoulder, elbow wrist and hand    Tolerating the pain - slowly improving but worries for possible fracture(s)    ASSESSMENT/PLAN:  1. Fall, initial encounter  -     XR SHOULDER RIGHT (MIN 2 VIEWS); Future  -     XR HUMERUS RIGHT (MIN 2 VIEWS); Future  -     XR ELBOW RIGHT (MIN 3 VIEWS); [facility]  -     [facility] - [facility]  2. Sprain of right shoulder, unspecified shoulder sprain type, initial encounter  -     XR SHOULDER RIGHT (MIN 2 VIEWS); Future  -     XR HUMERUS RIGHT (MIN 2 VIEWS); Future  -     XR ELBOW RIGHT (MIN 3 VIEWS); [facility]  -     [facility] - [facility]  3. Arm pain, right  -     XR HUMERUS RIGHT (MIN 2 VIEWS); Future  -     XR ELBOW RIGHT (MIN 3 VIEWS); [facility]  -     [facility] - [facility]  4. Elbow pain, right  -     XR ELBOW RIGHT (MIN 3 VIEWS); [facility]  -     [facility]'s      Return in about 6 months (around 2021). SUBJECTIVE/OBJECTIVE:  HPI    Review of Systems   Constitutional: Negative for chills and fever. HENT: Negative for sore throat. Eyes: Negative for pain. Respiratory: Negative for cough and shortness of breath. Cardiovascular: Negative for chest pain, palpitations and leg swelling. Gastrointestinal: Negative for constipation, nausea and vomiting. Genitourinary: Negative for dysuria. Musculoskeletal: Positive for arthralgias and joint swelling. Negative for back pain and myalgias. See HPI   Skin: Negative for rash. Neurological: Negative for dizziness and weakness. Hematological: Does not bruise/bleed easily. Psychiatric/Behavioral: Negative for suicidal ideas. The patient is not nervous/anxious. Physical Exam  Vitals signs reviewed. Constitutional:       Appearance: Normal appearance. Neck:      Musculoskeletal: Normal range of motion. Cardiovascular:      Rate and Rhythm: Normal rate and regular rhythm. Pulses: Normal pulses. Pulmonary:      Effort: Pulmonary effort is normal.      Breath sounds: Normal breath sounds. Musculoskeletal:      Comments: See HPI    Bruising about 3-5 days in age; overlying distal arm overlying biceps area, diffuse; no pop-eye; no weakness or give away with elbow flexion. NV intact   Skin:     General: Skin is warm. Findings: Bruising present. Neurological:      General: No focal deficit present. Mental Status: She is alert and oriented to person, place, and time. Sensory: No sensory deficit. Motor: No weakness. On this date 01/04/21 I have spent 30 minutes reviewing previous notes, test results and face to face with the patient discussing the diagnosis and importance of compliance with the treatment plan. Suggest XRAYS  And PT to avoid possibility of adhesive capsulitis. May need MRI if pain persists. Will follow      An electronic signature was used to authenticate this note.     --Bettina Garcia,

## 2021-01-05 ENCOUNTER — HOSPITAL ENCOUNTER (OUTPATIENT)
Age: 81
Discharge: HOME | End: 2021-01-07
Payer: MEDICARE

## 2021-01-05 ENCOUNTER — HOSPITAL ENCOUNTER (OUTPATIENT)
Dept: GENERAL RADIOLOGY | Age: 81
Discharge: HOME | End: 2021-01-07
Payer: MEDICARE

## 2021-01-05 ENCOUNTER — TELEPHONE (OUTPATIENT)
Dept: VASCULAR SURGERY | Age: 81
End: 2021-01-05

## 2021-01-05 DIAGNOSIS — I73.9 PAD (PERIPHERAL ARTERY DISEASE) (HCC): Primary | ICD-10-CM

## 2021-01-05 DIAGNOSIS — S43.401A SPRAIN OF RIGHT SHOULDER, UNSPECIFIED SHOULDER SPRAIN TYPE, INITIAL ENCOUNTER: ICD-10-CM

## 2021-01-05 DIAGNOSIS — M79.601 ARM PAIN, RIGHT: ICD-10-CM
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
## 2021-01-05 DIAGNOSIS — M25.521 ELBOW PAIN, RIGHT: ICD-10-CM

## 2021-01-05 DIAGNOSIS — I73.9 CLAUDICATION IN PERIPHERAL VASCULAR DISEASE (HCC): ICD-10-CM

## 2021-01-05 PROCEDURE — 73030 X-RAY EXAM OF SHOULDER: CPT

## 2021-01-05 PROCEDURE — 73080 X-RAY EXAM OF ELBOW: CPT

## 2021-01-05 PROCEDURE — 73060 X-RAY EXAM OF HUMERUS: CPT

## 2021-01-05 NOTE — TELEPHONE ENCOUNTER
Patient has an appt with Dr. Bibi Knott on Friday. She needs to get PVRs prior. LM for patient to call back to schedule testing.

## 2021-01-06 DIAGNOSIS — E03.9 HYPOTHYROIDISM, UNSPECIFIED TYPE: Primary | ICD-10-CM

## 2021-01-06 RX ORDER — LEVOTHYROXINE SODIUM 88 UG/1
88 TABLET ORAL DAILY
Qty: 90 TABLET | Refills: 3 | Status: SHIPPED | OUTPATIENT
Start: 2021-01-06 | End: 2022-01-03

## 2021-01-06 NOTE — PROGRESS NOTES
No chief complaint on file. Called patient regarding need for orthopedic opinion - discussed XRAY results showing fractures of humerus and elbow. Patient advised to hold on PT (they called her and had appointment foir this coming Monday). Ortho will need to assess her asap for recs. Patient states that she returned from [facility] office this morning and they are concerned in lowering her TRT (thyroid) as per the recent lab. TSH reviewed from 11- - Value 0.17      Plan:    Ortho consult placed - patient advised that she will get a returned call for appointment asap. Reduce TRT - from levothyroxine 125 mcg. To 88 mcg. Will repeat TSH in 3 mos. There were no vitals taken for this visit.       Review of Systems      Physical Exam      ASSESSMENT AND PLAN       Electronicallysigned by Harley Dow DO on 1/6/2021 at 10:20 AM

## 2021-01-06 NOTE — TELEPHONE ENCOUNTER
Patient called back and said that she fell Kendy Janelle in the kitchen and was told she has a fracture or broken shoulder and femur. Patient needed PVR testing, which requires doing BP on multiple places on the legs. Spoke to Dr. Shaun Crum, who stated we could just reschedule her for later. I rescheduled the patient's appt and transferred her to central scheduling to schedule testing.

## 2021-01-07 ENCOUNTER — TELEPHONE (OUTPATIENT)
Dept: FAMILY MEDICINE CLINIC | Facility: CLINIC | Age: 81
End: 2021-01-07

## 2021-01-07 NOTE — TELEPHONE ENCOUNTER
[facility] received a call from: [address] [place] [address]    Name of Jovanna Coleman    Relationship to patient:na    Organization name: na    Best contact number:Human at [phone]     Reason for call: Jennifer Castrejon calling to f/u on a fax sent to office on 12/3 checking and seeing if this patient scheduled a bone density test too.  Call them back

## 2021-01-07 NOTE — TELEPHONE ENCOUNTER
Writer mariana hennessy with [facility] advising patient has not had a bone density test done yet.

## 2021-01-11 DIAGNOSIS — M85.859 OSTEOPENIA OF HIP, UNSPECIFIED LATERALITY: Primary | ICD-10-CM

## 2021-01-11 NOTE — PROGRESS NOTES
No chief complaint on file. [place] - address for [employer]       There were no vitals taken for this visit. Review of Systems      Physical Exam      ASSESSMENT AND PLAN      Diagnosis Orders   1.  Osteopenia of hip, unspecified laterality  DEXA VERTEBRAL FRACTURE ASSESSMENT    DEXA AXIAL SKELETON W VERTEBRAL FX ASST         Electronicallysigned by Clare Randle DO on 1/11/2021 at 4:47 PM

## 2021-01-14 ENCOUNTER — OFFICE VISIT (OUTPATIENT)
Dept: ORTHOPEDIC SURGERY | Age: 81
End: 2021-01-14
Payer: MEDICARE

## 2021-01-14 VITALS — HEIGHT: 60 IN | WEIGHT: 212 LBS | BODY MASS INDEX: 41.62 KG/M2

## 2021-01-14 DIAGNOSIS — S42.251A DISPLACED FRACTURE OF GREATER TUBEROSITY OF RIGHT HUMERUS, INITIAL ENCOUNTER FOR CLOSED FRACTURE: Primary | ICD-10-CM

## 2021-01-14 PROCEDURE — G8484 FLU IMMUNIZE NO ADMIN: HCPCS | Performed by: STUDENT IN AN ORGANIZED HEALTH CARE EDUCATION/TRAINING PROGRAM

## 2021-01-14 PROCEDURE — G8399 PT W/DXA RESULTS DOCUMENT: HCPCS | Performed by: STUDENT IN AN ORGANIZED HEALTH CARE EDUCATION/TRAINING PROGRAM

## 2021-01-14 PROCEDURE — 1090F PRES/ABSN URINE INCON ASSESS: CPT | Performed by: STUDENT IN AN ORGANIZED HEALTH CARE EDUCATION/TRAINING PROGRAM

## 2021-01-14 PROCEDURE — 99203 OFFICE O/P NEW LOW 30 MIN: CPT | Performed by: STUDENT IN AN ORGANIZED HEALTH CARE EDUCATION/TRAINING PROGRAM

## 2021-01-14 PROCEDURE — G8428 CUR MEDS NOT DOCUMENT: HCPCS | Performed by: STUDENT IN AN ORGANIZED HEALTH CARE EDUCATION/TRAINING PROGRAM

## 2021-01-14 PROCEDURE — 1123F ACP DISCUSS/DSCN MKR DOCD: CPT | Performed by: STUDENT IN AN ORGANIZED HEALTH CARE EDUCATION/TRAINING PROGRAM

## 2021-01-14 PROCEDURE — 4040F PNEUMOC VAC/ADMIN/RCVD: CPT | Performed by: STUDENT IN AN ORGANIZED HEALTH CARE EDUCATION/TRAINING PROGRAM

## 2021-01-14 PROCEDURE — 1036F TOBACCO NON-USER: CPT | Performed by: STUDENT IN AN ORGANIZED HEALTH CARE EDUCATION/TRAINING PROGRAM

## 2021-01-14 PROCEDURE — G8417 CALC BMI ABV UP PARAM F/U: HCPCS | Performed by: STUDENT IN AN ORGANIZED HEALTH CARE EDUCATION/TRAINING PROGRAM

## 2021-01-14 NOTE — PROGRESS NOTES
[facility]  [facility]  [address] [place]  [address]  Dept: [phone]    Ambulatory Orthopedic Clinic    CHIEF COMPLAINT:    Chief Complaint   Patient presents with    New Patient     rt elbow       HISTORY OF PRESENT ILLNESS:      The patient is a 80 y.o. female who is being seen for right shoulder pain after a fall from standing height on outstretched arm on 12/24/2020. Patient states that she initially does not she bruised her arm but since her pain was not improving she follow-up with her primary care physician Dr. Elle Mays who ordered x-rays. His x-rays demonstrated a small greater tuberosity fracture which was minimally displaced. He is here today for further evaluation. Patient notes that she has global pain around her right shoulder worse over the lateral aspect. Pain is made worse with overhead and behind the back range of motion. Patient denies any numbness or tingling. Denies any other orthopedic complaints at this time. She has been moving her shoulder so it does not get stiff.     Past Medical History:    Past Medical History:   Diagnosis Date    Abnormal stress test 07/18/2014    going to do cath- not urgent just abnormal    Arthritis     At high risk for hyperkalemia 10/22/2014    From type 4 RTA    Back pain     CAD (coronary artery disease)     Chronic kidney disease     Circulation problem     decreased circulation to  zbigniew extremities    CKD (chronic kidney disease) stage 3, GFR 30-59 ml/min 10/22/2014    From diabetic and ischemic nephrosclerosis, baseline 1.4, GFR 40-45 ml/min, UPC 0.3    COPD [facility]chronic obstructive pulmonary disease) [facility]     DM (diabetes mellitus) ([facility]     Edema     chronic lower extremity    Foot pain, bilateral     Gastroesophageal reflux disease without esophagitis 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    Hyperlipidemia     Hypertension     Hypothyroidism  Intermittent claudication (HCC)     loly to left leg    Kidney disease     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 ([facility] 4/20/2016    Morbid obesity with BMI of 45.0-49.9, adult ([email or web address] 10/22/2014    Osteoarthritis     Other activity(E029.9)     Acute renal failure secondary to prerenal azotemia    Other activity(E029.9)     Atherosclerotic coronary artery disease status-post bypass surgery in 2003    Persistent proteinuria 5/9/2018    From diabetic nephrosclerosis, urine protein creatinine ratio 0.81    PVD (peripheral vascular disease) ([facility]     Sciatica     Secondary hyperparathyroidism (of renal origin) 10/22/2014    Not on VDRA    Shortness of breath     USES HOME OXYGEN    Sleep apnea     Sleep apnea     Type II or unspecified type diabetes mellitus without mention of complication, not stated as uncontrolled        Past Surgical History:    Past Surgical History:   Procedure Laterality Date    APPENDECTOMY      CARDIAC CATHETERIZATION  2006, [address] [facility] SURGERY  2003 2018    CABG X 3/STENTS    CORONARY ANGIOPLASTY WITH STENT PLACEMENT      NERVE BLOCK Right 10/21/2016    rt MBNB #1 kenalog 40mg       Current Medications:   Current Outpatient Medications   Medication Sig Dispense Refill    levothyroxine (SYNTHROID) 88 MCG tablet Take 1 tablet by mouth daily 90 tablet 3    acetaminophen (TYLENOL) 500 MG tablet Take 2 tablets by mouth every 8 hours as needed for Pain 270 tablet 1    aspirin EC 81 MG EC tablet Take 2 tablets by mouth daily 30 tablet 5    atorvastatin (LIPITOR) 20 MG tablet TAKE 1 TABLET EVERY DAY 90 tablet 5    blood glucose test strips (ACCU-CHEK DONG PLUS) strip TEST THREE TIMES DAILY 300 strip 5    calcitRIOL (ROCALTROL) 0.25 MCG capsule Take 1 capsule by mouth three times a week mon wed fri 30 capsule 5  clopidogrel (PLAVIX) 75 MG tablet TAKE 1 TABLET EVERY DAY 90 tablet 5    cyanocobalamin 1000 MCG/ML injection Inject 1 ML every other month   please give patient (6) 1 ml vials. 6 mL 5    blood glucose monitor strips Test_3__times daily Diagnosis: 250.0   Diabetes Mellitus__x__Insulin Dependent____Non-Insulin Dependent Life Scan test strips ultra one touch 300 strip 5    insulin glargine (LANTUS) 100 UNIT/ML injection vial INJECT 5 UNITS INTO THE SKIN NIGHTLY 30 mL 5    Insulin Syringe-Needle U-100 31G X 5/16\" 0.3 ML MISC 1 each by Does not apply route daily 100 each 5    lansoprazole (PREVACID) 15 MG delayed release capsule TAKE 1 CAPSULE EVERY DAY 90 capsule 5    lisinopril (PRINIVIL;ZESTRIL) 20 MG tablet TAKE 1 TABLET TWICE DAILY (DOSE INCREASE PER DR Lita Toure) 180 tablet 5    metoprolol succinate (TOPROL XL) 100 MG extended release tablet TAKE 1 TABLET EVERY DAY 90 tablet 5    nitroGLYCERIN (NITROSTAT) 0.4 MG SL tablet Place 1 tablet under the tongue as needed for Chest pain 25 tablet 5    Syringe/Needle, Disp, (SYRINGE 3CC/25GX1\") 25G X 1\" 3 ML MISC Used one every other month. 6 each 5    Alcohol Swabs (B-D SINGLE USE SWABS REGULAR) PADS 1 box by Other route 3 times daily as needed (When pt checks glucose) 1 each 5    B-D INS SYR ULTRAFINE 1CC/30G 30G X 1/2\" 1 ML MISC USE 1 SYRINGE EVERY DAY 90 each 5    Compression Stockings MISC by Does not apply route 20-40 mmHg. 2 each 0    Blood Glucose Calibration (OT ULTRA/FASTTK CNTRL SOLN) SOLN       Multiple Vitamins-Minerals (OCUVITE ADULT 50+ PO) Take 1 tablet by mouth 2 times daily.  Green Tea, Camillia sinensis, 315 MG CAPS Take 1 tablet by mouth daily       Cinnamon 500 MG TABS Take 1 tablet by mouth daily.  Cranberry-Vitamin C-Vitamin E (CRANBERRY PLUS VITAMIN C PO) Take 1 tablet by mouth daily.  Ascorbic Acid (VITAMIN C WITH VIVIENNE HIPS) 500 MG tablet Take 500 mg by mouth daily.  Omega-3 Fatty Acids (FISH OIL) 1000 MG CAPS Take 1,000 mg by mouth daily.  docusate sodium (COLACE) 100 MG capsule Take 100 mg by mouth 2 times daily.  Blood Glucose Monitoring Suppl (ONE TOUCH ULTRA) by Does not apply route. No current facility-administered medications for this visit. Allergies:    Patient has no known allergies. Social History:   Social History     Socioeconomic History    Marital status: Single     Spouse name: Not on file    Number of children: Not on file    Years of education: Not on file    Highest education level: Not on file   Occupational History    Not on file   Social Needs    Financial resource strain: Not on file    Food insecurity     Worry: Not on file     Inability: Not on file    Transportation needs     Medical: Not on file     Non-medical: Not on file   Tobacco Use    Smoking status: Former Smoker     Packs/day: 2.00     Years: 40.00     Pack years: 80.00     Quit date: 1995     Years since quittin.6    Smokeless tobacco: Never Used    Tobacco comment: quit smoking    Substance and Sexual Activity    Alcohol use: Yes     Comment: soc.  Drug use: No    Sexual activity: Not on file   Lifestyle    Physical activity     Days per week: Not on file     Minutes per session: Not on file    Stress: Not on file   Relationships    Social connections     Talks on phone: Not on file     Gets together: Not on file     Attends Voodoo service: Not on file     Active member of club or organization: Not on file     Attends meetings of clubs or organizations: Not on file     Relationship status: Not on file    Intimate partner violence     Fear of current or ex partner: Not on file     Emotionally abused: Not on file     Physically abused: Not on file     Forced sexual activity: Not on file   Other Topics Concern    Not on file   Social History Narrative    Not on file       Family History:  No family history on file. REVIEW OF SYSTEMS:  Constitutional: Negative for fever and chills. Respiratory: Negative for cough, shortness of breath and wheezing. Cardiovascular: Negative for chest pain and palpitations. GI: Denies any nausea, vomiting, abdominal pain   Musculoskeletal: Positive for right shoulder pain. PHYSICAL EXAM:  Ht 5' (1.524 m)   Wt 212 lb (96.2 kg)   BMI 41.40 kg/m²     General: Juanjose Hoffman is a 80 y.o. female who is alert and oriented and sitting comfortably in our office. Neuro: alert. oriented  Eyes: Extra-ocular muscles intact  Mouth: Oral mucosa moist. No perioral lesions  Pulm: Respirations unlabored and regular. Skin: warm, well perfused  Psych:   Patient has good fund of knowledge and displays understanging of exam, diagnosis, and plan. Right shoulder exam: Tenderness to palpation primarily over the supraspinatus insertion and greater tuberosity fragment. Range of motion 80 degrees abduction, 100 degrees forward flexion, internal rotation to beltline, able to reach the back of her head though with pain. Resisted abduction 4/5. AIN/PIN/radial/median/ulnar nerve motor grossly intact. Sensation grossly tact throughout the hand. Radial pulse 2+. Radiology:   History:   Right shoulder pain    Findings:   Views: AP/scapular Y/axillary  Findings: Demonstrate a small greater tuberosity fracture with minimal displacement. Compared to previous images obtained on 1/5/2021, this fracture appears unchanged in alignment. No lytic or blastic lesions. No other fractures noted. Previous comparison films: Right shoulder x-ray 1/5/2021    Impression:  1. Right shoulder greater tuberosity fracture with minimal displacement. ASSESSMENT:     1.  Displaced fracture of greater tuberosity of right humerus, initial encounter for closed fracture         PLAN: Discussed etiologies and natural histories of greater tuberosity fractures. Discussed with the patient that her fracture is minimally displaced and will likely heal without any complications. At this point we feel the patient is okay to begin physical therapy for range of motion exercises. We would like her to refrain from strengthening exercises. We will see her back in clinic in 3 weeks for repeat clinical exam and repeat right shoulder x-rays. All questions were answered. Patient knows to call with worsening symptoms or questions. No orders of the defined types were placed in this encounter. Orders Placed This Encounter   Procedures    XR SHOULDER RIGHT (MIN 2 VIEWS)     Standing Status:   Future     Number of Occurrences:   1     Standing Expiration Date:   1/14/2022            --------------------------------------------------------------  Gem Ashton DO, PGY-4  [facility]) Orthopedic Surgery   9:33 AM 01/14/21      Please excuse any typos/errors, as this note was created with the assistance of voice recognition software. While intending to generate a document that actually reflects the content of the visit, the document can still have some errors including those of syntax and sound-a-like substitutions which may escape proof reading. In such instances, actual meaning can be extrapolated by context.

## 2021-01-15 NOTE — PROGRESS NOTES
I performed a history and physical examination of the patient and discussed management with the resident. I reviewed the physician assistant/resident physician note and agree with the documented findings and plan of care. Any areas of disagreement are noted on the chart. I have personally evaluated this patient and have completed at least one if not all key elements of the E/M (history, physical exam, and MDM). Additional findings are as noted. I agree with the chief complaint, past medical history, past surgical history, allergies, medications, social and family history as documented unless otherwise noted below.      Electronically signed by Laney Greene DO on 1/15/2021 at 1:47 PM

## 2021-01-21 ENCOUNTER — HOSPITAL ENCOUNTER (OUTPATIENT)
Dept: PHYSICAL THERAPY | Age: 81
Setting detail: THERAPIES SERIES
Discharge: HOME | End: 2021-01-21
Payer: MEDICARE

## 2021-01-21 PROCEDURE — 97161 PT EVAL LOW COMPLEX 20 MIN: CPT

## 2021-01-21 NOTE — CONSULTS
[x] Sharee Mello  Outpatient Physical Therapy  [address]  Phone: [phone]  Fax: [phone] [] [facility] at [address] [place] [address]  Phone: [phone]   Fax: [phone]     Physical Therapy Evaluation    Date:  2021  Patient: Debi Payment  : 1940  MRN: [id number]  Physician: Michelle Bauman DO   Insurance: [facility] Medicare $40 copay Need auth   Medical Diagnosis:Fall, initial encounter (F97. XXXA [ICD-10-CM]); Sprain of right shoulder, unspecified shoulder sprain type, initial encounter (S43.401A [ICD-10-CM]); Arm pain, right (M79.601 [ICD-10-CM]); Elbow pain, right (M25.521 [ICD-10-CM])     Rehab Codes:M25.511, M25.611  Onset Date: 20                                 Next 's appt: Ortho     Subjective:   CC:Patients reports R shoulder pain, weakness, and decreased ROM since fall. Patient has been complaint with lifting restrictions, but has continued to use her shoulder within a pain free range. HPI:  Patient tripped in her kitchen on Xmas prudencio and she fell onto an outstretched R arm fracturing her Greater tuberosity. PMHx: [] Unremarkable [x] Diabetes [x] HTN  [] Pacemaker   [x] MI/Heart Problems [] Cancer [] Arthritis [] Asthma                         [] refer to full medical chart  In [facility]  [] Other:        Comorbidities:   [x] Obesity [] Dialysis  [] Other:   [] Asthma/COPD [] Dementia [] Other:   [] Stroke [] Sleep apnea [] Other:   [] Vascular disease [] Rheumatic disease [] Other:     Tests: [x] X-Ray:21 [] MRI:  [] Other:     Impression:   1.  Right shoulder greater tuberosity fracture with minimal displacement.        Medications: [x] Refer to full medical record [] None [] Other:  Allergies:      [x] Refer to full medical record  [] None [] Other:    Function:    Patient lives with: alone   In what type of home [x]  One story   [] Two story   [] Split level   Number of stairs to enter 0 With handrail on the []  Right to enter   [] Left to enter   Bathroom has a []  Tub only  [] Tub/shower combo   [] Walk in shower    []  Grab bars   Washing machine is on [x]  Main level   [] Second level   [] Basement     ADL/IADL Previous level of function Current level of function Who currently assists the patient with task   Bathing  [x] Independent  [] Assist [x] Independent  [] Assist    Dress/grooming [x] Independent  [] Assist [x] Independent  [] Assist    Transfer/mobility [x] Independent  [] Assist [x] Independent  [] Assist    Feeding [x] Independent  [] Assist [x] Independent  [] Assist    Toileting [x] Independent  [] Assist [x] Independent  [] Assist    Driving [x] Independent  [] Assist [x] Independent  [] Assist    Housekeeping [x] Independent  [] Assist [x] Independent  [] Assist    Grocery shop/meal prep [x] Independent  [] Assist [x] Independent  [] Assist      Gait Prior level of function Current level of function    [x] Independent  [] Assist [x] Independent  [] Assist   Device: [x] Independent [x] Independent    [] Straight Cane [] Quad cane [] Straight Cane [] Quad cane    [] Standard walker [] Rolling walker   [] 4 wheeled walker [] Standard walker [] Rolling walker   [] 4 wheeled walker    [] Wheelchair [] Wheelchair     Working:  [] Full-time  [] Part-time  [] Off d/t condition  [x] Retired  [] Disability  [] N/A  Job/Employer:    Pain:  [x] Yes  [] No Location: R shoulder Pain Rating: (0-10 scale) 5/10  Pain altered Tx:  [] Yes  [x] No  Action:    Objective: p = pain, L = Lacks      ROM  ° A/P STRENGTH    Left Right Left Right   Shoulder Flex 160 120 4/5 4-/5   Abduction 130 80 4/5 4-/5   ER 70 60 4/5 4-/5   IR 70 60 4/5 4-/5   Elbow Flex       Ext       Wrist Flex       Ext       Hand             Koo Fall Risk Assessment    Risk Factor Scale  Score   History of Falls [] Yes  [x] No Isolated tripping 25  0 0   Secondary Diagnosis [] Yes  [x] No 15  0    Ambulatory Aid [] Bear Ivanna [] Crutches/cane/walker  [x] None/bedrest/wheelchair/nurse 30  15  0    IV/Heparin Lock [] Yes  [x] No 20  0    Gait/Transferring [] Impaired  [] Weak  [x] Normal/bedrest/immobile 20  10  0    Mental Status [] Forgets limitations  [x] Oriented to own ability 15  0       Total:0     Based on the Assessment score: check the appropriate box. [x]  No intervention needed   Low =   Score of 0-24        OBSERVATION Comments   Posture Mild fwd head   Joint Alignment No deficit    Gait No deficit    Palpation Tenderness anterior shoulder    Edema No deficit    Neurological No deficit      Assessment: Patient demonstrates R shoulder pain, decreased ROM, and weakness consistent with healing humerus fracture. Patient will benefit form PT to restore R shoulder ROM, strength, and function. Problems:    [x] ? Pain: 5/10 R shoulder  [x] ? ROM:  [x] ? Flexibility: rotator cuff  [x] ? Strength: deltoids, rotator cuff  [x] ? Function: UEFI 49% functional  [] Other:    STG: (to be met in 10 treatments)  1. ? Pain: 2/10 R shoulder with activity. 2. ? ROM: R shoulder flexion 0-160, abduction 0-120, and ER to 70 degrees to improve reaching and self care. 3. ? Strength:4/5 R shoulder flexion and abduction to improve lifting. 4. ? Function: UEFI score to 69% functional or better to improve ADLs. 5. Independent with Home Exercise Programs    Patient goals: Restore R shoulder function. Rehab Potential:  [x] Good  [] Fair  [] Poor   Suggested Professional Referral:  [x] No  [] Yes:  Barriers to Goal Achievement[de-identified]  [x] No  [] Yes:  Domestic Concerns:  [x] No  [] Yes:    Pt. Education:  [x] Plans/Goals, Risks/Benefits discussed  [x] Home exercise program: See chart below  Method of Education: [x] Verbal  [x] Demo  [x] Written  Comprehension of Education:  [x] Verbalizes understanding. [x] Demonstrates understanding. [x] Needs Review. [] Demonstrates/verbalizes understanding of HEP/Ed previously given.     Treatment Plan: [x] Therapeutic Exercise   78779  [x] Vasopneumatic cold with compression  03902    [x] Therapeutic Activity  84682 [x] Cold/hotpack    [] Gait Training   [id number] [] Lumbar/Cervical Traction  [id number]   [] Neuromuscular Re-education  [id number] [x] Electrical Stimulation Unattended  92110   [x] Manual Therapy    77358 [x] Electrical Stimulation Attended  [id number]   [] Iontophoresis: 4 mg/mL Dexamethasone Sodium Phosphate  mAmin  50917 []  Medication allergies reviewed for use of   Dexamethasone Sodium Phosphate 4mg/ml with iontophoresis treatments. Pt is not allergic. Frequency:  2 x/week for 10visits  * May start 1x/week due to high copay    Todays Treatment:  Precautions: ROM exercises only x 3 weeks, f/u with otho   Modalities:   Exercises:  Exercise Reps/ Time Weight/ Level Comments   ShoulderCane Exercises 10xea  Handout only    Other:    Specific Instructions for next treatment[de-identified] ROM exercises only, manual and modalities as needed.  Begin strengthening 6 weeks post injury and when cleared by       Evaluation Complexity:  History (Personal factors, comorbidities) [] 0 [x] 1-2 [] 3+   Exam (limitations, restrictions) [] 1-2 [x] 3 [] 4+   Clinical presentation (progression) [x] Stable [] Evolving  [] Unstable   Decision Making [x] Low [] Moderate [] High    [x] Low Complexity [] Moderate Complexity [] High Complexity       Treatment Charges: Mins Units   [x] Evaluation       [x]  Low       []  Moderate       []  High 30 1   []  Modalities     []  Ther Exercise     []  Manual Therapy     []  Ther Activities     []  Aquatics     []  Vasocompression     []  Other       TOTAL TREATMENT TIME: 30      Time in:1600     Time out:1630    Electronically signed by: Gisela Romano, PT

## 2021-01-22 ENCOUNTER — TELEPHONE (OUTPATIENT)
Dept: FAMILY MEDICINE CLINIC | Age: 81
End: 2021-01-22

## 2021-02-01 ENCOUNTER — HOSPITAL ENCOUNTER (OUTPATIENT)
Dept: PHYSICAL THERAPY | Age: 81
Setting detail: THERAPIES SERIES
Discharge: HOME | End: 2021-02-01
Payer: MEDICARE

## 2021-02-01 PROCEDURE — 97110 THERAPEUTIC EXERCISES: CPT

## 2021-02-01 NOTE — FLOWSHEET NOTE
[x] [facility]) [facility]  Therapy  [address]  [phone]  F: [phone] [] [address]  [place] [address]   [address]  P: [phone]  F: [phone] [] [place]  [address]  P: [phone]  F: [phone] [] [address]  P: [phone]  F: [phone] [] [address]  [facility]   [address]   [place]: [phone]  F: [phone]      Physical Therapy Daily Treatment Note    Date:  2021  Patient Name:  Sukhjinder Chilel    :  1940  MRN: [id number]  Physician: Nora Lr DO                     Insurance: [facility] [place] $40 copay Need auth   Medical Diagnosis:Fall, initial encounter (X31. XXXA [ICD-10-CM]); Sprain of right shoulder, unspecified shoulder sprain type, initial encounter (S43.401A [ICD-10-CM]); Arm pain, right (M79.601 [ICD-10-CM]); Elbow pain, right (M25.521 [ICD-10-CM])                          Rehab Codes:M25.511, M25.611  Onset Date: 20                                 Next 's appt: Ortho    Visit# / total visits: 02/10; Progress note for STG due at visit # 10      Cancels/No Shows: 0/0    Subjective:    Pain:  [x] Yes  [] No Location:  Right shldr  Pain Rating: (0-10 scale) 1/10  Pain altered Tx:  [x] No  [] Yes  Action:  Comments: Reports shoulder  has a dull constant ache, but it does depend on what activity she is doing. Dressing can provoke pain. Objective:  Modalities:   Precautions: ROM exercises only x 3 weeks, f/u with ortho   Modalities:  CP to R shoulder x 10 mins post exericises in sitting w/ pillow under elbow. Exercises:  Exercise Reps/ Time Weight/ Level Comments   pulleys 2 mins AA Flexion, add abd next time. Pendulum   5x ea   all directions. Sitting      UT stretch add     shrugs 10x     Retro shldr rolls 10x     cane scap. retraction 10x     Table top slides 10x ea  Flexion, circles   cane flexion 10x     cane abd, scap plane 10x           Supine      Cane        manual   Add, as needed. IR/ER 10x AA    Chest press 15x AA    HAB 15x AA    Flexion 15x  AA                           Specific Instructions for next treatment[de-identified] ROM exercises only, manual and modalities as needed. Begin strengthening 6 weeks post injury and when cleared by           Treatment Charges: Mins Units   [x]  Modalities  CP 10 -   [x]  Ther Exercise 45 3   []  Manual Therapy     []  Ther Activities     []  Aquatics     []  Vasocompression     []  Other     Total Treatment time  45 3       Assessment: [x] Progressing toward goals initiated R shoulder ROM program with good understaiding demonstrated post education. Reviewed initial  HEP as ROM was not previously addressed in clinic and issued additional exercises for HEP. Demonstrated approx 160° flexion with pulleys. Will add Manual therapy as needed. [] No change. [] Other:  [] Patient would continue to benefit from skilled physical therapy services in order to:       STG: (to be met in 10 treatments)  1. ? Pain: 2/10 R shoulder with activity. 2. ? ROM: R shoulder flexion 0-160, abduction 0-120, and ER to 70 degrees to improve reaching and self care. 3. ? Strength:4/5 R shoulder flexion and abduction to improve lifting. 4. ? Function: UEFI score to 69% functional or better to improve ADLs. 5. Independent with Home Exercise Programs     Patient goals: Restore R shoulder function. Pt. Education:  [x] Yes  [] No  [x] Reviewed Prior HEP/Ed  Method of Education: [x] Verbal  [x] Demo  [x] Written  Comprehension of Education:  [x] Verbalizes understanding. [x] Demonstrates understanding. [] Needs review. [x] Demonstrates/verbalizes HEP/Ed previously given. 2/1/21: pendulum,  Shrugs, retro shldr rolls and  table top shldr ROM. Plan: [x] Continue current frequency toward long and short term goals. 1x wk during ROM only, due to copay amt, progress to 2x wk when strengthening allowed.    [x] Specific Instructions for subsequent treatments:  Progress ROM R shoulder,  Add manual as needed, check compliance with HEP      Time In: 1250           Time Out: 1348    Electronically signed by:  Isabel Escobar PTA

## 2021-02-02 ENCOUNTER — HOSPITAL ENCOUNTER (OUTPATIENT)
Dept: VASCULAR LAB | Age: 81
Discharge: HOME | End: 2021-02-02
Payer: MEDICARE

## 2021-02-02 DIAGNOSIS — I73.9 PAD (PERIPHERAL ARTERY DISEASE) (HCC): ICD-10-CM

## 2021-02-02 DIAGNOSIS — I73.9 CLAUDICATION IN PERIPHERAL VASCULAR DISEASE (HCC): ICD-10-CM

## 2021-02-02 PROCEDURE — 93923 UPR/LXTR ART STDY 3+ LVLS: CPT

## 2021-02-03 ENCOUNTER — HOSPITAL ENCOUNTER (OUTPATIENT)
Dept: MAMMOGRAPHY | Age: 81
Discharge: HOME | End: 2021-02-05
Payer: MEDICARE

## 2021-02-03 DIAGNOSIS — S42.251A DISPLACED FRACTURE OF GREATER TUBEROSITY OF RIGHT HUMERUS, INITIAL ENCOUNTER FOR CLOSED FRACTURE: Primary | ICD-10-CM

## 2021-02-03 DIAGNOSIS — M85.859 OSTEOPENIA OF HIP, UNSPECIFIED LATERALITY: ICD-10-CM

## 2021-02-03 PROCEDURE — 77080 DXA BONE DENSITY AXIAL: CPT

## 2021-02-04 ENCOUNTER — OFFICE VISIT (OUTPATIENT)
Dept: ORTHOPEDIC SURGERY | Age: 81
End: 2021-02-04
Payer: MEDICARE

## 2021-02-04 VITALS — WEIGHT: 212 LBS | BODY MASS INDEX: 41.62 KG/M2 | HEIGHT: 60 IN

## 2021-02-04 DIAGNOSIS — S42.251A DISPLACED FRACTURE OF GREATER TUBEROSITY OF RIGHT HUMERUS, INITIAL ENCOUNTER FOR CLOSED FRACTURE: Primary | ICD-10-CM

## 2021-02-04 PROCEDURE — 1123F ACP DISCUSS/DSCN MKR DOCD: CPT | Performed by: STUDENT IN AN ORGANIZED HEALTH CARE EDUCATION/TRAINING PROGRAM

## 2021-02-04 PROCEDURE — G8484 FLU IMMUNIZE NO ADMIN: HCPCS | Performed by: STUDENT IN AN ORGANIZED HEALTH CARE EDUCATION/TRAINING PROGRAM

## 2021-02-04 PROCEDURE — 4040F PNEUMOC VAC/ADMIN/RCVD: CPT | Performed by: STUDENT IN AN ORGANIZED HEALTH CARE EDUCATION/TRAINING PROGRAM

## 2021-02-04 PROCEDURE — G8399 PT W/DXA RESULTS DOCUMENT: HCPCS | Performed by: STUDENT IN AN ORGANIZED HEALTH CARE EDUCATION/TRAINING PROGRAM

## 2021-02-04 PROCEDURE — 99213 OFFICE O/P EST LOW 20 MIN: CPT | Performed by: STUDENT IN AN ORGANIZED HEALTH CARE EDUCATION/TRAINING PROGRAM

## 2021-02-04 PROCEDURE — 1090F PRES/ABSN URINE INCON ASSESS: CPT | Performed by: STUDENT IN AN ORGANIZED HEALTH CARE EDUCATION/TRAINING PROGRAM

## 2021-02-04 PROCEDURE — G8417 CALC BMI ABV UP PARAM F/U: HCPCS | Performed by: STUDENT IN AN ORGANIZED HEALTH CARE EDUCATION/TRAINING PROGRAM

## 2021-02-04 PROCEDURE — 1036F TOBACCO NON-USER: CPT | Performed by: STUDENT IN AN ORGANIZED HEALTH CARE EDUCATION/TRAINING PROGRAM

## 2021-02-04 PROCEDURE — G8427 DOCREV CUR MEDS BY ELIG CLIN: HCPCS | Performed by: STUDENT IN AN ORGANIZED HEALTH CARE EDUCATION/TRAINING PROGRAM

## 2021-02-04 NOTE — PROGRESS NOTES
[facility]  [facility] ORTHO SPECIALISTS  [address] [facility] [address]  Dept: [phone]  Dept Fax: [phone]        Orthopaedic Clinic Follow Up      Subjective:   Kandice Paige is a 80y.o. year old female who presents to the clinic today for routine followup regarding her right proximal humerus fracture, DOI 1/5/2021. Patient also has a right radial head fracture of indeterminate age. She has started PT and is feeling much better. Still gets dull soreness/achiness in her shoulder but is improving daily and is happy with her progress. Objective :   Ht 5' (1.524 m)   Wt 212 lb (96.2 kg)   BMI 41.40 kg/m² Body mass index is 41.4 kg/m². Physical Exam  Gen: Alert and oriented, no acute distress, resting comfortably in the clinic  Psych: Patient has good fund of knowledge and displays understanging of exam, diagnosis, and plan  Neuro: Alert, oriented  Head: Normocephalic, atraumatic   Eyes: Extra-ocular muscles intact  Chest: Symmetric chest excursion, respirations unlabored and regular  Skin: Warm, well perfused  RUE: Mild ttp about greater tuberosity, no ttp at elbow/radial head. AROM up to 90 degrees forward elevation and 70 abduction. Internal rotation limited to posterior iliac crest. Full elbow flexion/extension and supination/pronation compared to contralateral side. Fully intact motor/sensory exam of the RUE. Radiology:  History: Right proximal humerus fracture    Comparison: 1/14/20201    Findings: 3 views of the right shoulder showing healing/resportion of the greater tuberosity fracture without interval displacement or new fracture.     Impression: Healing right greater tuberosity fracture     Assessment:   80y.o. year old female with right greater tuberosity fracture  Plan:      Patient is to continue physical therapy working on ROM  Patient to follow up in 4-6 weeks  Limit lifting to no more than 10 pounds still Follow up:Return in about 6 weeks (around 3/18/2021) for repeat examination, XR. No orders of the defined types were placed in this encounter. No orders of the defined types were placed in this encounter.       Electronically signed by Jus Kc DO on 2/4/2021 at 6:39 PM

## 2021-02-05 DIAGNOSIS — E11.9 TYPE 2 DIABETES MELLITUS WITHOUT COMPLICATION, UNSPECIFIED WHETHER LONG TERM INSULIN USE (HCC): ICD-10-CM

## 2021-02-05 NOTE — TELEPHONE ENCOUNTER
months    PTH, Intact every 3 months    Protein / creatinine ratio, urine every 3 months                Patient Active Problem List:     DM (diabetes mellitus) ([facility]     Hypercholesteremia     CAD (coronary artery disease)     Hypothyroidism     Lumbar spondylosis with myelopathy     S/P cardiac cath 8/19/14-Dr. covarrubias     CKD (chronic kidney disease) stage 3, GFR 30-59 ml/min     Morbid obesity with BMI of 40.0-44.9, adult ([facility]     At high risk for hyperkalemia     Edema     COPD (chronic obstructive pulmonary disease) ([facility]     Hyperparathyroidism due to renal insufficiency (HCC)     Carotid stenosis, asymptomatic     Claudication in peripheral vascular disease (HCC)     Nonproliferative diabetic retinopathy of both eyes (HCC)     Type 2 diabetes mellitus with stage 3 chronic kidney disease, with long-term current use of insulin ([facility]     Coronary artery disease involving coronary bypass graft of native heart without angina pectoris     Essential hypertension     Localized edema     DM (diabetes mellitus), type 2 with peripheral vascular complications (HCC)     Mild nonproliferative diabetic retinopathy associated with type 2 diabetes mellitus (HCC)     Panlobular emphysema (HCC)     S/P drug eluting coronary stent placement-OM1 3/26/18-Dr. covarrubias     CAD S/P percutaneous coronary angioplasty     Persistent proteinuria     Lymphedema of right lower extremity     Chronic bilateral low back pain without sciatica     Deficiency of vitamin B12     Wrist arthritis     Gastroesophageal reflux disease without esophagitis           Please address the medication refill and close the encounter. If I can be of assistance, please route to the applicable pool. Thank you.

## 2021-02-08 ENCOUNTER — OFFICE VISIT (OUTPATIENT)
Dept: VASCULAR SURGERY | Age: 81
End: 2021-02-08
Payer: MEDICARE

## 2021-02-08 VITALS
SYSTOLIC BLOOD PRESSURE: 133 MMHG | DIASTOLIC BLOOD PRESSURE: 62 MMHG | WEIGHT: 220 LBS | BODY MASS INDEX: 43.19 KG/M2 | HEART RATE: 75 BPM | HEIGHT: 60 IN | TEMPERATURE: 98.4 F | OXYGEN SATURATION: 95 % | RESPIRATION RATE: 18 BRPM

## 2021-02-08 DIAGNOSIS — I73.9 PAD (PERIPHERAL ARTERY DISEASE) (HCC): Primary | ICD-10-CM

## 2021-02-08 PROCEDURE — G8427 DOCREV CUR MEDS BY ELIG CLIN: HCPCS | Performed by: SURGERY

## 2021-02-08 PROCEDURE — 99214 OFFICE O/P EST MOD 30 MIN: CPT | Performed by: SURGERY

## 2021-02-08 PROCEDURE — 4040F PNEUMOC VAC/ADMIN/RCVD: CPT | Performed by: SURGERY

## 2021-02-08 PROCEDURE — 1036F TOBACCO NON-USER: CPT | Performed by: SURGERY

## 2021-02-08 PROCEDURE — G8484 FLU IMMUNIZE NO ADMIN: HCPCS | Performed by: SURGERY

## 2021-02-08 PROCEDURE — 1123F ACP DISCUSS/DSCN MKR DOCD: CPT | Performed by: SURGERY

## 2021-02-08 PROCEDURE — 1090F PRES/ABSN URINE INCON ASSESS: CPT | Performed by: SURGERY

## 2021-02-08 PROCEDURE — G8399 PT W/DXA RESULTS DOCUMENT: HCPCS | Performed by: SURGERY

## 2021-02-08 PROCEDURE — G8417 CALC BMI ABV UP PARAM F/U: HCPCS | Performed by: SURGERY

## 2021-02-08 RX ORDER — BLOOD SUGAR DIAGNOSTIC
STRIP MISCELLANEOUS
Qty: 300 STRIP | Refills: 5 | Status: SHIPPED | OUTPATIENT
Start: 2021-02-08 | End: 2022-04-27 | Stop reason: SDUPTHER

## 2021-02-08 ASSESSMENT — ENCOUNTER SYMPTOMS
ABDOMINAL PAIN: 0
COLOR CHANGE: 0
ALLERGIC/IMMUNOLOGIC NEGATIVE: 1
BACK PAIN: 1
CHEST TIGHTNESS: 0
SHORTNESS OF BREATH: 0

## 2021-02-08 NOTE — PROGRESS NOTES
Division of Vascular Surgery        Follow Up    Chief Complaint:      PAD, claudication    History of Present Illness:      Ila Pereira is a 80 y.o. woman who presents for follow up regarding her peripheral arterial disease. She has noticed increased cramping in her left leg when she walks long distances. Also notices discomfort in her back when she has been standing still for longer periods. She does admit to decreased activity and walking due to the pandemic. Denies symptoms suggestive of ischemic rest pain or have any open wounds/sores on her feet. She wears compression stockings to help with chronic swelling in her legs.      Medical History:     Past Medical History:   Diagnosis Date    Abnormal stress test 07/18/2014    going to do cath- not urgent just abnormal    Arthritis     At high risk for hyperkalemia 10/22/2014    From type 4 RTA    Back pain     CAD (coronary artery disease)     Chronic kidney disease     Circulation problem     decreased circulation to  zbigniew extremities    CKD (chronic kidney disease) stage 3, GFR 30-59 ml/min 10/22/2014    From diabetic and ischemic nephrosclerosis, baseline 1.4, GFR 40-45 ml/min, UPC 0.3    COPD [facility]ch[facility] obstructive pulmonary disease) [facility]     DM (diabetes mellitus) ([facility])     Edema     chronic lower extremity    Foot pain, bilateral     Gastroesophageal reflux disease without esophagitis 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    Hyperlipidemia     Hypertension     Hypothyroidism     Intermittent claudication (HCC)     loly to left leg    Kidney disease     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 ([facility] [address] [facility] 4/20/2016    Morbid obesity with BMI of 45.0-49.9, adult ([address] 10/22/2014    Osteoarthritis     Other activity(E029.9)     Acute renal failure secondary to prerenal azotemia  Other activity(E029.9)     Atherosclerotic coronary artery disease status-post bypass surgery in 2003    Persistent proteinuria 5/9/2018    From diabetic nephrosclerosis, urine protein creatinine ratio 0.81    PVD (peripheral vascular disease) ([facility]     Sciatica     Secondary hyperparathyroidism (of renal origin) 10/22/2014    Not on VDRA    Shortness of breath     USES HOME OXYGEN    Sleep apnea     Sleep apnea     Type II or unspecified type diabetes mellitus without mention of complication, not stated as uncontrolled        Surgical History:     Past Surgical History:   Procedure Laterality Date    APPENDECTOMY      CARDIAC CATHETERIZATION  2006, [address] [facility] SURGERY  2003 2018    CABG X 3/STENTS    CORONARY ANGIOPLASTY WITH STENT PLACEMENT      NERVE BLOCK Right 10/21/2016    rt MBNB #1 kenalog 40mg       Family History:     No family history on file. Allergies:       Patient has no known allergies. Medications:      Current Outpatient Medications   Medication Sig Dispense Refill    levothyroxine (SYNTHROID) 88 MCG tablet Take 1 tablet by mouth daily 90 tablet 3    acetaminophen (TYLENOL) 500 MG tablet Take 2 tablets by mouth every 8 hours as needed for Pain 270 tablet 1    aspirin EC 81 MG EC tablet Take 2 tablets by mouth daily 30 tablet 5    atorvastatin (LIPITOR) 20 MG tablet TAKE 1 TABLET EVERY DAY 90 tablet 5    blood glucose test strips (ACCU-CHEK DONG PLUS) strip TEST THREE TIMES DAILY 300 strip 5    calcitRIOL (ROCALTROL) 0.25 MCG capsule Take 1 capsule by mouth three times a week mon wed fri 30 capsule 5    clopidogrel (PLAVIX) 75 MG tablet TAKE 1 TABLET EVERY DAY 90 tablet 5    cyanocobalamin 1000 MCG/ML injection Inject 1 ML every other month   please give patient (6) 1 ml vials.  6 mL 5  blood glucose monitor strips Test_3__times daily Diagnosis: 250.0   Diabetes Mellitus__x__Insulin Dependent____Non-Insulin Dependent Life Scan test strips ultra one touch 300 strip 5    insulin glargine (LANTUS) 100 UNIT/ML injection vial INJECT 5 UNITS INTO THE SKIN NIGHTLY 30 mL 5    Insulin Syringe-Needle U-100 31G X 5/16\" 0.3 ML MISC 1 each by Does not apply route daily 100 each 5    lansoprazole (PREVACID) 15 MG delayed release capsule TAKE 1 CAPSULE EVERY DAY 90 capsule 5    lisinopril (PRINIVIL;ZESTRIL) 20 MG tablet TAKE 1 TABLET TWICE DAILY (DOSE INCREASE PER DR Archana Macias) 180 tablet 5    metoprolol succinate (TOPROL XL) 100 MG extended release tablet TAKE 1 TABLET EVERY DAY 90 tablet 5    nitroGLYCERIN (NITROSTAT) 0.4 MG SL tablet Place 1 tablet under the tongue as needed for Chest pain 25 tablet 5    Syringe/Needle, Disp, (SYRINGE 3CC/25GX1\") 25G X 1\" 3 ML MISC Used one every other month. 6 each 5    Alcohol Swabs (B-D SINGLE USE SWABS REGULAR) PADS 1 box by Other route 3 times daily as needed (When pt checks glucose) 1 each 5    B-D INS SYR ULTRAFINE 1CC/30G 30G X 1/2\" 1 ML MISC USE 1 SYRINGE EVERY DAY 90 each 5    Compression Stockings MISC by Does not apply route 20-40 mmHg. 2 each 0    Blood Glucose Calibration (OT ULTRA/FASTTK CNTRL SOLN) SOLN       Multiple Vitamins-Minerals (OCUVITE ADULT 50+ PO) Take 1 tablet by mouth 2 times daily.  Green Tea, Camillia sinensis, 315 MG CAPS Take 1 tablet by mouth daily       Cinnamon 500 MG TABS Take 1 tablet by mouth daily.  Cranberry-Vitamin C-Vitamin E (CRANBERRY PLUS VITAMIN C PO) Take 1 tablet by mouth daily.  Ascorbic Acid (VITAMIN C WITH VIVIENNE HIPS) 500 MG tablet Take 500 mg by mouth daily.  Omega-3 Fatty Acids (FISH OIL) 1000 MG CAPS Take 1,000 mg by mouth daily.  docusate sodium (COLACE) 100 MG capsule Take 100 mg by mouth 2 times daily.  Blood Glucose Monitoring Suppl (ONE TOUCH ULTRA) by Does not apply route. No current facility-administered medications for this visit. Social History:     Tobacco:    reports that she quit smoking about 25 years ago. She has a 80.00 pack-year smoking history. She has never used smokeless tobacco.  Alcohol:      reports current alcohol use. Drug Use:  reports no history of drug use. Review of Systems:     Review of Systems   Constitutional: Negative for chills and fever. HENT: Negative for congestion. Eyes: Negative for visual disturbance. Respiratory: Negative for chest tightness and shortness of breath. Cardiovascular: Positive for leg swelling. Negative for chest pain. Gastrointestinal: Negative for abdominal pain. Endocrine: Negative. Genitourinary: Negative. Musculoskeletal: Positive for arthralgias and back pain. Skin: Negative for color change and wound. Allergic/Immunologic: Negative. Neurological: Negative for facial asymmetry, speech difficulty, weakness and numbness. Hematological: Negative. Psychiatric/Behavioral: Negative. Physical Exam:     Vitals: There were no vitals taken for this visit. Physical Exam  Constitutional:       Appearance: She is well-developed and well-groomed. Eyes:      Extraocular Movements: Extraocular movements intact. Conjunctiva/sclera: Conjunctivae normal.   Neck:      Musculoskeletal: Full passive range of motion without pain. Vascular: No carotid bruit. Cardiovascular:      Rate and Rhythm: Normal rate and regular rhythm. Pulses:           Radial pulses are 2+ on the right side and 2+ on the left side. Dorsalis pedis pulses are detected w/ Doppler on the right side and detected w/ Doppler on the left side. Posterior tibial pulses are detected w/ Doppler on the right side and detected w/ Doppler on the left side. Pulmonary:      Effort: Pulmonary effort is normal. No respiratory distress. Abdominal:      Palpations: Abdomen is soft. Tenderness: There is no abdominal tenderness. Musculoskeletal:      Right lower leg: No edema. Left lower leg: No edema. Right foot: Normal capillary refill. No tenderness or swelling. Left foot: Normal capillary refill. No tenderness or swelling. Feet:      Right foot:      Skin integrity: No ulcer or skin breakdown. Left foot:      Skin integrity: No ulcer or skin breakdown. Skin:     General: Skin is warm. Capillary Refill: Capillary refill takes less than 2 seconds. Neurological:      Mental Status: She is alert and oriented to person, place, and time. GCS: GCS eye subscore is 4. GCS verbal subscore is 5. GCS motor subscore is 6. Sensory: Sensation is intact. Motor: Motor function is intact. Psychiatric:         Mood and Affect: Mood normal.         Speech: Speech normal.         Behavior: Behavior normal.         Thought Content: Thought content normal.       Imaging/Labs:         Previous (2019) right JORY 0.28 and left 0.17    Assessment and Plan:     Peripheral arterial disease, claudication  · Continue optimal medical therapy with aspirin and statins  · Daily exercise and walking to improve overall cardiovascular health  · Overall claudication symptoms are not lifestyle limiting would continue to treat medically  · She will follow up in 1 year with surveillance with PVRs, sooner if her symptoms continue to progress    Electronically signed by Ra Brown MD on 2/8/21 at 3:16 PM [address]: [phone]  C: [phone]  Email: [email or web address]

## 2021-02-09 ENCOUNTER — HOSPITAL ENCOUNTER (OUTPATIENT)
Dept: PHYSICAL THERAPY | Age: 81
Setting detail: THERAPIES SERIES
Discharge: HOME | End: 2021-02-09
Payer: MEDICARE

## 2021-02-09 PROCEDURE — 97110 THERAPEUTIC EXERCISES: CPT

## 2021-02-09 NOTE — FLOWSHEET NOTE
Education  x  Sitting posture/positioning. Feet touching floor, lumbar roll, slight scap. Retraction. Sitting      UT stretch 3x15\"  Added 2/9   Levator stretch 3x15\"  Added 2/9   shrugs 10x     Retro shldr rolls 10x     scap. retraction 10x     Table top slides w/ wedge 10x ea  Flexion, scaption, circles   cane flexion 10x     cane abd, scap plane 10x     Scap. depression x  Attempted, poor contraction/ROM         SL       scapular depression 10x   improved contraction, ROM, added 2/9   standing        cane extension 10x  Added 2/9    cane IR 10x  Added 2/9         Supine      Cane        AAROM  5xea AA   PTA Assist, ROM to sergio. Cane ER 3x15\"  Incr. Angle 2/9   IR/ER 10x AA    Chest press 15x AA    HAB 15x AA    Flexion 15x AA                           Specific Instructions for next treatment[de-identified] ROM exercises only, manual and modalities as needed. Begin strengthening 6 weeks post injury and when cleared by           Treatment Charges: Mins Units   [x]  Modalities  CP      [x]  Ther Exercise 50 3   []  Manual Therapy     []  Ther Activities     []  Aquatics     []  Vasocompression     []  Other     Total Treatment time  50 3       Assessment: [x] Progressing toward goals   Added stretching of UT/levator muscles due to pt subjective reports and progressed standing cane ROM exercises. Also added scapular depression and progress table top ROM to inclined AAROM. [] No change. [] Other:  [] Patient would continue to benefit from skilled physical therapy services in order to:       STG: (to be met in 10 treatments)  1. ? Pain: 2/10 R shoulder with activity. 2. ? ROM: R shoulder flexion 0-160, abduction 0-120, and ER to 70 degrees to improve reaching and self care. 3. ? Strength:4/5 R shoulder flexion and abduction to improve lifting. 4. ? Function: UEFI score to 69% functional or better to improve ADLs.    5. Independent with Home Exercise Programs    Patient goals: Restore R shoulder function. Pt. Education:  [x] Yes  [] No  [x] Reviewed Prior HEP/Ed  Method of Education: [x] Verbal  [x] Demo  [x] Written  Comprehension of Education:  [x] Verbalizes understanding. [x] Demonstrates understanding. [] Needs review. [x] Demonstrates/verbalizes HEP/Ed previously given. 2/1/21: pendulum,  Shrugs, retro shldr rolls and  table top shldr ROM.  2/9/21: UT and levator stretching      Plan: [x] Continue current frequency toward long and short term goals. 1x wk during ROM only, due to copay amt, progress to 2x wk when strengthening allowed.    [x] Specific Instructions for subsequent treatments:  Progress ROM R shoulder,  Add manual as needed, check compliance with HEP      Time In:  1130           Time Out:  1225    Electronically signed by:  Dianna Hood PTA

## 2021-02-19 ENCOUNTER — HOSPITAL ENCOUNTER (OUTPATIENT)
Dept: PHYSICAL THERAPY | Age: 81
Setting detail: THERAPIES SERIES
Discharge: HOME | End: 2021-02-19
Payer: MEDICARE

## 2021-02-19 NOTE — FLOWSHEET NOTE
[x] [facility]) - [facility] &  Therapy  [address]    [phone]  F: [phone]   [] [address]  [facility]   [address]  P: [phone]  F: [phone]  [] [address] &  Therapy  [address]  P: [phone]  F: [phone] [] [address]  P: [phone]  F: [phone]  [] [address]  Psychiatric   [address]   [place]: [phone]  F: [phone]   [] [facility]  [address]  [place]: [phone]   F: [phone]     Physical Therapy Cancel/No Show note    Date: 2021  Patient: Gunjan Florence  : 1940  MRN: [id number]    Cancels/No Shows to date:     For today's appointment patient:    [x]  Cancelled    [] Rescheduled appointment    [] No-show     Reason given by patient:    []  Patient ill    []  Conflicting appointment    [] No transportation      [] Conflict with work    [] No reason given    [x] Weather related    [] COVID-19    [x] Other:      Comments:  Pt has written copy of schedule.   Next appt      [] Next appointment was confirmed    Electronically signed by: Georgia Ledesma PTA

## 2021-02-26 ENCOUNTER — HOSPITAL ENCOUNTER (OUTPATIENT)
Dept: PHYSICAL THERAPY | Age: 81
Setting detail: THERAPIES SERIES
Discharge: HOME | End: 2021-02-26
Payer: MEDICARE

## 2021-02-26 NOTE — FLOWSHEET NOTE
[x] [facility]) - [facility] &  Therapy  [address]    [phone]  F: [phone]   [] [address] [employer]  [facility] [address]   [address]  P: [phone]  F: [phone]  [] [address] [facility] &  Therapy  [address]  P: [phone]  F: [phone] [] [address]  P: [phone]  F: [phone]  [] [address]  [address] [facility] [address]   [address]   [place]: [phone]  F: [phone]   [] [facility]  [address]  [place]: [phone]   F: [phone]     Physical Therapy Cancel/No Show note    Date: 2021  Patient: Markos Cifuentes  : 1940  MRN: [id number]    Cancels/No Shows to date:     For today's appointment patient:    [x]  Cancelled    [] Rescheduled appointment    [] No-show     Reason given by patient:    []  Patient ill    []  Conflicting appointment    [] No transportation      [] Conflict with work    [] No reason given    [] Weather related    [] [id number]    [x] Other:      Comments:  [address] [facility] door will not open.        [x] Next appointment was confirmed  3/5/21    Electronically signed by: Titi Solis PTA

## 2021-03-01 NOTE — PROGRESS NOTES
Spoke with Nacho Antunez and restrictions/one visitor policy reviewed, asked to bring medications with patient and drop off location. Informed of medications to take in am.  Questions answered.

## 2021-03-02 ENCOUNTER — HOSPITAL ENCOUNTER (OUTPATIENT)
Dept: CARDIAC CATH/INVASIVE PROCEDURES | Age: 81
Discharge: HOME | End: 2021-03-02
Payer: MEDICARE

## 2021-03-02 VITALS
RESPIRATION RATE: 23 BRPM | BODY MASS INDEX: 41.62 KG/M2 | HEART RATE: 56 BPM | HEIGHT: 60 IN | WEIGHT: 212 LBS | SYSTOLIC BLOOD PRESSURE: 131 MMHG | TEMPERATURE: 98.8 F | OXYGEN SATURATION: 100 % | DIASTOLIC BLOOD PRESSURE: 51 MMHG

## 2021-03-02 LAB
ANION GAP SERPL CALCULATED.3IONS-SCNC: 8 MMOL/L (ref 9–17)
BUN BLDV-MCNC: 20 MG/DL (ref 8–23)
BUN/CREAT BLD: ABNORMAL (ref 9–20)
CALCIUM SERPL-MCNC: 9.7 MG/DL (ref 8.6–10.4)
CHLORIDE BLD-SCNC: 112 MMOL/L (ref 98–107)
CO2: 22 MMOL/L (ref 20–31)
CREAT SERPL-MCNC: 1.53 MG/DL (ref 0.5–0.9)
GFR AFRICAN AMERICAN: 40 ML/MIN
GFR NON-AFRICAN AMERICAN: 27 ML/MIN
GFR NON-AFRICAN AMERICAN: 33 ML/MIN
GFR SERPL CREATININE-BSD FRML MDRD: 32 ML/MIN
GFR SERPL CREATININE-BSD FRML MDRD: ABNORMAL ML/MIN/{1.73_M2}
GLUCOSE BLD-MCNC: 100 MG/DL (ref 65–105)
GLUCOSE BLD-MCNC: 140 MG/DL (ref 65–105)
GLUCOSE BLD-MCNC: 58 MG/DL (ref 65–105)
GLUCOSE BLD-MCNC: 63 MG/DL (ref 74–100)
GLUCOSE BLD-MCNC: 91 MG/DL (ref 65–105)
GLUCOSE BLD-MCNC: 93 MG/DL (ref 70–99)
PLATELET # BLD: 186 K/UL (ref 138–453)
POC CHLORIDE: 111 MMOL/L (ref 98–107)
POC CREATININE: 1.82 MG/DL (ref 0.51–1.19)
POC HEMATOCRIT: 32 % (ref 36–46)
POC HEMOGLOBIN: 10.9 G/DL (ref 12–16)
POC POTASSIUM: 4.4 MMOL/L (ref 3.5–4.5)
POC SODIUM: 143 MMOL/L (ref 138–146)
POTASSIUM SERPL-SCNC: 4.8 MMOL/L (ref 3.7–5.3)
SODIUM BLD-SCNC: 142 MMOL/L (ref 135–144)

## 2021-03-02 PROCEDURE — C1725 CATH, TRANSLUMIN NON-LASER: HCPCS

## 2021-03-02 PROCEDURE — 2500000003 HC RX 250 WO HCPCS

## 2021-03-02 PROCEDURE — 7100000000 HC PACU RECOVERY - FIRST 15 MIN

## 2021-03-02 PROCEDURE — 80048 BASIC METABOLIC PNL TOTAL CA: CPT

## 2021-03-02 PROCEDURE — 7100000001 HC PACU RECOVERY - ADDTL 15 MIN

## 2021-03-02 PROCEDURE — 6360000004 HC RX CONTRAST MEDICATION

## 2021-03-02 PROCEDURE — 6360000002 HC RX W HCPCS

## 2021-03-02 PROCEDURE — 2709999900 HC NON-CHARGEABLE SUPPLY

## 2021-03-02 PROCEDURE — 93454 CORONARY ARTERY ANGIO S&I: CPT | Performed by: INTERNAL MEDICINE

## 2021-03-02 PROCEDURE — 85014 HEMATOCRIT: CPT

## 2021-03-02 PROCEDURE — 84295 ASSAY OF SERUM SODIUM: CPT

## 2021-03-02 PROCEDURE — 93455 CORONARY ART/GRFT ANGIO S&I: CPT | Performed by: INTERNAL MEDICINE

## 2021-03-02 PROCEDURE — 84132 ASSAY OF SERUM POTASSIUM: CPT

## 2021-03-02 PROCEDURE — 82435 ASSAY OF BLOOD CHLORIDE: CPT

## 2021-03-02 PROCEDURE — C1894 INTRO/SHEATH, NON-LASER: HCPCS

## 2021-03-02 PROCEDURE — 82947 ASSAY GLUCOSE BLOOD QUANT: CPT

## 2021-03-02 PROCEDURE — C1887 CATHETER, GUIDING: HCPCS

## 2021-03-02 PROCEDURE — C1769 GUIDE WIRE: HCPCS

## 2021-03-02 PROCEDURE — 2580000003 HC RX 258

## 2021-03-02 PROCEDURE — 85049 AUTOMATED PLATELET COUNT: CPT

## 2021-03-02 PROCEDURE — 82565 ASSAY OF CREATININE: CPT

## 2021-03-02 PROCEDURE — 2580000003 HC RX 258: Performed by: INTERNAL MEDICINE

## 2021-03-02 RX ORDER — DEXTROSE MONOHYDRATE 25 G/50ML
12.5 INJECTION, SOLUTION INTRAVENOUS ONCE
Status: COMPLETED | OUTPATIENT
Start: 2021-03-02 | End: 2021-03-02

## 2021-03-02 RX ORDER — DEXTROSE MONOHYDRATE 25 G/50ML
25 INJECTION, SOLUTION INTRAVENOUS ONCE
Status: COMPLETED | OUTPATIENT
Start: 2021-03-02 | End: 2021-03-02

## 2021-03-02 RX ORDER — DEXTROSE MONOHYDRATE 25 G/50ML
12.5 INJECTION, SOLUTION INTRAVENOUS ONCE
Status: DISCONTINUED | OUTPATIENT
Start: 2021-03-02 | End: 2021-03-02

## 2021-03-02 RX ORDER — DEXTROSE MONOHYDRATE 25 G/50ML
5 INJECTION, SOLUTION INTRAVENOUS ONCE
Status: DISCONTINUED | OUTPATIENT
Start: 2021-03-02 | End: 2021-03-03 | Stop reason: HOSPADM

## 2021-03-02 RX ORDER — AMLODIPINE BESYLATE 10 MG/1
10 TABLET ORAL DAILY
COMMUNITY

## 2021-03-02 RX ORDER — ACETAMINOPHEN 325 MG/1
650 TABLET ORAL EVERY 4 HOURS PRN
Status: DISCONTINUED | OUTPATIENT
Start: 2021-03-02 | End: 2021-03-03 | Stop reason: HOSPADM

## 2021-03-02 RX ORDER — SODIUM CHLORIDE 0.9 % (FLUSH) 0.9 %
10 SYRINGE (ML) INJECTION EVERY 12 HOURS SCHEDULED
Status: DISCONTINUED | OUTPATIENT
Start: 2021-03-02 | End: 2021-03-03 | Stop reason: HOSPADM

## 2021-03-02 RX ORDER — SODIUM CHLORIDE 0.9 % (FLUSH) 0.9 %
10 SYRINGE (ML) INJECTION PRN
Status: DISCONTINUED | OUTPATIENT
Start: 2021-03-02 | End: 2021-03-03 | Stop reason: HOSPADM

## 2021-03-02 RX ORDER — SODIUM CHLORIDE 9 MG/ML
INJECTION, SOLUTION INTRAVENOUS CONTINUOUS
Status: DISCONTINUED | OUTPATIENT
Start: 2021-03-02 | End: 2021-03-03 | Stop reason: HOSPADM

## 2021-03-02 RX ADMIN — DEXTROSE MONOHYDRATE 25 G: 25 INJECTION, SOLUTION INTRAVENOUS at 12:13

## 2021-03-02 RX ADMIN — DEXTROSE MONOHYDRATE 12.5 G: 25 INJECTION, SOLUTION INTRAVENOUS at 09:25

## 2021-03-02 RX ADMIN — SODIUM CHLORIDE: 9 INJECTION, SOLUTION INTRAVENOUS at 09:08

## 2021-03-02 NOTE — H&P
vascular disease) ([facility] [address] Sciatica, Secondary hyperparathyroidism (of renal origin), Shortness of breath, Sleep apnea, and Type II or unspecified type diabetes mellitus without mention of complication, not stated as uncontrolled. Past Surgical History:   has a past surgical history that includes Appendectomy; Nerve Block (Right, 10/21/2016); Coronary angioplasty with stent; Cardiac surgery (2003 2018); and Cardiac catheterization (2006, 2014,4/2018). Home Medications:    Prior to Admission medications    Medication Sig Start Date End Date Taking? Authorizing Provider   amLODIPine (NORVASC) 10 MG tablet Take 10 mg by mouth daily   Yes Historical Provider, MD   ACCU-CHEK DONG PLUS strip TEST THREE TIMES DAILY 2/8/21  Yes Kasey Cisneros,    levothyroxine (SYNTHROID) 88 MCG tablet Take 1 tablet by mouth daily 1/6/21  Yes Kasey Cisneros,    acetaminophen (TYLENOL) 500 MG tablet Take 2 tablets by mouth every 8 hours as needed for Pain 11/23/20  Yes Vimal Bolanos MD   aspirin EC 81 MG EC tablet Take 2 tablets by mouth daily 11/23/20  Yes Vimal Bolanos MD   atorvastatin (LIPITOR) 20 MG tablet TAKE 1 TABLET EVERY DAY  Patient taking differently: nightly TAKE 1 TABLET EVERY DAY 11/23/20  Yes Vimal Bolanos MD   calcitRIOL (ROCALTROL) 0.25 MCG capsule Take 1 capsule by mouth three times a week mon wed fri 11/23/20  Yes Nehemias Ralph MD   clopidogrel (PLAVIX) 75 MG tablet TAKE 1 TABLET EVERY DAY 11/23/20  Yes Vimal Bolanos MD   cyanocobalamin 1000 MCG/ML injection Inject 1 ML every other month   please give patient (6) 1 ml vials.  11/23/20  Yes Vimal Bolanos MD   blood glucose monitor strips Test_3__times daily Diagnosis: 250.0   Diabetes Mellitus__x__Insulin Dependent____Non-Insulin Dependent Life Scan test strips ultra one touch 11/23/20  Yes Vimal Bolanos MD   insulin glargine (LANTUS) 100 UNIT/ML injection vial INJECT 5 UNITS INTO THE SKIN NIGHTLY 11/23/20  Yes Vimal CASTRO Lady Donnelly MD   Insulin Syringe-Needle U-100 31G X 5/16\" 0.3 ML MISC 1 each by Does not apply route daily 11/23/20  Yes Vimal Herbert MD   lansoprazole (PREVACID) 15 MG delayed release capsule TAKE 1 CAPSULE EVERY DAY 11/23/20  Yes Vimal Herbert MD   lisinopril (PRINIVIL;ZESTRIL) 20 MG tablet TAKE 1 TABLET TWICE DAILY (DOSE INCREASE PER DR Federico Herndon) 11/23/20  Yes Vimal Herbert MD   metoprolol succinate (TOPROL XL) 100 MG extended release tablet TAKE 1 TABLET EVERY DAY 11/23/20  Yes Vimal Herbert MD   nitroGLYCERIN (NITROSTAT) 0.4 MG SL tablet Place 1 tablet under the tongue as needed for Chest pain 11/23/20  Yes Vimal Herbert MD   Syringe/Needle, Disp, (SYRINGE 3CC/25GX1\") 25G X 1\" 3 ML MISC Used one every other month. 11/23/20  Yes Vimal Herbert MD   Alcohol Swabs (B-D SINGLE USE SWABS REGULAR) PADS 1 box by Other route 3 times daily as needed (When pt checks glucose) 11/23/20 3/3/21 Yes Vimal Herbert MD   B-D INS SYR ULTRAFINE 1CC/30G 30G X 1/2\" 1 ML MISC USE 1 SYRINGE EVERY DAY 2/28/18  Yes Tree Bertrand MD   Compression Stockings MISC by Does not apply route 20-40 mmHg. 11/7/17  Yes Makenna Ramsay MD   Blood Glucose Calibration (OT ULTRA/FASTTK CNTRL SOLN) SOLN  3/2/15  Yes Historical Provider, MD   Multiple Vitamins-Minerals (OCUVITE ADULT 50+ PO) Take 1 tablet by mouth 2 times daily. Yes Historical Provider, MD   Victorina Tea, Camillia sinensis, 315 MG CAPS Take 1 tablet by mouth daily    Yes Historical Provider, MD   Cinnamon 500 MG TABS Take 1 tablet by mouth daily. Yes Historical Provider, MD   Cranberry-Vitamin C-Vitamin E (CRANBERRY PLUS VITAMIN C PO) Take 1 tablet by mouth daily. Yes Historical Provider, MD   Ascorbic Acid (VITAMIN C WITH VIVIENNE HIPS) 500 MG tablet Take 500 mg by mouth daily. Yes Historical Provider, MD   Omega-3 Fatty Acids (FISH OIL) 1000 MG CAPS Take 1,000 mg by mouth daily.    Yes Historical Provider, MD   docusate sodium (COLACE) 100 MG capsule Take 100 mg by mouth 2 times daily. Yes Historical Provider, MD   Blood Glucose Monitoring Suppl (ONE TOUCH ULTRA) by Does not apply route. Yes Historical Provider, MD        Current Facility-Administered Medications: 0.9 % sodium chloride infusion, , Intravenous, Continuous    Allergies:  Patient has no known allergies. Social History:   reports that she quit smoking about 25 years ago. She has a 80.00 pack-year smoking history. She has never used smokeless tobacco. She reports current alcohol use. She reports that she does not use drugs. Family History: family history is not on file. No h/o sudden cardiac death. No for premature CAD    REVIEW OF SYSTEMS:    · Constitutional: there has been no unanticipated weight loss. There's been No change in energy level, No change in activity level. · Eyes: No visual changes or diplopia. No scleral icterus. · ENT: No Headaches  · Cardiovascular: Remaining as above  · Respiratory: No previous pulmonary problems, No cough  · Gastrointestinal: No abdominal pain. No change in bowel or bladder habits. · Genitourinary: No dysuria, trouble voiding, or hematuria. · Musculoskeletal:  No gait disturbance, No weakness or joint complaints. · Integumentary: No rash or pruritis. · Neurological: No headache, diplopia, change in muscle strength, numbness or tingling. No change in gait, balance, coordination, mood, affect, memory, mentation, behavior. · Psychiatric: No anxiety, or depression. · Endocrine: No temperature intolerance. No excessive thirst, fluid intake, or urination. No tremor. · Hematologic/Lymphatic: No abnormal bruising or bleeding, blood clots or swollen lymph nodes. · Allergic/Immunologic: No nasal congestion or hives.       PHYSICAL EXAM:      Pulse 54   Temp 98.8 °F (37.1 °C) (Oral)   Resp 16   Ht 5' (1.524 m)   Wt 212 lb (96.2 kg)   SpO2 98%   BMI 41.40 kg/m²    No intake or output data in the 24 hours ending 03/02/21 0924      Constitutional and General Appearance: alert, cooperative, no distress and appears stated age  [de-identified]: PERRL, no cervical lymphadenopathy. No masses palpable. Normal oral mucosa  Respiratory:  · Normal excursion and expansion without use of accessory muscles  · Resp Auscultation: Good respiratory effort. No for increased work of breathing. On auscultation: clear to auscultation bilaterally  Cardiovascular:  · The apical impulse is not displaced  · Heart tones are crisp and normal. regular S1 and S2.  · Jugular venous pulsation Normal  · The carotid upstroke is normal in amplitude and contour without delay or bruit  · Peripheral pulses are symmetrical and full     Abdomen:   · No masses or tenderness  · Bowel sounds present  Extremities:  ·  No Cyanosis or Clubbing  ·  Lower extremity edema: No  ·  Skin: Warm and dry  Neurological:  · Alert and oriented. · Moves all extremities well  · No abnormalities of mood, affect, memory, mentation, or behavior are noted    DATA:    Diagnostics:    EK21 septal infarct, lateral ST depressions    ECHO:  21:  1. Overall reduced LV systolic function with EF 35%. 2. Reduced LV diastolic compliance (DD grade I). 3. Moderate mitral regurgitation. 4. Mild tricuspid regurgitation. Stress Test: 21:  1. Large inferolateral area of moderate ischemia (Reversible defect). 2. Large anterior transfusion or infarction (fixed defect). 3. Reduced LV systolic function with 98% EF. 4. Global hypokinesia more severe in the anterior and inferolateral walls. Cardiac Angiography: 3/6/2018  LMCA: Normal 0% stenosis.     LAD: Patent stent area with 20-25% stenosis     LCx: Dominant vessel  Re - stenosis in Proximal OM1 stent with 50% stenosis. Distal to stent a new  lesion 75% stenosis  OM2: Ostial bifurcation stenosis 90%       Lesion on 1st Ob Patricia: Mid subsection. 80% stenosis 8 mm length reduced to    0%. Pre procedure TESFAYE III flow was noted. Post Procedure TESFAYE III flow    was present. Good runoff was present. The lesion was diagnosed as High    Risk (C). Comments:Ostial area with previous LCX stent was ballooned with high    pressure balloon bringing stenosis to 0%    Devices used       - Luge Wire 182 cm. Number of passes: 1.       - NC Quantum [place] Balloon 4.0x12. 3 inflation(s) to a max pressure of:    15 javed. - Xience Alpine 4.0 x 12 TIANA. 1 inflation(s) to a max pressure of: 12    javed. Lesion on 2nd Ob Patricia: Proximal subsection. 90% stenosis 15 mm length    reduced to 20%. Pre procedure TESFAYE II flow was noted. Post Procedure TESFAYE    III flow was present. Good runoff was present. The lesion was diagnosed    as High Risk (C). Comments: This is in stent stenosis. Balloon PTCA was performed only due to smaller size    Devices used       - ASAHI Prowater 190 cm. Number of passes: 1.       - Trek Balloon 2.5mm x 12mm. 3 inflation(s) to a max pressure of: 19    javed. RCA: Distal 99% stenosis  Radial to PDA is patent with retrograde filling native RCA      Coronary Tree      Dominance: Left    Lower extremity Doppler: 12/2019   Right JORY of 0.28 and left JORY of 0.17 are consistent with severe arterial   occlusive disease. Segmental pressures suggests aortoiliac occlusive   disease. Labs:     CBC: No results for input(s): WBC, HGB, HCT, PLT in the last 72 hours. BMP:   Recent Labs     03/02/21  0910   CREATININE 1.82*   LABGLOM 27*     Pro-BNP:  No results for input(s): PROBNP in the last 72 hours. BNP: No results for input(s): BNP in the last 72 hours. PT/INR: No results for input(s): PROTIME, INR in the last 72 hours. APTT:No results for input(s): APTT in the last 72 hours. CARDIAC ENZYMES:No results for input(s): CKTOTAL, CKMB, CKMBINDEX, TROPONINI in the last 72 hours. Invalid input(s):  TROPONINT  No results for input(s): TROPONINT in the last 72 hours. No results for input(s): TROPHS in the last 72 hours.   FASTING LIPID PANEL:  Lab Results   Component Value Date    HDL 51 11/04/2020    TRIG 176 06/23/2016     LIVER PROFILE:No results for input(s): AST, ALT, LABALBU in the last 72 hours. Patient's Active Problem List  Active Problems:    * No active hospital problems. *  Resolved Problems:    * No resolved hospital problems. *        IMPRESSION:    1. Stress test 2/21: Large anterior infarction, large area of inferior lateral ischemia, reduced EF 38%. 2. Echocardiogram 2/21 suggest LV dysfunction with EF 35%. 3. MARCELA on CKD Stage III: Follows with Dr Manuel Sanchez  4. CAD with previous CABG, with all grafts occluded and nonfunctioning LIMA-LAD. Only radial-PDA was patent. 5. S/P angioplasty and stenting of the LAD and circumflex in November 2007. 3. LV dysfunction, EF 35%. 6. Diabetes, type II.  7. Hypertension. 8. Hyperlipidemia. 9. Hypothyroidism. 10. Morbid obesity. 11. GERD. 12. Sleep apnea. 13.  Morbid obesity with BMI 48. 14.  Positive stress leading to cardiac cath 3/26/18: PTCA/TIANA x1 to OM1 and PTCA only to OM2 (both 80%). RECOMMENDATIONS:  1. Proceed with cardiac cath  2. Further recommendations post procedure      Risk, benefits and alternatives of cardiac catheterization were discussed in detail. Risk of bleeding, requiring blood transfusion, vascular complication requiring surgery, renal insufficieny with need of dialysis, CVA, MI, death and anesthesia complications including intubation were discussed. Patient agrees to proceed and verbalizes understanding. Pre Procedure Conscious Sedation Data:    ASA Class:    [] I [] II [x] III [] IV    Mallampati Class:  [] I [] II [x] III [] IV      Discussed with patient and Nurse. Ofelia Davis M.D. Fellow, [address] [place] [address]        Attestation signed by      Attending Physician Statement:    I have discussed the care of  Baljit Ureña , including pertinent history and exam findings, with the Cardiology fellow/resident.      I have seen and examined the patient and the key elements of all parts of the encounter have been performed by me. I agree with the assessment, plan and orders as documented by the fellow/resident, after I modified exam findings and plan of treatments, and the final version is my approved version of the assessment.      Additional Comments:

## 2021-03-02 NOTE — PROGRESS NOTES
Patient received post cath to [facility] room 5 alert and awake. Assessment obtained. Post cath pathway initiated. Right radial site with VASC BAND intact / 15 ml air. No hematoma noted. Restrictions reviewed with patient and family. Patient without complaints. Side rails up 2 of 2 with call light to reach.   Fluids offered

## 2021-03-02 NOTE — PROGRESS NOTES
Patient admitted, consent signed and questions answered. Patient ready for procedure. Call light to reach with side rails up 2 of 2. Bilat groin hairs clipped. Family at bedside with patient. History and physical needed.

## 2021-03-02 NOTE — PROGRESS NOTES
Dr Jennifer Barnett notified of glucose and creat with orders received.   Skin warm and dry / denies complaints with low sugar

## 2021-03-02 NOTE — OP NOTE
[facility]        Date:   3/2/2021  Patient name:  Henna Strickland  Date of admission:  3/2/2021  8:19 AM  MRN:   [id number]  YOB: 1940    CARDIAC CATHETERIZATION    Operators:  Pollo Verde MD      Procedure performed:       [] Left Heart Catheterization. [] Graft Angiography. [x] Left Ventriculography. [] Right Heart Catheterization. [x] Coronary Angiography. [] Aortic Valve Studies. [] PCI:      [] Other:       Pre Procedure Conscious Sedation Data:    ASA Class:    [] I [] II [x] III [] IV    Mallampati Class:  [] I [] II [x] III [] IV      Indication:  [] STEMI      [x] + Stress test  [] ACS      [] + EKG Changes  [] Non Q MI       [] Significant Risk Factors  [] Recurrent Angina             [] Diabetes Mellitus    [] New LBBB      [] Uncontrolled HTN. [] CHF / Low EF changes     [] Abnormal CTA / Ca Score  [] Other:     Procedure:  Access:  [] Femoral artery  [x] Radial  artery       [x] Right   [] Left    Procedure: After informed consent was obtained with explanation of the risks and benefits, patient was brought to the cath lab. The access area was prepped and draped in sterile fashion. 1% lidocaine was used for local block. The artery was cannulated with 6  Fr sheath with brisk arterial blood return. The side port was frequently flushed and aspirated with normal saline.     Estimated blood loss: 10 ml    Findings:    LMCA: Mild irregularities 10-20%.     LAD: Mild irregularities 10-20%.     LCx: Mid area 25% stenosis  OM patent stent with 20% stenosis  PL branch / OM2: Ostial 75% stenosis, small     RCA: Known occluded  SVG -PDA is patent with distal 50% stenosis (Same as 2018)      Coronary Tree      Dominance: Left        Conclusions:  Patent SVG -PDA   PAtent LCX/OM stent   Minimal disease in LAD    Recommendations:   Medical treatments           History and Risk Factors    [x] Hypertension     [] Family history of CAD  [x] Hyperlipidemia [] Cerebrovascular Disease   [x] Prior MI       [x] Peripheral Vascular disease   [x] Prior PCI              [x] Diabetes Mellitus    [] Left Main PCI. [] Currently on Dialysis. [] Prior CABG. [] Currently smoker. [] Cardiac Arrest outside of healthcare facility. [] Yes    [x] No        Witnessed     [] Yes   [] No     Arrest after arrival of EMS  [] Yes   [] No     [] Cardiac Arrest at other Facility. [] Yes   [x] No    Pre-Procedure Information. Heart Failure       [x] Yes    [] No        Class  [] I      [] II  [] III    [] IV. New Diagnosis    [] Yes  [] No    HF Type      [x] Systolic   [x] Diastolic          [] Unknown. Diagnostic Test:   EKG       [x] Normal   [] Abnormal    New antiarrhythmia medications:    [] Yes   [] No   New onset atrial fibrillation / Flutter     [] Yes   [] No   ECG Abnormalities:      [] V. Fib   [] Daily V. Tach           [] NS V. T   [] New LBBB           [] T. Inv  []  ST dev > 0.5 mm         [] PVC's freq  [] PVC's infrequent    Stress Test Performed:      [x] Yes    [] No     Type:     [] Stress Echo   [] Exercise Stress Test (no imaging)      [x] Stress Nuclear  [] Stress Imaging     Results   [] Negative   [x] Positive        [] Indeterminate  [] Unavailable     If Positive/ Risk / Extent of Ischemia:       [] Low  [] Intermediate         [x] High  [] Unavailable      Cardiac CTA Performed:     [] Yes    [x] No      Results   [] CAD   [] Non obstructive CAD      [] No CAD   [] Uncertain      [] Unknown   [] Structural Disease.      Pre Procedure Medications:   [x] Yes    [] No         [x] ASA  [x] Beta Blockers      [] Nitrate  [x] Ca Channel Blockers      [] Ranolazine  [x] Statin       [x] Plavix/Others antiplatelets          Saranya Coelho MD  Fellow, [address]              I have reviewed the case / procedure with resident / fellow  I have examined the patient personally  Patient agree with treatment plan as discussed before, final arrangement based on my evaluation and exam.    Risk and benefit of procedure planned were explained in details. Procedure was performed by me personally, with all aspect of the procedure being done using standard protocol. Note was modified based on my own assessment and treatment.     Jacek Merino MD  [facility]

## 2021-03-05 ENCOUNTER — HOSPITAL ENCOUNTER (OUTPATIENT)
Dept: PHYSICAL THERAPY | Age: 81
Setting detail: THERAPIES SERIES
Discharge: HOME | End: 2021-03-05
Payer: MEDICARE

## 2021-03-05 PROCEDURE — 97140 MANUAL THERAPY 1/> REGIONS: CPT

## 2021-03-05 PROCEDURE — 97110 THERAPEUTIC EXERCISES: CPT

## 2021-03-05 NOTE — FLOWSHEET NOTE
[x] [facility]  [address]  [phone]  F: [phone] [] [address]  [facility]   [address]  P: [phone]  F: [phone] [] [address]  P: [phone]  F: [phone] [] [address]  P: [phone]  F: [phone] [] [address]  [facility]   [address]   [place]: [phone]  F: [phone]      Physical Therapy Daily Treatment Note    Date:  3/5/2021  Patient Name:  Henna Strickland    :  1940  MRN: [id number]  Physician: Karoline Patel DO                     Insurance: [facility] Medicare $40 copay Need auth   Medical Diagnosis:Fall, initial encounter (I04. XXXA [ICD-10-CM]); Sprain of right shoulder, unspecified shoulder sprain type, initial encounter (S43.401A [ICD-10-CM]); Arm pain, right (M79.601 [ICD-10-CM]); Elbow pain, right (M25.521 [ICD-10-CM])                          Rehab Codes:M25.511, M25.611  Onset Date: 20                                 Next 's appt: Ortho    Visit# / total visits: 04/10; Progress note for STG due at visit # 10      Cancels/No Shows: 2/0    Subjective:    Pain:  [] Yes  [x] No Location:  Right shldr  Pain Rating: (0-10 scale) 0-1/10  Pain altered Tx:  [x] No  [] Yes  Action:  Comments:    Addressing HEP, has Ortho MD appt 3/11/21. Reportts shldr pain is intermittent now. Objective:  Modalities:   Precautions: ROM exercises only x 3 weeks, f/u with ortho   Modalities:  CP to R shoulder x 10 mins post exericises in sitting w/ pillow under elbow. Exercises:  Exercise Reps/ Time Weight/ Level Comments   pulleys 2 mins ea AA Flexion,  abd     Pendulum   HEP   all directions.    Education     Sitting posture/positioning. Feet touching floor, lumbar roll, slight scap. Retraction. Standing      Wall slides 10x5\" ea  Flexion, circles, scaphion- added   Cane ext 15x     Cane IR 15x     wand scaphion  15x     wand chest press 15x     cane shldr flexion 15x           Sitting      UT stretch 3x15\"      Levator stretch 3x15\"     shrugs 10x     Retro shldr rolls 10x     scap. retraction 10x     scap retraction w/ ER 10x5\"'  Added 3/5   Table top slides w/ wedge  HEP  Flexion, scaption, circles   cane flexion HEP     cane abd, scap plane HEP     doorway pectoralis stretch 3x20\"  Arms only abd without location, added          SL       scapular depression 10x   improved contraction, ROM vs sitting     IR  Gentle stretch  3x15\"  Added 3/5         Supine      Cane        Manual  8 mins  Oscillations, distraction, IR/ER static hold and AAROM   Pectoralis stretch 3 mins  Towel roll, arm abd approx 90°   AAROM    AA     Cane ER 3x15\"  I    IR/ER  HEP AA    Chest press  HEP AA    HAB HEP AA    Flexion HEP AA                           Specific Instructions for next treatment[de-identified] ROM exercises only, manual and modalities as needed. Begin strengthening 6 weeks post injury and when cleared by           Treatment Charges: Mins Units   [x]  Modalities  CP  --    [x]  Ther Exercise  40 2   [x]  Manual Therapy   8 1   []  Ther Activities     []  Aquatics     []  Vasocompression     []  Other     Total Treatment time  48 3       Assessment: [x] Progressing toward goals   Progressed  AAROM  to wall slides and standing cane exercises for shoulder ROM progressions. Added manual as listed above and  gentle supine  pectoralis stretch. Supine scaphion to approx. 170°          [] No change. [] Other:  [] Patient would continue to benefit from skilled physical therapy services in order to:       STG: (to be met in 10 treatments)  1. ? Pain: 2/10 R shoulder with activity.   2. ? ROM: R shoulder flexion 0-160, abduction 0-120, and ER to

## 2021-03-09 ENCOUNTER — TELEPHONE (OUTPATIENT)
Dept: FAMILY MEDICINE CLINIC | Age: 81
End: 2021-03-09

## 2021-03-09 NOTE — TELEPHONE ENCOUNTER
Call placed to pt she declined appt at this time she states she will think about it and call us back.

## 2021-03-11 ENCOUNTER — OFFICE VISIT (OUTPATIENT)
Dept: ORTHOPEDIC SURGERY | Age: 81
End: 2021-03-11
Payer: MEDICARE

## 2021-03-11 ENCOUNTER — OFFICE VISIT (OUTPATIENT)
Dept: PODIATRY | Age: 81
End: 2021-03-11
Payer: MEDICARE

## 2021-03-11 VITALS — HEIGHT: 60 IN | WEIGHT: 212 LBS | BODY MASS INDEX: 41.62 KG/M2

## 2021-03-11 VITALS — HEIGHT: 60 IN | WEIGHT: 212 LBS | TEMPERATURE: 97.9 F | BODY MASS INDEX: 41.62 KG/M2 | RESPIRATION RATE: 16 BRPM

## 2021-03-11 DIAGNOSIS — B35.1 DERMATOPHYTOSIS OF NAIL: ICD-10-CM

## 2021-03-11 DIAGNOSIS — S42.251G DISPLACED FRACTURE OF GREATER TUBEROSITY OF RIGHT HUMERUS, SUBSEQUENT ENCOUNTER FOR FRACTURE WITH DELAYED HEALING: Primary | ICD-10-CM

## 2021-03-11 DIAGNOSIS — D23.71 BENIGN NEOPLASM OF SKIN OF LOWER LIMB, INCLUDING HIP, RIGHT: ICD-10-CM

## 2021-03-11 DIAGNOSIS — I73.9 PERIPHERAL VASCULAR DISEASE (HCC): ICD-10-CM

## 2021-03-11 DIAGNOSIS — R26.2 TROUBLE WALKING: Primary | ICD-10-CM

## 2021-03-11 DIAGNOSIS — M25.511 CHRONIC RIGHT SHOULDER PAIN: ICD-10-CM

## 2021-03-11 DIAGNOSIS — E11.51 TYPE II DIABETES MELLITUS WITH PERIPHERAL CIRCULATORY DISORDER (HCC): ICD-10-CM

## 2021-03-11 DIAGNOSIS — D23.72 BENIGN NEOPLASM OF SKIN OF LOWER LIMB, INCLUDING HIP, LEFT: ICD-10-CM

## 2021-03-11 DIAGNOSIS — G89.29 CHRONIC RIGHT SHOULDER PAIN: ICD-10-CM

## 2021-03-11 PROCEDURE — 1036F TOBACCO NON-USER: CPT | Performed by: STUDENT IN AN ORGANIZED HEALTH CARE EDUCATION/TRAINING PROGRAM

## 2021-03-11 PROCEDURE — 1123F ACP DISCUSS/DSCN MKR DOCD: CPT | Performed by: STUDENT IN AN ORGANIZED HEALTH CARE EDUCATION/TRAINING PROGRAM

## 2021-03-11 PROCEDURE — G8399 PT W/DXA RESULTS DOCUMENT: HCPCS | Performed by: STUDENT IN AN ORGANIZED HEALTH CARE EDUCATION/TRAINING PROGRAM

## 2021-03-11 PROCEDURE — G8428 CUR MEDS NOT DOCUMENT: HCPCS | Performed by: STUDENT IN AN ORGANIZED HEALTH CARE EDUCATION/TRAINING PROGRAM

## 2021-03-11 PROCEDURE — 11721 DEBRIDE NAIL 6 OR MORE: CPT | Performed by: PODIATRIST

## 2021-03-11 PROCEDURE — 17110 DESTRUCTION B9 LES UP TO 14: CPT | Performed by: PODIATRIST

## 2021-03-11 PROCEDURE — G8417 CALC BMI ABV UP PARAM F/U: HCPCS | Performed by: STUDENT IN AN ORGANIZED HEALTH CARE EDUCATION/TRAINING PROGRAM

## 2021-03-11 PROCEDURE — 99213 OFFICE O/P EST LOW 20 MIN: CPT | Performed by: STUDENT IN AN ORGANIZED HEALTH CARE EDUCATION/TRAINING PROGRAM

## 2021-03-11 PROCEDURE — 1090F PRES/ABSN URINE INCON ASSESS: CPT | Performed by: STUDENT IN AN ORGANIZED HEALTH CARE EDUCATION/TRAINING PROGRAM

## 2021-03-11 PROCEDURE — 4040F PNEUMOC VAC/ADMIN/RCVD: CPT | Performed by: STUDENT IN AN ORGANIZED HEALTH CARE EDUCATION/TRAINING PROGRAM

## 2021-03-11 PROCEDURE — G8484 FLU IMMUNIZE NO ADMIN: HCPCS | Performed by: STUDENT IN AN ORGANIZED HEALTH CARE EDUCATION/TRAINING PROGRAM

## 2021-03-11 NOTE — PROGRESS NOTES
[facility] PHYSICIANS  [facility]  [address] [place] [address]  Dept: [phone]  Dept Fax: [phone]    DIABETIC PROGRESS NOTE  Date of patient's visit: 3/11/2021  Patient's Name:  Veronica Nelson YOB: 1940            Patient Care Team:  Beatris Mortimer, DO as PCP - General (Family Medicine)  Beatris Mortimer, DO as PCP - [facility] Empaneled Provider  Alejandro Rose DPM as Physician (Podiatry)          Chief Complaint   Patient presents with    Diabetes    Peripheral Neuropathy    Foot Pain    Nail Problem    Benign Neoplasm       Subjective:   Veronica Nelson comes to clinic for Diabetes, Peripheral Neuropathy, Foot Pain, Nail Problem, and Benign Neoplasm    she is a diabetic and states that she is doing well. Pt currently has complaint of thickened, elongated nails that they cannot manage by themselves. Pt's primary care physician is Imagene Sicard, DO last seen 01/04/2021. Pt's last blood sugar was 55 this morning . Pt also relates to painful skin spots to the bottom of both feet. Pt has tried pads and changing shoes but it has note helped the pain    Pt has a new complaint of NONE. Lab Results   Component Value Date    LABA1C 6.0 11/23/2020      Complains of numbness in the feet bilat.   Past Medical History:   Diagnosis Date    Abnormal cardiovascular stress test 02/2021    LARGE ANTERIOR INFARCTION / LARGE AREA OF INFERIOR LATERAL ISCHEMIA WITH REDUCED EF 38%    Abnormal stress test 07/18/2014    going to do cath- not urgent just abnormal    Ambulates with cane     PRN    Arthritis     At high risk for hyperkalemia 10/22/2014    From type 4 RTA    Back pain     CAD (coronary artery disease)     Circulation problem     decreased circulation to  zbigniew extremities    CKD (chronic kidney disease) stage 3, GFR 30-59 ml/min 10/22/2014    From diabetic and ischemic nephrosclerosis, baseline 1.4, GFR 40-45 ml/min, UPC 0.3    COPD (chronic obstructive pulmonary disease) ([facility])     DM (diabetes mellitus) ([facility])     Edema     chronic lower extremity    Foot pain, bilateral     Former smoker     Gastroesophageal reflux disease without esophagitis 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    Hyperlipidemia     Hypertension     Hypothyroidism     Intermittent claudication ([facility])     loly to left leg    Kidney disease     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 ([facility] 04/20/2016    Morbid obesity with BMI of 45.0-49.9, adult ([facility] 10/22/2014    Osteoarthritis     Other activity(E029.9)     Acute renal failure secondary to prerenal azotemia    Other activity(E029.9)     Atherosclerotic coronary artery disease status-post bypass surgery in 2003    Persistent proteinuria 05/09/2018    From diabetic nephrosclerosis, urine protein creatinine ratio 0.81    PVD (peripheral vascular disease) ([facility]     Sciatica     Secondary hyperparathyroidism (of renal origin) 10/22/2014    Not on VDRA    Shortness of breath     Shoulder fracture, right 12/2020    STATES FELL ON [employer]    Sleep apnea     Type II or unspecified type diabetes mellitus without mention of complication, not stated as uncontrolled        No Known Allergies  Current Outpatient Medications on File Prior to Visit   Medication Sig Dispense Refill    amLODIPine (NORVASC) 10 MG tablet Take 10 mg by mouth daily      ACCU-CHEK DONG PLUS strip TEST THREE TIMES DAILY 300 strip 5    levothyroxine (SYNTHROID) 88 MCG tablet Take 1 tablet by mouth daily 90 tablet 3    acetaminophen (TYLENOL) 500 MG tablet Take 2 tablets by mouth every 8 hours as needed for Pain 270 tablet 1    aspirin EC 81 MG EC tablet Take 2 tablets by mouth daily 30 tablet 5    atorvastatin (LIPITOR) 20 MG tablet TAKE 1 TABLET EVERY DAY (Patient taking differently: nightly TAKE 1 TABLET EVERY DAY) 90 tablet 5    calcitRIOL (ROCALTROL) 0.25 MCG capsule Take 1 capsule by mouth three times a week mon wed fri 30 capsule 5    clopidogrel (PLAVIX) 75 MG tablet TAKE 1 TABLET EVERY DAY 90 tablet 5    cyanocobalamin 1000 MCG/ML injection Inject 1 ML every other month   please give patient (6) 1 ml vials. 6 mL 5    blood glucose monitor strips Test_3__times daily Diagnosis: 250.0   Diabetes Mellitus__x__Insulin Dependent____Non-Insulin Dependent Life Scan test strips ultra one touch 300 strip 5    insulin glargine (LANTUS) 100 UNIT/ML injection vial INJECT 5 UNITS INTO THE SKIN NIGHTLY 30 mL 5    Insulin Syringe-Needle U-100 31G X 5/16\" 0.3 ML MISC 1 each by Does not apply route daily 100 each 5    lansoprazole (PREVACID) 15 MG delayed release capsule TAKE 1 CAPSULE EVERY DAY 90 capsule 5    lisinopril (PRINIVIL;ZESTRIL) 20 MG tablet TAKE 1 TABLET TWICE DAILY (DOSE INCREASE PER DR Betty Herr) 180 tablet 5    metoprolol succinate (TOPROL XL) 100 MG extended release tablet TAKE 1 TABLET EVERY DAY 90 tablet 5    nitroGLYCERIN (NITROSTAT) 0.4 MG SL tablet Place 1 tablet under the tongue as needed for Chest pain 25 tablet 5    Syringe/Needle, Disp, (SYRINGE 3CC/25GX1\") 25G X 1\" 3 ML MISC Used one every other month. 6 each 5    B-D INS SYR ULTRAFINE 1CC/30G 30G X 1/2\" 1 ML MISC USE 1 SYRINGE EVERY DAY 90 each 5    Compression Stockings MISC by Does not apply route 20-40 mmHg. 2 each 0    Blood Glucose Calibration (OT ULTRA/FASTTK CNTRL SOLN) SOLN       Multiple Vitamins-Minerals (OCUVITE ADULT 50+ PO) Take 1 tablet by mouth 2 times daily.  Green Tea, Camillia sinensis, 315 MG CAPS Take 1 tablet by mouth daily       Cinnamon 500 MG TABS Take 1 tablet by mouth daily.  Cranberry-Vitamin C-Vitamin E (CRANBERRY PLUS VITAMIN C PO) Take 1 tablet by mouth daily.  Ascorbic Acid (VITAMIN C WITH VIVIENNE HIPS) 500 MG tablet Take 500 mg by mouth daily.       Omega-3 Fatty Acids (FISH OIL) 1000 MG CAPS Take 1,000 mg by mouth daily.  docusate sodium (COLACE) 100 MG capsule Take 100 mg by mouth 2 times daily.  Blood Glucose Monitoring Suppl (ONE TOUCH ULTRA) by Does not apply route.  Alcohol Swabs (B-D SINGLE USE SWABS REGULAR) PADS 1 box by Other route 3 times daily as needed (When pt checks glucose) 1 each 5     No current facility-administered medications on file prior to visit. Review of Systems    Review of Systems:   History obtained from chart review and the patient  General ROS: negative for - chills, fatigue, fever, night sweats or weight gain  Constitutional: Negative for chills, diaphoresis, fatigue, fever and unexpected weight change. Musculoskeletal: Positive for arthralgias, gait problem and joint swelling. Neurological ROS: negative for - behavioral changes, confusion, headaches or seizures. Negative for weakness and numbness. Dermatological ROS: negative for - mole changes, rash  Cardiovascular: Negative for leg swelling. Gastrointestinal: Negative for constipation, diarrhea, nausea and vomiting. Objective:  Dermatologic Exam:  Skin lesion present to the right and left plantar foot with a central core and petchaie noted to the lesions periphery. Pain on palpation of the lesion  . Ulus Reusing    Skin is thin, with flaky sloughing skin as well as decreased hair growth to the lower leg  Small red hemosiderin deposits seen dorsal foot   Musculoskeletal:     1st MPJ ROM decreased, Bilateral.  Muscle strength 5/5, Bilateral.  Pain present upon palpation of toenails 1-5, Bilateral. decreased medial longitudinal arch, Bilateral.  Ankle ROM decreased,Bilateral.    Dorsally contracted digits present digits 2, Bilateral.     Vascular: DP pulses 1/4 bilateral.  PT pulses 0/4 bilateral.   CFT <5 seconds, Bilateral.  Hair growth absent to the level of the digits, Bilateral.  Edema present, Bilateral.  Varicosities absent, Bilateral. Erythema absent, Bilateral    Neurological: Sensation diminshed to light touch to level of digits, Bilateral.  Protective sensation intact 6/10 sites via 5.07/10g [place] Monofilament, Bilateral.  negative Tinel's, Bilateral.  negative Valleix sign, Bilateral.      Integument: Warm, dry, supple, Bilateral.  Open lesion absent, Bilateral.  Interdigital maceration absent to web spaces 4, Bilateral.  Nails 1-5 left and 1-5 right thickened > 3.0 mm, dystrophic and crumbly, discolored with subungual debris. Fissures absent, Bilateral.   General: AAO x 3 in NAD.     Derm  Toenail Description  Sites of Onychomycosis Involvement (Check affected area)  [x] [x] [x] [x] [x] [x] [x] [x] [x] [x]  5 4 3 2 1 1 2 3 4 5                          Right                                        Left    Thickness  [x] [x] [x] [x] [x] [x] [x] [x] [x] [x]  5 4 3 2 1 1 2 3 4 5                         Right                                        Left    Dystrophic Changes   [x] [x] [x] [x] [x] [x] [x] [x] [x] [x]  5 4 3 2 1 1 2 3 4 5                         Right                                        Left    Color   [x] [x] [x] [x] [x] [x] [x] [x] [x] [x]  5 4 3 2 1 1 2 3 4 5                          Right                                        Left    Incurvation/Ingrowin   [] [] [] [] [] [] [] [] [] []  5 4 3 2 1 1 2 3 4 5                         Right                                        Left    Inflammation/Pain   [x] [x] [x] [x] [x] [x] [x] [x] [x] [x]  5 4 3 2 1 1 2 3 4 5                         Right                                        Left        Visual inspection:  Deformity: hammertoe deformity zbigniew feet  amputation: absent  Skin lesions: present - as above  Edema: right- 2+ pitting edema, left- 2+ pitting edema    Sensory exam:  Monofilament sensation: abnormal - 6/10 via SW 5.07/10g monofilament to the plantar foot bilateral feet    Pulses: abnormal - 1/4 dorsalis pedis pulse and 1/4 Posterior tibial pulse,   Pinprick: questions/problems/concerns.    Other comorbidity noted and will be managed by PCP.  3/11/2021    Electronically signed by Valentin Meadows DPM on 3/11/2021 at 2:24 PM  3/11/2021

## 2021-03-11 NOTE — PROGRESS NOTES
[facility]  [facility] ORTHO SPECIALISTS  [address]  Dept: [phone]  Dept Fax: [phone]        Ambulatory Follow Up      Subjective:     Chief Complaint   Patient presents with    Follow-up     rt humerus fx       Jose Rico is a 80y.o. year old female who presents to our office today for routine followup regarding her   1. Displaced fracture of greater tuberosity of right humerus, subsequent encounter for fracture with delayed healing    2. Chronic right shoulder pain    . HPI  Patient is here for follow-up regarding her right greater tuberosity fracture sustained on Kendy Janelle of 2020. Patient states that she is doing better since her last visit. She has been working with physical therapy and they have been working on range of motion exercises. She has not begun strengthening exercises yet at this time. Her pain is controlled. She denies any numbness or tingling. Denies any other orthopedic complaints at this time. ROS:   Constitutional: Negative for fever and chills. Respiratory: Negative for cough, shortness of breath and wheezing. Cardiovascular: Negative for chest pain and palpitations. GI: Denies any nausea, vomiting, abdominal pain   Musculoskeletal: Positive for mild right shoulder pain      Objective :   Ht 5' (1.524 m)   Wt 212 lb (96.2 kg)   BMI 41.40 kg/m²     General: Jose Rico is a 80 y.o. female who is alert and oriented and sitting comfortably in our office. Neuro: alert. oriented  Eyes: Extra-ocular muscles intact  Mouth: Oral mucosa moist. No perioral lesions  Pulm: Respirations unlabored and regular. Skin: warm, well perfused  Psych:   Patient has good fund of knowledge and displays understanging of exam, diagnosis, and plan. MSK: Right shoulder exam: Mild tenderness palpation over the fracture site though improved from previous clinical exams. Range of motion:  Forward flexion 110 degrees, external rotation to 35 degrees, internal rotation to L2. Strength 5/5 with resisted abduction, subscap lift off, external rotation. Positive Mullen, positive Neer's. Radial pulse 2+. Sensation grossly intact throughout the hand.  strength 5/5    Radiology:   History:   Right greater tuberosity fracture on 12/24/2020    Findings:   Views: AP/Grashey/scapular Y of the right shoulder  Findings: Demonstrate evidence of right greater tuberosity fracture with some mild bony consolidation. The fracture does appear to be slightly more depressed than previous images though this could be projectional.  No lytic or blastic lesions. No other fractures noted. Normal glenohumeral alignment. Previous comparison films: Right shoulder films, 2/4/2021    Impression:  1. Greater tuberosity fracture with routine healing. Assessment:      1. Displaced fracture of greater tuberosity of right humerus, subsequent encounter for fracture with delayed healing    2. Chronic right shoulder pain       Plan:   Patient is clinically improved from previous exams. She has been working with physical therapy and is making adequate progress with range of motion. We would like her to continue doing physical therapy at this time. We are okay with her beginning strengthening exercises with PT. We will see the patient back in 3 months and we will repeat x-rays of her right shoulder at that visit. Patient is okay to follow-up sooner if having any issues. Follow up:Return in about 3 months (around 6/11/2021). No orders of the defined types were placed in this encounter.          Orders Placed This Encounter   Procedures    XR SHOULDER RIGHT (MIN 2 VIEWS)     Standing Status:   Future     Number of Occurrences:   1     Standing Expiration Date:   3/11/2022           --------------------------------------------------------------  Linda Sanderson DO, PGY-4  [facility]) Orthopedic Surgery   11:53 AM 03/11/21      Please

## 2021-03-12 ENCOUNTER — HOSPITAL ENCOUNTER (OUTPATIENT)
Dept: PHYSICAL THERAPY | Age: 81
Setting detail: THERAPIES SERIES
Discharge: HOME | End: 2021-03-12
Payer: MEDICARE

## 2021-03-12 PROCEDURE — 97110 THERAPEUTIC EXERCISES: CPT

## 2021-03-12 NOTE — FLOWSHEET NOTE
[x] [facility]) [facility] &  Therapy  [address]  [phone]  F: [phone] [] [address]  [place] [address]   [address]  P: [phone]  F: [phone] [] [place]  [address]  P: [phone]  F: [phone] [] [address]  P: [phone]  F: [phone] [] [address]  [facility]   [address]   [place]: [phone]  F: [phone]      Physical Therapy Daily Treatment Note    Date:  3/12/2021  Patient Name:  Roger Salinas    :  1940  MRN: [id number]  Physician: Michael Nichols DO                     Insurance: [employer] Medicare $40 copay Need auth   Medical Diagnosis:Fall, initial encounter (R55. XXXA [ICD-10-CM]); Sprain of right shoulder, unspecified shoulder sprain type, initial encounter (S43.401A [ICD-10-CM]); Arm pain, right (M79.601 [ICD-10-CM]); Elbow pain, right (M25.521 [ICD-10-CM])                          Rehab Codes:M25.511, M25.611  Onset Date: 20                                 Next 's appt: Ortho /   Visit# / total visits: 05/10; Progress note for STG due at visit # 10      Cancels/No Shows: 2/0    Subjective:    Pain:  [x] Yes  [x] No Location:  Right shldr  Pain Rating: (0-10 scale) 0-1/10  Pain altered Tx:  [x] No  [] Yes  Action:  Comments:     Reports intermittent dull aching pain. States she went to ortho MD and is okayed to work on strengthening. MD notes in Epic (notes R shldr impingement)   Objective:  Modalities:   Precautions:    Modalities:  CP to R shoulder x 10 mins post exericises in sitting w/ pillow under elbow.    Exercises:  3/12/21 completed exercises marked with \"x\"    Exercise Reps/ Time Weight/ Level Comments    pulleys 2 mins margo TERRELL Flexion, abd   x                  Standing       Wall slides HEP  Flexion, circles, scaphion-     Doorway pectoralis strectch 3x20\"  Added 3/12 x   Wand chest press 10x 2 lbs Added wt 3/12 x   Wand abd 10x 2 lbs Added wt 3/12 x   wand flexion 10x 2 lbs  Added wt 3/12 x   Wand IR  x  HEP and demo on y 3/12 x   Wand shldr ext x  HEP and demo only 3/12 x   FW chest press  10x 1 lbs  x   FW abd 10x 1 lbs  x   FW flexion 10x 1 lbs  x   tband   Added all 3/12 x   ER/IR 15x ea lime  x   shldr ext 15x lime  x   rows 15x lime  x          Sitting       UT stretch HEP       Levator stretch HEP      shrugs HEP      Retro shldr rolls HEP      scap. retraction HEP      scap retraction w/ ER HEP        self caudal glide 2x15\"  Gentle, added 3/12 x          SL        scapular depression 10x   improved contraction, ROM vs sitting      IR sleeper stretch  1x20\"    x   ER 10x 1 lbs Added 3/12 x   abd 10x 1 lbs Added 3/12 x          Supine              Manual  x  Oscillations, distraction, distraction with ROM. Add  mobs x   Pectoralis stretch 3 mins  Towel roll, arm abd approx 90°    AAROM    AA      Cane ER 3x15\"  I     IR/ER  HEP AA     Chest press  HEP AA     HAB HEP AA     Flexion HEP AA                               Specific Instructions for next treatment[de-identified]  Manual/add shldr mobs and modalities as needed, progress strengthening to tolerance with focus on periscapular muscles, posture, pectoralis stretching         Treatment Charges: Mins Units   [x]  Modalities  CP  --    [x]  Ther Exercise  45 3   [x]  Manual Therapy     []  Ther Activities     []  Aquatics     []  Vasocompression     []  Other     Total Treatment time 45 3       Assessment: [x] Progressing toward goals   Progressed  AAROM  to wall slides and standing cane exercises for shoulder ROM progressions. Added manual as listed above and  gentle supine  pectoralis stretch. Supine scaphion to approx. 170°          [] No change.      [] Other:  [] Patient would continue to benefit from skilled physical therapy services in order to:       STG: (to be met in 10 treatments)  1. ? Pain: 2/10 R shoulder with activity. 2. ? ROM: R shoulder flexion 0-160, abduction 0-120, and ER to 70 degrees to improve reaching and self care. 3. ? Strength:4/5 R shoulder flexion and abduction to improve lifting. 4. ? Function: UEFI score to 69% functional or better to improve ADLs. 5. Independent with Home Exercise Programs     Patient goals: Restore R shoulder function. Pt. Education:  [x] Yes  [] No  [x] Reviewed Prior HEP/Ed  Method of Education: [x] Verbal  [x] Demo  [x] Written  Comprehension of Education:  [x] Verbalizes understanding. [x] Demonstrates understanding. [] Needs review. [x] Demonstrates/verbalizes HEP/Ed previously given. 2/1/21: pendulum,  Shrugs, retro shldr rolls and  table top shldr ROM.  2/9/21: UT and levator stretching    Access Code: [email or web address]   URL: [email or web address]/   Date: 03/05/2021   Prepared by: Deana Munoz     Exercises   Supine Thoracic Mobilization Foam Roll Horizontal - 1 reps - 1 sets - 120 hold - 1x daily - 7x weekly   Shoulder Flexion Wall Slide with Towel - 10 reps - 1 sets - 5 hold - 1x daily - 7x weekly   Standing Shoulder Abduction Slides at Wall - 10 reps - 1 sets - 5 hold - 1x daily - 7x weekly   Access Code: [email or web address]   URL: GridX/   Date: 03/12/2021   Prepared by: Deana Munoz     Exercises   Seated Shoulder Inferior Glide - 3 reps - 3 sets - 15 hold - 1x daily - 7x weekly   Doorway Pec Stretch at 90 Degrees Abduction - 13 reps - 1 sets - 30 hold - 3x daily - 7x weekly   Standing Pec Stretch at Wall - 13 reps - 1 sets - 30 hold - 3x daily - 7x weekly   Standing Shoulder Extension with Dowel - 10 reps - 2 sets - 3 hold - 1-2x daily - 5x weekly   Standing Bilateral Shoulder Internal Rotation AAROM with Dowel - 10 reps - 2 sets - 3 hold - 1-2x daily - 5x weekly     Plan: [x] Continue current frequency toward long and

## 2021-03-17 ENCOUNTER — APPOINTMENT (OUTPATIENT)
Dept: PHYSICAL THERAPY | Age: 81
End: 2021-03-17
Payer: MEDICARE

## 2021-03-19 ENCOUNTER — HOSPITAL ENCOUNTER (OUTPATIENT)
Dept: PHYSICAL THERAPY | Age: 81
Setting detail: THERAPIES SERIES
Discharge: HOME | End: 2021-03-19
Payer: MEDICARE

## 2021-03-19 PROCEDURE — 97110 THERAPEUTIC EXERCISES: CPT

## 2021-03-19 NOTE — FLOWSHEET NOTE
[x] [facility]) [facility] &  Therapy  [address]  [phone]  F: [phone] [] [address]  [place] [address]   [address]  P: [phone]  F: [phone] [] [place]  [address]  P: [phone]  F: [phone] [] [address]  P: [phone]  F: [phone] [] [address]  [facility]   [address]   [place]: [phone]  F: [phone]      Physical Therapy Daily Treatment Note    Date:  3/19/2021  Patient Name:  Maranda hTompson    :  1940  MRN: [id number]  Physician: Froy Mir DO                     Insurance: [employer] Medicare $40 copay Need auth   Medical Diagnosis:Fall, initial encounter (I52. XXXA [ICD-10-CM]); Sprain of right shoulder, unspecified shoulder sprain type, initial encounter (S43.401A [ICD-10-CM]); Arm pain, right (M79.601 [ICD-10-CM]); Elbow pain, right (M25.521 [ICD-10-CM])                          Rehab Codes:M25.511, M25.611  Onset Date: 20                                 Next 's appt: Ortho /   Visit# / total visits: 06/10; Progress note for STG due at visit # 10      Cancels/No Shows: 2/0    Subjective:    Pain:  [x] Yes  [x] No Location:  Right shldr  Pain Rating: (0-10 scale) 0-1/10  Pain altered Tx:  [x] No  [] Yes  Action:  Comments:  Reports pain as resolved. Addressing ADLS and HEP. States dressing (ie bra is getting somewhat better)      Objective:  Modalities:  As needed. Precautions:    Modalities:  CP to R shoulder x 10 mins post exericises in sitting w/ pillow under elbow.    Exercises:  3/12/21 completed exercises marked with \"x\"    Exercise Reps/ Time Weight/ Level Comments    pulleys 2 mins ea AA Flexion,  abd        UBE 2/2 L1.5 Added 3/19 x Standing       Wall slides HEP  Flexion, circles, scaphion-     Doorway pectoralis stretch 3x20\"   x   Towel IR stretch 5x20\"  Added  3/19 x   Post capsule stretch 3x20'  Added 3/19 x   Wand chest press HEP 2 lbs    x   Wand abd 5x 2 lbs  x   wand flexion 5x 2 lbs      Wand IR 10x 2 lbs  x   Wand shldr ext 10x 2 lbs  x          FW shrugs 10x 2 lbs Added 3/19 x   FW chest press  10x 1 lbs  x   FW abd 10x 1 lbs  x   FW flexion 10x 1 lbs  x   FW biceps 10x2 2 lbs 2 ways, added 3/19 x          tband       ER/IR 15x ea lime      shldr ext 15x lime  x   rows 15x lime  x   abd 15x lime Added 3/19 x          Sitting       UT stretch HEP       Levator stretch HEP      shrugs HEP      Retro shldr rolls HEP      scap. retraction HEP      scap retraction w/ ER HEP       self caudal glide 2x15\"  Gentle           SL        scapular depression 10x   improved contraction, ROM vs sitting      IR sleeper stretch 1x20\"   prefers towel stretch standing     ER 15x 1 lbs   x   abd 10x 1 lbs  x          Supine       Manual  x  Oscillations, distraction, distraction with ROM. Add  Mobs as needed     Pectoralis stretch 3 mins  Towel roll, arm abd approx 90°    AAROM    AA      Cane ER 3x15\"       IR/ER  HEP AA     Chest press  HEP AA     HAB HEP AA     Flexion HEP AA                           other:  IR  T10  Specific Instructions for next treatment[de-identified]  Manual/add shldr mobs and modalities as needed, progress strengthening to tolerance with focus on periscapular muscles, posture, pectoralis stretching         Treatment Charges: Mins Units   [x]  Modalities  CP  --    [x]  Ther Exercise  53 4   []  Manual Therapy     []  Ther Activities     []  Aquatics     []  Vasocompression     []  Other     Total Treatment time 53 4       Assessment: [x] Progressing toward goals progressing shldr strengthening exercises and stretching per log. IR to T10 . Pt requires sitting rest breaks due to feet and LE chronic pain. [] No change.      [] Other:  [] Patient would continue to benefit from skilled physical therapy services in order to:       STG: (to be met in 10 treatments)  1. ? Pain: 2/10 R shoulder with activity. 2. ? ROM: R shoulder flexion 0-160, abduction 0-120, and ER to 70 degrees to improve reaching and self care. 3. ? Strength:4/5 R shoulder flexion and abduction to improve lifting. 4. ? Function: UEFI score to 69% functional or better to improve ADLs. 5. Independent with Home Exercise Programs     Patient goals: Restore R shoulder function. Pt. Education:  [x] Yes  [] No  [x] Reviewed Prior HEP/Ed  Method of Education: [x] Verbal  [x] Demo  [x] Written  Comprehension of Education:  [x] Verbalizes understanding. [x] Demonstrates understanding. [] Needs review. [x] Demonstrates/verbalizes HEP/Ed previously given. 2/1/21: pendulum,  Shrugs, retro shldr rolls and  table top shldr ROM.  2/9/21: UT and levator stretching    Access Code: [email or web address]   URL: [employer]/   Date: 03/05/2021   Prepared by: Gabriela Rogers     Exercises   Supine Thoracic Mobilization Foam Roll Horizontal - 1 reps - 1 sets - 120 hold - 1x daily - 7x weekly   Shoulder Flexion Wall Slide with Towel - 10 reps - 1 sets - 5 hold - 1x daily - 7x weekly   Standing Shoulder Abduction Slides at Wall - 10 reps - 1 sets - 5 hold - 1x daily - 7x weekly   Access Code: [email or web address]   URL: [employer]/   Date: 03/12/2021   Prepared by: Gabriela Rogers     Exercises   Seated Shoulder Inferior Glide - 3 reps - 3 sets - 15 hold - 1x daily - 7x weekly   Doorway Pec Stretch at 90 Degrees Abduction - 13 reps - 1 sets - 30 hold - 3x daily - 7x weekly   Standing Pec Stretch at Wall - 13 reps - 1 sets - 30 hold - 3x daily - 7x weekly   Standing Shoulder Extension with Dowel - 10 reps - 2 sets - 3 hold - 1-2x daily - 5x weekly   Standing Bilateral Shoulder Internal Rotation AAROM with Dowel - 10 reps - 2 sets - 3 hold - 1-2x daily - 5x weekly   Access Code: [email or web address]: [email or web address] com/Date: 03/19/2021Prepared by: Sinda Bence   Standing Row with Resistance - 1 x daily - 3-4 x weekly - 2 sets - 10 reps - 3 hold   Shoulder External Rotation with Anchored Resistance - 1 x daily - 3-4 x weekly - 2 sets - 10 reps - 3 hold   Shoulder Internal Rotation with Resistance - 1 x daily - 3-4 x weekly - 2 sets - 10 reps - 3 hold   Shoulder Abduction with Resistance Bar - 1 x daily - 3-4 x weekly - 2 sets - 10 reps - 3 hold  Issued lime tband. Plan: [x] Continue current frequency toward long and short term goals. 1x wk during ROM only, due to copay amt, progress to 2x wk when strengthening allowed. [x] Specific Instructions for subsequent treatments:  Progress ROM R shoulder,  Add manual as needed, periscapular strengthening.        Time In:   1215           Time Out:   5468      Electronically signed by:  Dianna Hood PTA

## 2021-03-26 ENCOUNTER — HOSPITAL ENCOUNTER (OUTPATIENT)
Dept: PHYSICAL THERAPY | Age: 81
Setting detail: THERAPIES SERIES
Discharge: HOME | End: 2021-03-26
Payer: MEDICARE

## 2021-03-26 PROCEDURE — 97140 MANUAL THERAPY 1/> REGIONS: CPT

## 2021-03-26 PROCEDURE — 97110 THERAPEUTIC EXERCISES: CPT

## 2021-03-26 NOTE — FLOWSHEET NOTE
[x] Counts include 234 beds at the [facility] &  Therapy  [address]  [phone]  F: [phone] [] [address]  [facility]   [address]  P: [phone]  F: [phone] [] [place]  [address]  P: [phone]  F: [phone] [] [address]  P: [phone]  F: [phone] [] [address]  [facility]   [address]   [place]: [phone]  F: [phone]      Physical Therapy Daily Treatment Note    Date:  3/26/2021  Patient Name:  Suellen Courtney    :  1940  MRN: [id number]  Physician: Priscilla Sinha DO                     Insurance: [facility] Medicare $40 copay Need auth   Medical Diagnosis:Fall, initial encounter (S13. XXXA [ICD-10-CM]); Sprain of right shoulder, unspecified shoulder sprain type, initial encounter (S43.401A [ICD-10-CM]); Arm pain, right (M79.601 [ICD-10-CM]); Elbow pain, right (M25.521 [ICD-10-CM])                          Rehab Codes:M25.511, M25.611  Onset Date: 20                                 Next 's appt: Ortho /   Visit# / total visits: 07/10; Progress note for STG due at visit # 10      Cancels/No Shows: 2/0    Subjective:    Pain:  [x] Yes  [x] No Location:  Right shldr  Pain Rating: (0-10 scale) 0-1/10  Pain altered Tx:  [x] No  [] Yes  Action:  Comments:  Reports reaching the back of her neck is very hard/tight. Addressing HEP     Objective:  Modalities:  As needed. Precautions:    Modalities:  CP to R shoulder x 10 mins post exericises in sitting w/ pillow under elbow.    Exercises:  3/12/21 completed exercises marked with \"x\"    Exercise Reps/ Time Weight/ Level Comments    pulleys 2 mins ea AA Flexion,  abd        UBE 2/2 L2.0   x          Standing       Doorway pectoralis stretch 3x20\"   x   Towel IR stretch 5x20\"    x   Post capsule stretch 3x20'   x   Door ER stretch 3x15\"  Added 3/26 x   Wand chest press HEP 2 lbs       Wand abd 5x 2 lbs     wand flexion 5x 2 lbs      Wand IR 15x 2 lbs  x   Wand shldr ext 15x 2 lbs  x          FW shrugs 10x 2 lbs      FW chest press  10x 1 lbs     FW abd 10x 1 lbs     FW flexion 10x 1 lbs     FW biceps 10x2 2 lbs 2 ways,  x          tband       ER 15x ea lime   x   IR 15x lime      shldr ext 15x lime  x   rows 15x lime  x   abd 15x lime             Sitting       UT stretch HEP       Levator stretch HEP      shrugs HEP      Retro shldr rolls HEP      scap. retraction HEP      scap retraction w/ ER x   Demo and verbal for HEP x   self caudal glide 3x15\"  Gentle x   Education  x  Posture, lumbar roll, slight scap. retraction x   SL        scapular depression 10x   improved contraction, ROM vs sitting      IR sleeper stretch 3x20\"     x   ER 15x 2lbs  incr. weight 3/26 x   abd 10x 1 lbs  x          Supine       Manual  8 mins  Oscillations, distraction, distraction with ROM, anterior/posterior mobs  x   Wand IR/ER 90° abd    x   ER stretch 3x15\"  Manual  x   Pectoralis stretch 3 mins  Towel roll, arm abd approx 90°    AAROM    AA      Cane ER 3x15\"       IR/ER  HEP AA     Chest press  HEP AA     HAB HEP AA     Flexion HEP AA                           other:  IR  T10  Specific Instructions for next treatment[de-identified]  Manual/add shldr mobs and modalities as needed, progress strengthening to tolerance with focus on periscapular muscles, posture, pectoralis stretching         Treatment Charges: Mins Units   [x]  Modalities  CP  --    [x]  Ther Exercise  40 2   [x]  Manual Therapy  10 1   []  Ther Activities     []  Aquatics     []  Vasocompression     []  Other     Total Treatment time 50 3       Assessment: [x] Progressing toward goals   Focus on Manual and stretching shoulder rotators  with focus on ER  ROM. Pt demonstrated good understanding of stretches. Lower tolerance to standing activities due to PVD and zbigniew heel hx. IR to T8       [] No change. [x] Other: Pt demonstrates impingement, education on lumbar roll/sitting posture, periscapular strengthening and added self caudal glide. [] Patient would continue to benefit from skilled physical therapy services in order to:       STG: (to be met in 10 treatments)  1. ? Pain: 2/10 R shoulder with activity. 2. ? ROM: R shoulder flexion 0-160, abduction 0-120, and ER to 70 degrees to improve reaching and self care. 3. ? Strength:4/5 R shoulder flexion and abduction to improve lifting. 4. ? Function: UEFI score to 69% functional or better to improve ADLs. 5. Independent with Home Exercise Programs     Patient goals: Restore R shoulder function. Pt. Education:  [x] Yes  [] No  [x] Reviewed Prior HEP/Ed  Method of Education: [x] Verbal  [x] Demo  [x] Written  Comprehension of Education:  [x] Verbalizes understanding. [x] Demonstrates understanding. [] Needs review. [x] Demonstrates/verbalizes HEP/Ed previously given. 2/1/21: pendulum,  Shrugs, retro shldr rolls and  table top shldr ROM.  2/9/21: UT and levator stretching    Access Code: [email or web address]   URL: [email or web address]. com/   Date: 03/05/2021   Prepared by: Keith Vasquez     Exercises   Supine Thoracic Mobilization Foam Roll Horizontal - 1 reps - 1 sets - 120 hold - 1x daily - 7x weekly   Shoulder Flexion Wall Slide with Towel - 10 reps - 1 sets - 5 hold - 1x daily - 7x weekly   Standing Shoulder Abduction Slides at Wall - 10 reps - 1 sets - 5 hold - 1x daily - 7x weekly   Access Code: [email or web address]   URL: [email or web address]. com/   Date: 03/12/2021   Prepared by: Keith Vasquez     Exercises   Seated Shoulder Inferior Glide - 3 reps - 3 sets - 15 hold - 1x daily - 7x weekly   Doorway Pec Stretch at 90 Degrees Abduction - 13 reps - 1 sets - 30 hold - 3x daily - 7x weekly   Standing Pec Stretch at Wall - 13 reps - 1 sets - 30 hold - 3x daily - 7x weekly Standing Shoulder Extension with Dowel - 10 reps - 2 sets - 3 hold - 1-2x daily - 5x weekly   Standing Bilateral Shoulder Internal Rotation AAROM with Dowel - 10 reps - 2 sets - 3 hold - 1-2x daily - 5x weekly   Access Code: [email or web address]: [employer]/Date: 03/19/2021Prepared by: Bret Staples   Standing Row with Resistance - 1 x daily - 3-4 x weekly - 2 sets - 10 reps - 3 hold   Shoulder External Rotation with Anchored Resistance - 1 x daily - 3-4 x weekly - 2 sets - 10 reps - 3 hold   Shoulder Internal Rotation with Resistance - 1 x daily - 3-4 x weekly - 2 sets - 10 reps - 3 hold   Shoulder Abduction with Resistance Bar - 1 x daily - 3-4 x weekly - 2 sets - 10 reps - 3 hold  Issued lime tband. Access Code: [email or web address]: [employer]/Date: 03/26/2021Prepared by: Viet Clarkeaescobari - 2-3 x daily - 7 x weekly - 1 sets - 3-5 reps - 15 hold   Seated Shoulder Inferior Glide - 2 x daily - 7 x weekly - 1 sets - 3 reps - 15 hold   Standing Shoulder External Rotation Stretch in Doorway - 2-3 x daily - 7 x weekly - 1 sets - 3-5 reps - 15 hold    Plan: [x] Continue current frequency toward long and short term goals. 1x wk during ROM only, due to copay amt, progress to 2x wk when strengthening allowed. [x] Specific Instructions for subsequent treatments:  Progress ROM R shoulder,  Add manual as needed, periscapular strengthening.        Time In:   1050            Time Out:    6002    Electronically signed by:  Rach Molina PTA

## 2021-03-31 ENCOUNTER — HOSPITAL ENCOUNTER (OUTPATIENT)
Dept: PHYSICAL THERAPY | Age: 81
Setting detail: THERAPIES SERIES
Discharge: HOME | End: 2021-03-31
Payer: MEDICARE

## 2021-03-31 PROCEDURE — 97110 THERAPEUTIC EXERCISES: CPT

## 2021-03-31 PROCEDURE — 97140 MANUAL THERAPY 1/> REGIONS: CPT

## 2021-03-31 NOTE — FLOWSHEET NOTE
[x] [facility] &  Therapy  [address]  [phone]  F: [phone] [] [address]  [facility]   [address]  P: [phone]  F: [phone] [] [place]  [address]  P: [phone]  F: [phone] [] [address]  P: [phone]  F: [phone] [] [address]  [facility]   [address]   [place]: [phone]  F: [phone]      Physical Therapy Daily Treatment Note    Date:  3/31/2021  Patient Name:  Kylee Bland    :  1940  MRN: [id number]  Physician: Gabi Tony DO                     Insurance: [facility] Medicare $40 copay Need auth   Medical Diagnosis:Fall, initial encounter (N16. XXXA [ICD-10-CM]); Sprain of right shoulder, unspecified shoulder sprain type, initial encounter (S43.401A [ICD-10-CM]); Arm pain, right (M79.601 [ICD-10-CM]); Elbow pain, right (M25.521 [ICD-10-CM])                          Rehab Codes:M25.511, M25.611  Onset Date: 20                                 Next 's appt: Ortho /   Visit# / total visits: 08/10;                    Progress note for STG due at visit # 10      Cancels/No Shows: 2/0    Subjective:    Pain:  [x] Yes  [x] No Location:  Right shldr  Pain Rating: (0-10 scale) 3/10  Pain altered Tx:  [x] No  [] Yes  Action:  Comments:   Reports reaching to back of neck is still very difficult but can reach back of head better to address ADLS. Shldr pain changes depending on what she is addressing. Addressing HEP. Objective:  Modalities:  As needed. Precautions:    Modalities:  CP to R shoulder x 10 mins post exericises in sitting w/ pillow under elbow.    Exercises:  3/12/21 completed exercises marked with \"x\"    Exercise Reps/ Time periscapular muscles, posture, pectoralis stretching         Treatment Charges: Mins Units   [x]  Modalities  CP  --    [x]  Ther Exercise 20 1   [x]  Manual Therapy 20 2   []  Ther Activities     []  Aquatics     []  Vasocompression     []  Other     Total Treatment time 40  3       Assessment: [x] Progressing toward goals   Focus on Manual and stretching as listed above. Restrictions with pectoralis major/minor, and subscapularis/ nfraspinatus. Lower tolerance to standing activity due to PVD. [] No change. [x] Other:  .   [] Patient would continue to benefit from skilled physical therapy services in order to:       STG: (to be met in 10 treatments)  1. ? Pain: 2/10 R shoulder with activity. 3/31/21 NOT MET  2. ? ROM: R shoulder flexion 0-160, abduction 0-120, and ER to 70 degrees to improve reaching and self care. 3/31/21: partially met, only met abd.  3. ? Strength:4/5 R shoulder flexion and abduction to improve lifting. 3/31/21 MET  4. ? Function: UEFI score to 69% functional or better to improve ADLs.  3/31/21 MET 88%  5. Independent with Home Exercise Programs 3/31/ MET     Patient goals: Restore R shoulder function. Pt. Education:  [x] Yes  [] No  [x] Reviewed Prior HEP/Ed  Method of Education: [x] Verbal  [x] Demo  [x] Written  Comprehension of Education:  [x] Verbalizes understanding. [x] Demonstrates understanding. [] Needs review. [x] Demonstrates/verbalizes HEP/Ed previously given. 2/1/21: pendulum,  Shrugs, retro shldr rolls and  table top shldr ROM.  2/9/21: UT and levator stretching    Access Code: [email or web address]   URL: [email or web address]/   Date: 03/05/2021   Prepared by: Kamilla Solano     Exercises   Supine Thoracic Mobilization Foam Roll Horizontal - 1 reps - 1 sets - 120 hold - 1x daily - 7x weekly   Shoulder Flexion Wall Slide with Towel - 10 reps - 1 sets - 5 hold - 1x daily - 7x weekly   Standing Shoulder Abduction Slides at Wall - 10 reps - 1 sets - 5 hold - 1x daily - 7x weekly   Access Code: [email or web address]   URL: [employer]. com/   Date: 03/12/2021   Prepared by: Sahra Wright     Exercises   Seated Shoulder Inferior Glide - 3 reps - 3 sets - 15 hold - 1x daily - 7x weekly   Doorway Pec Stretch at 90 Degrees Abduction - 13 reps - 1 sets - 30 hold - 3x daily - 7x weekly   Standing Pec Stretch at Wall - 13 reps - 1 sets - 30 hold - 3x daily - 7x weekly   Standing Shoulder Extension with Dowel - 10 reps - 2 sets - 3 hold - 1-2x daily - 5x weekly   Standing Bilateral Shoulder Internal Rotation AAROM with Dowel - 10 reps - 2 sets - 3 hold - 1-2x daily - 5x weekly   Access Code: [email or web address]: [employer]. com/Date: 03/19/2021Prepared by: Camila Sizer   Standing Row with Resistance - 1 x daily - 3-4 x weekly - 2 sets - 10 reps - 3 hold   Shoulder External Rotation with Anchored Resistance - 1 x daily - 3-4 x weekly - 2 sets - 10 reps - 3 hold   Shoulder Internal Rotation with Resistance - 1 x daily - 3-4 x weekly - 2 sets - 10 reps - 3 hold   Shoulder Abduction with Resistance Bar - 1 x daily - 3-4 x weekly - 2 sets - 10 reps - 3 hold  Issued lime tband. Access Code: [email or web address]: [employer] com/Date: 03/26/2021Prepared by: Vamshi Garcia - 2-3 x daily - 7 x weekly - 1 sets - 3-5 reps - 15 hold   Seated Shoulder Inferior Glide - 2 x daily - 7 x weekly - 1 sets - 3 reps - 15 hold   Standing Shoulder External Rotation Stretch in Doorway - 2-3 x daily - 7 x weekly - 1 sets - 3-5 reps - 15 hold    Plan: [x] Continue current frequency toward long and short term goals. 1x wk during ROM only, due to copay amt, progress to 2x wk when strengthening allowed. [x] Specific Instructions for subsequent treatments:  Progress ROM R shoulder,  Add manual as needed, periscapular strengthening, MFR pectoralis, Subscapularis, coracobrachialis .        Time In:    1330           Time Out:    1435     Electronically signed by:  Sangita Ochoa, PTA

## 2021-04-13 ENCOUNTER — HOSPITAL ENCOUNTER (OUTPATIENT)
Dept: PHYSICAL THERAPY | Age: 81
Setting detail: THERAPIES SERIES
Discharge: HOME | End: 2021-04-13
Payer: MEDICARE

## 2021-04-19 NOTE — DISCHARGE SUMMARY
[x] [facility] &  Therapy  [address]  [phone]  F: [phone] [] [address]  [facility]   [address]  P: [phone]  F: [phone] [] [address]  [address]  P: [phone]  F: [phone] [] [address]  P: [phone]  F: [phone] [] [address]  [facility]   [address]   [place]: [phone]  F: [phone]      Physical Therapy Discharge Note    Date: 2021      Patient: Chandni Clark  : 1940  MRN: [id number]    [address], [place]                     Insurance: [employer] Medicare $40 copay Need auth   Medical Diagnosis:Fall, initial encounter (W19. XXXA [ICD-10-CM]); Sprain of right shoulder, unspecified shoulder sprain type, initial encounter (S43.401A [ICD-10-CM]); Arm pain, right (M79.601 [ICD-10-CM]); Elbow pain, right (M25.521 [ICD-10-CM])                          Rehab Codes:M25.511, M25.611  Onset Date: 20                                 Next 's appt: Ortho    Visit# / total visits: 08/10;                    Progress note for STG due at visit # 10                               Cancels/No Shows: 2  Date of initial visit:21                 Date of final visit: 3/31/21    Subjective:  3/31/21  Pain:  [x]? Yes  [x]? No   Location:  Right shldr            Pain Rating: (0-10 scale) 3/10  Pain altered Tx:  [x]? No  []? Yes  Action:  Comments:   Reports reaching to back of neck is still very difficult but can reach back of head better to address ADLS. Shldr pain changes depending on what she is addressing. Addressing HEP. Objective: 3/31/21  Test Measurements:  R shdlr abd 4/5, flexion 4+/5, IR/ER 5/5.   Supine R shldr flexion 165°, abduction 40°, ER 72°  Function: UEFI 88% functional      Assessment:  Patient improved R shoulder ROM, strength, and function with physical therapy. Recommend continued HEP 3x/week to further improve her R shoulder strength and maximize total function. STG: (to be met in 10 treatments)  1. ? Pain: 2/10 R shoulder with activity. Progress  2. ? ROM: R shoulder flexion 0-160, abduction 0-120, and ER to 70 degrees to improve reaching and self care.  Progress  3. ? Strength:4/5 R shoulder flexion and abduction to improve lifting.  MET  4. ? Function: UEFI score to 69% functional or better to improve ADLs.   MET 88%  5. Independent with Home Exercise Programs  MET     Treatment to Date:  [x] Therapeutic Exercise    [] Modalities:  [] Therapeutic Activity     [] Ultrasound  [] Electrical Stimulation  [] Gait Training     [] Massage       [] Lumbar/Cervical Traction  [] Neuromuscular Re-education [] Cold/hotpack [] Iontophoresis: 4 mg/mL  [x] Instruction in Home Exercise Program                     Dexamethasone Sodium  [x] Manual Therapy             Phosphate 40-80 mAmin  [] Aquatic Therapy                   [x] Vasocompression/    [] Other:             Game Ready    Discharge Status:     [] Pt recovered from conditions. Treatment goals were met. [] Pt received maximum benefit. No further therapy indicated at this time. [x] Pt to continue exercise/home instructions independently. [] Therapy interrupted due to:    [] Pt has 2 or more no shows/cancels, is discontinued per our policy. [] Pt has completed prescribed number of treatment sessions. [] Other:         Electronically signed by Fabiano White PT on 4/19/2021 at 7:39 AM      If you have any questions or concerns, please don't hesitate to call.   Thank you for your referral.

## 2021-05-03 ENCOUNTER — CLINICAL DOCUMENTATION (OUTPATIENT)
Dept: NEPHROLOGY | Age: 81
End: 2021-05-03

## 2021-05-03 ENCOUNTER — HOSPITAL ENCOUNTER (OUTPATIENT)
Age: 81
Discharge: HOME | End: 2021-05-03
Payer: MEDICARE

## 2021-05-03 DIAGNOSIS — N18.32 STAGE 3B CHRONIC KIDNEY DISEASE (HCC): ICD-10-CM

## 2021-05-03 DIAGNOSIS — R80.1 PERSISTENT PROTEINURIA: ICD-10-CM

## 2021-05-03 LAB
ALBUMIN SERPL-MCNC: 4.2 G/DL (ref 3.5–5.2)
ANION GAP SERPL CALCULATED.3IONS-SCNC: 16 MMOL/L (ref 9–17)
BUN BLDV-MCNC: 25 MG/DL (ref 8–23)
BUN/CREAT BLD: ABNORMAL (ref 9–20)
CALCIUM SERPL-MCNC: 10.2 MG/DL (ref 8.6–10.4)
CHLORIDE BLD-SCNC: 108 MMOL/L (ref 98–107)
CO2: 19 MMOL/L (ref 20–31)
CREAT SERPL-MCNC: 1.68 MG/DL (ref 0.5–0.9)
CREATININE URINE: 122 MG/DL (ref 28–217)
GFR AFRICAN AMERICAN: 36 ML/MIN
GFR NON-AFRICAN AMERICAN: 29 ML/MIN
GFR SERPL CREATININE-BSD FRML MDRD: ABNORMAL ML/MIN/{1.73_M2}
GFR SERPL CREATININE-BSD FRML MDRD: ABNORMAL ML/MIN/{1.73_M2}
GLUCOSE BLD-MCNC: 86 MG/DL (ref 70–99)
HCT VFR BLD CALC: 40.2 % (ref 36.3–47.1)
HEMOGLOBIN: 12.3 G/DL (ref 11.9–15.1)
MCH RBC QN AUTO: 28 PG (ref 25.2–33.5)
MCHC RBC AUTO-ENTMCNC: 30.6 G/DL (ref 28.4–34.8)
MCV RBC AUTO: 91.4 FL (ref 82.6–102.9)
NRBC AUTOMATED: 0 PER 100 WBC
PDW BLD-RTO: 14.5 % (ref 11.8–14.4)
PHOSPHORUS: 3.6 MG/DL (ref 2.6–4.5)
PLATELET # BLD: 219 K/UL (ref 138–453)
PMV BLD AUTO: 11.8 FL (ref 8.1–13.5)
POTASSIUM SERPL-SCNC: 4.9 MMOL/L (ref 3.7–5.3)
PTH INTACT: 211.7 PG/ML (ref 15–65)
RBC # BLD: 4.4 M/UL (ref 3.95–5.11)
SODIUM BLD-SCNC: 143 MMOL/L (ref 135–144)
TOTAL PROTEIN, URINE: 86 MG/DL
URINE TOTAL PROTEIN CREATININE RATIO: 0.7 (ref 0–0.2)
WBC # BLD: 7 K/UL (ref 3.5–11.3)

## 2021-05-03 PROCEDURE — 84156 ASSAY OF PROTEIN URINE: CPT

## 2021-05-03 PROCEDURE — 84100 ASSAY OF PHOSPHORUS: CPT

## 2021-05-03 PROCEDURE — 82570 ASSAY OF URINE CREATININE: CPT

## 2021-05-03 PROCEDURE — 85027 COMPLETE CBC AUTOMATED: CPT

## 2021-05-03 PROCEDURE — 82040 ASSAY OF SERUM ALBUMIN: CPT

## 2021-05-03 PROCEDURE — 83970 ASSAY OF PARATHORMONE: CPT

## 2021-05-03 PROCEDURE — 36415 COLL VENOUS BLD VENIPUNCTURE: CPT

## 2021-05-03 PROCEDURE — 80048 BASIC METABOLIC PNL TOTAL CA: CPT

## 2021-05-03 RX ORDER — SODIUM BICARBONATE 650 MG/1
650 TABLET ORAL 2 TIMES DAILY
Qty: 180 TABLET | Refills: 3 | Status: SHIPPED | OUTPATIENT
Start: 2021-05-03 | End: 2021-08-01

## 2021-05-13 ENCOUNTER — OFFICE VISIT (OUTPATIENT)
Dept: PODIATRY | Age: 81
End: 2021-05-13
Payer: MEDICARE

## 2021-05-13 VITALS — HEIGHT: 60 IN | BODY MASS INDEX: 42.21 KG/M2 | WEIGHT: 215 LBS

## 2021-05-13 DIAGNOSIS — I73.9 PERIPHERAL VASCULAR DISEASE (HCC): ICD-10-CM

## 2021-05-13 DIAGNOSIS — D23.71 BENIGN NEOPLASM OF SKIN OF LOWER LIMB, INCLUDING HIP, RIGHT: ICD-10-CM

## 2021-05-13 DIAGNOSIS — D23.72 BENIGN NEOPLASM OF SKIN OF LOWER LIMB, INCLUDING HIP, LEFT: ICD-10-CM

## 2021-05-13 DIAGNOSIS — R26.2 TROUBLE WALKING: Primary | ICD-10-CM

## 2021-05-13 DIAGNOSIS — E11.51 TYPE II DIABETES MELLITUS WITH PERIPHERAL CIRCULATORY DISORDER (HCC): ICD-10-CM

## 2021-05-13 DIAGNOSIS — B35.1 DERMATOPHYTOSIS OF NAIL: ICD-10-CM

## 2021-05-13 DIAGNOSIS — M79.671 PAIN IN BOTH FEET: ICD-10-CM

## 2021-05-13 DIAGNOSIS — M79.672 PAIN IN BOTH FEET: ICD-10-CM

## 2021-05-13 PROCEDURE — 11721 DEBRIDE NAIL 6 OR MORE: CPT | Performed by: PODIATRIST

## 2021-05-13 PROCEDURE — 17110 DESTRUCTION B9 LES UP TO 14: CPT | Performed by: PODIATRIST

## 2021-05-18 NOTE — PROGRESS NOTES
[facility]YSIC[facility]ANS  [facility]  [address] [facility] [address] [place] [address]  Dept: [phone]  Dept Fax: [phone]    DIABETIC PROGRESS NOTE  Date of patient's visit: 5/13/2021  Patient's Name:  Willma Sandhoff YOB: 1940            Patient Care Team:  Kathy Sales DO as PCP - General (Family Medicine)  Kathy Sales DO as PCP - [facility]  Alisha Kraft DPM as Physician (Podiatry)          Chief Complaint   Patient presents with    Diabetes    Peripheral Neuropathy       Subjective:   Willma Sandhoff comes to clinic for Diabetes and Peripheral Neuropathy    she is a diabetic and states that she checks her feet daily. Pt currently has complaint of thickened, elongated nails that they cannot manage by themselves. Pt's primary care physician is Maine Maza DO last seen3/9/2021   Pt's last blood sugar was 95 . Pt also relates to painful skin spots to the bottom of both feet. Pt has tried pads and changing shoes but it has note helped the pain      Pt has a new complaint of NONE. Lab Results   Component Value Date    LABA1C 6.0 11/23/2020      Complains of numbness in the feet bilat.   Past Medical History:   Diagnosis Date    Abnormal cardiovascular stress test 02/2021    LARGE ANTERIOR INFARCTION / LARGE AREA OF INFERIOR LATERAL ISCHEMIA WITH REDUCED EF 38%    Abnormal stress test 07/18/2014    going to do cath- not urgent just abnormal    Ambulates with cane     PRN    Arthritis     At high risk for hyperkalemia 10/22/2014    From type 4 RTA    Back pain     CAD (coronary artery disease)     Circulation problem     decreased circulation to  zbigniew extremities    CKD (chronic kidney disease) stage 3, GFR 30-59 ml/min 10/22/2014    From diabetic and ischemic nephrosclerosis, baseline 1.4, GFR 40-45 ml/min, UPC 0.3    COPD (chronic obstructive pulmonary disease) (HCC)     DM (diabetes mellitus) ([facility]     Edema     chronic lower extremity    Foot pain, bilateral     Former smoker     Gastroesophageal reflux disease without esophagitis 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    Hyperlipidemia     Hypertension     Hypothyroidism     Intermittent claudication (HCC)     loly to left leg    Kidney disease     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 ([facility] 04/20/2016    Morbid obesity with BMI of 45.0-49.9, adult ([facility] 10/22/2014    Osteoarthritis     Other activity(E029.9)     Acute renal failure secondary to prerenal azotemia    Other activity(E029.9)     Atherosclerotic coronary artery disease status-post bypass surgery in 2003    Persistent proteinuria 05/09/2018    From diabetic nephrosclerosis, urine protein creatinine ratio 0.81    PVD (peripheral vascular disease) ([facility]     Sciatica     Secondary hyperparathyroidism (of renal origin) 10/22/2014    Not on VDRA    Shortness of breath     Shoulder fracture, right 12/2020    STATES FELL ON [employer]    Sleep apnea     Type II or unspecified type diabetes mellitus without mention of complication, not stated as uncontrolled        No Known Allergies  Current Outpatient Medications on File Prior to Visit   Medication Sig Dispense Refill    sodium bicarbonate 650 MG tablet Take 1 tablet by mouth 2 times daily 180 tablet 3    amLODIPine (NORVASC) 10 MG tablet Take 10 mg by mouth daily      ACCU-CHEK DONG PLUS strip TEST THREE TIMES DAILY 300 strip 5    levothyroxine (SYNTHROID) 88 MCG tablet Take 1 tablet by mouth daily 90 tablet 3    acetaminophen (TYLENOL) 500 MG tablet Take 2 tablets by mouth every 8 hours as needed for Pain 270 tablet 1    aspirin EC 81 MG EC tablet Take 2 tablets by mouth daily 30 tablet 5    atorvastatin (LIPITOR) 20 MG tablet TAKE 1 TABLET EVERY DAY (Patient taking differently: nightly TAKE 1 TABLET EVERY DAY) 90 tablet 5    calcitRIOL (ROCALTROL) 0.25 MCG capsule Take 1 capsule by mouth three times a week mon wed fri 30 capsule 5    clopidogrel (PLAVIX) 75 MG tablet TAKE 1 TABLET EVERY DAY 90 tablet 5    cyanocobalamin 1000 MCG/ML injection Inject 1 ML every other month   please give patient (6) 1 ml vials. 6 mL 5    blood glucose monitor strips Test_3__times daily Diagnosis: 250.0   Diabetes Mellitus__x__Insulin Dependent____Non-Insulin Dependent Life Scan test strips ultra one touch 300 strip 5    insulin glargine (LANTUS) 100 UNIT/ML injection vial INJECT 5 UNITS INTO THE SKIN NIGHTLY 30 mL 5    Insulin Syringe-Needle U-100 31G X 5/16\" 0.3 ML MISC 1 each by Does not apply route daily 100 each 5    lansoprazole (PREVACID) 15 MG delayed release capsule TAKE 1 CAPSULE EVERY DAY 90 capsule 5    lisinopril (PRINIVIL;ZESTRIL) 20 MG tablet TAKE 1 TABLET TWICE DAILY (DOSE INCREASE PER DR Peter Carvalho) 180 tablet 5    metoprolol succinate (TOPROL XL) 100 MG extended release tablet TAKE 1 TABLET EVERY DAY 90 tablet 5    nitroGLYCERIN (NITROSTAT) 0.4 MG SL tablet Place 1 tablet under the tongue as needed for Chest pain 25 tablet 5    Syringe/Needle, Disp, (SYRINGE 3CC/25GX1\") 25G X 1\" 3 ML MISC Used one every other month. 6 each 5    B-D INS SYR ULTRAFINE 1CC/30G 30G X 1/2\" 1 ML MISC USE 1 SYRINGE EVERY DAY 90 each 5    Compression Stockings MISC by Does not apply route 20-40 mmHg. 2 each 0    Blood Glucose Calibration (OT ULTRA/FASTTK CNTRL SOLN) SOLN       Multiple Vitamins-Minerals (OCUVITE ADULT 50+ PO) Take 1 tablet by mouth daily       Green Tea, Camillia sinensis, 315 MG CAPS Take 1 tablet by mouth daily       Cinnamon 500 MG TABS Take 1 tablet by mouth daily.  Cranberry-Vitamin C-Vitamin E (CRANBERRY PLUS VITAMIN C PO) Take 1 tablet by mouth daily.  Ascorbic Acid (VITAMIN C WITH VIVIENNE HIPS) 500 MG tablet Take 500 mg by mouth daily.       Omega-3 Fatty Acids (FISH OIL) 1000 MG CAPS Take 1,000 mg by mouth daily.  docusate sodium (COLACE) 100 MG capsule Take 100 mg by mouth 2 times daily.  Blood Glucose Monitoring Suppl (ONE TOUCH ULTRA) by Does not apply route.  Alcohol Swabs (B-D SINGLE USE SWABS REGULAR) PADS 1 box by Other route 3 times daily as needed (When pt checks glucose) 1 each 5     No current facility-administered medications on file prior to visit. Review of Systems    Review of Systems:   History obtained from chart review and the patient  General ROS: negative for - chills, fatigue, fever, night sweats or weight gain  Constitutional: Negative for chills, diaphoresis, fatigue, fever and unexpected weight change. Musculoskeletal: Positive for arthralgias, gait problem and joint swelling. Neurological ROS: negative for - behavioral changes, confusion, headaches or seizures. Negative for weakness and numbness. Dermatological ROS: negative for - mole changes, rash  Cardiovascular: Negative for leg swelling. Gastrointestinal: Negative for constipation, diarrhea, nausea and vomiting. Objective:  Dermatologic Exam:  Skin lesion present to the right and left plantar foot with a central core and petchaie noted to the lesions periphery.   Pain on palpation of the lesion    Skin is thin, with flaky sloughing skin as well as decreased hair growth to the lower leg  Small red hemosiderin deposits seen dorsal foot   Musculoskeletal:     1st MPJ ROM decreased, Bilateral.  Muscle strength 5/5, Bilateral.  Pain present upon palpation of toenails 1-5, Bilateral. decreased medial longitudinal arch, Bilateral.  Ankle ROM decreased,Bilateral.    Dorsally contracted digits present digits 2, Bilateral.     Vascular: DP pulses 1/4 bilateral.  PT pulses 0/4 bilateral.   CFT <5 seconds, Bilateral.  Hair growth absent to the level of the digits, Bilateral.  Edema present, Bilateral.  Varicosities absent, Bilateral. Erythema absent, Bilateral Neurological: Sensation diminshed to light touch to level of digits, Bilateral.  Protective sensation intact 6/10 sites via 5.07/10g [place] Monofilament, Bilateral.  negative Tinel's, Bilateral.  negative Valleix sign, Bilateral.      Integument: Warm, dry, supple, Bilateral.  Open lesion absent, Bilateral.  Interdigital maceration absent to web spaces 4, Bilateral.  Nails 1-5 left and 1-5 right thickened > 3.0 mm, dystrophic and crumbly, discolored with subungual debris. Fissures absent, Bilateral.   General: AAO x 3 in NAD.     Derm  Toenail Description  Sites of Onychomycosis Involvement (Check affected area)  [x] [x] [x] [x] [x] [x] [x] [x] [x] [x]  5 4 3 2 1 1 2 3 4 5                          Right                                        Left    Thickness  [x] [x] [x] [x] [x] [x] [x] [x] [x] [x]  5 4 3 2 1 1 2 3 4 5                         Right                                        Left    Dystrophic Changes   [x] [x] [x] [x] [x] [x] [x] [x] [x] [x]  5 4 3 2 1 1 2 3 4 5                         Right                                        Left    Color   [x] [x] [x] [x] [x] [x] [x] [x] [x] [x]  5 4 3 2 1 1 2 3 4 5                          Right                                        Left    Incurvation/Ingrowin   [] [] [] [] [] [] [] [] [] []  5 4 3 2 1 1 2 3 4 5                         Right                                        Left    Inflammation/Pain   [x] [x] [x] [x] [x] [x] [x] [x] [x] [x]  5 4 3 2 1 1 2 3 4 5                         Right                                        Left        Visual inspection:  Deformity: hammertoe deformity zbigniew feet  amputation: absent  Skin lesions: presnt   Edema: right- 2+ pitting edema, left- 2+ pitting edema    Sensory exam:  Monofilament sensation: abnormal - 6/10 via SW 5.07/10g monofilament to the plantar foot bilateral feet    Pulses: abnormal - 1/4 dorsalis pedis pulse and 1/4 Posterior tibial pulse,   Pinprick: Impaired  Proprioception: Impaired Vibration (128 Hz): Impaired       DM with PVD       [x]Yes    []No      Assessment:  80 y.o. female with:   Diagnosis Orders   1. Trouble walking   DIABETES FOOT EXAM    66844 - PA DEBRIDEMENT OF NAILS, 6 OR MORE    97695 - PA DESTRUCTION BENIGN LESIONS UP TO 14   2. Type II diabetes mellitus with peripheral circulatory disorder (HCC)   DIABETES FOOT EXAM    39058 - PA DEBRIDEMENT OF NAILS, 6 OR MORE    68558 - PA DESTRUCTION BENIGN LESIONS UP TO 14   3. Dermatophytosis of nail  HM DIABETES FOOT EXAM    09118 - PA DEBRIDEMENT OF NAILS, 6 OR MORE   4. Benign neoplasm of skin of lower limb, including hip, right  HM DIABETES FOOT EXAM    05569 - PA DESTRUCTION BENIGN LESIONS UP TO 14   5. Benign neoplasm of skin of lower limb, including hip, left  HM DIABETES FOOT EXAM    73981 - PA DESTRUCTION BENIGN LESIONS UP TO 14   6. Peripheral vascular disease (HCC)  HM DIABETES FOOT EXAM    88501 - PA DEBRIDEMENT OF NAILS, 6 OR MORE    07125 - PA DESTRUCTION BENIGN LESIONS UP TO 14   7. Pain in both feet   DIABETES FOOT EXAM    84540 - PA DEBRIDEMENT OF NAILS, 6 OR MORE    92977 - PA DESTRUCTION BENIGN LESIONS UP TO 14           Q7   []Yes    []No                Q8   [x]Yes    []No                     Q9   []Yes    []No    Plan:   Pt was evaluated and examined. Patient was given personalized discharge instructions. The lesions were partially excised via 15 blade and Pyrogallic acid was applied under occlusion. The patient will leave in place for 24-48 hours and than remove. The patient tolerated the procedure well and without complication. Nails 1-10 were debrided sharply in length and thickness with a nipper and , without incident. Pt will follow up in 9 weeks or sooner if any problems arise. Diagnosis was discussed with the pt and all of their questions were answered in detail. Proper foot hygiene and care was discussed with the pt.  Informed patient on proper diabetic foot care and importance of tight glycemic control. Patient to check feet daily and contact the office with any questions/problems/concerns.    Other comorbidity noted and will be managed by PCP.  5/13/2021    Electronically signed by Jose Bonner DPM on 5/18/2021 at 12:32 PM  5/13/2021

## 2021-06-15 ENCOUNTER — TELEPHONE (OUTPATIENT)
Dept: FAMILY MEDICINE CLINIC | Age: 81
End: 2021-06-15

## 2021-06-15 DIAGNOSIS — S42.251G DISPLACED FRACTURE OF GREATER TUBEROSITY OF RIGHT HUMERUS, SUBSEQUENT ENCOUNTER FOR FRACTURE WITH DELAYED HEALING: Primary | ICD-10-CM

## 2021-06-17 ENCOUNTER — OFFICE VISIT (OUTPATIENT)
Dept: ORTHOPEDIC SURGERY | Age: 81
End: 2021-06-17
Payer: MEDICARE

## 2021-06-17 VITALS — BODY MASS INDEX: 42.21 KG/M2 | HEIGHT: 60 IN | WEIGHT: 215 LBS

## 2021-06-17 DIAGNOSIS — S42.251G DISPLACED FRACTURE OF GREATER TUBEROSITY OF RIGHT HUMERUS, SUBSEQUENT ENCOUNTER FOR FRACTURE WITH DELAYED HEALING: Primary | ICD-10-CM

## 2021-06-17 PROCEDURE — 1090F PRES/ABSN URINE INCON ASSESS: CPT | Performed by: ORTHOPAEDIC SURGERY

## 2021-06-17 PROCEDURE — G8399 PT W/DXA RESULTS DOCUMENT: HCPCS | Performed by: ORTHOPAEDIC SURGERY

## 2021-06-17 PROCEDURE — 1036F TOBACCO NON-USER: CPT | Performed by: ORTHOPAEDIC SURGERY

## 2021-06-17 PROCEDURE — G8417 CALC BMI ABV UP PARAM F/U: HCPCS | Performed by: ORTHOPAEDIC SURGERY

## 2021-06-17 PROCEDURE — 4040F PNEUMOC VAC/ADMIN/RCVD: CPT | Performed by: ORTHOPAEDIC SURGERY

## 2021-06-17 PROCEDURE — G8427 DOCREV CUR MEDS BY ELIG CLIN: HCPCS | Performed by: ORTHOPAEDIC SURGERY

## 2021-06-17 PROCEDURE — 99213 OFFICE O/P EST LOW 20 MIN: CPT | Performed by: ORTHOPAEDIC SURGERY

## 2021-06-17 PROCEDURE — 1123F ACP DISCUSS/DSCN MKR DOCD: CPT | Performed by: ORTHOPAEDIC SURGERY

## 2021-06-17 NOTE — PROGRESS NOTES
[facility]  [facility]  [address] [place] [address]  Dept: [phone]  Dept Fax: [phone]        Ambulatory Follow Up      Subjective:   Valeri Weber is a 80y.o. year old female who presents to our office today for routine followup regarding her     Chief Complaint   Patient presents with    Follow-up     R shoulder        HPI  Patient is a 56-year-old female here today for routine follow-up regarding her right shoulder greater tuberosity fracture. Patient sustained the injury on 12/24/2020. Since her previous appointment, patient has completed her physical therapy. She reports no pain and full shoulder range of motion with ability to perform functional daily activities. She has no concerns at today's visit. She is no longer taking any pain medications. Review of Systems  Negative otherwise noted in the HPI. Objective :   General: Valeri Weber is a 80 y.o. female who is alert and oriented and sitting comfortably in our office. Neuro: alert. oriented  Eyes: Extra-ocular muscles intact  Mouth: Oral mucosa moist. No perioral lesions  Pulm: Respirations unlabored and regular. Skin: warm, well perfused  Psych:   Patient has good fund of knowledge and displays understanging of exam, diagnosis, and plan. MS: Right upper extremity: Patient is nontender to palpation throughout the right shoulder. Patient has full range of motion without any restrictions or pain. 5/5 strength shoulder abduction, external, internal, forward flexion. C5-T1 sensation is intact. Radiology:   History:   56-year-old female fall from standing height right greater tuberosity fracture follow-up    Findings:   Views: 2V  Right Shoulder   Weight bearing: No   Findings: Right greater tuberosity fracture with interval healing. No new angulation or deformity.    Previous comparison films March 11, 2021 interval callus formation with no new angulation or deformity when

## 2021-07-09 NOTE — PROGRESS NOTES
Mom was told she could call to give TB result over the phone. Please call mom back.  Thanks.    Subjective:      Patient ID: True  is a 70 y.o. female follows up for diabetes and wrist arthritis. She has been taking 7 units of glargine insulin nightly and her blood sugars have been ranging between high 80s to low 100s. She checks twice a day. She denies episodes of diaphoresis or dizziness or lightheadedness or nausea. For her wrist arthritis she has tried taking Tylenol once a day however she has also been taking the tramadol daily. She has not tried to take Tylenol more frequent dosing. HPI    Review of Systems   Constitutional: Negative for fatigue, fever and unexpected weight change. Respiratory: Negative for cough. Gastrointestinal: Negative for abdominal pain, constipation, diarrhea, nausea and vomiting. Neurological: Negative for dizziness and light-headedness. Objective:   Physical Exam   Constitutional: She is oriented to person, place, and time. She appears well-developed and well-nourished. No distress. Cardiovascular: Normal rate, regular rhythm and normal heart sounds. Exam reveals no gallop and no friction rub. No murmur heard. Pulmonary/Chest: Effort normal and breath sounds normal. No stridor. No respiratory distress. She has no wheezes. She has no rales. She exhibits no tenderness. Neurological: She is alert and oriented to person, place, and time. Skin: She is not diaphoretic. Nursing note and vitals reviewed. BP (!) 140/60   Pulse 76   Resp 16   Wt 220 lb (99.8 kg)   BMI 42.97 kg/m²       Assessment:      1. Type 2 diabetes mellitus with stage 3 chronic kidney disease, with long-term current use of insulin ([facility])  We'll decrease her insulin to 5 units nightly. She will need an A1c as I couldn't find it in the system. Patient left before checking an A1c.    - insulin glargine (LANTUS) 100 UNIT/ML injection vial; INJECT 5 UNITS INTO THE SKIN NIGHTLY  Dispense: 30 mL; Refill: 3    2. Wrist arthritis  Pain controlled on tramadol. Prescription monitoring report reviewed and patient has not filled prior tramadol order. Patient will need a pain contract next visit before any further tramadol refills. She was advised to try Tylenol more frequent dosing to see if we can get her off of tramadol. We'll follow-up in 2 months. - traMADol (ULTRAM) 50 MG tablet; Take 1 tablet by mouth daily as needed for Pain for up to 30 days. Dispense: 30 tablet; Refill: 0  - traMADol (ULTRAM) 50 MG tablet; Take 1 tablet by mouth daily as needed for Pain for up to 30 days. Intended supply: 3 days. Take lowest dose possible to manage pain  Dispense: 12 tablet; Refill: 0    3. Secondary hyperparathyroidism ([address]    - Vitamin D 25 Hydroxy; Future    4. Need for shingles vaccine    - zoster recombinant adjuvanted vaccine [facility]) 50 MCG/0.5ML SUSR injection; Inject 0.5 mLs into the muscle once for 1 dose  Dispense: 0.5 mL; Refill: 0    5. Need for tetanus booster    - Tetanus-Diphth-Acell Pertussis (BOOSTRIX) 5-2.5-18.5 LF-MCG/0.5 injection; Inject 0.5 mLs into the muscle once for 1 dose  Dispense: 1 each; Refill: 0          1. Alec Macias received counseling on the following healthy behaviors: medication adherence  2. Reviewed prior labs and health maintenance  3. Continue current medications, diet and exercise. 4.  Discussed use, benefit, and side effects of prescribed medications. Barriers to medication compliance addressed. All patient questions answered. Pt voiced understanding. 5.  Patient given educational materials - see patient instructions  6. Was a self-tracking handout given in paper form or via [employer]? No  If yes, see orders or list here.     PHQ Scores 2/18/2019 4/12/2018 2/13/2017 8/13/2015 5/13/2015   PHQ2 Score 0 0 0 0 0   PHQ9 Score 0 0 0 0 0     Interpretation of Total Score Depression Severity: 1-4 = Minimal depression, 5-9 = Mild depression, 10-14 = Moderate depression, 15-19 = Moderately severe depression, 20-27 = Severe depression  Normal    Completed Refills   Requested Prescriptions     Signed Prescriptions Disp Refills    zoster recombinant adjuvanted vaccine (SHINGRIX) 50 MCG/0.5ML SUSR injection 0.5 mL 0     Sig: Inject 0.5 mLs into the muscle once for 1 dose    Tetanus-Diphth-Acell Pertussis (BOOSTRIX) 5-2.5-18.5 LF-MCG/0.5 injection 1 each 0     Sig: Inject 0.5 mLs into the muscle once for 1 dose    traMADol (ULTRAM) 50 MG tablet 30 tablet 0     Sig: Take 1 tablet by mouth daily as needed for Pain for up to 30 days.  traMADol (ULTRAM) 50 MG tablet 12 tablet 0     Sig: Take 1 tablet by mouth daily as needed for Pain for up to 30 days. Intended supply: 3 days. Take lowest dose possible to manage pain    insulin glargine (LANTUS) 100 UNIT/ML injection vial 30 mL 3     Sig: INJECT 5 UNITS INTO THE SKIN NIGHTLY       Return in about 2 months (around 7/7/2019) for dm.       Fern Aguiar MD

## 2021-07-15 ENCOUNTER — OFFICE VISIT (OUTPATIENT)
Dept: PODIATRY | Age: 81
End: 2021-07-15
Payer: MEDICARE

## 2021-07-15 VITALS — HEIGHT: 60 IN | RESPIRATION RATE: 16 BRPM | BODY MASS INDEX: 42.21 KG/M2 | WEIGHT: 215 LBS

## 2021-07-15 DIAGNOSIS — B35.1 DERMATOPHYTOSIS OF NAIL: ICD-10-CM

## 2021-07-15 DIAGNOSIS — M79.672 PAIN IN BOTH FEET: ICD-10-CM

## 2021-07-15 DIAGNOSIS — D23.71 BENIGN NEOPLASM OF SKIN OF LOWER LIMB, INCLUDING HIP, RIGHT: ICD-10-CM

## 2021-07-15 DIAGNOSIS — E11.51 TYPE II DIABETES MELLITUS WITH PERIPHERAL CIRCULATORY DISORDER (HCC): Primary | ICD-10-CM

## 2021-07-15 DIAGNOSIS — M79.671 PAIN IN BOTH FEET: ICD-10-CM

## 2021-07-15 DIAGNOSIS — R26.2 TROUBLE WALKING: ICD-10-CM

## 2021-07-15 DIAGNOSIS — I73.9 PERIPHERAL VASCULAR DISEASE (HCC): ICD-10-CM

## 2021-07-15 DIAGNOSIS — D23.72 BENIGN NEOPLASM OF SKIN OF LOWER LIMB, INCLUDING HIP, LEFT: ICD-10-CM

## 2021-07-15 PROCEDURE — 11721 DEBRIDE NAIL 6 OR MORE: CPT | Performed by: PODIATRIST

## 2021-07-15 PROCEDURE — 17110 DESTRUCTION B9 LES UP TO 14: CPT | Performed by: PODIATRIST

## 2021-07-15 NOTE — PROGRESS NOTES
disease) ([facility]     DM (diabetes mellitus) ([facility]     Edema     chronic lower extremity    Foot pain, bilateral     Former smoker     Gastroesophageal reflux disease without esophagitis 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    Hyperlipidemia     Hypertension     Hypothyroidism     Intermittent claudication (HCC)     loly to left leg    Kidney disease     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 ([facility] 04/20/2016    Morbid obesity with BMI of 45.0-49.9, adult ([facility] 10/22/2014    Osteoarthritis     Other activity(E029.9)     Acute renal failure secondary to prerenal azotemia    Other activity(E029.9)     Atherosclerotic coronary artery disease status-post bypass surgery in 2003    Persistent proteinuria 05/09/2018    From diabetic nephrosclerosis, urine protein creatinine ratio 0.8-1    PVD (peripheral vascular disease) (HCC)     Sciatica     Secondary hyperparathyroidism (of renal origin) 10/22/2014    Not on VDRA    Shortness of breath     Shoulder fracture, right 12/2020    STATES FELL ON [employer]    Sleep apnea     Type II or unspecified type diabetes mellitus without mention of complication, not stated as uncontrolled        No Known Allergies  Current Outpatient Medications on File Prior to Visit   Medication Sig Dispense Refill    sodium bicarbonate 650 MG tablet Take 1 tablet by mouth 2 times daily 180 tablet 3    amLODIPine (NORVASC) 10 MG tablet Take 10 mg by mouth daily      ACCU-CHEK DONG PLUS strip TEST THREE TIMES DAILY 300 strip 5    levothyroxine (SYNTHROID) 88 MCG tablet Take 1 tablet by mouth daily 90 tablet 3    acetaminophen (TYLENOL) 500 MG tablet Take 2 tablets by mouth every 8 hours as needed for Pain 270 tablet 1    aspirin EC 81 MG EC tablet Take 2 tablets by mouth daily 30 tablet 5    atorvastatin (LIPITOR) 20 MG tablet TAKE 1 TABLET EVERY DAY (Patient taking differently: nightly TAKE 1 TABLET EVERY DAY) 90 tablet 5    calcitRIOL (ROCALTROL) 0.25 MCG capsule Take 1 capsule by mouth three times a week mon wed fri 30 capsule 5    clopidogrel (PLAVIX) 75 MG tablet TAKE 1 TABLET EVERY DAY 90 tablet 5    cyanocobalamin 1000 MCG/ML injection Inject 1 ML every other month   please give patient (6) 1 ml vials. 6 mL 5    blood glucose monitor strips Test_3__times daily Diagnosis: 250.0   Diabetes Mellitus__x__Insulin Dependent____Non-Insulin Dependent Life Scan test strips ultra one touch 300 strip 5    insulin glargine (LANTUS) 100 UNIT/ML injection vial INJECT 5 UNITS INTO THE SKIN NIGHTLY 30 mL 5    Insulin Syringe-Needle U-100 31G X 5/16\" 0.3 ML MISC 1 each by Does not apply route daily 100 each 5    lansoprazole (PREVACID) 15 MG delayed release capsule TAKE 1 CAPSULE EVERY DAY 90 capsule 5    lisinopril (PRINIVIL;ZESTRIL) 20 MG tablet TAKE 1 TABLET TWICE DAILY (DOSE INCREASE PER DR Merle Cortez) 180 tablet 5    metoprolol succinate (TOPROL XL) 100 MG extended release tablet TAKE 1 TABLET EVERY DAY 90 tablet 5    nitroGLYCERIN (NITROSTAT) 0.4 MG SL tablet Place 1 tablet under the tongue as needed for Chest pain 25 tablet 5    Syringe/Needle, Disp, (SYRINGE 3CC/25GX1\") 25G X 1\" 3 ML MISC Used one every other month. 6 each 5    B-D INS SYR ULTRAFINE 1CC/30G 30G X 1/2\" 1 ML MISC USE 1 SYRINGE EVERY DAY 90 each 5    Compression Stockings MISC by Does not apply route 20-40 mmHg. 2 each 0    Blood Glucose Calibration (OT ULTRA/FASTTK CNTRL SOLN) SOLN       Multiple Vitamins-Minerals (OCUVITE ADULT 50+ PO) Take 1 tablet by mouth daily       Green Tea, Camillia sinensis, 315 MG CAPS Take 1 tablet by mouth daily       Cinnamon 500 MG TABS Take 1 tablet by mouth daily.  Cranberry-Vitamin C-Vitamin E (CRANBERRY PLUS VITAMIN C PO) Take 1 tablet by mouth daily.       Ascorbic Acid (VITAMIN C WITH VIVIENNE HIPS) 500 MG tablet Take 500 mg by mouth daily.  Omega-3 Fatty Acids (FISH OIL) 1000 MG CAPS Take 1,000 mg by mouth daily.  docusate sodium (COLACE) 100 MG capsule Take 100 mg by mouth 2 times daily.  Blood Glucose Monitoring Suppl (ONE TOUCH ULTRA) by Does not apply route.  Alcohol Swabs (B-D SINGLE USE SWABS REGULAR) PADS 1 box by Other route 3 times daily as needed (When pt checks glucose) 1 each 5     No current facility-administered medications on file prior to visit. Review of Systems    Review of Systems:   History obtained from chart review and the patient  General ROS: negative for - chills, fatigue, fever, night sweats or weight gain  Constitutional: Negative for chills, diaphoresis, fatigue, fever and unexpected weight change. Musculoskeletal: Positive for arthralgias, gait problem and joint swelling. Neurological ROS: negative for - behavioral changes, confusion, headaches or seizures. Negative for weakness and numbness. Dermatological ROS: negative for - mole changes, rash  Cardiovascular: Negative for leg swelling. Gastrointestinal: Negative for constipation, diarrhea, nausea and vomiting. Objective:  Dermatologic Exam:  Skin lesion present to the right and left plantar foot with a central core and petchaie noted to the lesions periphery.   Pain on palpation of the lesion     Skin is thin, with flaky sloughing skin as well as decreased hair growth to the lower leg  Small red hemosiderin deposits seen dorsal foot   Musculoskeletal:     1st MPJ ROM decreased, Bilateral.  Muscle strength 5/5, Bilateral.  Pain present upon palpation of toenails 1-5, Bilateral. decreased medial longitudinal arch, Bilateral.  Ankle ROM decreased,Bilateral.    Dorsally contracted digits present digits 2, Bilateral.     Vascular: DP pulses 1/4 bilateral.  PT pulses 0/4 bilateral.   CFT <5 seconds, Bilateral.  Hair growth absent to the level of the digits, Bilateral.  Edema present, Bilateral.  Varicosities absent, Bilateral. Erythema absent, Bilateral    Neurological: Sensation diminshed to light touch to level of digits, Bilateral.  Protective sensation intact 6/10 sites via 5.07/10g [place]-Mabel Monofilament, Bilateral.  negative Tinel's, Bilateral.  negative Valleix sign, Bilateral.      Integument: Warm, dry, supple, Bilateral.  Open lesion absent, Bilateral.  Interdigital maceration absent to web spaces 4, Bilateral.  Nails 1-5 left and 1-5 right thickened > 3.0 mm, dystrophic and crumbly, discolored with subungual debris. Fissures absent, Bilateral.   General: AAO x 3 in NAD.     Derm  Toenail Description  Sites of Onychomycosis Involvement (Check affected area)  [x] [x] [x] [x] [x] [x] [x] [x] [x] [x]  5 4 3 2 1 1 2 3 4 5                          Right                                        Left    Thickness  [x] [x] [x] [x] [x] [x] [x] [x] [x] [x]  5 4 3 2 1 1 2 3 4 5                         Right                                        Left    Dystrophic Changes   [x] [x] [x] [x] [x] [x] [x] [x] [x] [x]  5 4 3 2 1 1 2 3 4 5                         Right                                        Left    Color   [x] [x] [x] [x] [x] [x] [x] [x] [x] [x]  5 4 3 2 1 1 2 3 4 5                          Right                                        Left    Incurvation/Ingrowin   [] [] [] [] [] [] [] [] [] []  5 4 3 2 1 1 2 3 4 5                         Right                                        Left    Inflammation/Pain   [x] [x] [x] [x] [x] [x] [x] [x] [x] [x]  5 4 3 2 1 1 2 3 4 5                         Right                                        Left        Visual inspection:  Deformity: hammertoe deformity zbigniew feet  amputation: absent  Skin lesions: present - as above  Edema: right- 2+ pitting edema, left- 2+ pitting edema    Sensory exam:  Monofilament sensation: abnormal - 6/10 via SW 5.07/10g monofilament to the plantar foot bilateral feet    Pulses: abnormal - 1/4 dorsalis pedis pulse and 1/4 Posterior tibial pulse, Pinprick: Impaired  Proprioception: Impaired  Vibration (128 Hz): Impaired       DM with PVD       [x]Yes    []No      Assessment:  80 y.o. female with:   Diagnosis Orders   1. Type II diabetes mellitus with peripheral circulatory disorder (HCC)   DIABETES FOOT EXAM    20494 - MI DEBRIDEMENT OF NAILS, 6 OR MORE    84386 - MI DESTRUCTION BENIGN LESIONS UP TO 14   2. Dermatophytosis of nail   DIABETES FOOT EXAM    02454 - MI DEBRIDEMENT OF NAILS, 6 OR MORE   3. Trouble walking   DIABETES FOOT EXAM    11337 - MI DEBRIDEMENT OF NAILS, 6 OR MORE    17770 - MI DESTRUCTION BENIGN LESIONS UP TO 14   4. Benign neoplasm of skin of lower limb, including hip, right   DIABETES FOOT EXAM    66825 - MI DESTRUCTION BENIGN LESIONS UP TO 14   5. Benign neoplasm of skin of lower limb, including hip, left   DIABETES FOOT EXAM    47390 - MI DESTRUCTION BENIGN LESIONS UP TO 14   6. Peripheral vascular disease (HCC)   DIABETES FOOT EXAM    06536 - MI DEBRIDEMENT OF NAILS, 6 OR MORE    53364 - MI DESTRUCTION BENIGN LESIONS UP TO 14   7. Pain in both feet   DIABETES FOOT EXAM    58630 - MI DEBRIDEMENT OF NAILS, 6 OR MORE    38200 - MI DESTRUCTION BENIGN LESIONS UP TO 14           Q7   []Yes    []No                Q8   [x]Yes    []No                     Q9   []Yes    []No    Plan:   Pt was evaluated and examined. Patient was given personalized discharge instructions. The lesions were partially excised via 15 blade and Pyrogallic acid was applied under occlusion. The patient will leave in place for 24-48 hours and than remove. The patient tolerated the procedure well and without complication. Nails 1-10 were debrided sharply in length and thickness with a nipper and , without incident. Pt will follow up in 9 weeks or sooner if any problems arise. Diagnosis was discussed with the pt and all of their questions were answered in detail. Proper foot hygiene and care was discussed with the pt.  Informed patient on proper diabetic foot care and importance of tight glycemic control. Patient to check feet daily and contact the office with any questions/problems/concerns.    Other comorbidity noted and will be managed by PCP.  7/15/2021    Electronically signed by Courtney Luo DPM on 7/15/2021 at 11:41 AM  7/15/2021

## 2021-07-19 ENCOUNTER — OFFICE VISIT (OUTPATIENT)
Dept: FAMILY MEDICINE CLINIC | Age: 81
End: 2021-07-19
Payer: MEDICARE

## 2021-07-19 VITALS
DIASTOLIC BLOOD PRESSURE: 87 MMHG | HEIGHT: 60 IN | WEIGHT: 221.4 LBS | TEMPERATURE: 97 F | HEART RATE: 89 BPM | SYSTOLIC BLOOD PRESSURE: 135 MMHG | BODY MASS INDEX: 43.47 KG/M2

## 2021-07-19 DIAGNOSIS — N18.30 TYPE 2 DIABETES MELLITUS WITH STAGE 3 CHRONIC KIDNEY DISEASE, WITH LONG-TERM CURRENT USE OF INSULIN, UNSPECIFIED WHETHER STAGE 3A OR 3B CKD (HCC): ICD-10-CM

## 2021-07-19 DIAGNOSIS — E11.22 TYPE 2 DIABETES MELLITUS WITH STAGE 3 CHRONIC KIDNEY DISEASE, WITH LONG-TERM CURRENT USE OF INSULIN, UNSPECIFIED WHETHER STAGE 3A OR 3B CKD (HCC): ICD-10-CM

## 2021-07-19 DIAGNOSIS — E11.9 TYPE 2 DIABETES MELLITUS WITHOUT COMPLICATION, UNSPECIFIED WHETHER LONG TERM INSULIN USE (HCC): ICD-10-CM

## 2021-07-19 DIAGNOSIS — I25.810 CORONARY ARTERY DISEASE INVOLVING CORONARY BYPASS GRAFT OF NATIVE HEART WITHOUT ANGINA PECTORIS: ICD-10-CM

## 2021-07-19 DIAGNOSIS — K21.9 GASTROESOPHAGEAL REFLUX DISEASE WITHOUT ESOPHAGITIS: ICD-10-CM

## 2021-07-19 DIAGNOSIS — Z79.4 TYPE 2 DIABETES MELLITUS WITH STAGE 3 CHRONIC KIDNEY DISEASE, WITH LONG-TERM CURRENT USE OF INSULIN, UNSPECIFIED WHETHER STAGE 3A OR 3B CKD (HCC): ICD-10-CM

## 2021-07-19 PROCEDURE — 99213 OFFICE O/P EST LOW 20 MIN: CPT | Performed by: FAMILY MEDICINE

## 2021-07-19 PROCEDURE — G8427 DOCREV CUR MEDS BY ELIG CLIN: HCPCS | Performed by: FAMILY MEDICINE

## 2021-07-19 PROCEDURE — G8417 CALC BMI ABV UP PARAM F/U: HCPCS | Performed by: FAMILY MEDICINE

## 2021-07-19 PROCEDURE — 1090F PRES/ABSN URINE INCON ASSESS: CPT | Performed by: FAMILY MEDICINE

## 2021-07-19 PROCEDURE — 1123F ACP DISCUSS/DSCN MKR DOCD: CPT | Performed by: FAMILY MEDICINE

## 2021-07-19 PROCEDURE — 1036F TOBACCO NON-USER: CPT | Performed by: FAMILY MEDICINE

## 2021-07-19 PROCEDURE — 4040F PNEUMOC VAC/ADMIN/RCVD: CPT | Performed by: FAMILY MEDICINE

## 2021-07-19 PROCEDURE — G8399 PT W/DXA RESULTS DOCUMENT: HCPCS | Performed by: FAMILY MEDICINE

## 2021-07-19 RX ORDER — LISINOPRIL 20 MG/1
TABLET ORAL
Qty: 180 TABLET | Refills: 3 | Status: SHIPPED | OUTPATIENT
Start: 2021-07-19 | End: 2022-08-10

## 2021-07-19 RX ORDER — NITROGLYCERIN 0.4 MG/1
0.4 TABLET SUBLINGUAL PRN
Qty: 25 TABLET | Refills: 2 | Status: SHIPPED | OUTPATIENT
Start: 2021-07-19

## 2021-07-19 RX ORDER — ATORVASTATIN CALCIUM 40 MG/1
TABLET, FILM COATED ORAL
Qty: 90 TABLET | Refills: 3 | Status: SHIPPED | OUTPATIENT
Start: 2021-07-19 | End: 2022-08-10 | Stop reason: SDUPTHER

## 2021-07-19 RX ORDER — METOPROLOL SUCCINATE 100 MG/1
TABLET, EXTENDED RELEASE ORAL
Qty: 90 TABLET | Refills: 3 | Status: SHIPPED | OUTPATIENT
Start: 2021-07-19 | End: 2022-08-12 | Stop reason: SDUPTHER

## 2021-07-19 RX ORDER — INSULIN GLARGINE 100 [IU]/ML
INJECTION, SOLUTION SUBCUTANEOUS
Qty: 30 ML | Refills: 5 | Status: SHIPPED | OUTPATIENT
Start: 2021-07-19

## 2021-07-19 RX ORDER — CLOPIDOGREL BISULFATE 75 MG/1
TABLET ORAL
Qty: 90 TABLET | Refills: 3 | Status: SHIPPED | OUTPATIENT
Start: 2021-07-19 | End: 2022-06-20

## 2021-07-19 RX ORDER — LANSOPRAZOLE 15 MG/1
CAPSULE, DELAYED RELEASE ORAL
Qty: 90 CAPSULE | Refills: 3 | Status: SHIPPED | OUTPATIENT
Start: 2021-07-19 | End: 2022-08-10 | Stop reason: SDUPTHER

## 2021-07-19 ASSESSMENT — PATIENT HEALTH QUESTIONNAIRE - PHQ9
SUM OF ALL RESPONSES TO PHQ QUESTIONS 1-9: 0
1. LITTLE INTEREST OR PLEASURE IN DOING THINGS: 0
2. FEELING DOWN, DEPRESSED OR HOPELESS: 0
SUM OF ALL RESPONSES TO PHQ QUESTIONS 1-9: 0
SUM OF ALL RESPONSES TO PHQ QUESTIONS 1-9: 0
SUM OF ALL RESPONSES TO PHQ9 QUESTIONS 1 & 2: 0

## 2021-07-19 NOTE — PROGRESS NOTES
score)  Completed    Shingles Vaccine  Completed    Pneumococcal 65+ years Vaccine  Completed    COVID-19 Vaccine  Completed    Hepatitis A vaccine  Aged Out    Hib vaccine  Aged Out    Meningococcal (ACWY) vaccine  Aged Out

## 2021-07-19 NOTE — PROGRESS NOTES
Diabetes (Follow up)    Check up    DM  [employer] will be asking to replace the HGM -     Right shoulder follow up discussion - patient doing well, regaining function, pain is reduced, mobility stable and improving,. Review Meds:  Renewed    Consultants:  Dr Judith Raymond Ohms           /87 (Site: Left Lower Arm, Position: Sitting, Cuff Size: Medium Adult)   Pulse 89   Temp 97 °F (36.1 °C) (Temporal)   Ht 5' (1.524 m)   Wt 221 lb 6.4 oz (100.4 kg)   BMI 43.24 kg/m²       Review of Systems    Negative for:     Worry / mood complaints  Headache  Dizziness  Visual Disturbance  Hearing Changes  Nasal / sinus Symptoms  Mouth / tooth symptom, pain  Throat pain  Difficulty swallowing  Neck pain  Chest discomfort  Cough  SOB  N/V/D/C  Pelvic area discomfort  Bladder / voiding discomfort  Bowel complaints  MS complaints   Numbness/tingling/abnormal sensations   Edema / Leg swelling  Dizziness  Fatigue  Bleeding   Skin    Pertinent Pos: See HPI - see above    Physical Exam    Alert and oriented to PPT  NAD    HEENT - neg  Neck - no bruits, no lymphadenopathy  Chest  HRRR w/o murmer  LCTAB no wheezes / rhonchi  Abdomen - soft, non-tender, BS  Extremities - 1-2+ PTE    Gait / Station - stable, no dysequilibrium, uniform pace, no assist device, cane. ASSESSMENT AND PLAN      Diagnosis Orders   1. Coronary artery disease involving native heart  atorvastatin (LIPITOR) 40 MG tablet    clopidogrel (PLAVIX) 75 MG tablet   2. Type 2 diabetes mellitus with stage 3 chronic kidney disease, with long-term current use of insulin, unspecified whether stage 3a or 3b CKD ([facility])  insulin glargine (LANTUS) 100 UNIT/ML injection vial   3. Gastroesophageal reflux disease without esophagitis  lansoprazole (PREVACID) 15 MG delayed release capsule   4.  Coronary artery disease involving coronary bypass graft of native heart without angina pectoris  lisinopril (PRINIVIL;ZESTRIL) 20 MG tablet    metoprolol succinate (TOPROL XL) 100 MG extended release tablet    nitroGLYCERIN (NITROSTAT) 0.4 MG SL tablet   5. Type 2 diabetes mellitus without complication, unspecified whether long term insulin use ([facility]         Labs: reviewed    Rocaltrol - noted on care plan. (Dr. Kaur Brain)    Medications are renewed / updated as requested. patrient is clincally very independent. rech visits about every 6 months is adequate for her clinical status.       Electronicallysigned by Zully Fajardo DO on 7/27/2021 at 7:27 AM

## 2021-07-20 DIAGNOSIS — E11.51 DM (DIABETES MELLITUS), TYPE 2 WITH PERIPHERAL VASCULAR COMPLICATIONS (HCC): Primary | ICD-10-CM

## 2021-07-20 NOTE — TELEPHONE ENCOUNTER
Pharmacy sent over request for a true metrix blood glucose monitor, test strip and 33G lancets. Please advise, thank you.            Lesley Castellanos

## 2021-07-27 RX ORDER — GLUCOSAMINE HCL/CHONDROITIN SU 500-400 MG
1 CAPSULE ORAL
Qty: 100 STRIP | Refills: 11 | Status: SHIPPED | OUTPATIENT
Start: 2021-07-27 | End: 2021-10-19 | Stop reason: SDUPTHER

## 2021-07-27 RX ORDER — LANCETS 30 GAUGE
1 EACH MISCELLANEOUS DAILY
Qty: 100 EACH | Refills: 3 | Status: SHIPPED | OUTPATIENT
Start: 2021-07-27

## 2021-08-16 DIAGNOSIS — E11.9 TYPE 2 DIABETES MELLITUS WITHOUT COMPLICATION, UNSPECIFIED WHETHER LONG TERM INSULIN USE (HCC): ICD-10-CM

## 2021-08-16 RX ORDER — DIPHENHYDRAMINE HCL 25 MG
1 TABLET ORAL 2 TIMES DAILY
Qty: 1 KIT | Refills: 0 | Status: SHIPPED | OUTPATIENT
Start: 2021-08-16

## 2021-08-16 RX ORDER — ISOPROPYL ALCOHOL 0.75 G/1
1 SWAB TOPICAL 3 TIMES DAILY PRN
Qty: 1 EACH | Refills: 5 | Status: SHIPPED | OUTPATIENT
Start: 2021-08-16 | End: 2022-04-27 | Stop reason: SDUPTHER

## 2021-08-16 NOTE — TELEPHONE ENCOUNTER
Please address the medication refill and close the encounter. If I can be of assistance, please route to the applicable pool. Thank you. Last visit: 07/19/2021  Last Med refill:   Does patient have enough medication for 72 hours: No:     Next Visit Date:  Future Appointments   Date Time Provider Lena Brisa   9/16/2021 10:45 AM Ambrose Salgado DPM [facility]   11/10/2021 11:30 AM Rut Burciaga MD [facility] [place] None   2/7/2022  2:15 PM Palmer Michaud MD [facility]asc [address] Maintenance   Topic Date Due    Hepatitis C screen  Never done    Annual Wellness Visit (AWV)  03/09/2023 (Originally 6/10/2019)    Flu vaccine (1) 09/01/2021    Lipid screen  11/04/2021    TSH testing  11/23/2021    Potassium monitoring  05/03/2022    Creatinine monitoring  05/03/2022    DTaP/Tdap/Td vaccine (2 - Td or Tdap) 07/02/2029    DEXA (modify frequency per FRAX score)  Completed    Shingles Vaccine  Completed    Pneumococcal 65+ years Vaccine  Completed    COVID-19 Vaccine  Completed    Hepatitis A vaccine  Aged Out    Hib vaccine  Aged Out    Meningococcal (ACWY) vaccine  Aged Out       Hemoglobin A1C (%)   Date Value   11/23/2020 6.0   02/13/2018 6.3   11/07/2017 6.7             ( goal A1C is < 7)   Microalb/Crt.  Ratio (mcg/mg creat)   Date Value   02/13/2018 280 (H)     LDL Cholesterol (mg/dL)   Date Value   11/04/2020 47   04/29/2019 46       (goal LDL is <100)   AST (U/L)   Date Value   11/04/2020 13     ALT (U/L)   Date Value   11/04/2020 7     BUN (mg/dL)   Date Value   05/03/2021 25 (H)     BP Readings from Last 3 Encounters:   07/19/21 135/87   05/05/21 (!) 92/52   03/02/21 (!) 131/51          (goal 120/80)    All Future Testing planned in [facility]  Lab Frequency Next Occurrence   DEXA AXIAL SKELETON W VERTEBRAL FX ASST Once 01/11/2022   VL ARTERIAL PVR LOWER WO EXERCISE Once 02/08/2022   CBC every 3 months    Albumin every 3 months    Basic Metabolic Panel every 3 months    Phosphorus every 3 months    PTH, Intact every 3 months    Protein / creatinine ratio, urine every 3 months                Patient Active Problem List:     DM (diabetes mellitus) ([facility]     Hypercholesteremia     CAD (coronary artery disease)     Hypothyroidism     Lumbar spondylosis with myelopathy     S/P cardiac cath 8/19/14-Dr. covarrubias     CKD (chronic kidney disease) stage 3, GFR 30-59 ml/min ([facility])     Morbid obesity with BMI of 40.0-44.9, adult ([facility]     At high risk for hyperkalemia     Edema     COPD (chronic obstructive pulmonary disease) ([address]     Hyperparathyroidism due to renal insufficiency ([facility])     Carotid stenosis, asymptomatic     Claudication in peripheral vascular disease ([facility])     Nonproliferative diabetic retinopathy of both eyes ([facility])     Type 2 diabetes mellitus with stage 3 chronic kidney disease, with long-term current use of insulin ([facility]     Coronary artery disease involving coronary bypass graft of native heart without angina pectoris     Essential hypertension     Localized edema     DM (diabetes mellitus), type 2 with peripheral vascular complications ([facility])     Mild nonproliferative diabetic retinopathy associated with type 2 diabetes mellitus ([address]     Panlobular emphysema ([facility])     S/P drug eluting coronary stent placement-OM1 3/26/18-Dr. covarrubias     CAD S/P percutaneous coronary angioplasty     Persistent proteinuria     Lymphedema of right lower extremity     Chronic bilateral low back pain without sciatica     Deficiency of vitamin B12     Wrist arthritis     Gastroesophageal reflux disease without esophagitis

## 2021-08-21 ENCOUNTER — TELEPHONE (OUTPATIENT)
Dept: FAMILY MEDICINE CLINIC | Age: 81
End: 2021-08-21

## 2021-08-21 NOTE — TELEPHONE ENCOUNTER
Spoke with patient in regards to scheduling an appointment for awv and patient declined not interested

## 2021-08-31 ENCOUNTER — OFFICE VISIT (OUTPATIENT)
Dept: FAMILY MEDICINE CLINIC | Age: 81
End: 2021-08-31
Payer: MEDICARE

## 2021-08-31 VITALS
DIASTOLIC BLOOD PRESSURE: 75 MMHG | HEART RATE: 71 BPM | SYSTOLIC BLOOD PRESSURE: 150 MMHG | HEIGHT: 60 IN | TEMPERATURE: 97.4 F | BODY MASS INDEX: 42.56 KG/M2 | WEIGHT: 216.8 LBS

## 2021-08-31 DIAGNOSIS — S13.9XXA NECK SPRAIN, INITIAL ENCOUNTER: Primary | ICD-10-CM

## 2021-08-31 PROCEDURE — G8428 CUR MEDS NOT DOCUMENT: HCPCS | Performed by: STUDENT IN AN ORGANIZED HEALTH CARE EDUCATION/TRAINING PROGRAM

## 2021-08-31 PROCEDURE — 4040F PNEUMOC VAC/ADMIN/RCVD: CPT | Performed by: STUDENT IN AN ORGANIZED HEALTH CARE EDUCATION/TRAINING PROGRAM

## 2021-08-31 PROCEDURE — G8417 CALC BMI ABV UP PARAM F/U: HCPCS | Performed by: STUDENT IN AN ORGANIZED HEALTH CARE EDUCATION/TRAINING PROGRAM

## 2021-08-31 PROCEDURE — 1090F PRES/ABSN URINE INCON ASSESS: CPT | Performed by: STUDENT IN AN ORGANIZED HEALTH CARE EDUCATION/TRAINING PROGRAM

## 2021-08-31 PROCEDURE — 1036F TOBACCO NON-USER: CPT | Performed by: STUDENT IN AN ORGANIZED HEALTH CARE EDUCATION/TRAINING PROGRAM

## 2021-08-31 PROCEDURE — 1123F ACP DISCUSS/DSCN MKR DOCD: CPT | Performed by: STUDENT IN AN ORGANIZED HEALTH CARE EDUCATION/TRAINING PROGRAM

## 2021-08-31 PROCEDURE — G8399 PT W/DXA RESULTS DOCUMENT: HCPCS | Performed by: STUDENT IN AN ORGANIZED HEALTH CARE EDUCATION/TRAINING PROGRAM

## 2021-08-31 PROCEDURE — 99213 OFFICE O/P EST LOW 20 MIN: CPT | Performed by: STUDENT IN AN ORGANIZED HEALTH CARE EDUCATION/TRAINING PROGRAM

## 2021-08-31 RX ORDER — LIDOCAINE 50 MG/G
1 PATCH TOPICAL DAILY
Qty: 30 PATCH | Refills: 0 | Status: SHIPPED | OUTPATIENT
Start: 2021-08-31 | End: 2021-09-30

## 2021-08-31 RX ORDER — TIZANIDINE 2 MG/1
2 TABLET ORAL NIGHTLY PRN
Qty: 10 TABLET | Refills: 0 | Status: SHIPPED | OUTPATIENT
Start: 2021-08-31 | End: 2021-09-10

## 2021-08-31 ASSESSMENT — ENCOUNTER SYMPTOMS
ABDOMINAL PAIN: 0
DIARRHEA: 0
VOMITING: 0
SHORTNESS OF BREATH: 0
NAUSEA: 0

## 2021-08-31 NOTE — PROGRESS NOTES
Visit Information    Have you changed or started any medications since your last visit including any over-the-counter medicines, vitamins, or herbal medicines? no   Have you stopped taking any of your medications? Is so, why? -  no  Are you having any side effects from any of your medications? - no    Have you seen any other physician or provider since your last visit?  no   Have you had any other diagnostic tests since your last visit?  no   Have you been seen in the emergency room and/or had an admission in a hospital since we last saw you?  no   Have you had your routine dental cleaning in the past 6 months?  no     Do you have an active MyChart account? If no, what is the barrier?   Yes    Patient Care Team:  Megan Chester DO as PCP - General (Family Medicine)  Megan Chester DO as PCP [facility]  Adriano Cao DPM as Physician (Podiatry)    Medical History Review  Past Medical, Family, and Social History reviewed and does not contribute to the patient presenting condition    Health Maintenance   Topic Date Due    Hepatitis C screen  Never done   [employer] Visit (AWV)  03/09/2023 (Originally 6/10/2019)    Flu vaccine (1) 09/01/2021    Lipid screen  11/04/2021    TSH testing  11/23/2021    Potassium monitoring  05/03/2022    Creatinine monitoring  05/03/2022    DTaP/Tdap/Td vaccine (2 - Td or Tdap) 07/02/2029    DEXA (modify frequency per FRAX score)  Completed    Shingles Vaccine  Completed    Pneumococcal 65+ years Vaccine  Completed    COVID-19 Vaccine  Completed    Hepatitis A vaccine  Aged Out    Hib vaccine  Aged Out    Meningococcal (ACWY) vaccine  Aged Out

## 2021-08-31 NOTE — PATIENT INSTRUCTIONS
Thank you for letting us take care of you today. We hope all your questions were addressed. If a question was overlooked or something else comes to mind after you return home, please contact a member of your Care Team listed below. Your Care Team at [place] [address] is Team #2  Alejandra Krueger DO (Faculty)  Paramjit Caro (Faculty)  Laz Coleman MD (Resident)  Linh Walker MD (Resident)  Cain Miller MD (Resident)  Roxanne Walker MD (Resident)  Zita Andrews MD (Resident)  Eleazar Baxter., CHANA Cabrera Jorje.,  SARINA Wilde., [facility] office)  Mandie Hernandez, [address] [facility] [address] (Clinical Practice Manager)  Angelia Pham [facility] (Clinical Pharmacist)     Office phone number: [phone]    If you need to get in right away due to illness, please be advised we have \"Same Day\" appointments available Monday-Friday. Please call us at [phone] option #3 to schedule your \"Same Day\" appointment. Patient Education        Neck Strain or Sprain: Rehab Exercises  Introduction  Here are some examples of exercises for you to try. The exercises may be suggested for a condition or for rehabilitation. Start each exercise slowly. Ease off the exercises if you start to have pain. You will be told when to start these exercises and which ones will work best for you. How to do the exercises  Neck rotation   1. Sit in a firm chair, or stand up straight. 2. Keeping your chin level, turn your head to the right, and hold for 15 to 30 seconds. 3. Turn your head to the left and hold for 15 to 30 seconds. 4. Repeat 2 to 4 times to each side. Neck stretches   1. Look straight ahead, and tip your right ear to your right shoulder. Do not let your left shoulder rise up as you tip your head to the right. 2. Hold for 15 to 30 seconds. 3. Tilt your head to the left. Do not let your right shoulder rise up as you tip your head to the left. 4. Hold for 15 to 30 seconds. 5. Repeat 2 to 4 times to each side.     Forward neck flexion   1. Sit in a firm chair, or stand up straight. 2. Bend your head forward. 3. Hold for 15 to 30 seconds. 4. Repeat 2 to 4 times. Lateral (side) bend strengthening   1. With your right hand, place your first two fingers on your right temple. 2. Start to bend your head to the side while using gentle pressure from your fingers to keep your head from bending. 3. Hold for about 6 seconds. 4. Repeat 8 to 12 times. 5. Switch hands and repeat the same exercise on your left side. Forward bend strengthening   1. Place your first two fingers of either hand on your forehead. 2. Start to bend your head forward while using gentle pressure from your fingers to keep your head from bending. 3. Hold for about 6 seconds. 4. Repeat 8 to 12 times. Neutral position strengthening   1. Using one hand, place your fingertips on the back of your head at the top of your neck. 2. Start to bend your head backward while using gentle pressure from your fingers to keep your head from bending. 3. Hold for about 6 seconds. 4. Repeat 8 to 12 times. Chin tuck   1. Lie on the floor with a rolled-up towel under your neck. Your head should be touching the floor. 2. Slowly bring your chin toward your chest.  3. Hold for a count of 6, and then relax for up to 10 seconds. 4. Repeat 8 to 12 times. Follow-up care is a key part of your treatment and safety. Be sure to make and go to all appointments, and call your doctor if you are having problems. It's also a good idea to know your test results and keep a list of the medicines you take. Where can you learn more? Go to https://[employer]ime.[employer]. org and sign in to your [employer] account. Enter M679 in the [employer] box to learn more about \"Neck Strain or Sprain: Rehab Exercises. \"     If you do not have an account, please click on the \"Sign Up Now\" link.   Current as of: November 16, 2020               Content Version: 12.9  © 4747-2389 Healthwise, Incorporated. Care instructions adapted under license by [facility] ([facility]). If you have questions about a medical condition or this instruction, always ask your healthcare professional. [address] any warranty or liability for your use of this information.

## 2021-08-31 NOTE — PROGRESS NOTES
Attending Physician Statement  I have discussed the care of Maury Acuna, including pertinent history and exam findings,  with the resident. I have reviewed the key elements of all parts of the encounter with the resident. I agree with the assessment, plan and orders as documented by the resident.   (Radha Wilder)    Susanne Yang MD

## 2021-08-31 NOTE — PROGRESS NOTES
Subjective:    Cheri Titus is a 80 y.o. female with  has a past medical history of Abnormal cardiovascular stress test, Abnormal stress test, Ambulates with cane, Arthritis, At high risk for hyperkalemia, Back pain, CAD (coronary artery disease), Circulation problem, CKD (chronic kidney disease) stage 3, GFR 30-59 ml/min ([facility]), COPD (chronic obstructive pulmonary disease) ([facility] DM (diabetes mellitus) ([facility] Edema, Foot pain, bilateral, Former smoker, Gastroesophageal reflux disease without esophagitis, Hyperkalemia, Hyperlipidemia, Hypertension, Hypothyroidism, Intermittent claudication ([facility] Kidney disease, Lymphedema, MI (mitral incompetence), 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 ([facility] Morbid obesity with BMI of 45.0-49.9, adult ([facility] Osteoarthritis, Other activity(E029.9), Other activity(E029.9), Persistent proteinuria, PVD (peripheral vascular disease) ([facility] Sciatica, Secondary hyperparathyroidism (of renal origin), Shortness of breath, Shoulder fracture, right, Sleep apnea, and Type II or unspecified type diabetes mellitus without mention of complication, not stated as uncontrolled. Presented to the office today for:  Chief Complaint   Patient presents with    Back Pain     back and neck pain       HPI    81F here with complaint of left neck pain. Stated she may have \"pulled\" her neck muscles a few days ago. No trauma/injury. Stated has sharp pain/tightness/spasms in the left neck. No radiation. No numbness/tingling/sensory changes/ No weakness. No other concerns today. Review of Systems   Constitutional: Negative for chills and fever. Respiratory: Negative for shortness of breath. Cardiovascular: Negative for chest pain. Gastrointestinal: Negative for abdominal pain, diarrhea, nausea and vomiting. Musculoskeletal: Positive for neck pain (left sided). Neurological: Negative for numbness and headaches. The patient has a No family history on file. Objective:    BP (!) 150/75   Pulse 71   Temp 97.4 °F (36.3 °C)   Ht 5' (1.524 m)   Wt 216 lb 12.8 oz (98.3 kg)   BMI 42.34 kg/m²    BP Readings from Last 3 Encounters:   08/31/21 (!) 150/75   07/19/21 135/87   05/05/21 (!) 92/52       Physical Exam  HENT:      Head: Normocephalic and atraumatic. Cardiovascular:      Rate and Rhythm: Normal rate and regular rhythm. Pulses: Normal pulses. Heart sounds: Normal heart sounds. Pulmonary:      Effort: Pulmonary effort is normal. No respiratory distress. Breath sounds: Normal breath sounds. No wheezing. Abdominal:      Palpations: Abdomen is soft. Tenderness: There is no abdominal tenderness. There is no guarding. Musculoskeletal:      Comments: Neck ROM wnl however pain on lateral flexion on left side   Skin:     General: Skin is warm and dry. Neurological:      General: No focal deficit present. Mental Status: She is alert and oriented to person, place, and time. Lab Results   Component Value Date    WBC 7.0 05/03/2021    HGB 12.3 05/03/2021    HCT 40.2 05/03/2021     05/03/2021    CHOL 151 06/23/2016    TRIG 176 (H) 06/23/2016    HDL 51 11/04/2020    ALT 7 11/04/2020    AST 13 11/04/2020     05/03/2021    K 4.9 05/03/2021     (H) 05/03/2021    CREATININE 1.68 (H) 05/03/2021    BUN 25 (H) 05/03/2021    CO2 19 (L) 05/03/2021    TSH 0.17 (L) 11/23/2020    INR CANNOT BE CALCULATED 03/23/2018    LABA1C 6.0 11/23/2020    LABMICR 280 (H) 02/13/2018     Lab Results   Component Value Date    CALCIUM 10.2 05/03/2021    PHOS 3.6 05/03/2021     Lab Results   Component Value Date    LDLCHOLESTEROL 47 11/04/2020       Assessment and Plan:    1.  Neck sprain, initial encounter  - counseled on modifying activity, ice/heat, tylenol PRN  - counseled on careful use of muscle relaxants, nightly use only  - lidocaine (LIDODERM) 5 %; Place 1 patch onto the skin daily 12 hours on, 12 hours off. Dispense: 30 patch; Refill: 0  - tiZANidine (ZANAFLEX) 2 MG tablet; Take 1 tablet by mouth nightly as needed (muscle spasms)  Dispense: 10 tablet; Refill: 0          Requested Prescriptions     Signed Prescriptions Disp Refills    lidocaine (LIDODERM) 5 % 30 patch 0     Sig: Place 1 patch onto the skin daily 12 hours on, 12 hours off.  tiZANidine (ZANAFLEX) 2 MG tablet 10 tablet 0     Sig: Take 1 tablet by mouth nightly as needed (muscle spasms)       There are no discontinued medications. Maria Luisa Chato received counseling on the following healthy behaviors: nutrition, exercise and medication adherence    Discussed use,benefit, and side effects of prescribed medications. Barriers to medication compliance addressed. All patient questions answered. Pt voiced understanding. Return if symptoms worsen or fail to improve. Disclaimer: Some orall of this note was transcribed using voice-recognition software. This may cause typographical errors occasionally. Although all effort is made to fix these errors, please do not hesitate to contact our office if there Yvone Gain concern with the understanding of this note.

## 2021-09-16 ENCOUNTER — OFFICE VISIT (OUTPATIENT)
Dept: PODIATRY | Age: 81
End: 2021-09-16
Payer: MEDICARE

## 2021-09-16 VITALS — HEIGHT: 60 IN | WEIGHT: 215 LBS | BODY MASS INDEX: 42.21 KG/M2

## 2021-09-16 DIAGNOSIS — B35.1 DERMATOPHYTOSIS OF NAIL: ICD-10-CM

## 2021-09-16 DIAGNOSIS — M79.671 RIGHT FOOT PAIN: ICD-10-CM

## 2021-09-16 DIAGNOSIS — E11.51 TYPE II DIABETES MELLITUS WITH PERIPHERAL CIRCULATORY DISORDER (HCC): Primary | ICD-10-CM

## 2021-09-16 DIAGNOSIS — D23.72 BENIGN NEOPLASM OF SKIN OF LOWER LIMB, INCLUDING HIP, LEFT: ICD-10-CM

## 2021-09-16 DIAGNOSIS — R26.2 TROUBLE WALKING: ICD-10-CM

## 2021-09-16 DIAGNOSIS — D23.71 BENIGN NEOPLASM OF SKIN OF LOWER LIMB, INCLUDING HIP, RIGHT: ICD-10-CM

## 2021-09-16 DIAGNOSIS — M79.671 PAIN IN BOTH FEET: ICD-10-CM

## 2021-09-16 DIAGNOSIS — I73.9 PERIPHERAL VASCULAR DISEASE (HCC): ICD-10-CM

## 2021-09-16 DIAGNOSIS — M79.672 PAIN IN BOTH FEET: ICD-10-CM

## 2021-09-16 PROCEDURE — 17110 DESTRUCTION B9 LES UP TO 14: CPT | Performed by: PODIATRIST

## 2021-09-16 PROCEDURE — 11721 DEBRIDE NAIL 6 OR MORE: CPT | Performed by: PODIATRIST

## 2021-09-16 NOTE — PATIENT INSTRUCTIONS
Schedule a Vaccine  When you qualify to receive the vaccine, call the [facility]) COVID-19 Vaccination Hotline to schedule your appointment or to get additional information about the [facility]) locations which are offering the COVID-19 vaccine. To be 94% effective, it's important that you receive two doses of one of the COVID-19 vaccines. -If you are receiving the Leung Peter vaccine, your second shot will be scheduled as close to 21 days after the first shot as possible. -If you are receiving the Moderna vaccine, your second shot will be scheduled as close to 28 days after the first shot as possible. [facility]) COVID-19 Vaccination Hotline: [phone]    Links to [facility]) website and [facility] website:    [email or web address]    [email or web address]

## 2021-09-16 NOTE — PROGRESS NOTES
[facility] PHYSICIANS  [facility]  [address] [place] [address]  Dept: [phone]  Dept Fax: [phone]    DIABETIC PROGRESS NOTE  Date of patient's visit: 9/16/2021  Patient's Name:  Maury Acuna YOB: 1940            Patient Care Team:  Rad Estes DO as PCP - General (Family Medicine)  Rad Estes DO as PCP - [facility] Empaneled Provider  Murphy Santos DPM as Physician (Podiatry)          Chief Complaint   Patient presents with    Diabetes     [facility] [place]     Peripheral Neuropathy       Subjective:   Maury Acuna comes to clinic for Diabetes [facility] [place] ) and Peripheral Neuropathy    she is a diabetic and states that needs toenails trimmed. Pt currently has complaint of thickened, elongated nails that they cannot manage by themselves. Pt's primary care physician is Lio Tomas DO last seen 7/19/21. Pt's last blood sugar was 91 - 3/2/21 . Pt also relates to painful skin spots to the bottom of both feet. Pt has tried pads and changing shoes but it has note helped the pain    No new complaints        Lab Results   Component Value Date    LABA1C 6.0 11/23/2020      Complains of numbness in the feet bilat.   Past Medical History:   Diagnosis Date    Abnormal cardiovascular stress test 02/2021    LARGE ANTERIOR INFARCTION / LARGE AREA OF INFERIOR LATERAL ISCHEMIA WITH REDUCED EF 38%    Abnormal stress test 07/18/2014    going to do cath- not urgent just abnormal    Ambulates with cane     PRN    Arthritis     At high risk for hyperkalemia 10/22/2014    From type 4 RTA    Back pain     CAD (coronary artery disease)     Circulation problem     decreased circulation to  zbigniew extremities    CKD (chronic kidney disease) stage 3, GFR 30-59 ml/min ([facility]) 10/22/2014    From diabetic and ischemic nephrosclerosis, baseline 1.4, GFR 40-45 ml/min, UPC 0.3    COPD (chronic obstructive pulmonary disease) [facility]     DM (diabetes mellitus) ([facility]     Edema     chronic lower extremity    Foot pain, bilateral     Former smoker     Gastroesophageal reflux disease without esophagitis 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    Hyperlipidemia     Hypertension     Hypothyroidism     Intermittent claudication (HCC)     loly to left leg    Kidney disease     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 ([facility] 04/20/2016    Morbid obesity with BMI of 45.0-49.9, adult ([facility] 10/22/2014    Osteoarthritis     Other activity(E029.9)     Acute renal failure secondary to prerenal azotemia    Other activity(E029.9)     Atherosclerotic coronary artery disease status-post bypass surgery in 2003    Persistent proteinuria 05/09/2018    From diabetic nephrosclerosis, urine protein creatinine ratio 0.81    PVD (peripheral vascular disease) ([facility]     Sciatica     Secondary hyperparathyroidism (of renal origin) 10/22/2014    Not on VDRA    Shortness of breath     Shoulder fracture, right 12/2020    STATES FELL ON [employer]    Sleep apnea     Type II or unspecified type diabetes mellitus without mention of complication, not stated as uncontrolled        No Known Allergies  Current Outpatient Medications on File Prior to Visit   Medication Sig Dispense Refill    lidocaine (LIDODERM) 5 % Place 1 patch onto the skin daily 12 hours on, 12 hours off.  30 patch 0    Alcohol Swabs (B-D SINGLE USE SWABS REGULAR) PADS 1 box by Other route 3 times daily as needed (When pt checks glucose) 1 each 5    Blood Glucose Monitoring Suppl (TRUE METRIX AIR GLUCOSE METER) w/Device KIT 1 Device by Does not apply route 2 times daily 1 kit 0    blood glucose monitor strips 1 strip by Other route every morning (before breakfast) True Metrix Test__1_times daily Diagnosis: 250.0   Diabetes Mellitus____Insulin Dependent__x__Non-Insulin Dependent 100 strip 11    Lancets MISC 1 each by Does not apply route daily 33G Use 1 times daily Diagnisis:250.0  Diabetes Mellitus____Insulin Dependent__X_Non-Insulin Dependent 100 each 3    calcitRIOL (ROCALTROL) 0.25 MCG capsule Take 1 capsule by mouth three times a week mon wed fri 36 capsule 3    atorvastatin (LIPITOR) 40 MG tablet TAKE 1 TABLET EVERY DAY 90 tablet 3    clopidogrel (PLAVIX) 75 MG tablet TAKE 1 TABLET EVERY DAY 90 tablet 3    insulin glargine (LANTUS) 100 UNIT/ML injection vial INJECT 10 UNITS INTO THE SKIN NIGHTLY 30 mL 5    lansoprazole (PREVACID) 15 MG delayed release capsule TAKE 1 CAPSULE EVERY DAY 90 capsule 3    lisinopril (PRINIVIL;ZESTRIL) 20 MG tablet TAKE 1 TABLET TWICE DAILY (DOSE INCREASE PER DR Juliette Goode) 180 tablet 3    metoprolol succinate (TOPROL XL) 100 MG extended release tablet TAKE 1 TABLET EVERY DAY 90 tablet 3    nitroGLYCERIN (NITROSTAT) 0.4 MG SL tablet Place 1 tablet under the tongue as needed for Chest pain 25 tablet 2    amLODIPine (NORVASC) 10 MG tablet Take 10 mg by mouth daily      ACCU-CHEK DONG PLUS strip TEST THREE TIMES DAILY 300 strip 5    levothyroxine (SYNTHROID) 88 MCG tablet Take 1 tablet by mouth daily 90 tablet 3    acetaminophen (TYLENOL) 500 MG tablet Take 2 tablets by mouth every 8 hours as needed for Pain 270 tablet 1    aspirin EC 81 MG EC tablet Take 2 tablets by mouth daily 30 tablet 5    cyanocobalamin 1000 MCG/ML injection Inject 1 ML every other month   please give patient (6) 1 ml vials. 6 mL 5    blood glucose monitor strips Test_3__times daily Diagnosis: 250.0   Diabetes Mellitus__x__Insulin Dependent____Non-Insulin Dependent Life Scan test strips ultra one touch 300 strip 5    Insulin Syringe-Needle U-100 31G X 5/16\" 0.3 ML MISC 1 each by Does not apply route daily 100 each 5    Syringe/Needle, Disp, (SYRINGE 3CC/25GX1\") 25G X 1\" 3 ML MISC Used one every other month.  6 each 5    B-D INS SYR ULTRAFINE 1CC/30G 30G X 1/2\" 1 ML MISC USE 1 SYRINGE EVERY DAY 90 each 5    Compression Stockings MISC by Does not apply route 20-40 mmHg. 2 each 0    Blood Glucose Calibration (OT ULTRA/FASTTK CNTRL SOLN) SOLN       Multiple Vitamins-Minerals (OCUVITE ADULT 50+ PO) Take 1 tablet by mouth daily       Green Tea, Camillia sinensis, 315 MG CAPS Take 1 tablet by mouth daily       Cinnamon 500 MG TABS Take 1 tablet by mouth daily.  Cranberry-Vitamin C-Vitamin E (CRANBERRY PLUS VITAMIN C PO) Take 1 tablet by mouth daily.  Ascorbic Acid (VITAMIN C WITH VIVIENNE HIPS) 500 MG tablet Take 500 mg by mouth daily.  Omega-3 Fatty Acids (FISH OIL) 1000 MG CAPS Take 1,000 mg by mouth daily.  docusate sodium (COLACE) 100 MG capsule Take 100 mg by mouth 2 times daily.  Blood Glucose Monitoring Suppl (ONE TOUCH ULTRA) by Does not apply route. No current facility-administered medications on file prior to visit. Review of Systems    Review of Systems:   History obtained from chart review and the patient  General ROS: negative for - chills, fatigue, fever, night sweats or weight gain  Constitutional: Negative for chills, diaphoresis, fatigue, fever and unexpected weight change. Musculoskeletal: Positive for arthralgias, gait problem and joint swelling. Neurological ROS: negative for - behavioral changes, confusion, headaches or seizures. Negative for weakness and numbness. Dermatological ROS: negative for - mole changes, rash  Cardiovascular: Negative for leg swelling. Gastrointestinal: Negative for constipation, diarrhea, nausea and vomiting. Objective:  Dermatologic Exam:  Skin lesion present to the right and left plantar foot with a central core and petchaie noted to the lesions periphery.   Pain on palpation of the lesion    Skin is thin, with flaky sloughing skin as well as decreased hair growth to the lower leg  Small red hemosiderin deposits seen dorsal foot   Musculoskeletal: 1st MPJ ROM decreased, Bilateral.  Muscle strength 5/5, Bilateral.  Pain present upon palpation of toenails 1-5, Bilateral. decreased medial longitudinal arch, Bilateral.  Ankle ROM decreased,Bilateral.    Dorsally contracted digits present digits 2, Bilateral.     Vascular: DP pulses 1/4 bilateral.  PT pulses 0/4 bilateral.   CFT <5 seconds, Bilateral.  Hair growth absent to the level of the digits, Bilateral.  Edema present, Bilateral.  Varicosities absent, Bilateral. Erythema absent, Bilateral    Neurological: Sensation diminshed to light touch to level of digits, Bilateral.  Protective sensation intact 6/10 sites via 5.07/10g Lecompte-Mabel Monofilament, Bilateral.  negative Tinel's, Bilateral.  negative Valleix sign, Bilateral.      Integument: Warm, dry, supple, Bilateral.  Open lesion absent, Bilateral.  Interdigital maceration absent to web spaces 4, Bilateral.  Nails 1-5 left and 1-5 right thickened > 3.0 mm, dystrophic and crumbly, discolored with subungual debris. Fissures absent, Bilateral.   General: AAO x 3 in NAD.     Derm  Toenail Description  Sites of Onychomycosis Involvement (Check affected area)  [x] [x] [x] [x] [x] [x] [x] [x] [x] [x]  5 4 3 2 1 1 2 3 4 5                          Right                                        Left    Thickness  [x] [x] [x] [x] [x] [x] [x] [x] [x] [x]  5 4 3 2 1 1 2 3 4 5                         Right                                        Left    Dystrophic Changes   [x] [x] [x] [x] [x] [x] [x] [x] [x] [x]  5 4 3 2 1 1 2 3 4 5                         Right                                        Left    Color   [x] [x] [x] [x] [x] [x] [x] [x] [x] [x]  5 4 3 2 1 1 2 3 4 5                          Right                                        Left    Incurvation/Ingrowin   [] [] [] [] [] [] [] [] [] []  5 4 3 2 1 1 2 3 4 5                         Right                                        Left    Inflammation/Pain [x] [x] [x] [x] [x] [x] [x] [x] [x] [x]  5 4 3 2 1 1 2 3 4 5                         Right                                        Left        Visual inspection:  Deformity: hammertoe deformity zbigniew feet  amputation: absent  Skin lesions: present - as above  Edema: right- 2+ pitting edema, left- 2+ pitting edema    Sensory exam:  Monofilament sensation: abnormal - 6/10 via SW 5.07/10g monofilament to the plantar foot bilateral feet    Pulses: abnormal - 1/4 dorsalis pedis pulse and 0/4 Posterior tibial pulse,   Pinprick: Impaired  Proprioception: Impaired  Vibration (128 Hz): Impaired       DM with PVD       [x]Yes    []No      Assessment:  80 y.o. female with:   Diagnosis Orders   1. Type II diabetes mellitus with peripheral circulatory disorder (HCC)  08056 - CT DEBRIDEMENT OF NAILS, 6 OR MORE    HM DIABETES FOOT EXAM    20213 - CT DESTRUCTION BENIGN LESIONS UP TO 14   2. Dermatophytosis of nail  19517 - CT DEBRIDEMENT OF NAILS, 6 OR MORE    HM DIABETES FOOT EXAM    90571 - CT DESTRUCTION BENIGN LESIONS UP TO 14   3. Trouble walking  99950 - CT DEBRIDEMENT OF NAILS, 6 OR MORE    HM DIABETES FOOT EXAM    75453 - CT DESTRUCTION BENIGN LESIONS UP TO 14   4. Benign neoplasm of skin of lower limb, including hip, right  85937 - CT DEBRIDEMENT OF NAILS, 6 OR MORE    HM DIABETES FOOT EXAM    38232 - CT DESTRUCTION BENIGN LESIONS UP TO 14   5. Benign neoplasm of skin of lower limb, including hip, left  70286 - CT DEBRIDEMENT OF NAILS, 6 OR MORE    HM DIABETES FOOT EXAM    74892 - CT DESTRUCTION BENIGN LESIONS UP TO 14   6. Peripheral vascular disease (HCC)  31698 - CT DEBRIDEMENT OF NAILS, 6 OR MORE    HM DIABETES FOOT EXAM    94568 - CT DESTRUCTION BENIGN LESIONS UP TO 14   7. Pain in both feet  47969 - CT DEBRIDEMENT OF NAILS, 6 OR MORE    HM DIABETES FOOT EXAM    90606 - CT DESTRUCTION BENIGN LESIONS UP TO 14   8.  Right foot pain  77742 - CT DEBRIDEMENT OF NAILS, 6 OR MORE    HM DIABETES FOOT EXAM    61643 - CT DESTRUCTION BENIGN LESIONS UP TO 14           Q7   []Yes    []No                Q8   [x]Yes    []No                     Q9   []Yes    []No    Plan:   Pt was evaluated and examined. Patient was given personalized discharge instructions. The lesions were partially excised via 15 blade and silver nitrate was applied under occlusion. The patient tolerated the procedure well and without complication. Advised patient to use vasoline to the area after tomorrow to prevent surrounding tissue irritation. Nails 1-10 were debrided sharply in length and thickness with a nipper and , without incident. Pt will follow up in 9 weeks or sooner if any problems arise. Diagnosis was discussed with the pt and all of their questions were answered in detail. Proper foot hygiene and care was discussed with the pt. Informed patient on proper diabetic foot care and importance of tight glycemic control. Patient to check feet daily and contact the office with any questions/problems/concerns.    Other comorbidity noted and will be managed by PCP.  9/16/2021    Electronically signed by Leandro Crowe DPM on 9/16/2021 at 10:41 AM  9/16/2021

## 2021-09-28 ENCOUNTER — TELEPHONE (OUTPATIENT)
Dept: FAMILY MEDICINE CLINIC | Age: 81
End: 2021-09-28

## 2021-10-12 ENCOUNTER — HOSPITAL ENCOUNTER (OUTPATIENT)
Dept: MAMMOGRAPHY | Age: 81
Discharge: HOME | End: 2021-10-14
Payer: MEDICARE

## 2021-10-12 DIAGNOSIS — Z12.31 VISIT FOR SCREENING MAMMOGRAM: ICD-10-CM

## 2021-10-12 PROCEDURE — 77063 BREAST TOMOSYNTHESIS BI: CPT

## 2021-10-19 DIAGNOSIS — E11.51 DM (DIABETES MELLITUS), TYPE 2 WITH PERIPHERAL VASCULAR COMPLICATIONS (HCC): ICD-10-CM

## 2021-10-19 NOTE — TELEPHONE ENCOUNTER
Please address the medication refill and close the encounter. If I can be of assistance, please route to the applicable pool. Thank you. Last visit: 7-  Last Med refill: 7/27/2021  Does patient have enough medication for 72 hours: No:     Next Visit Date:  Future Appointments   Date Time Provider Lena Brisa   11/10/2021 11:30 AM Laverne Combs MD [facility] [place] None   11/18/2021 10:45 AM Raffaele Arthur DPM [facility]   12/1/2021  3:00 PM DO Ivon Carrasquillo. [address]   2/7/2022  2:15 PM Palmer Ledezma MD [facility] [address] Maintenance   Topic Date Due    Hepatitis C screen  Never done    COVID-19 Vaccine (3 - Pfizer booster) 08/17/2021    Flu vaccine (1) 09/01/2021    Annual Wellness Visit (AWV)  03/09/2023 (Originally 6/10/2019)    Lipid screen  11/04/2021    TSH testing  11/23/2021    Potassium monitoring  05/03/2022    Creatinine monitoring  05/03/2022    DTaP/Tdap/Td vaccine (2 - Td or Tdap) 07/02/2029    DEXA (modify frequency per FRAX score)  Completed    Shingles Vaccine  Completed    Pneumococcal 65+ years Vaccine  Completed    Hepatitis A vaccine  Aged Out    Hib vaccine  Aged Out    Meningococcal (ACWY) vaccine  Aged Out       Hemoglobin A1C (%)   Date Value   11/23/2020 6.0   02/13/2018 6.3   11/07/2017 6.7             ( goal A1C is < 7)   Microalb/Crt.  Ratio (mcg/mg creat)   Date Value   02/13/2018 280 (H)     LDL Cholesterol (mg/dL)   Date Value   11/04/2020 47   04/29/2019 46       (goal LDL is <100)   AST (U/L)   Date Value   11/04/2020 13     ALT (U/L)   Date Value   11/04/2020 7     BUN (mg/dL)   Date Value   05/03/2021 25 (H)     BP Readings from Last 3 Encounters:   08/31/21 (!) 150/75   07/19/21 135/87   05/05/21 (!) 92/52          (goal 120/80)    All Future Testing planned in [facility]  Lab Frequency Next Occurrence   DEXA AXIAL SKELETON W VERTEBRAL FX ASST Once 01/11/2022   VL ARTERIAL PVR LOWER WO EXERCISE Once 02/08/2022   CBC every 3 months    Albumin every 3 months    Basic Metabolic Panel every 3 months    Phosphorus every 3 months    PTH, Intact every 3 months    Protein / creatinine ratio, urine every 3 months                Patient Active Problem List:     DM (diabetes mellitus) ([address]     Hypercholesteremia     CAD (coronary artery disease)     Hypothyroidism     Lumbar spondylosis with myelopathy     S/P cardiac cath 8/19/14-Dr. covarrubias     CKD (chronic kidney disease) stage 3, GFR 30-59 ml/min ([facility])     Morbid obesity with BMI of 40.0-44.9, adult ([address]     At high risk for hyperkalemia     Edema     COPD (chronic obstructive pulmonary disease) ([address]     Hyperparathyroidism due to renal insufficiency ([facility])     Carotid stenosis, asymptomatic     Claudication in peripheral vascular disease ([facility])     Nonproliferative diabetic retinopathy of both eyes ([facility])     Type 2 diabetes mellitus with stage 3 chronic kidney disease, with long-term current use of insulin ([address]     Coronary artery disease involving coronary bypass graft of native heart without angina pectoris     Essential hypertension     Localized edema     DM (diabetes mellitus), type 2 with peripheral vascular complications ([facility])     Mild nonproliferative diabetic retinopathy associated with type 2 diabetes mellitus ([address]     Panlobular emphysema ([facility])     S/P drug eluting coronary stent placement-OM1 3/26/18-Dr. covarrubias     CAD S/P percutaneous coronary angioplasty     Persistent proteinuria     Lymphedema of right lower extremity     Chronic bilateral low back pain without sciatica     Deficiency of vitamin B12     Wrist arthritis     Gastroesophageal reflux disease without esophagitis     Neck sprain

## 2021-10-20 RX ORDER — GLUCOSAMINE HCL/CHONDROITIN SU 500-400 MG
1 CAPSULE ORAL
Qty: 100 STRIP | Refills: 1 | Status: SHIPPED | OUTPATIENT
Start: 2021-10-20 | End: 2022-05-19 | Stop reason: SDUPTHER

## 2021-11-02 ENCOUNTER — HOSPITAL ENCOUNTER (OUTPATIENT)
Age: 81
Discharge: HOME | End: 2021-11-02
Payer: MEDICARE

## 2021-11-02 DIAGNOSIS — R80.1 PERSISTENT PROTEINURIA: ICD-10-CM

## 2021-11-02 DIAGNOSIS — N18.32 STAGE 3B CHRONIC KIDNEY DISEASE (HCC): ICD-10-CM

## 2021-11-02 LAB
ALBUMIN SERPL-MCNC: 3.8 G/DL (ref 3.5–5.2)
ANION GAP SERPL CALCULATED.3IONS-SCNC: 11 MMOL/L (ref 9–17)
BUN BLDV-MCNC: 18 MG/DL (ref 8–23)
BUN/CREAT BLD: ABNORMAL (ref 9–20)
CALCIUM SERPL-MCNC: 9.6 MG/DL (ref 8.6–10.4)
CHLORIDE BLD-SCNC: 108 MMOL/L (ref 98–107)
CO2: 21 MMOL/L (ref 20–31)
CREAT SERPL-MCNC: 1.55 MG/DL (ref 0.5–0.9)
CREATININE URINE: 94.7 MG/DL (ref 28–217)
GFR AFRICAN AMERICAN: 39 ML/MIN
GFR NON-AFRICAN AMERICAN: 32 ML/MIN
GFR SERPL CREATININE-BSD FRML MDRD: ABNORMAL ML/MIN/{1.73_M2}
GFR SERPL CREATININE-BSD FRML MDRD: ABNORMAL ML/MIN/{1.73_M2}
GLUCOSE BLD-MCNC: 172 MG/DL (ref 70–99)
HCT VFR BLD CALC: 39.7 % (ref 36.3–47.1)
HEMOGLOBIN: 12.4 G/DL (ref 11.9–15.1)
MCH RBC QN AUTO: 28.9 PG (ref 25.2–33.5)
MCHC RBC AUTO-ENTMCNC: 31.2 G/DL (ref 28.4–34.8)
MCV RBC AUTO: 92.5 FL (ref 82.6–102.9)
NRBC AUTOMATED: 0 PER 100 WBC
PDW BLD-RTO: 14.5 % (ref 11.8–14.4)
PHOSPHORUS: 3.4 MG/DL (ref 2.6–4.5)
PLATELET # BLD: 180 K/UL (ref 138–453)
PMV BLD AUTO: 12.8 FL (ref 8.1–13.5)
POTASSIUM SERPL-SCNC: 4.7 MMOL/L (ref 3.7–5.3)
PTH INTACT: 250 PG/ML (ref 15–65)
RBC # BLD: 4.29 M/UL (ref 3.95–5.11)
SODIUM BLD-SCNC: 140 MMOL/L (ref 135–144)
TOTAL PROTEIN, URINE: 119 MG/DL
URINE TOTAL PROTEIN CREATININE RATIO: 1.26 (ref 0–0.2)
WBC # BLD: 5.4 K/UL (ref 3.5–11.3)

## 2021-11-02 PROCEDURE — 84100 ASSAY OF PHOSPHORUS: CPT

## 2021-11-02 PROCEDURE — 84156 ASSAY OF PROTEIN URINE: CPT

## 2021-11-02 PROCEDURE — 80048 BASIC METABOLIC PNL TOTAL CA: CPT

## 2021-11-02 PROCEDURE — 36415 COLL VENOUS BLD VENIPUNCTURE: CPT

## 2021-11-02 PROCEDURE — 85027 COMPLETE CBC AUTOMATED: CPT

## 2021-11-02 PROCEDURE — 83970 ASSAY OF PARATHORMONE: CPT

## 2021-11-02 PROCEDURE — 82040 ASSAY OF SERUM ALBUMIN: CPT

## 2021-11-02 PROCEDURE — 82570 ASSAY OF URINE CREATININE: CPT

## 2021-11-18 ENCOUNTER — OFFICE VISIT (OUTPATIENT)
Dept: PODIATRY | Age: 81
End: 2021-11-18
Payer: MEDICARE

## 2021-11-18 DIAGNOSIS — I73.9 PERIPHERAL VASCULAR DISEASE (HCC): ICD-10-CM

## 2021-11-18 DIAGNOSIS — R26.2 TROUBLE WALKING: ICD-10-CM

## 2021-11-18 DIAGNOSIS — M79.672 PAIN IN BOTH FEET: ICD-10-CM

## 2021-11-18 DIAGNOSIS — B35.1 DERMATOPHYTOSIS OF NAIL: ICD-10-CM

## 2021-11-18 DIAGNOSIS — M79.671 PAIN IN BOTH FEET: ICD-10-CM

## 2021-11-18 DIAGNOSIS — E11.51 TYPE II DIABETES MELLITUS WITH PERIPHERAL CIRCULATORY DISORDER (HCC): Primary | ICD-10-CM

## 2021-11-18 PROCEDURE — 11721 DEBRIDE NAIL 6 OR MORE: CPT | Performed by: PODIATRIST

## 2021-11-23 NOTE — PROGRESS NOTES
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    Hyperlipidemia     Hypertension     Hypothyroidism     Intermittent claudication (HCC)     loly to left leg    Kidney disease     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 ([facility] [address] 04/20/2016    Morbid obesity with BMI of 45.0-49.9, adult ([facility] 10/22/2014    Osteoarthritis     Other activity(E029.9)     Acute renal failure secondary to prerenal azotemia    Other activity(E029.9)     Atherosclerotic coronary artery disease status-post bypass surgery in 2003    Persistent proteinuria 05/09/2018    From diabetic nephrosclerosis, urine protein creatinine ratio 0.81    PVD (peripheral vascular disease) ([facility]     Sciatica     Secondary hyperparathyroidism (of renal origin) 10/22/2014    Not on VDRA    Shortness of breath     Shoulder fracture, right 12/2020    STATES FELL ON MARLYN CALIN    Sleep apnea     Type II or unspecified type diabetes mellitus without mention of complication, not stated as uncontrolled        No Known Allergies  Current Outpatient Medications on File Prior to Visit   Medication Sig Dispense Refill    sodium bicarbonate 650 MG tablet Take 650 mg by mouth 2 times daily      blood glucose monitor strips 1 strip by Other route every morning (before breakfast) True Metrix Test__1_times daily Diagnosis: 250.0   Diabetes Mellitus____Insulin Dependent__x__Non-Insulin Dependent 100 strip 1    Alcohol Swabs (B-D SINGLE USE SWABS REGULAR) PADS 1 box by Other route 3 times daily as needed (When pt checks glucose) 1 each 5    Blood Glucose Monitoring Suppl (TRUE METRIX AIR GLUCOSE METER) w/Device KIT 1 Device by Does not apply route 2 times daily 1 kit 0    Lancets MISC 1 each by Does not apply route daily 33G Use 1 times daily Diagnisis:250.0  Diabetes Mellitus____Insulin Dependent__X_Non-Insulin Dependent 100 each 3    calcitRIOL (ROCALTROL) 0.25 MCG capsule Take 1 capsule by mouth three times a week mon wed fri 36 capsule 3    atorvastatin (LIPITOR) 40 MG tablet TAKE 1 TABLET EVERY DAY 90 tablet 3    clopidogrel (PLAVIX) 75 MG tablet TAKE 1 TABLET EVERY DAY 90 tablet 3    insulin glargine (LANTUS) 100 UNIT/ML injection vial INJECT 10 UNITS INTO THE SKIN NIGHTLY 30 mL 5    lansoprazole (PREVACID) 15 MG delayed release capsule TAKE 1 CAPSULE EVERY DAY 90 capsule 3    lisinopril (PRINIVIL;ZESTRIL) 20 MG tablet TAKE 1 TABLET TWICE DAILY (DOSE INCREASE PER DR Garima Morrison) 180 tablet 3    metoprolol succinate (TOPROL XL) 100 MG extended release tablet TAKE 1 TABLET EVERY DAY 90 tablet 3    nitroGLYCERIN (NITROSTAT) 0.4 MG SL tablet Place 1 tablet under the tongue as needed for Chest pain 25 tablet 2    amLODIPine (NORVASC) 10 MG tablet Take 10 mg by mouth daily      ACCU-CHEK DONG PLUS strip TEST THREE TIMES DAILY 300 strip 5    levothyroxine (SYNTHROID) 88 MCG tablet Take 1 tablet by mouth daily 90 tablet 3    acetaminophen (TYLENOL) 500 MG tablet Take 2 tablets by mouth every 8 hours as needed for Pain 270 tablet 1    aspirin EC 81 MG EC tablet Take 2 tablets by mouth daily 30 tablet 5    cyanocobalamin 1000 MCG/ML injection Inject 1 ML every other month   please give patient (6) 1 ml vials. 6 mL 5    blood glucose monitor strips Test_3__times daily Diagnosis: 250.0   Diabetes Mellitus__x__Insulin Dependent____Non-Insulin Dependent Life Scan test strips ultra one touch 300 strip 5    Insulin Syringe-Needle U-100 31G X 5/16\" 0.3 ML MISC 1 each by Does not apply route daily 100 each 5    Syringe/Needle, Disp, (SYRINGE 3CC/25GX1\") 25G X 1\" 3 ML MISC Used one every other month. 6 each 5    B-D INS SYR ULTRAFINE 1CC/30G 30G X 1/2\" 1 ML MISC USE 1 SYRINGE EVERY DAY 90 each 5    Compression Stockings MISC by Does not apply route 20-40 mmHg.  2 each 0    Blood Glucose Calibration (OT ULTRA/FASTTK CNTRL SOLN) SOLN  Multiple Vitamins-Minerals (OCUVITE ADULT 50+ PO) Take 1 tablet by mouth 2 times daily       Green Tea, Camillia sinensis, 315 MG CAPS Take 1 tablet by mouth daily       Cinnamon 500 MG TABS Take 2 tablets by mouth daily       Cranberry-Vitamin C-Vitamin E (CRANBERRY PLUS VITAMIN C PO) Take 1 tablet by mouth daily.  Ascorbic Acid (VITAMIN C WITH VIVIENNE HIPS) 500 MG tablet Take 500 mg by mouth daily.  Omega-3 Fatty Acids (FISH OIL) 1000 MG CAPS Take 1,000 mg by mouth daily.  docusate sodium (COLACE) 100 MG capsule Take 100 mg by mouth 2 times daily.  Blood Glucose Monitoring Suppl (ONE TOUCH ULTRA) by Does not apply route. No current facility-administered medications on file prior to visit. Review of Systems    Review of Systems:   History obtained from chart review and the patient  General ROS: negative for - chills, fatigue, fever, night sweats or weight gain  Constitutional: Negative for chills, diaphoresis, fatigue, fever and unexpected weight change. Musculoskeletal: Positive for arthralgias, gait problem and joint swelling. Neurological ROS: negative for - behavioral changes, confusion, headaches or seizures. Negative for weakness and numbness. Dermatological ROS: negative for - mole changes, rash  Cardiovascular: Negative for leg swelling. Gastrointestinal: Negative for constipation, diarrhea, nausea and vomiting. Objective:  Dermatologic Exam:  Skin lesion/ulceration Absent . Skin No rashes or nodules noted. .   Skin is thin, with flaky sloughing skin as well as decreased hair growth to the lower leg  Small red hemosiderin deposits seen dorsal foot   Musculoskeletal:     1st MPJ ROM decreased, Bilateral.  Muscle strength 5/5, Bilateral.  Pain present upon palpation of toenails 1-5, Bilateral. decreased medial longitudinal arch, Bilateral.  Ankle ROM decreased,Bilateral.    Dorsally contracted digits present digits 2, Bilateral.     Vascular: DP pulses 1/4 bilateral.  PT pulses 0/4 bilateral.   CFT <5 seconds, Bilateral.  Hair growth absent to the level of the digits, Bilateral.  Edema present, Bilateral.  Varicosities absent, Bilateral. Erythema absent, Bilateral    Neurological: Sensation diminshed to light touch to level of digits, Bilateral.  Protective sensation intact 6/10 sites via 5.07/10g [place] Monofilament, Bilateral.  negative Tinel's, Bilateral.  negative Valleix sign, Bilateral.      Integument: Warm, dry, supple, Bilateral.  Open lesion absent, Bilateral.  Interdigital maceration absent to web spaces 4, Bilateral.  Nails 1-5 left and 1-5 right thickened > 3.0 mm, dystrophic and crumbly, discolored with subungual debris. Fissures absent, Bilateral.   General: AAO x 3 in NAD.     Derm  Toenail Description  Sites of Onychomycosis Involvement (Check affected area)  [x] [x] [x] [x] [x] [x] [x] [x] [x] [x]  5 4 3 2 1 1 2 3 4 5                          Right                                        Left    Thickness  [x] [x] [x] [x] [x] [x] [x] [x] [x] [x]  5 4 3 2 1 1 2 3 4 5                         Right                                        Left    Dystrophic Changes   [x] [x] [x] [x] [x] [x] [x] [x] [x] [x]  5 4 3 2 1 1 2 3 4 5                         Right                                        Left    Color   [x] [x] [x] [x] [x] [x] [x] [x] [x] [x]  5 4 3 2 1 1 2 3 4 5                          Right                                        Left    Incurvation/Ingrowin   [] [] [] [] [] [] [] [] [] []  5 4 3 2 1 1 2 3 4 5                         Right                                        Left    Inflammation/Pain   [x] [x] [x] [x] [x] [x] [x] [x] [x] [x]  5 4 3 2 1 1 2 3 4 5                         Right                                        Left        Visual inspection:  Deformity: hammertoe deformity zbigniew feet  amputation: absent  Skin lesions: absent  Edema: right- 2+ pitting edema, left- 2+ pitting edema    Sensory exam:  Monofilament sensation: abnormal - 6/10 via SW 5.07/10g monofilament to the plantar foot bilateral feet    Pulses: abnormal - 1/4 dorsalis pedis pulse and 0/4 Posterior tibial pulse,   Pinprick: Impaired  Proprioception: Impaired  Vibration (128 Hz): Impaired       DM with PVD       [x]Yes    []No      Assessment:  80 y.o. female with:  .   Diagnosis Orders   1. Type II diabetes mellitus with peripheral circulatory disorder (HCC)  HM DIABETES FOOT EXAM    04508 - CA DEBRIDEMENT OF NAILS, 6 OR MORE   2. Trouble walking  HM DIABETES FOOT EXAM    54523 - CA DEBRIDEMENT OF NAILS, 6 OR MORE   3. Dermatophytosis of nail  HM DIABETES FOOT EXAM    33249 - CA DEBRIDEMENT OF NAILS, 6 OR MORE   4. Peripheral vascular disease (HCC)  HM DIABETES FOOT EXAM    08683 - CA DEBRIDEMENT OF NAILS, 6 OR MORE   5. Pain in both feet  HM DIABETES FOOT EXAM    47321 - CA DEBRIDEMENT OF NAILS, 6 OR MORE         Q7   []Yes    []No                Q8   [x]Yes    []No                     Q9   []Yes    []No    Plan:   Pt was evaluated and examined. Patient was given personalized discharge instructions. Nails 1-10 were debrided sharply in length and thickness with a nipper and , without incident. Pt will follow up in 9 weeks or sooner if any problems arise. Diagnosis was discussed with the pt and all of their questions were answered in detail. Proper foot hygiene and care was discussed with the pt. Informed patient on proper diabetic foot care and importance of tight glycemic control. Patient to check feet daily and contact the office with any questions/problems/concerns.    Other comorbidity noted and will be managed by PCP.  11/18/2021    Electronically signed by [employer]ODR[employer]GUEZ on 11/23/2021 at 8:16 AM  11/18/2021

## 2021-12-01 ENCOUNTER — OFFICE VISIT (OUTPATIENT)
Dept: FAMILY MEDICINE CLINIC | Age: 81
End: 2021-12-01
Payer: MEDICARE

## 2021-12-01 VITALS
BODY MASS INDEX: 41.98 KG/M2 | TEMPERATURE: 96.5 F | DIASTOLIC BLOOD PRESSURE: 64 MMHG | HEART RATE: 61 BPM | HEIGHT: 60 IN | SYSTOLIC BLOOD PRESSURE: 148 MMHG | WEIGHT: 213.8 LBS

## 2021-12-01 DIAGNOSIS — Z00.00 ENCOUNTER FOR ANNUAL WELLNESS VISIT (AWV) IN MEDICARE PATIENT: Primary | ICD-10-CM

## 2021-12-01 PROCEDURE — 4040F PNEUMOC VAC/ADMIN/RCVD: CPT | Performed by: FAMILY MEDICINE

## 2021-12-01 PROCEDURE — 1123F ACP DISCUSS/DSCN MKR DOCD: CPT | Performed by: FAMILY MEDICINE

## 2021-12-01 PROCEDURE — G0439 PPPS, SUBSEQ VISIT: HCPCS | Performed by: FAMILY MEDICINE

## 2021-12-01 PROCEDURE — G8484 FLU IMMUNIZE NO ADMIN: HCPCS | Performed by: FAMILY MEDICINE

## 2021-12-01 ASSESSMENT — PATIENT HEALTH QUESTIONNAIRE - PHQ9
SUM OF ALL RESPONSES TO PHQ QUESTIONS 1-9: 0
1. LITTLE INTEREST OR PLEASURE IN DOING THINGS: 0
2. FEELING DOWN, DEPRESSED OR HOPELESS: 0
SUM OF ALL RESPONSES TO PHQ QUESTIONS 1-9: 0
2. FEELING DOWN, DEPRESSED OR HOPELESS: 0
1. LITTLE INTEREST OR PLEASURE IN DOING THINGS: 0
SUM OF ALL RESPONSES TO PHQ QUESTIONS 1-9: 0
SUM OF ALL RESPONSES TO PHQ9 QUESTIONS 1 & 2: 0
SUM OF ALL RESPONSES TO PHQ9 QUESTIONS 1 & 2: 0
SUM OF ALL RESPONSES TO PHQ QUESTIONS 1-9: 0

## 2021-12-01 ASSESSMENT — LIFESTYLE VARIABLES
AUDIT-C TOTAL SCORE: 0
HOW OFTEN DO YOU HAVE SIX OR MORE DRINKS ON ONE OCCASION: 0
HOW OFTEN DURING THE LAST YEAR HAVE YOU FAILED TO DO WHAT WAS NORMALLY EXPECTED FROM YOU BECAUSE OF DRINKING: 0
HOW OFTEN DURING THE LAST YEAR HAVE YOU HAD A FEELING OF GUILT OR REMORSE AFTER DRINKING: 0
AUDIT TOTAL SCORE: 0
HOW OFTEN DURING THE LAST YEAR HAVE YOU BEEN UNABLE TO REMEMBER WHAT HAPPENED THE NIGHT BEFORE BECAUSE YOU HAD BEEN DRINKING: NEVER
HOW OFTEN DO YOU HAVE SIX OR MORE DRINKS ON ONE OCCASION: NEVER
HOW OFTEN DURING THE LAST YEAR HAVE YOU NEEDED AN ALCOHOLIC DRINK FIRST THING IN THE MORNING TO GET YOURSELF GOING AFTER A NIGHT OF HEAVY DRINKING: NEVER
HAS A RELATIVE, FRIEND, DOCTOR, OR ANOTHER HEALTH PROFESSIONAL EXPRESSED CONCERN ABOUT YOUR DRINKING OR SUGGESTED YOU CUT DOWN: 0
AUDIT-C TOTAL SCORE: 1
HAVE YOU OR SOMEONE ELSE BEEN INJURED AS A RESULT OF YOUR DRINKING: 0
HOW OFTEN DURING THE LAST YEAR HAVE YOU FOUND THAT YOU WERE NOT ABLE TO STOP DRINKING ONCE YOU HAD STARTED: NEVER
HOW MANY STANDARD DRINKS CONTAINING ALCOHOL DO YOU HAVE ON A TYPICAL DAY: ONE OR TWO
HAVE YOU OR SOMEONE ELSE BEEN INJURED AS A RESULT OF YOUR DRINKING: NO
HOW OFTEN DURING THE LAST YEAR HAVE YOU BEEN UNABLE TO REMEMBER WHAT HAPPENED THE NIGHT BEFORE BECAUSE YOU HAD BEEN DRINKING: 0
HOW OFTEN DURING THE LAST YEAR HAVE YOU FOUND THAT YOU WERE NOT ABLE TO STOP DRINKING ONCE YOU HAD STARTED: 0
HOW OFTEN DO YOU HAVE A DRINK CONTAINING ALCOHOL: MONTHLY OR LESS
HOW OFTEN DURING THE LAST YEAR HAVE YOU NEEDED AN ALCOHOLIC DRINK FIRST THING IN THE MORNING TO GET YOURSELF GOING AFTER A NIGHT OF HEAVY DRINKING: 0
HOW MANY STANDARD DRINKS CONTAINING ALCOHOL DO YOU HAVE ON A TYPICAL DAY: 0
HOW OFTEN DURING THE LAST YEAR HAVE YOU FAILED TO DO WHAT WAS NORMALLY EXPECTED FROM YOU BECAUSE OF DRINKING: NEVER
AUDIT TOTAL SCORE: 1
HAS A RELATIVE, FRIEND, DOCTOR, OR ANOTHER HEALTH PROFESSIONAL EXPRESSED CONCERN ABOUT YOUR DRINKING OR SUGGESTED YOU CUT DOWN: NO
HOW OFTEN DURING THE LAST YEAR HAVE YOU HAD A FEELING OF GUILT OR REMORSE AFTER DRINKING: NEVER
HOW OFTEN DO YOU HAVE A DRINK CONTAINING ALCOHOL: 1

## 2021-12-01 NOTE — PROGRESS NOTES
AWV    Negative for:     Worry / mood complaints  Headache  Dizziness  Visual Disturbance  Hearing Changes  Nasal / sinus Symptoms  Mouth / tooth symptom, pain  Throat pain  Difficulty swallowing  Neck pain  Chest discomfort  Cough  SOB  N/V/D/C  Pelvic area discomfort  Bladder / voiding discomfort  Bowel complaints  MS complaints   Numbness/tingling/abnormal sensations   Edema / Leg swelling  Dizziness  Fatigue  Bleeding   Skin    Pertinent Pos: See HPI - see above    Vitals:    12/01/21 1518   BP: (!) 148/64   Pulse: 61   Temp:        Alert and oriented to PPT  NAD    HEENT - neg  Neck - no bruits, no lymphadenopathy  Chest  HRRR w/o murmer  LCTAB no wheezes / rhonchi  Abdomen - soft, non-tender, BS  Extremities - 0-1+ PTE    Gait / Station - stable, no dysequilibrium, uniform pace, no assist device, cane. Diagnosis Orders   1.  Encounter for annual wellness visit (AWV) in Medicare patient         Plan:  1.)  2.)  3.)  4.)

## 2021-12-01 NOTE — PROGRESS NOTES
Visit Information    Have you changed or started any medications since your last visit including any over-the-counter medicines, vitamins, or herbal medicines? no   Have you stopped taking any of your medications? Is so, why? -  no  Are you having any side effects from any of your medications? - no    Have you seen any other physician or provider since your last visit? yes - Podiatry, Nephrology   Have you had any other diagnostic tests since your last visit? yes - Labs, Mammogram   Have you been seen in the emergency room and/or had an admission in a hospital since we last saw you?  no   Have you had your routine dental cleaning in the past 6 months?  no     Do you have an active MyChart account? If no, what is the barrier?   Yes    Patient Care Team:  Luna Trevino DO as PCP - General (Family Medicine)  Luna Trevino DO as PCP - [facility]  Orlin Brown DPM as Physician (Podiatry)    Medical History Review  Past Medical, Family, and Social History reviewed and does not contribute to the patient presenting condition    Health Maintenance   Topic Date Due    Hepatitis C screen  Never done    TSH testing  11/23/2021    Lipid screen  11/04/2021    Annual Wellness Visit (AWV)  03/09/2023 (Originally 6/10/2019)    Potassium monitoring  11/02/2022    Creatinine monitoring  11/02/2022    DTaP/Tdap/Td vaccine (2 - Td or Tdap) 07/02/2029    DEXA (modify frequency per FRAX score)  Completed    Flu vaccine  Completed    Shingles Vaccine  Completed    Pneumococcal 65+ years Vaccine  Completed    COVID-19 Vaccine  Completed    Hepatitis A vaccine  Aged Out    Hib vaccine  Aged Out    Meningococcal (ACWY) vaccine  Aged Out

## 2021-12-01 NOTE — PATIENT INSTRUCTIONS
Thank you for letting us take care of you today. We hope all your questions were addressed. If a question was overlooked or something else comes to mind after you return home, please contact a member of your Care Team listed below. Your Care Team at [place] [address] is Team #2  Jeannie Harrison DO (Faculty)  Jhoan Garcia (Faculty)  Milton Cooper MD (Resident)  Lina Blancas MD (Resident)  Collin Muir MD (Resident)  Jhonny Duran MD (Resident)  Delmer Zamora., CHANA Bourgeois.,  SARINA Castillo., [facility] office)  Charly Boyd, [address] (Clinical Practice Manager)  Sally Butterfield [facility] (Clinical Pharmacist)     Office phone number: [phone]    If you need to get in right away due to illness, please be advised we have \"Same Day\" appointments available Monday-Friday. Please call us at [phone] option #[phone] to schedule your \"Same Day\" appointment.

## 2021-12-30 DIAGNOSIS — E03.9 HYPOTHYROIDISM, UNSPECIFIED TYPE: ICD-10-CM

## 2022-01-03 RX ORDER — LEVOTHYROXINE SODIUM 88 UG/1
TABLET ORAL
Qty: 90 TABLET | Refills: 3 | Status: SHIPPED | OUTPATIENT
Start: 2022-01-03

## 2022-01-03 NOTE — TELEPHONE ENCOUNTER
Last visit: 12/1/21  Last Med refill: 10/18/21  Does patient have enough medication for 72 hours: Yes    Next Visit Date:  Future Appointments   Date Time Provider Lena Julesi   1/20/2022 11:00 AM Nelson Estrella Lily Podiatry [facility]   2/7/2022  2:15 PM [facility]   5/25/2022 11:30 AM Laverne Mccallum MD [facility] None       Health Maintenance   Topic Date Due    Hepatitis C screen  Never done    Lipid screen  11/04/2021    TSH testing  11/23/2021    Potassium monitoring  11/02/2022    Creatinine monitoring  11/02/2022    Depression Screen  12/01/2022    Annual Wellness Visit (AWV)  12/02/2022    DTaP/Tdap/Td vaccine (2 - Td or Tdap) 07/02/2029    DEXA (modify frequency per FRAX score)  Completed    Flu vaccine  Completed    Shingles Vaccine  Completed    Pneumococcal 65+ years Vaccine  Completed    COVID-19 Vaccine  Completed    Hepatitis A vaccine  Aged Out    Hib vaccine  Aged Out    Meningococcal (ACWY) vaccine  Aged Out       Hemoglobin A1C (%)   Date Value   11/23/2020 6.0   02/13/2018 6.3   11/07/2017 6.7             ( goal A1C is < 7)   Microalb/Crt.  Ratio (mcg/mg creat)   Date Value   02/13/2018 280 (H)     LDL Cholesterol (mg/dL)   Date Value   11/04/2020 47   04/29/2019 46       (goal LDL is <100)   AST (U/L)   Date Value   11/04/2020 13     ALT (U/L)   Date Value   11/04/2020 7     BUN (mg/dL)   Date Value   11/02/2021 18     BP Readings from Last 3 Encounters:   12/01/21 (!) 148/64   11/10/21 124/80   08/31/21 (!) 150/75          (goal 120/80)    All Future Testing planned in [facility]  Lab Frequency Next Occurrence   DEXA AXIAL SKELETON W VERTEBRAL FX ASST Once 01/11/2022   VL ARTERIAL PVR LOWER WO EXERCISE Once 02/08/2022   CBC every 3 months    Albumin every 3 months    Basic Metabolic Panel every 3 months    Phosphorus every 3 months    PTH, Intact every 3 months    Protein / creatinine ratio, urine every 3 months Patient Active Problem List:     DM (diabetes mellitus) ([facility] [address]     Hypercholesteremia     CAD (coronary artery disease)     Hypothyroidism     Lumbar spondylosis with myelopathy     S/P cardiac cath 8/19/14-Dr. covarrubias     CKD (chronic kidney disease) stage 3, GFR 30-59 ml/min ([facility])     Morbid obesity with BMI of 40.0-44.9, adult [facility]     At high risk for hyperkalemia     Edema     COPD (chronic obstructive pulmonary disease) ([facility]     Hyperparathyroidism due to renal insufficiency ([facility])     Carotid stenosis, asymptomatic     Claudication in peripheral vascular disease ([facility])     Nonproliferative diabetic retinopathy of both eyes ([facility])     Type 2 diabetes mellitus with stage 3 chronic kidney disease, with long-term current use of insulin ([facility]     Coronary artery disease involving coronary bypass graft of native heart without angina pectoris     Essential hypertension     Localized edema     DM (diabetes mellitus), type 2 with peripheral vascular complications ([facility])     Mild nonproliferative diabetic retinopathy associated with type 2 diabetes mellitus ([facility])     Panlobular emphysema ([facility])     S/P drug eluting coronary stent placement-OM1 3/26/18-Dr. covarrubias     CAD S/P percutaneous coronary angioplasty     Persistent proteinuria     Lymphedema of right lower extremity     Chronic bilateral low back pain without sciatica     Deficiency of vitamin B12     Wrist arthritis     Gastroesophageal reflux disease without esophagitis     Neck sprain

## 2022-01-06 DIAGNOSIS — E03.9 HYPOTHYROIDISM, UNSPECIFIED TYPE: ICD-10-CM

## 2022-01-06 RX ORDER — LEVOTHYROXINE SODIUM 88 UG/1
TABLET ORAL
Qty: 90 TABLET | Refills: 3 | OUTPATIENT
Start: 2022-01-06

## 2022-01-06 NOTE — TELEPHONE ENCOUNTER
Last visit:   Last Med refill:   Does patient have enough medication for 72 hours: No:     Next Visit Date:  Future Appointments   Date Time Provider Lena Julesi   1/20/2022 11:00 [facility]   2/7/2022  2:15 PM Debi Chan MD [facility]asc [facility]   5/25/2022 11:30 AM Laverne Rahman MD [facility] None       Health Maintenance   Topic Date Due    Lipid screen  11/04/2021    TSH testing  11/23/2021    Potassium monitoring  11/02/2022    Creatinine monitoring  11/02/2022    Depression Screen  12/01/2022    Annual Wellness Visit (AWV)  12/02/2022    DTaP/Tdap/Td vaccine (2 - Td or Tdap) 07/02/2029    DEXA (modify frequency per FRAX score)  Completed    Flu vaccine  Completed    Shingles Vaccine  Completed    Pneumococcal 65+ years Vaccine  Completed    COVID-19 Vaccine  Completed    Hepatitis A vaccine  Aged Out    Hib vaccine  Aged Out    Meningococcal (ACWY) vaccine  Aged Out       Hemoglobin A1C (%)   Date Value   11/23/2020 6.0   02/13/2018 6.3   11/07/2017 6.7             ( goal A1C is < 7)   Microalb/Crt.  Ratio (mcg/mg creat)   Date Value   02/13/2018 280 (H)     LDL Cholesterol (mg/dL)   Date Value   11/04/2020 47   04/29/2019 46       (goal LDL is <100)   AST (U/L)   Date Value   11/04/2020 13     ALT (U/L)   Date Value   11/04/2020 7     BUN (mg/dL)   Date Value   11/02/2021 18     BP Readings from Last 3 Encounters:   12/01/21 (!) 148/64   11/10/21 124/80   08/31/21 (!) 150/75          (goal 120/80)    All Future Testing planned in [facility]  Lab Frequency Next Occurrence   DEXA AXIAL SKELETON W VERTEBRAL FX ASST Once 01/11/2022   VL ARTERIAL PVR LOWER WO EXERCISE Once 02/08/2022   CBC every 3 months    Albumin every 3 months    Basic Metabolic Panel every 3 months    Phosphorus every 3 months    PTH, Intact every 3 months    Protein / creatinine ratio, urine every 3 months                Patient Active Problem List:     DM (diabetes mellitus) ([address]     Hypercholesteremia     CAD (coronary artery disease)     Hypothyroidism     Lumbar spondylosis with myelopathy     S/P cardiac cath 8/19/14-Dr. covarrubias     CKD (chronic kidney disease) stage 3, GFR 30-59 ml/min ([facility])     Morbid obesity with BMI of 40.0-44.9, adult ([facility])     At high risk for hyperkalemia     Edema     COPD (chronic obstructive pulmonary disease) ([facility])     Hyperparathyroidism due to renal insufficiency ([facility])     Carotid stenosis, asymptomatic     Claudication in peripheral vascular disease ([facility])     Nonproliferative diabetic retinopathy of both eyes ([facility])     Type 2 diabetes mellitus with stage 3 chronic kidney disease, with long-term current use of insulin ([address]     Coronary artery disease involving coronary bypass graft of native heart without angina pectoris     Essential hypertension     Localized edema     DM (diabetes mellitus), type 2 with peripheral vascular complications ([facility])     Mild nonproliferative diabetic retinopathy associated with type 2 diabetes mellitus ([address]     Panlobular emphysema ([facility])     S/P drug eluting coronary stent placement-OM1 3/26/18-Dr. covarrubias     CAD S/P percutaneous coronary angioplasty     Persistent proteinuria     Lymphedema of right lower extremity     Chronic bilateral low back pain without sciatica     Deficiency of vitamin B12     Wrist arthritis     Gastroesophageal reflux disease without esophagitis     Neck sprain           Please address the medication refill and close the encounter. If I can be of assistance, please route to the applicable pool. Thank you.

## 2022-01-10 DIAGNOSIS — E53.8 DEFICIENCY OF VITAMIN B12: ICD-10-CM

## 2022-01-10 DIAGNOSIS — E11.9 TYPE 2 DIABETES MELLITUS WITHOUT COMPLICATION, UNSPECIFIED WHETHER LONG TERM INSULIN USE (HCC): ICD-10-CM

## 2022-01-10 RX ORDER — BLOOD SUGAR DIAGNOSTIC
1 STRIP MISCELLANEOUS DAILY
Qty: 100 EACH | Refills: 5 | Status: SHIPPED | OUTPATIENT
Start: 2022-01-10

## 2022-01-10 RX ORDER — SYRINGE W-NEEDLE,DISPOSAB,3 ML 25GX5/8"
SYRINGE, EMPTY DISPOSABLE MISCELLANEOUS
Qty: 6 EACH | Refills: 5 | Status: SHIPPED | OUTPATIENT
Start: 2022-01-10

## 2022-01-10 NOTE — TELEPHONE ENCOUNTER
Last visit:   Last Med refill:   Does patient have enough medication for 72 hours: No:     Next Visit Date:  Future Appointments   Date Time Provider Lena Julesi   1/20/2022 11:00 AM Madina Edwards Podiatry [facility]   2/7/2022  2:15 PM Leonila Merino MD heartvasc [facility]   5/25/2022 11:30 AM Laverne Meadows MD [facility] [place] None       Health Maintenance   Topic Date Due    Lipid screen  11/04/2021    TSH testing  11/23/2021    Potassium monitoring  11/02/2022    Creatinine monitoring  11/02/2022    Depression Screen  12/01/2022    Annual Wellness Visit (AWV)  12/02/2022    DTaP/Tdap/Td vaccine (2 - Td or Tdap) 07/02/2029    DEXA (modify frequency per FRAX score)  Completed    Flu vaccine  Completed    Shingles Vaccine  Completed    Pneumococcal 65+ years Vaccine  Completed    COVID-19 Vaccine  Completed    Hepatitis A vaccine  Aged Out    Hib vaccine  Aged Out    Meningococcal (ACWY) vaccine  Aged Out       Hemoglobin A1C (%)   Date Value   11/23/2020 6.0   02/13/2018 6.3   11/07/2017 6.7             ( goal A1C is < 7)   Microalb/Crt.  Ratio (mcg/mg creat)   Date Value   02/13/2018 280 (H)     LDL Cholesterol (mg/dL)   Date Value   11/04/2020 47   04/29/2019 46       (goal LDL is <100)   AST (U/L)   Date Value   11/04/2020 13     ALT (U/L)   Date Value   11/04/2020 7     BUN (mg/dL)   Date Value   11/02/2021 18     BP Readings from Last 3 Encounters:   12/01/21 (!) 148/64   11/10/21 124/80   08/31/21 (!) 150/75          (goal 120/80)    All Future Testing planned in [facility]  Lab Frequency Next Occurrence   DEXA AXIAL SKELETON W VERTEBRAL FX ASST Once 01/11/2022   VL ARTERIAL PVR LOWER WO EXERCISE Once 02/08/2022   CBC every 3 months    Albumin every 3 months    Basic Metabolic Panel every 3 months    Phosphorus every 3 months    PTH, Intact every 3 months    Protein / creatinine ratio, urine every 3 months                Patient Active Problem List:     DM (diabetes mellitus) ([address]     Hypercholesteremia     CAD (coronary artery disease)     Hypothyroidism     Lumbar spondylosis with myelopathy     S/P cardiac cath 8/19/14-Dr. covarrubias     CKD (chronic kidney disease) stage 3, GFR 30-59 ml/min ([facility])     Morbid obesity with BMI of 40.0-44.9, adult ([facility])     At high risk for hyperkalemia     Edema     COPD (chronic obstructive pulmonary disease) ([facility])     Hyperparathyroidism due to renal insufficiency ([facility])     Carotid stenosis, asymptomatic     Claudication in peripheral vascular disease ([facility])     Nonproliferative diabetic retinopathy of both eyes ([facility])     Type 2 diabetes mellitus with stage 3 chronic kidney disease, with long-term current use of insulin ([address]     Coronary artery disease involving coronary bypass graft of native heart without angina pectoris     Essential hypertension     Localized edema     DM (diabetes mellitus), type 2 with peripheral vascular complications ([facility])     Mild nonproliferative diabetic retinopathy associated with type 2 diabetes mellitus ([address]     Panlobular emphysema ([facility])     S/P drug eluting coronary stent placement-OM1 3/26/18-Dr. covarrubias     CAD S/P percutaneous coronary angioplasty     Persistent proteinuria     Lymphedema of right lower extremity     Chronic bilateral low back pain without sciatica     Deficiency of vitamin B12     Wrist arthritis     Gastroesophageal reflux disease without esophagitis     Neck sprain           Please address the medication refill and close the encounter. If I can be of assistance, please route to the applicable pool. Thank you.

## 2022-02-01 ENCOUNTER — TELEPHONE (OUTPATIENT)
Dept: VASCULAR SURGERY | Age: 82
End: 2022-02-01

## 2022-02-08 ENCOUNTER — OFFICE VISIT (OUTPATIENT)
Dept: PODIATRY | Age: 82
End: 2022-02-08
Payer: MEDICARE

## 2022-02-08 VITALS — RESPIRATION RATE: 16 BRPM | WEIGHT: 213 LBS | HEIGHT: 60 IN | BODY MASS INDEX: 41.82 KG/M2

## 2022-02-08 DIAGNOSIS — M79.672 PAIN IN BOTH FEET: ICD-10-CM

## 2022-02-08 DIAGNOSIS — R26.2 TROUBLE WALKING: ICD-10-CM

## 2022-02-08 DIAGNOSIS — E11.51 TYPE II DIABETES MELLITUS WITH PERIPHERAL CIRCULATORY DISORDER (HCC): Primary | ICD-10-CM

## 2022-02-08 DIAGNOSIS — M79.671 PAIN IN BOTH FEET: ICD-10-CM

## 2022-02-08 DIAGNOSIS — I73.9 PERIPHERAL VASCULAR DISEASE (HCC): ICD-10-CM

## 2022-02-08 DIAGNOSIS — I73.9 PVD (PERIPHERAL VASCULAR DISEASE) (HCC): Primary | ICD-10-CM

## 2022-02-08 DIAGNOSIS — B35.1 DERMATOPHYTOSIS OF NAIL: ICD-10-CM

## 2022-02-08 DIAGNOSIS — D23.72 BENIGN NEOPLASM OF SKIN OF LOWER LIMB, INCLUDING HIP, LEFT: ICD-10-CM

## 2022-02-08 DIAGNOSIS — D23.71 BENIGN NEOPLASM OF SKIN OF LOWER LIMB, INCLUDING HIP, RIGHT: ICD-10-CM

## 2022-02-08 PROCEDURE — 17110 DESTRUCTION B9 LES UP TO 14: CPT | Performed by: PODIATRIST

## 2022-02-08 PROCEDURE — 11721 DEBRIDE NAIL 6 OR MORE: CPT | Performed by: PODIATRIST

## 2022-02-08 PROCEDURE — 99999 PR OFFICE/OUTPT VISIT,PROCEDURE ONLY: CPT | Performed by: PODIATRIST

## 2022-02-08 NOTE — PROGRESS NOTES
[address] [facility] PODIATRY [facility]  [address] [address] [place] [address]  Dept: [phone]  Dept Fax: [phone]    DIABETIC PROGRESS NOTE  Date of patient's visit: 2/8/2022  Patient's Name:  Malaika Birmingham YOB: 1940            Patient Care Team:  Apple Lawton DO as PCP - General (Family Medicine)  Apple Lawton DO as PCP - [facility] Empaneled Provider  Abel Srivastava DPM as Physician (Podiatry)          Chief Complaint   Patient presents with    Diabetes    Peripheral Neuropathy    Nail Problem    Benign Neoplasm       Subjective:   Malaika Birmingham comes to clinic for Diabetes, Peripheral Neuropathy, Nail Problem, and Benign Neoplasm    she is a diabetic and states that she is doing well. Pt currently has complaint of thickened, elongated nails that they cannot manage by themselves. Pt's primary care physician is Alli Rangel DO last seen 12/01/2021   Pt's last blood sugar was 118 . Pt also relates to painful skin spots to the bottom of both feet. Pt has tried pads and changing shoes but it has note helped the pain      Pt has a new complaint of none today . Lab Results   Component Value Date    LABA1C 6.0 11/23/2020      Complains of numbness in the feet bilat.   Past Medical History:   Diagnosis Date    Abnormal cardiovascular stress test 02/2021    LARGE ANTERIOR INFARCTION / LARGE AREA OF INFERIOR LATERAL ISCHEMIA WITH REDUCED EF 38%    Abnormal stress test 07/18/2014    going to do cath- not urgent just abnormal    Ambulates with cane     PRN    Arthritis     At high risk for hyperkalemia 10/22/2014    From type 4 RTA    Back pain     CAD (coronary artery disease)     Circulation problem     decreased circulation to  zbigniew extremities    CKD (chronic kidney disease) stage 3, GFR 30-59 ml/min ([facility]) 10/22/2014    From diabetic and ischemic nephrosclerosis, baseline 1.4, GFR 40-45 ml/min, UPC 0.3    COPD (chronic obstructive pulmonary disease) [facility]     DM (diabetes mellitus) ([facility])     Edema     chronic lower extremity    Foot pain, bilateral     Former smoker     Gastroesophageal reflux disease without esophagitis 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    Hyperlipidemia     Hypertension     Hypothyroidism     Intermittent claudication ([facility])     loly to left leg    Kidney disease     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 ([facility] 04/20/2016    Morbid obesity with BMI of 45.0-49.9, adult ([facility] 10/22/2014    Osteoarthritis     Other activity(E029.9)     Acute renal failure secondary to prerenal azotemia    Other activity(E029.9)     Atherosclerotic coronary artery disease status-post bypass surgery in 2003    Persistent proteinuria 05/09/2018    From diabetic nephrosclerosis, urine protein creatinine ratio 0.81    PVD (peripheral vascular disease) [facility]     Sciatica     Secondary hyperparathyroidism (of renal origin) 10/22/2014    Not on VDRA    Shortness of breath     Shoulder fracture, right 12/2020    STATES FELL ON MARLYN CALIN    Sleep apnea     Type II or unspecified type diabetes mellitus without mention of complication, not stated as uncontrolled        No Known Allergies  Current Outpatient Medications on File Prior to Visit   Medication Sig Dispense Refill    Insulin Syringe-Needle U-100 31G X 5/16\" 0.3 ML MISC 1 each by Does not apply route daily 100 each 5    Syringe/Needle, Disp, (SYRINGE 3CC/25GX1\") 25G X 1\" 3 ML MISC Used one every other month.  6 each 5    levothyroxine (SYNTHROID) 88 MCG tablet TAKE 1 TABLET EVERY DAY 90 tablet 3    sodium bicarbonate 650 MG tablet Take 650 mg by mouth 2 times daily      blood glucose monitor strips 1 strip by Other route every morning (before breakfast) True Metrix MISC by Does not apply route 20-40 mmHg. 2 each 0    Blood Glucose Calibration (OT ULTRA/FASTTK CNTRL SOLN) SOLN       Multiple Vitamins-Minerals (OCUVITE ADULT 50+ PO) Take 1 tablet by mouth 2 times daily       Green Tea, Camillia sinensis, 315 MG CAPS Take 1 tablet by mouth daily       Cinnamon 500 MG TABS Take 2 tablets by mouth daily       Cranberry-Vitamin C-Vitamin E (CRANBERRY PLUS VITAMIN C PO) Take 1 tablet by mouth daily.  Ascorbic Acid (VITAMIN C WITH VIVIENNE HIPS) 500 MG tablet Take 500 mg by mouth daily.  Omega-3 Fatty Acids (FISH OIL) 1000 MG CAPS Take 1,000 mg by mouth daily.  docusate sodium (COLACE) 100 MG capsule Take 100 mg by mouth 2 times daily.  Blood Glucose Monitoring Suppl (ONE TOUCH ULTRA) by Does not apply route.  Alcohol Swabs (B-D SINGLE USE SWABS REGULAR) PADS 1 box by Other route 3 times daily as needed (When pt checks glucose) 1 each 5     No current facility-administered medications on file prior to visit. Review of Systems    Review of Systems:   History obtained from chart review and the patient  General ROS: negative for - chills, fatigue, fever, night sweats or weight gain  Constitutional: Negative for chills, diaphoresis, fatigue, fever and unexpected weight change. Musculoskeletal: Positive for arthralgias, gait problem and joint swelling. Neurological ROS: negative for - behavioral changes, confusion, headaches or seizures. Negative for weakness and numbness. Dermatological ROS: negative for - mole changes, rash  Cardiovascular: Negative for leg swelling. Gastrointestinal: Negative for constipation, diarrhea, nausea and vomiting. Objective:  Dermatologic Exam:  Skin lesion present to the right and left plantar foot with a central core and petchaie noted to the lesions periphery.   Pain on palpation of the lesion    Skin is thin, with flaky sloughing skin as well as decreased hair growth to the lower leg  Small red hemosiderin deposits seen dorsal foot   Musculoskeletal:     1st MPJ ROM decreased, Bilateral.  Muscle strength 5/5, Bilateral.  Pain present upon palpation of toenails 1-5, Bilateral. decreased medial longitudinal arch, Bilateral.  Ankle ROM decreased,Bilateral.    Dorsally contracted digits present digits 2, Bilateral.     Vascular: DP pulses 1/4 bilateral.  PT pulses 0/4 bilateral.   CFT <5 seconds, Bilateral.  Hair growth absent to the level of the digits, Bilateral.  Edema present, Bilateral.  Varicosities absent, Bilateral. Erythema absent, Bilateral    Neurological: Sensation diminshed to light touch to level of digits, Bilateral.  Protective sensation intact 6/10 sites via 5.07/10g Newton-Mabel Monofilament, Bilateral.  negative Tinel's, Bilateral.  negative Valleix sign, Bilateral.      Integument: Warm, dry, supple, Bilateral.  Open lesion absent, Bilateral.  Interdigital maceration absent to web spaces 4, Bilateral.  Nails 1-5 left and 1-5 right thickened > 3.0 mm, dystrophic and crumbly, discolored with subungual debris. Fissures absent, Bilateral.   General: AAO x 3 in NAD.     Derm  Toenail Description  Sites of Onychomycosis Involvement (Check affected area)  [x] [x] [x] [x] [x] [x] [x] [x] [x] [x]  5 4 3 2 1 1 2 3 4 5                          Right                                        Left    Thickness  [x] [x] [x] [x] [x] [x] [x] [x] [x] [x]  5 4 3 2 1 1 2 3 4 5                         Right                                        Left    Dystrophic Changes   [x] [x] [x] [x] [x] [x] [x] [x] [x] [x]  5 4 3 2 1 1 2 3 4 5                         Right                                        Left    Color   [x] [x] [x] [x] [x] [x] [x] [x] [x] [x]  5 4 3 2 1 1 2 3 4 5                          Right                                        Left    Incurvation/Ingrowin   [] [] [] [] [] [] [] [] [] []  5 4 3 2 1 1 2 3 4 5                         Right                                        Left    Inflammation/Pain [x] [x] [x] [x] [x] [x] [x] [x] [x] [x]  5 4 3 2 1 1 2 3 4 5                         Right                                        Left        Visual inspection:  Deformity: hammertoe deformity zbigniew feet  amputation: absent  Skin lesions: present - as above  Edema: right- 2+ pitting edema, left- 2+ pitting edema    Sensory exam:  Monofilament sensation: abnormal - 6/10 via SW 5.07/10g monofilament to the plantar foot bilateral feet    Pulses: abnormal - 1/4 dorsalis pedis pulse and 0/4 Posterior tibial pulse,   Pinprick: Impaired  Proprioception: Impaired  Vibration (128 Hz): Impaired       DM with PVD       [x]Yes    []No      Assessment:  [de-identified] y.o. female with:   Diagnosis Orders   1. Type II diabetes mellitus with peripheral circulatory disorder (HCC)   DIABETES FOOT EXAM    82788 - [place] DEBRIDEMENT OF NAILS, 6 OR MORE    95450 - [place] DESTRUCTION BENIGN LESIONS UP TO 14   2. Dermatophytosis of nail   DIABETES FOOT EXAM    81476 - [place] DEBRIDEMENT OF NAILS, 6 OR MORE   3. Benign neoplasm of skin of lower limb, including hip, right   DIABETES FOOT EXAM    37697 - [place] DESTRUCTION BENIGN LESIONS UP TO 14   4. Benign neoplasm of skin of lower limb, including hip, left   DIABETES FOOT EXAM    00619 - [place] DESTRUCTION BENIGN LESIONS UP TO 14   5. Trouble walking   DIABETES FOOT EXAM    03328 - [place] DEBRIDEMENT OF NAILS, 6 OR MORE    98792 - [place] DESTRUCTION BENIGN LESIONS UP TO 14   6. Peripheral vascular disease (HCC)   DIABETES FOOT EXAM    13408 - [place] DEBRIDEMENT OF NAILS, 6 OR MORE    69148 - [place] DESTRUCTION BENIGN LESIONS UP TO 14   7. Pain in both feet   DIABETES FOOT EXAM    09334 - [place] DEBRIDEMENT OF NAILS, 6 OR MORE    57059 - [place] DESTRUCTION BENIGN LESIONS UP TO 14             Q7   []Yes    []No                Q8   [x]Yes    []No                     Q9   []Yes    []No    Plan:   Pt was evaluated and examined. Patient was given personalized discharge instructions.       The lesions were partially excised via 15 blade and silver nitrate was applied under occlusion. The patient tolerated the procedure well and without complication. Advised patient to use vasoline to the area after tomorrow to prevent surrounding tissue irritation. Nails 1-10 were debrided sharply in length and thickness with a nipper and , without incident. Pt will follow up in 9 weeks or sooner if any problems arise. Diagnosis was discussed with the pt and all of their questions were answered in detail. Proper foot hygiene and care was discussed with the pt. Informed patient on proper diabetic foot care and importance of tight glycemic control. Patient to check feet daily and contact the office with any questions/problems/concerns.    Other comorbidity noted and will be managed by PCP.  2/8/2022    Electronically signed by Wili Dawson DPM on 2/8/2022 at 11:29 AM  2/8/2022

## 2022-02-11 ENCOUNTER — HOSPITAL ENCOUNTER (OUTPATIENT)
Dept: VASCULAR LAB | Age: 82
Discharge: HOME | End: 2022-02-11
Payer: MEDICARE

## 2022-02-11 DIAGNOSIS — I73.9 PVD (PERIPHERAL VASCULAR DISEASE) (HCC): ICD-10-CM

## 2022-02-11 PROCEDURE — 93923 UPR/LXTR ART STDY 3+ LVLS: CPT

## 2022-02-14 ENCOUNTER — TELEPHONE (OUTPATIENT)
Dept: FAMILY MEDICINE CLINIC | Age: 82
End: 2022-02-14

## 2022-02-14 NOTE — TELEPHONE ENCOUNTER
Writer spoke to patient, she is going to call back when has her calendar to schedule appt for HTN/DM.

## 2022-02-28 ENCOUNTER — HOSPITAL ENCOUNTER (OUTPATIENT)
Age: 82
Setting detail: SPECIMEN
Discharge: HOME | End: 2022-02-28
Payer: MEDICARE

## 2022-02-28 DIAGNOSIS — R80.1 PERSISTENT PROTEINURIA: ICD-10-CM

## 2022-02-28 DIAGNOSIS — N18.32 STAGE 3B CHRONIC KIDNEY DISEASE (HCC): ICD-10-CM

## 2022-02-28 LAB
ALBUMIN SERPL-MCNC: 4 G/DL (ref 3.5–5.2)
ALT SERPL-CCNC: 8 U/L (ref 5–33)
ANION GAP SERPL CALCULATED.3IONS-SCNC: 11 MMOL/L (ref 9–17)
AST SERPL-CCNC: 15 U/L
BUN BLDV-MCNC: 17 MG/DL (ref 8–23)
CALCIUM SERPL-MCNC: 10.1 MG/DL (ref 8.6–10.4)
CHLORIDE BLD-SCNC: 104 MMOL/L (ref 98–107)
CHOLESTEROL, FASTING: 139 MG/DL
CHOLESTEROL/HDL RATIO: 2.3
CO2: 24 MMOL/L (ref 20–31)
CREAT SERPL-MCNC: 1.58 MG/DL (ref 0.5–0.9)
CREATININE URINE: 138.1 MG/DL (ref 28–217)
GFR AFRICAN AMERICAN: 38 ML/MIN
GFR NON-AFRICAN AMERICAN: 31 ML/MIN
GFR SERPL CREATININE-BSD FRML MDRD: ABNORMAL ML/MIN/{1.73_M2}
GLUCOSE BLD-MCNC: 133 MG/DL (ref 70–99)
HCT VFR BLD CALC: 41.5 % (ref 36.3–47.1)
HDLC SERPL-MCNC: 60 MG/DL
HEMOGLOBIN: 12.8 G/DL (ref 11.9–15.1)
LDL CHOLESTEROL: 57 MG/DL (ref 0–130)
MCH RBC QN AUTO: 27.8 PG (ref 25.2–33.5)
MCHC RBC AUTO-ENTMCNC: 30.8 G/DL (ref 28.4–34.8)
MCV RBC AUTO: 90.2 FL (ref 82.6–102.9)
NRBC AUTOMATED: 0 PER 100 WBC
PDW BLD-RTO: 14.6 % (ref 11.8–14.4)
PHOSPHORUS: 3.8 MG/DL (ref 2.6–4.5)
PLATELET # BLD: 221 K/UL (ref 138–453)
PMV BLD AUTO: 12 FL (ref 8.1–13.5)
POTASSIUM SERPL-SCNC: 4.4 MMOL/L (ref 3.7–5.3)
PTH INTACT: 207.4 PG/ML (ref 15–65)
RBC # BLD: 4.6 M/UL (ref 3.95–5.11)
SODIUM BLD-SCNC: 139 MMOL/L (ref 135–144)
TOTAL PROTEIN, URINE: 225 MG/DL
TRIGLYCERIDE, FASTING: 112 MG/DL
URINE TOTAL PROTEIN CREATININE RATIO: 1.63 (ref 0–0.2)
WBC # BLD: 6.7 K/UL (ref 3.5–11.3)

## 2022-02-28 PROCEDURE — 82040 ASSAY OF SERUM ALBUMIN: CPT

## 2022-02-28 PROCEDURE — 36415 COLL VENOUS BLD VENIPUNCTURE: CPT

## 2022-02-28 PROCEDURE — 84460 ALANINE AMINO (ALT) (SGPT): CPT

## 2022-02-28 PROCEDURE — 80061 LIPID PANEL: CPT

## 2022-02-28 PROCEDURE — 83970 ASSAY OF PARATHORMONE: CPT

## 2022-02-28 PROCEDURE — 82570 ASSAY OF URINE CREATININE: CPT

## 2022-02-28 PROCEDURE — 85027 COMPLETE CBC AUTOMATED: CPT

## 2022-02-28 PROCEDURE — 84450 TRANSFERASE (AST) (SGOT): CPT

## 2022-02-28 PROCEDURE — 80048 BASIC METABOLIC PNL TOTAL CA: CPT

## 2022-02-28 PROCEDURE — 84100 ASSAY OF PHOSPHORUS: CPT

## 2022-02-28 PROCEDURE — 84156 ASSAY OF PROTEIN URINE: CPT

## 2022-03-03 ENCOUNTER — OFFICE VISIT (OUTPATIENT)
Dept: VASCULAR SURGERY | Age: 82
End: 2022-03-03
Payer: MEDICARE

## 2022-03-03 VITALS
SYSTOLIC BLOOD PRESSURE: 126 MMHG | WEIGHT: 213 LBS | TEMPERATURE: 98.4 F | HEIGHT: 60 IN | DIASTOLIC BLOOD PRESSURE: 64 MMHG | HEART RATE: 95 BPM | OXYGEN SATURATION: 97 % | RESPIRATION RATE: 20 BRPM | BODY MASS INDEX: 41.82 KG/M2

## 2022-03-03 DIAGNOSIS — I73.9 PAD (PERIPHERAL ARTERY DISEASE) (HCC): Primary | ICD-10-CM

## 2022-03-03 PROCEDURE — 1036F TOBACCO NON-USER: CPT | Performed by: SURGERY

## 2022-03-03 PROCEDURE — 1123F ACP DISCUSS/DSCN MKR DOCD: CPT | Performed by: SURGERY

## 2022-03-03 PROCEDURE — G8417 CALC BMI ABV UP PARAM F/U: HCPCS | Performed by: SURGERY

## 2022-03-03 PROCEDURE — G8399 PT W/DXA RESULTS DOCUMENT: HCPCS | Performed by: SURGERY

## 2022-03-03 PROCEDURE — G8484 FLU IMMUNIZE NO ADMIN: HCPCS | Performed by: SURGERY

## 2022-03-03 PROCEDURE — G8427 DOCREV CUR MEDS BY ELIG CLIN: HCPCS | Performed by: SURGERY

## 2022-03-03 PROCEDURE — 99214 OFFICE O/P EST MOD 30 MIN: CPT | Performed by: SURGERY

## 2022-03-03 PROCEDURE — 1090F PRES/ABSN URINE INCON ASSESS: CPT | Performed by: SURGERY

## 2022-03-03 PROCEDURE — 4040F PNEUMOC VAC/ADMIN/RCVD: CPT | Performed by: SURGERY

## 2022-03-03 NOTE — PROGRESS NOTES
Division of Vascular Surgery        Follow Up      Chief Complaint:     PAD surveillance    History of Present Illness:      Linette Merlos is a [de-identified] y.o. woman who presents for her yearly surveillance regarding her peripheral arterial disease. She continues to do well and denies any activity limiting pain in her legs. Every now and then if she over does it her upper thighs will bother her. She denies any symptoms suggestive of ischemic rest pain, she does not have any open wounds or sores on her feet. She has been trying to stay active, but admits to slowing down because of COVID. Hoping to get moving again now that the weather has shown signs of warming back up.     Medical History:     Past Medical History:   Diagnosis Date    Abnormal cardiovascular stress test 02/2021    LARGE ANTERIOR INFARCTION / LARGE AREA OF INFERIOR LATERAL ISCHEMIA WITH REDUCED EF 38%    Abnormal stress test 07/18/2014    going to do cath- not urgent just abnormal    Ambulates with cane     PRN    Arthritis     At high risk for hyperkalemia 10/22/2014    From type 4 RTA    Back pain     CAD (coronary artery disease)     Circulation problem     decreased circulation to  zbigniew extremities    CKD (chronic kidney disease) stage 3, GFR 30-59 ml/min ([facility]) 10/22/2014    From diabetic and ischemic nephrosclerosis, baseline 1.4, GFR 40-45 ml/min, UPC 0.3    COPD (chronic obstructive pulmonary disease) ([facility])     DM (diabetes mellitus) ([facility])     Edema     chronic lower extremity    Foot pain, bilateral     Former smoker     Gastroesophageal reflux disease without esophagitis 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    Hyperlipidemia     Hypertension     Hypothyroidism     Intermittent claudication ([facility])     loly to left leg    Kidney disease     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 ([facility] 04/20/2016    Morbid obesity with BMI of 45.0-49.9, adult ([facility] 10/22/2014    Osteoarthritis     Other activity(E029.9)     Acute renal failure secondary to prerenal azotemia    Other activity(E029.9)     Atherosclerotic coronary artery disease status-post bypass surgery in 2003    Persistent proteinuria 05/09/2018    From diabetic nephrosclerosis, urine protein creatinine ratio 0.81    PVD (peripheral vascular disease) ([facility]     Sciatica     Secondary hyperparathyroidism (of renal origin) 10/22/2014    Not on VDRA    Shortness of breath     Shoulder fracture, right 12/2020    STATES FELL ON MARLYN CALIN    Sleep apnea     Type II or unspecified type diabetes mellitus without mention of complication, not stated as uncontrolled        Surgical History:     Past Surgical History:   Procedure Laterality Date    APPENDECTOMY      CARDIAC CATHETERIZATION  2006, 2014,4/2018    CARDIAC CATHETERIZATION  08/19/2014    Patent Radial - PDA / RCA  /  patent LAD stent / 50% stenosis in Om stent / LV dysfunction, EF 40%    CARDIAC CATHETERIZATION  03/02/2021    Patent SVG -PDA / PAtent LCX/OM stent / Minimal disease in LAD   [facility] CARDIAC SURGERY  2003 2018    CABG X 3/STENTS    CATARACT REMOVAL WITH IMPLANT Bilateral     COLONOSCOPY      CORONARY ANGIOPLASTY WITH STENT PLACEMENT  11/2007    S/P ANGIOPLASTY AND STENTING OF LAD AND CIRCUMFLEX    NERVE BLOCK Right 10/21/2016    rt MBNB #1 kenalog 40mg       Family History:     No family history on file. Allergies:       Patient has no known allergies. Medications:      Current Outpatient Medications   Medication Sig Dispense Refill    paricalcitol (ZEMPLAR) 1 MCG capsule Take 1 capsule by mouth three times a week Mon Wed Fri 36 capsule 3    Insulin Syringe-Needle U-100 31G X 5/16\" 0.3 ML MISC 1 each by Does not apply route daily 100 each 5    Syringe/Needle, Disp, (SYRINGE 3CC/25GX1\") 25G X 1\" 3 ML MISC Used one every other month.  6 each 5  levothyroxine (SYNTHROID) 88 MCG tablet TAKE 1 TABLET EVERY DAY 90 tablet 3    sodium bicarbonate 650 MG tablet Take 650 mg by mouth 2 times daily      blood glucose monitor strips 1 strip by Other route every morning (before breakfast) True Metrix Test__1_times daily Diagnosis: 250.0   Diabetes Mellitus____Insulin Dependent__x__Non-Insulin Dependent 100 strip 1    Blood Glucose Monitoring Suppl (TRUE METRIX AIR GLUCOSE METER) w/Device KIT 1 Device by Does not apply route 2 times daily 1 kit 0    Lancets MISC 1 each by Does not apply route daily 33G Use 1 times daily Diagnisis:250.0  Diabetes Mellitus____Insulin Dependent__X_Non-Insulin Dependent 100 each 3    atorvastatin (LIPITOR) 40 MG tablet TAKE 1 TABLET EVERY DAY 90 tablet 3    clopidogrel (PLAVIX) 75 MG tablet TAKE 1 TABLET EVERY DAY 90 tablet 3    insulin glargine (LANTUS) 100 UNIT/ML injection vial INJECT 10 UNITS INTO THE SKIN NIGHTLY 30 mL 5    lansoprazole (PREVACID) 15 MG delayed release capsule TAKE 1 CAPSULE EVERY DAY 90 capsule 3    lisinopril (PRINIVIL;ZESTRIL) 20 MG tablet TAKE 1 TABLET TWICE DAILY (DOSE INCREASE PER DR Vincent Kimball) 180 tablet 3    metoprolol succinate (TOPROL XL) 100 MG extended release tablet TAKE 1 TABLET EVERY DAY 90 tablet 3    nitroGLYCERIN (NITROSTAT) 0.4 MG SL tablet Place 1 tablet under the tongue as needed for Chest pain 25 tablet 2    amLODIPine (NORVASC) 10 MG tablet Take 10 mg by mouth daily      ACCU-CHEK DONG PLUS strip TEST THREE TIMES DAILY 300 strip 5    acetaminophen (TYLENOL) 500 MG tablet Take 2 tablets by mouth every 8 hours as needed for Pain 270 tablet 1    aspirin EC 81 MG EC tablet Take 2 tablets by mouth daily 30 tablet 5    cyanocobalamin 1000 MCG/ML injection Inject 1 ML every other month   please give patient (6) 1 ml vials.  6 mL 5    blood glucose monitor strips Test_3__times daily Diagnosis: 250.0   Diabetes Mellitus__x__Insulin Dependent____Non-Insulin Dependent Life Scan test strips ultra one touch 300 strip 5    B-D INS SYR ULTRAFINE 1CC/30G 30G X 1/2\" 1 ML MISC USE 1 SYRINGE EVERY DAY 90 each 5    Compression Stockings MISC by Does not apply route 20-40 mmHg. 2 each 0    Blood Glucose Calibration (OT ULTRA/FASTTK CNTRL SOLN) SOLN       Multiple Vitamins-Minerals (OCUVITE ADULT 50+ PO) Take 1 tablet by mouth 2 times daily       Green Tea, Camillia sinensis, 315 MG CAPS Take 1 tablet by mouth daily       Cinnamon 500 MG TABS Take 2 tablets by mouth daily       Cranberry-Vitamin C-Vitamin E (CRANBERRY PLUS VITAMIN C PO) Take 1 tablet by mouth daily.  Ascorbic Acid (VITAMIN C WITH VIVIENNE HIPS) 500 MG tablet Take 500 mg by mouth daily.  Omega-3 Fatty Acids (FISH OIL) 1000 MG CAPS Take 1,000 mg by mouth daily.  docusate sodium (COLACE) 100 MG capsule Take 100 mg by mouth 2 times daily.  Blood Glucose Monitoring Suppl (ONE TOUCH ULTRA) by Does not apply route.  Alcohol Swabs (B-D SINGLE USE SWABS REGULAR) PADS 1 box by Other route 3 times daily as needed (When pt checks glucose) 1 each 5     No current facility-administered medications for this visit. Social History:     Tobacco:    reports that she quit smoking about 26 years ago. She has a 80.00 pack-year smoking history. She has never used smokeless tobacco.  Alcohol:      reports current alcohol use. Drug Use:  reports no history of drug use. Review of Systems:     Review of Systems   Constitutional: Negative for chills and fever. HENT: Negative for congestion. Eyes: Negative for visual disturbance. Respiratory: Negative for chest tightness and shortness of breath. Cardiovascular: Negative for chest pain and leg swelling. Gastrointestinal: Negative for abdominal pain. Endocrine: Negative. Genitourinary: Negative. Musculoskeletal: Positive for arthralgias and back pain. Skin: Negative for color change and wound. Allergic/Immunologic: Negative.     Neurological: Negative for facial asymmetry, speech difficulty, weakness and numbness. Hematological: Negative. Psychiatric/Behavioral: Negative. Physical Exam:     Vitals:  /64 (Site: Left Upper Arm, Position: Sitting, Cuff Size: Large Adult)   Pulse 95   Temp 98.4 °F (36.9 °C) (Temporal)   Resp 20   Ht 5' (1.524 m)   Wt 213 lb (96.6 kg)   SpO2 97%   BMI 41.60 kg/m²     Physical Exam  Constitutional:       Appearance: She is well-developed and well-groomed. Eyes:      Extraocular Movements: Extraocular movements intact. Conjunctiva/sclera: Conjunctivae normal.   Neck:      Vascular: No carotid bruit. Cardiovascular:      Rate and Rhythm: Normal rate and regular rhythm. Pulses:           Radial pulses are 2+ on the right side and 2+ on the left side. Dorsalis pedis pulses are detected w/ Doppler on the right side and detected w/ Doppler on the left side. Posterior tibial pulses are detected w/ Doppler on the right side and detected w/ Doppler on the left side. Pulmonary:      Effort: Pulmonary effort is normal. No respiratory distress. Abdominal:      Palpations: Abdomen is soft. Tenderness: There is no abdominal tenderness. Musculoskeletal:      Cervical back: Full passive range of motion without pain. Right lower leg: No swelling or tenderness. No edema. Left lower leg: No swelling or tenderness. No edema. Right foot: Normal capillary refill. No swelling or tenderness. Left foot: Normal capillary refill. No swelling or tenderness. Feet:      Right foot:      Skin integrity: No ulcer or skin breakdown. Left foot:      Skin integrity: No ulcer or skin breakdown. Skin:     General: Skin is warm. Capillary Refill: Capillary refill takes less than 2 seconds. Neurological:      Mental Status: She is alert and oriented to person, place, and time. GCS: GCS eye subscore is 4. GCS verbal subscore is 5. GCS motor subscore is 6.       Sensory: Sensation is intact. Motor: Motor function is intact. Psychiatric:         Mood and Affect: Mood normal.         Speech: Speech normal.         Behavior: Behavior normal.       Imaging/Labs:       Previous JORY (2021) right 0.38 and left 0.20    Assessment and Plan:     Peripheral arterial disease  · Overall stable and asymptomatic for the most part  · Continue optimal medical therapy with aspirin and statins  · Daily exercise and walking to improve overall cardiovascular health  · Yearly surveillance with PVRs, sooner if symptoms get worse    Electronically signed by Anaya Craig MD on 3/3/22 at 1:33 PM [address]: [phone]  C: [phone]  Email: [email or web address]

## 2022-03-04 ASSESSMENT — ENCOUNTER SYMPTOMS
CHEST TIGHTNESS: 0
SHORTNESS OF BREATH: 0
COLOR CHANGE: 0
BACK PAIN: 1
ABDOMINAL PAIN: 0
ALLERGIC/IMMUNOLOGIC NEGATIVE: 1

## 2022-03-30 RX ORDER — SODIUM BICARBONATE 650 MG/1
650 TABLET ORAL 2 TIMES DAILY
Qty: 180 TABLET | Refills: 3 | Status: SHIPPED | OUTPATIENT
Start: 2022-03-30 | End: 2022-06-28

## 2022-03-31 ENCOUNTER — HOSPITAL ENCOUNTER (OUTPATIENT)
Age: 82
Setting detail: SPECIMEN
Discharge: HOME | End: 2022-03-31

## 2022-03-31 ENCOUNTER — OFFICE VISIT (OUTPATIENT)
Dept: FAMILY MEDICINE CLINIC | Age: 82
End: 2022-03-31
Payer: MEDICARE

## 2022-03-31 VITALS
DIASTOLIC BLOOD PRESSURE: 62 MMHG | WEIGHT: 216.4 LBS | HEIGHT: 60 IN | TEMPERATURE: 96.6 F | SYSTOLIC BLOOD PRESSURE: 125 MMHG | HEART RATE: 85 BPM | BODY MASS INDEX: 42.49 KG/M2

## 2022-03-31 DIAGNOSIS — E11.9 TYPE 2 DIABETES MELLITUS WITHOUT COMPLICATION, UNSPECIFIED WHETHER LONG TERM INSULIN USE (HCC): ICD-10-CM

## 2022-03-31 DIAGNOSIS — E11.9 TYPE 2 DIABETES MELLITUS WITHOUT COMPLICATION, UNSPECIFIED WHETHER LONG TERM INSULIN USE (HCC): Primary | ICD-10-CM

## 2022-03-31 LAB
CREATININE URINE: 182.7 MG/DL (ref 28–217)
ESTIMATED AVERAGE GLUCOSE: 140 MG/DL
HBA1C MFR BLD: 6.5 % (ref 4–6)
MICROALBUMIN/CREAT 24H UR: 1047 MG/L
MICROALBUMIN/CREAT UR-RTO: 573 MCG/MG CREAT

## 2022-03-31 PROCEDURE — 99211 OFF/OP EST MAY X REQ PHY/QHP: CPT | Performed by: FAMILY MEDICINE

## 2022-03-31 PROCEDURE — 4040F PNEUMOC VAC/ADMIN/RCVD: CPT | Performed by: FAMILY MEDICINE

## 2022-03-31 PROCEDURE — G8428 CUR MEDS NOT DOCUMENT: HCPCS | Performed by: FAMILY MEDICINE

## 2022-03-31 PROCEDURE — G8417 CALC BMI ABV UP PARAM F/U: HCPCS | Performed by: FAMILY MEDICINE

## 2022-03-31 PROCEDURE — 1090F PRES/ABSN URINE INCON ASSESS: CPT | Performed by: FAMILY MEDICINE

## 2022-03-31 PROCEDURE — 99213 OFFICE O/P EST LOW 20 MIN: CPT | Performed by: FAMILY MEDICINE

## 2022-03-31 PROCEDURE — 1123F ACP DISCUSS/DSCN MKR DOCD: CPT | Performed by: FAMILY MEDICINE

## 2022-03-31 PROCEDURE — G8484 FLU IMMUNIZE NO ADMIN: HCPCS | Performed by: FAMILY MEDICINE

## 2022-03-31 PROCEDURE — 1036F TOBACCO NON-USER: CPT | Performed by: FAMILY MEDICINE

## 2022-03-31 PROCEDURE — G8399 PT W/DXA RESULTS DOCUMENT: HCPCS | Performed by: FAMILY MEDICINE

## 2022-03-31 NOTE — PROGRESS NOTES
Diabetes (follow up -Pt. feels well with no complaints) and Hypertension    Dr Domingo Cheatham - visits May, 6 mo  Dr. Siri Márquez - visit in a couple weeks  Dr. Kate New - DPM  DDS -  PCP - as needed    Lives on Jovanna Kamara 16 blood zsugars -am and nmnight    \"Sliding scale\" Lantus - 5 iu - 10 iu    Discussed blood glucose      /62 (Site: Left Upper Arm, Position: Sitting, Cuff Size: Large Adult) Comment: machine  Pulse 85   Temp 96.6 °F (35.9 °C)   Ht 5' (1.524 m)   Wt 216 lb 6.4 oz (98.2 kg)   BMI 42.26 kg/m²     Weight stable       Review of Systems    Negative for:     Worry / mood complaints  Headache  Dizziness  Visual Disturbance  Hearing Changes  Nasal / sinus Symptoms  Mouth / tooth symptom, pain  Throat pain  Difficulty swallowing  Neck pain  Chest discomfort  Cough  SOB  Abdominal area discomfort / pain  N/V/D/C  Pelvic area discomfort  Bladder / voiding discomfort  Bowel complaints  MS complaints   Numbness/tingling/abnormal sensations   Edema / Leg swelling  Dizziness  Fatigue  Bleeding   Skin    Pertinent Pos: See HPI - negative      Physical Exam    Alert and oriented to PPT  NAD    HEENT - neg  Neck - no bruits, no lymphadenopathy  Chest  HRRR w/o murmer  LCTAB no wheezes / rhonchi  Abdomen - soft, non-tender, BS  Extremities - 0+ PTE    Gait / Station - stable, no dysequilibrium, uniform pace, no assist device, cane. ASSESSMENT AND PLAN      Diagnosis Orders   1.  Type 2 diabetes mellitus without complication, unspecified whether long term insulin use (HCC)  Hemoglobin A1C    Microalbumin, Ur       Labs due - A1c, Umicroalbumin  HUSAM 6 m    Electronicallysigned by Jaswant Flores DO on 3/31/2022 at 11:35 AM

## 2022-03-31 NOTE — PATIENT INSTRUCTIONS
Thank you for letting us take care of you today. We hope all your questions were addressed. If a question was overlooked or something else comes to mind after you return home, please contact a member of your Care Team listed below. Your Care Team at [place] [id number] is Team #2  Collette Woody DO (Faculty)  Dominic Mary (Faculty)  Daisy Dave MD (Resident)  Lisa Whitley MD (Resident)  De Tao MD (Resident)  Dorian Aguayo MD (Resident)  Víctor Roberts., CHANA García.,  SARINA Da Silva., [facility] office)  Brayan Rose, [address] (Clinical Practice Manager)  Danny Cope, [address] [place] [address] (Clinical Pharmacist)     Office phone number: [phone]    If you need to get in right away due to illness, please be advised we have \"Same Day\" appointments available Monday-Friday. Please call us at [phone] option #3 to schedule your \"Same Day\" appointment.

## 2022-03-31 NOTE — PROGRESS NOTES
DIABETES and HYPERTENSION visit    BP Readings from Last 3 Encounters:   03/03/22 126/64   12/01/21 (!) 148/64   11/10/21 124/80        Hemoglobin A1C (%)   Date Value   11/23/2020 6.0   02/13/2018 6.3   11/07/2017 6.7     Microalb/Crt. Ratio (mcg/mg creat)   Date Value   02/13/2018 280 (H)     LDL Cholesterol (mg/dL)   Date Value   02/28/2022 57     HDL (mg/dL)   Date Value   02/28/2022 60     BUN (mg/dL)   Date Value   02/28/2022 17     CREATININE (mg/dL)   Date Value   02/28/2022 1.58 (H)     Glucose (mg/dL)   Date Value   02/28/2022 133 (H)   02/07/2012 122 (H)            Have you changed or started any medications since your last visit including any over-the-counter medicines, vitamins, or herbal medicines? no   Have you stopped taking any of your medications? Is so, why? -  no  Are you having any side effects from any of your medications? - no    Have you seen any other physician or provider since your last visit? yes - Podiatry , Vascular    Have you had any other diagnostic tests since your last visit? yes - labs, Rhythm strip report , Cath lab, Cardiac Cath    Have you been seen in the emergency room and/or had an admission in a hospital since we last saw you?  no   Have you had your routine dental cleaning in the past 6 months?  no     Have you had your annual diabetic retinal (eye) exam? No   (ensure copy of exam is in the chart)    Do you have an active MyChart account? If no, what is the barrier?   Yes    Patient Care Team:  Robles Ramos DO as PCP - General (Family Medicine)  Robles Crews, DO as PCP - [facility]  Diamante Morley DPM as Physician (Podiatry)    Medical History Review  Past Medical, Family, and Social History reviewed and does not contribute to the patient presenting condition    Health Maintenance   Topic Date Due    TSH testing  11/23/2021    Depression Screen  12/01/2022    Annual Wellness Visit (AWV)  12/02/2022    Lipid screen  02/28/2023    Potassium monitoring  02/28/2023    Creatinine monitoring  02/28/2023    DTaP/Tdap/Td vaccine (2 - Td or Tdap) 07/02/2029    DEXA (modify frequency per FRAX score)  Completed    Flu vaccine  Completed    Shingles Vaccine  Completed    Pneumococcal 65+ years Vaccine  Completed    COVID-19 Vaccine  Completed    Hepatitis A vaccine  Aged Out    Hib vaccine  Aged Out    Meningococcal (ACWY) vaccine  Aged Out

## 2022-04-26 ENCOUNTER — OFFICE VISIT (OUTPATIENT)
Dept: PODIATRY | Age: 82
End: 2022-04-26
Payer: MEDICARE

## 2022-04-26 VITALS — RESPIRATION RATE: 16 BRPM | WEIGHT: 216 LBS | BODY MASS INDEX: 42.41 KG/M2 | HEIGHT: 60 IN

## 2022-04-26 DIAGNOSIS — I73.9 PERIPHERAL VASCULAR DISEASE (HCC): ICD-10-CM

## 2022-04-26 DIAGNOSIS — D23.71 BENIGN NEOPLASM OF SKIN OF LOWER LIMB, INCLUDING HIP, RIGHT: ICD-10-CM

## 2022-04-26 DIAGNOSIS — B35.1 DERMATOPHYTOSIS OF NAIL: ICD-10-CM

## 2022-04-26 DIAGNOSIS — E11.51 TYPE II DIABETES MELLITUS WITH PERIPHERAL CIRCULATORY DISORDER (HCC): Primary | ICD-10-CM

## 2022-04-26 DIAGNOSIS — R26.2 TROUBLE WALKING: ICD-10-CM

## 2022-04-26 DIAGNOSIS — M79.672 PAIN IN BOTH FEET: ICD-10-CM

## 2022-04-26 DIAGNOSIS — D23.72 BENIGN NEOPLASM OF SKIN OF LOWER LIMB, INCLUDING HIP, LEFT: ICD-10-CM

## 2022-04-26 DIAGNOSIS — M79.671 PAIN IN BOTH FEET: ICD-10-CM

## 2022-04-26 PROCEDURE — 99999 PR OFFICE/OUTPT VISIT,PROCEDURE ONLY: CPT | Performed by: PODIATRIST

## 2022-04-26 PROCEDURE — 11721 DEBRIDE NAIL 6 OR MORE: CPT | Performed by: PODIATRIST

## 2022-04-26 PROCEDURE — 17110 DESTRUCTION B9 LES UP TO 14: CPT | Performed by: PODIATRIST

## 2022-04-26 NOTE — PROGRESS NOTES
[address] [facility] PODIATRY [facility]  [address] [place] [address]  Dept: [phone]  Dept Fax: [phone]    DIABETIC PROGRESS NOTE  Date of patient's visit: 4/26/2022  Patient's Name:  Sun Martinez YOB: 1940            Patient Care Team:  Kavin Garcia DO as PCP - General (Family Medicine)  Kavin Garcia DO as PCP - [facility] Fara Kelley Provider  Gabriela Samuels DPM as Physician (Podiatry)          Chief Complaint   Patient presents with    Diabetes     type II    Peripheral Neuropathy    Nail Problem     toenail trim    Benign Neoplasm     bilateral       Subjective:   Sun Martinez comes to clinic for Diabetes (type II), Peripheral Neuropathy, Nail Problem (toenail trim), and Benign Neoplasm (bilateral)    she is a diabetic and states that she is doing well. Pt currently has complaint of thickened, elongated nails that they cannot manage by themselves. Pt's primary care physician is Carlos Llanes DO last seen 03/31/2022   Pt's last blood sugar was 99 this morning. Pt also relates to painful skin spots to the bottom of both feet. Pt has tried pads and changing shoes but it has note helped the pain      Pt has a new complaint of none today . Lab Results   Component Value Date    LABA1C 6.5 (H) 03/31/2022      Complains of numbness in the feet bilat.   Past Medical History:   Diagnosis Date    Abnormal cardiovascular stress test 02/2021    LARGE ANTERIOR INFARCTION / LARGE AREA OF INFERIOR LATERAL ISCHEMIA WITH REDUCED EF 38%    Abnormal stress test 07/18/2014    going to do cath- not urgent just abnormal    Ambulates with cane     PRN    Arthritis     At high risk for hyperkalemia 10/22/2014    From type 4 RTA    Back pain     CAD (coronary artery disease)     Circulation problem     decreased circulation to  zbigniew extremities    CKD (chronic kidney disease) stage 3, GFR 30-59 ml/min ([facility]) 10/22/2014    From diabetic and ischemic nephrosclerosis, baseline 1.4, GFR 40-45 ml/min, UPC 0.3    COPD (chronic obstructive pulmonary disease) ([facility])     DM (di[facility] mellitus) [facility]     Edema     chronic lower extremity    Foot pain, bilateral     Former smoker     Gastroesophageal reflux disease without esophagitis 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    Hyperlipidemia     Hypertension     Hypothyroidism     Intermittent claudication ([facility])     loly to left leg    Kidney disease     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 ([facility] [facility] 04/20/2016    Morbid obesity with BMI of 45.0-49.9, adult ([facility] 10/22/2014    Osteoarthritis     Other activity(E029.9)     Acute renal failure secondary to prerenal azotemia    Other activity(E029.9)     Atherosclerotic coronary artery disease status-post bypass surgery in 2003    Persistent proteinuria 05/09/2018    From diabetic nephrosclerosis, urine protein creatinine ratio 0.81    PVD (peripheral vascular disease) ([facility]     Sciatica     Secondary hyperparathyroidism (of renal origin) 10/22/2014    Not on VDRA    Shortness of breath     Shoulder fracture, right 12/2020    STATES FELL ON MARLYN CALIN    Sleep apnea     Type II or unspecified type diabetes mellitus without mention of complication, not stated as uncontrolled        No Known Allergies  Current Outpatient Medications on File Prior to Visit   Medication Sig Dispense Refill    sodium bicarbonate 650 MG tablet Take 1 tablet by mouth 2 times daily 180 tablet 3    paricalcitol (ZEMPLAR) 1 MCG capsule Take 1 capsule by mouth three times a week Mon Wed Fri 36 capsule 3    Insulin Syringe-Needle U-100 31G X 5/16\" 0.3 ML MISC 1 each by Does not apply route daily 100 each 5    Syringe/Needle, Disp, (SYRINGE 3CC/25GX1\") 25G X 1\" 3 ML MISC Used one every other month. 6 each 5    levothyroxine (SYNTHROID) 88 MCG tablet TAKE 1 TABLET EVERY DAY 90 tablet 3    blood glucose monitor strips 1 strip by Other route every morning (before breakfast) True Metrix Test__1_times daily Diagnosis: 250.0   Diabetes Mellitus____Insulin Dependent__x__Non-Insulin Dependent 100 strip 1    Blood Glucose Monitoring Suppl (TRUE METRIX AIR GLUCOSE METER) w/Device KIT 1 Device by Does not apply route 2 times daily 1 kit 0    Lancets MISC 1 each by Does not apply route daily 33G Use 1 times daily Diagnisis:250.0  Diabetes Mellitus____Insulin Dependent__X_Non-Insulin Dependent 100 each 3    atorvastatin (LIPITOR) 40 MG tablet TAKE 1 TABLET EVERY DAY 90 tablet 3    clopidogrel (PLAVIX) 75 MG tablet TAKE 1 TABLET EVERY DAY 90 tablet 3    insulin glargine (LANTUS) 100 UNIT/ML injection vial INJECT 10 UNITS INTO THE SKIN NIGHTLY 30 mL 5    lansoprazole (PREVACID) 15 MG delayed release capsule TAKE 1 CAPSULE EVERY DAY 90 capsule 3    lisinopril (PRINIVIL;ZESTRIL) 20 MG tablet TAKE 1 TABLET TWICE DAILY (DOSE INCREASE PER DR Regina Garner) 180 tablet 3    metoprolol succinate (TOPROL XL) 100 MG extended release tablet TAKE 1 TABLET EVERY DAY 90 tablet 3    nitroGLYCERIN (NITROSTAT) 0.4 MG SL tablet Place 1 tablet under the tongue as needed for Chest pain 25 tablet 2    amLODIPine (NORVASC) 10 MG tablet Take 10 mg by mouth daily      ACCU-CHEK DONG PLUS strip TEST THREE TIMES DAILY 300 strip 5    acetaminophen (TYLENOL) 500 MG tablet Take 2 tablets by mouth every 8 hours as needed for Pain 270 tablet 1    aspirin EC 81 MG EC tablet Take 2 tablets by mouth daily 30 tablet 5    cyanocobalamin 1000 MCG/ML injection Inject 1 ML every other month   please give patient (6) 1 ml vials.  6 mL 5    blood glucose monitor strips Test_3__times daily Diagnosis: 250.0   Diabetes Mellitus__x__Insulin Dependent____Non-Insulin Dependent Life Scan test strips ultra one touch 300 strip 5    B-D INS SYR ULTRAFINE 1CC/30G 30G X 1/2\" 1 ML MISC USE 1 SYRINGE EVERY DAY 90 each 5    Compression Stockings MISC by Does not apply route 20-40 mmHg. 2 each 0    Blood Glucose Calibration (OT ULTRA/FASTTK CNTRL SOLN) SOLN       Multiple Vitamins-Minerals (OCUVITE ADULT 50+ PO) Take 1 tablet by mouth 2 times daily       Green Tea, Camillia sinensis, 315 MG CAPS Take 1 tablet by mouth daily       Cinnamon 500 MG TABS Take 2 tablets by mouth daily       Cranberry-Vitamin C-Vitamin E (CRANBERRY PLUS VITAMIN C PO) Take 1 tablet by mouth daily.  Ascorbic Acid (VITAMIN C WITH VIVIENNE HIPS) 500 MG tablet Take 500 mg by mouth daily.  Omega-3 Fatty Acids (FISH OIL) 1000 MG CAPS Take 1,000 mg by mouth daily.  docusate sodium (COLACE) 100 MG capsule Take 100 mg by mouth 2 times daily.  Blood Glucose Monitoring Suppl (ONE TOUCH ULTRA) by Does not apply route.  Alcohol Swabs (B-D SINGLE USE SWABS REGULAR) PADS 1 box by Other route 3 times daily as needed (When pt checks glucose) 1 each 5     No current facility-administered medications on file prior to visit. Review of Systems    Review of Systems:   History obtained from chart review and the patient  General ROS: negative for - chills, fatigue, fever, night sweats or weight gain  Constitutional: Negative for chills, diaphoresis, fatigue, fever and unexpected weight change. Musculoskeletal: Positive for arthralgias, gait problem and joint swelling. Neurological ROS: negative for - behavioral changes, confusion, headaches or seizures. Negative for weakness and numbness. Dermatological ROS: negative for - mole changes, rash  Cardiovascular: Negative for leg swelling. Gastrointestinal: Negative for constipation, diarrhea, nausea and vomiting. Objective:  Dermatologic Exam:  Skin lesion present to the right and left plantar foot with a central core and petchaie noted to the lesions periphery.   Pain on palpation of the lesion    Skin is thin, with flaky sloughing skin as well as decreased hair growth to the lower leg  Small red hemosiderin deposits seen dorsal foot   Musculoskeletal:     1st MPJ ROM decreased, Bilateral.  Muscle strength 5/5, Bilateral.  Pain present upon palpation of toenails 1-5, Bilateral. decreased medial longitudinal arch, Bilateral.  Ankle ROM decreased,Bilateral.    Dorsally contracted digits present digits 2, Bilateral.     Vascular: DP pulses 1/4 bilateral.  PT pulses 0/4 bilateral.   CFT <5 seconds, Bilateral.  Hair growth absent to the level of the digits, Bilateral.  Edema present, Bilateral.  Varicosities absent, Bilateral. Erythema absent, Bilateral    Neurological: Sensation diminshed to light touch to level of digits, Bilateral.  Protective sensation intact 6/10 sites via 5.07/10g Sparks-Mabel Monofilament, Bilateral.  negative Tinel's, Bilateral.  negative Valleix sign, Bilateral.      Integument: Warm, dry, supple, Bilateral.  Open lesion absent, Bilateral.  Interdigital maceration absent to web spaces 4, Bilateral.  Nails 1-5 left and 1-5 right thickened > 3.0 mm, dystrophic and crumbly, discolored with subungual debris. Fissures absent, Bilateral.   General: AAO x 3 in NAD.     Derm  Toenail Description  Sites of Onychomycosis Involvement (Check affected area)  [x] [x] [x] [x] [x] [x] [x] [x] [x] [x]  5 4 3 2 1 1 2 3 4 5                          Right                                        Left    Thickness  [x] [x] [x] [x] [x] [x] [x] [x] [x] [x]  5 4 3 2 1 1 2 3 4 5                         Right                                        Left    Dystrophic Changes   [x] [x] [x] [x] [x] [x] [x] [x] [x] [x]  5 4 3 2 1 1 2 3 4 5                         Right                                        Left    Color   [x] [x] [x] [x] [x] [x] [x] [x] [x] [x]  5 4 3 2 1 1 2 3 4 5                          Right                                        Left    Incurvation/Ingrowin [] [] [] [] [] [] [] [] [] []  5 4 3 2 1 1 2 3 4 5                         Right                                        Left    Inflammation/Pain   [x] [x] [x] [x] [x] [x] [x] [x] [x] [x]  5 4 3 2 1 1 2 3 4 5                         Right                                        Left        Visual inspection:  Deformity: hammertoe deformity zbigniew feet  amputation: absent  Skin lesions: present   Edema: right- 2+ pitting edema, left- 2+ pitting edema    Sensory exam:  Monofilament sensation: abnormal - 6/10 via SW 5.07/10g monofilament to the plantar foot bilateral feet    Pulses: abnormal - 1/4 dorsalis pedis pulse and 0/4 Posterior tibial pulse,   Pinprick: Impaired  Proprioception: Impaired  Vibration (128 Hz): Impaired       DM with PVD       [x]Yes    []No      Assessment:  [de-identified] y.o. female with:   Diagnosis Orders   1. Type II diabetes mellitus with peripheral circulatory disorder (HCC)   DIABETES FOOT EXAM    40590 - AZ DEBRIDEMENT OF NAILS, 6 OR MORE    AZ DESTRUCTION BENIGN LESIONS UP TO 14   2. Dermatophytosis of nail   DIABETES FOOT EXAM    75948 - AZ DEBRIDEMENT OF NAILS, 6 OR MORE   3. Benign neoplasm of skin of lower limb, including hip, right   DIABETES FOOT EXAM    AZ DESTRUCTION BENIGN LESIONS UP TO 14   4. Benign neoplasm of skin of lower limb, including hip, left   DIABETES FOOT EXAM    AZ DESTRUCTION BENIGN LESIONS UP TO 14   5. Trouble walking   DIABETES FOOT EXAM    04882 - AZ DEBRIDEMENT OF NAILS, 6 OR MORE    AZ DESTRUCTION BENIGN LESIONS UP TO 14   6. Peripheral vascular disease (HCC)   DIABETES FOOT EXAM    51037 - AZ DEBRIDEMENT OF NAILS, 6 OR MORE    AZ DESTRUCTION BENIGN LESIONS UP TO 14   7. Pain in both feet   DIABETES FOOT EXAM    48646 - AZ DEBRIDEMENT OF NAILS, 6 OR MORE    AZ DESTRUCTION BENIGN LESIONS UP TO 14           Q7   []Yes    []No                Q8   [x]Yes    []No                     Q9   []Yes    []No    Plan:   Pt was evaluated and examined.  Patient was given personalized discharge instructions. The lesions were partially excised via 15 blade and silver nitrate was applied under occlusion. The patient tolerated the procedure well and without complication. Advised patient to use vasoline to the area after tomorrow to prevent surrounding tissue irritation. Nails 1-10 were debrided sharply in length and thickness with a nipper and , without incident. Pt will follow up in 9 weeks or sooner if any problems arise. Diagnosis was discussed with the pt and all of their questions were answered in detail. Proper foot hygiene and care was discussed with the pt. Informed patient on proper diabetic foot care and importance of tight glycemic control. Patient to check feet daily and contact the office with any questions/problems/concerns.    Other comorbidity noted and will be managed by PCP.  4/26/2022    Electronically signed by Warner Majano DPM on 4/26/2022 at 10:08 AM  4/26/2022

## 2022-04-27 DIAGNOSIS — E11.9 TYPE 2 DIABETES MELLITUS WITHOUT COMPLICATION, UNSPECIFIED WHETHER LONG TERM INSULIN USE (HCC): ICD-10-CM

## 2022-04-27 RX ORDER — BLOOD SUGAR DIAGNOSTIC
STRIP MISCELLANEOUS
Qty: 300 STRIP | Refills: 3 | Status: SHIPPED | OUTPATIENT
Start: 2022-04-27 | End: 2022-05-25 | Stop reason: SDUPTHER

## 2022-04-27 RX ORDER — ISOPROPYL ALCOHOL 0.75 G/1
1 SWAB TOPICAL 3 TIMES DAILY PRN
Qty: 1 EACH | Refills: 5 | Status: SHIPPED | OUTPATIENT
Start: 2022-04-27 | End: 2022-08-05

## 2022-04-27 NOTE — TELEPHONE ENCOUNTER
Last visit: 3/31/22  Last Med refill:   Does patient have enough medication for 72 hours: Yes    Next Visit Date:  Future Appointments   Date Time Provider Lena Julesi   5/25/2022 11:30 AM Iglesia Degroot MD [facility] None   6/28/2022 11:15 AM Chantel Mathur DPM [facility]   3/9/2023  1:15 PM Mohammed Jimmye Crigler, MD heartvasc [address] Maintenance   Topic Date Due    TSH  11/23/2021    Depression Screen  12/01/2022    Annual Wellness Visit (AWV)  12/02/2022    Lipids  02/28/2023    Potassium  02/28/2023    Creatinine  02/28/2023    DTaP/Tdap/Td vaccine (2 - Td or Tdap) 07/02/2029    DEXA (modify frequency per FRAX score)  Completed    Flu vaccine  Completed    Shingles Vaccine  Completed    Pneumococcal 65+ years Vaccine  Completed    COVID-19 Vaccine  Completed    Hepatitis A vaccine  Aged Out    Hib vaccine  Aged Out    Meningococcal (ACWY) vaccine  Aged Out       Hemoglobin A1C (%)   Date Value   03/31/2022 6.5 (H)   11/23/2020 6.0   02/13/2018 6.3             ( goal A1C is < 7)   Microalb/Crt.  Ratio (mcg/mg creat)   Date Value   03/31/2022 573 (H)     LDL Cholesterol (mg/dL)   Date Value   02/28/2022 57   11/04/2020 47       (goal LDL is <100)   AST (U/L)   Date Value   02/28/2022 15     ALT (U/L)   Date Value   02/28/2022 8     BUN (mg/dL)   Date Value   02/28/2022 17     BP Readings from Last 3 Encounters:   03/31/22 125/62   03/03/22 126/64   12/01/21 (!) 148/64          (goal 120/80)    All Future Testing planned in [facility]  Lab Frequency Next Occurrence   VL ARTERIAL PVR LOWER WO EXERCISE Once 03/03/2023   CBC every 3 months    Albumin every 3 months    Basic Metabolic Panel every 3 months    Phosphorus every 3 months    PTH, Intact every 3 months    Protein / creatinine ratio, urine every 3 months                Patient Active Problem List:     DM (diabetes mellitus) ([address]     Hypercholesteremia     CAD (coronary artery disease)     Hypothyroidism Lumbar spondylosis with myelopathy     S/P cardiac cath 8/19/14-Dr. covarrubias     CKD (chronic kidney disease) stage 3, GFR 30-59 ml/min ([facility])     Morbid obesity with BMI of 40.0-44.9, adult ([facility])     At high risk for hyperkalemia     Edema     COPD (chronic obstructive pulmonary disease) ([facility])     Hyperparathyroidism due to renal insufficiency ([facility])     Carotid stenosis, asymptomatic     Claudication in peripheral vascular disease ([facility])     Nonproliferative diabetic retinopathy of both eyes ([facility])     Type 2 diabetes mellitus with stage 3 chronic kidney disease, with long-term current use of insulin ([address]     Coronary artery disease involving coronary bypass graft of native heart without angina pectoris     Essential hypertension     Localized edema     DM (diabetes mellitus), type 2 with peripheral vascular complications ([facility])     Mild nonproliferative diabetic retinopathy associated with type 2 diabetes mellitus ([address]     Panlobular emphysema ([facility])     S/P drug eluting coronary stent placement-OM1 3/26/18-Dr. covarrubias     CAD S/P percutaneous coronary angioplasty     Persistent proteinuria     Lymphedema of right lower extremity     Chronic bilateral low back pain without sciatica     Deficiency of vitamin B12     Wrist arthritis     Gastroesophageal reflux disease without esophagitis     Neck sprain

## 2022-04-28 DIAGNOSIS — E53.8 DEFICIENCY OF VITAMIN B12: ICD-10-CM

## 2022-04-28 RX ORDER — CYANOCOBALAMIN 1000 UG/ML
INJECTION INTRAMUSCULAR; SUBCUTANEOUS
Qty: 2 ML | Refills: 2 | Status: SHIPPED | OUTPATIENT
Start: 2022-04-28

## 2022-04-28 NOTE — TELEPHONE ENCOUNTER
E-scribe request for med refill. Please review and e-scribe if applicable. Last Visit Date:  03/31/2022  Next Visit Date:  Visit date not found    Hemoglobin A1C (%)   Date Value   03/31/2022 6.5 (H)   11/23/2020 6.0   02/13/2018 6.3             ( goal A1C is < 7)   Microalb/Crt.  Ratio (mcg/mg creat)   Date Value   03/31/2022 573 (H)     LDL Cholesterol (mg/dL)   Date Value   02/28/2022 57       (goal LDL is <100)   AST (U/L)   Date Value   02/28/2022 15     ALT (U/L)   Date Value   02/28/2022 8     BUN (mg/dL)   Date Value   02/28/2022 17     BP Readings from Last 3 Encounters:   03/31/22 125/62   03/03/22 126/64   12/01/21 (!) 148/64          (goal 120/80)        Patient Active Problem List:     DM (diabetes mellitus) ([facility] [address]     Hypercholesteremia     CAD (coronary artery disease)     Hypothyroidism     Lumbar spondylosis with myelopathy     S/P cardiac cath 8/19/14-Dr. covarrubias     CKD (chronic kidney disease) stage 3, GFR 30-59 ml/min ([facility])     Morbid obesity with BMI of 40.0-44.9, adult ([address]     At high risk for hyperkalemia     Edema     COPD (chronic obstructive pulmonary disease) ([facility])     Hyperparathyroidism due to renal insufficiency ([facility])     Carotid stenosis, asymptomatic     Claudication in peripheral vascular disease ([facility])     Nonproliferative diabetic retinopathy of both eyes ([facility])     Type 2 diabetes mellitus with stage 3 chronic kidney disease, with long-term current use of insulin ([address]     Coronary artery disease involving coronary bypass graft of native heart without angina pectoris     Essential hypertension     Localized edema     DM (diabetes mellitus), type 2 with peripheral vascular complications ([facility])     Mild nonproliferative diabetic retinopathy associated with type 2 diabetes mellitus ([address]     Panlobular emphysema (HCC)     S/P drug eluting coronary stent placement-OM1 3/26/18-Dr. covarrubias     CAD S/P percutaneous coronary angioplasty     Persistent proteinuria     Lymphedema of right lower extremity     Chronic bilateral low back pain without sciatica     Deficiency of vitamin B12     Wrist arthritis     Gastroesophageal reflux disease without esophagitis     Neck sprain      ----Kady Sanchez

## 2022-05-05 DIAGNOSIS — E11.51 DM (DIABETES MELLITUS), TYPE 2 WITH PERIPHERAL VASCULAR COMPLICATIONS (HCC): ICD-10-CM

## 2022-05-05 NOTE — TELEPHONE ENCOUNTER
Last visit: 03/31/2022  Last Med refill:   Does patient have enough medication for 72 hours: No:     Next Visit Date:  Future Appointments   Date Time Provider Lena Julesi   5/25/2022 11:30 AM Laverne Alston MD [facility] None   6/28/2022 11:15 AM RODRÍGUEZ [facility]   3/9/2023  1:15 PM Palmer Rodrigues MD heart[facility]asc [address] Maintenance   Topic Date Due    TSH  11/23/2021    Depression Screen  12/01/2022    Annual Wellness Visit (AWV)  12/02/2022    Lipids  02/28/2023    Potassium  02/28/2023    Creatinine  02/28/2023    DTaP/Tdap/Td vaccine (2 - Td or Tdap) 07/02/2029    DEXA (modify frequency per FRAX score)  Completed    Flu vaccine  Completed    Shingles vaccine  Completed    Pneumococcal 65+ years Vaccine  Completed    COVID-19 Vaccine  Completed    Hepatitis A vaccine  Aged Out    Hib vaccine  Aged Out    Meningococcal (ACWY) vaccine  Aged Out       Hemoglobin A1C (%)   Date Value   03/31/2022 6.5 (H)   11/23/2020 6.0   02/13/2018 6.3             ( goal A1C is < 7)   Microalb/Crt.  Ratio (mcg/mg creat)   Date Value   03/31/2022 573 (H)     LDL Cholesterol (mg/dL)   Date Value   02/28/2022 57   11/04/2020 47       (goal LDL is <100)   AST (U/L)   Date Value   02/28/2022 15     ALT (U/L)   Date Value   02/28/2022 8     BUN (mg/dL)   Date Value   02/28/2022 17     BP Readings from Last 3 Encounters:   03/31/22 125/62   03/03/22 126/64   12/01/21 (!) 148/64          (goal 120/80)    All Future Testing planned in [facility]  Lab Frequency Next Occurrence   VL ARTERIAL PVR LOWER WO EXERCISE Once 03/03/2023   CBC every 3 months    Albumin every 3 months    Basic Metabolic Panel every 3 months    Phosphorus every 3 months    PTH, Intact every 3 months    Protein / creatinine ratio, urine every 3 months                Patient Active Problem List:     DM (diabetes mellitus) ([address]     Hypercholesteremia     CAD (coronary artery disease) Hypothyroidism     Lumbar spondylosis with myelopathy     S/P cardiac cath 8/19/14-Dr. covarrubias     CKD (chronic kidney disease) stage 3, GFR 30-59 ml/min ([facility])     Morbid obesity with BMI of 40.0-44.9, adult ([facility])     At high risk for hyperkalemia     Edema     COPD (chronic obstructive pulmonary disease) ([facility])     Hyperparathyroidism due to renal insufficiency ([facility])     Carotid stenosis, asymptomatic     Claudication in peripheral vascular disease ([facility])     Nonproliferative diabetic retinopathy of both eyes ([facility])     Type 2 diabetes mellitus with stage 3 chronic kidney disease, with long-term current use of insulin ([facility] [address]     Coronary artery disease involving coronary bypass graft of native heart without angina pectoris     Essential hypertension     Localized edema     DM (diabetes mellitus), type 2 with peripheral vascular complications ([facility])     Mild nonproliferative diabetic retinopathy associated with type 2 diabetes mellitus ([facility])     Panlobular emphysema ([facility])     S/P drug eluting coronary stent placement-OM1 3/26/18-Dr. covarrubias     CAD S/P percutaneous coronary angioplasty     Persistent proteinuria     Lymphedema of right lower extremity     Chronic bilateral low back pain without sciatica     Deficiency of vitamin B12     Wrist arthritis     Gastroesophageal reflux disease without esophagitis     Neck sprain

## 2022-05-06 RX ORDER — GLUCOSAMINE HCL/CHONDROITIN SU 500-400 MG
1 CAPSULE ORAL
Qty: 300 STRIP | Refills: 1 | OUTPATIENT
Start: 2022-05-06

## 2022-05-18 NOTE — TELEPHONE ENCOUNTER
Patient called because she is out of test strips due to her meter changing. She needs True Metrix sent to her pharmacy. She would like 100 of them sent to her local pharmacy of [facility] on [address] and the rest sent to [facility] so she does not run out, please advise.

## 2022-05-19 ENCOUNTER — HOSPITAL ENCOUNTER (OUTPATIENT)
Age: 82
Discharge: HOME | End: 2022-05-19
Payer: MEDICARE

## 2022-05-19 DIAGNOSIS — E11.51 DM (DIABETES MELLITUS), TYPE 2 WITH PERIPHERAL VASCULAR COMPLICATIONS (HCC): ICD-10-CM

## 2022-05-19 LAB
ALBUMIN SERPL-MCNC: 4 G/DL (ref 3.5–5.2)
ANION GAP SERPL CALCULATED.3IONS-SCNC: 8 MMOL/L (ref 9–17)
BUN BLDV-MCNC: 18 MG/DL (ref 8–23)
CALCIUM SERPL-MCNC: 10.2 MG/DL (ref 8.6–10.4)
CHLORIDE BLD-SCNC: 105 MMOL/L (ref 98–107)
CO2: 26 MMOL/L (ref 20–31)
CREAT SERPL-MCNC: 1.49 MG/DL (ref 0.5–0.9)
GFR AFRICAN AMERICAN: 41 ML/MIN
GFR NON-AFRICAN AMERICAN: 34 ML/MIN
GFR SERPL CREATININE-BSD FRML MDRD: ABNORMAL ML/MIN/{1.73_M2}
GLUCOSE BLD-MCNC: 102 MG/DL (ref 70–99)
HCT VFR BLD CALC: 39.3 % (ref 36.3–47.1)
HEMOGLOBIN: 12.6 G/DL (ref 11.9–15.1)
MCH RBC QN AUTO: 28.6 PG (ref 25.2–33.5)
MCHC RBC AUTO-ENTMCNC: 32.1 G/DL (ref 28.4–34.8)
MCV RBC AUTO: 89.1 FL (ref 82.6–102.9)
NRBC AUTOMATED: 0 PER 100 WBC
PDW BLD-RTO: 14.6 % (ref 11.8–14.4)
PHOSPHORUS: 3.6 MG/DL (ref 2.6–4.5)
PLATELET # BLD: 211 K/UL (ref 138–453)
PMV BLD AUTO: 12.1 FL (ref 8.1–13.5)
POTASSIUM SERPL-SCNC: 4.7 MMOL/L (ref 3.7–5.3)
RBC # BLD: 4.41 M/UL (ref 3.95–5.11)
SODIUM BLD-SCNC: 139 MMOL/L (ref 135–144)
WBC # BLD: 5.8 K/UL (ref 3.5–11.3)

## 2022-05-19 PROCEDURE — 80048 BASIC METABOLIC PNL TOTAL CA: CPT

## 2022-05-19 PROCEDURE — 82040 ASSAY OF SERUM ALBUMIN: CPT

## 2022-05-19 PROCEDURE — 84100 ASSAY OF PHOSPHORUS: CPT

## 2022-05-19 PROCEDURE — 85027 COMPLETE CBC AUTOMATED: CPT

## 2022-05-19 RX ORDER — GLUCOSAMINE HCL/CHONDROITIN SU 500-400 MG
1 CAPSULE ORAL
Qty: 100 STRIP | Refills: 1 | Status: SHIPPED | OUTPATIENT
Start: 2022-05-19

## 2022-05-19 RX ORDER — GLUCOSAMINE HCL/CHONDROITIN SU 500-400 MG
CAPSULE ORAL
Refills: 0 | OUTPATIENT
Start: 2022-05-19

## 2022-05-19 NOTE — TELEPHONE ENCOUNTER
----- Message from Ned Phenes sent at 5/19/2022  9:33 AM EDT -----  Subject: Medication Problem    QUESTIONS  Name of Medication? Blood Glucose Monitoring Suppl (TRUE METRIX AIR   GLUCOSE METER) w/Device KIT  Patient-reported dosage and instructions? 1 strip by Other route every   morning (before breakfast) True Metrix Test__1_times daily Diagnosis? 250.0 Diabetes Mellitus____Insulin Dependent__x__Non-Insulin Dependent  What question or problem do you have with the medication? Patient stated   she's out of strips, would like#50 strips sent to [address] [place] [address] [phone], due to she   would not received it from [facility] in a couple of days  Preferred Pharmacy? [facility], [address] [place] [address] [phone] - F [phone]  Pharmacy phone number (if available)? [phone]  Additional Information for Provider? Patient would like for office to give   her a call back with questions and concerns she have  ---------------------------------------------------------------------------  --------------  CALL BACK INFO  What is the best way for the office to contact you? OK to leave message on   voicemail  Preferred Call Back Phone Number? [phone]  ---------------------------------------------------------------------------  --------------  SCRIPT ANSWERS  Relationship to Patient?  Self

## 2022-05-19 NOTE — TELEPHONE ENCOUNTER
Please address the medication refill and close the encounter. If I can be of assistance, please route to the applicable pool. Thank you. Last visit: 3-31-22  Last Med refill: 10/20/21  Does patient have enough medication for 72 hours: No:     Next Visit Date:  Future Appointments   Date Time Provider Lena Ledezma   5/25/2022 11:30 AM Laverne Chen MD [facility] None   6/28/2022 11:15 AM Oscar RODRÍGUEZ [facility]   3/9/2023  1:15 PM Ness Castellanos MD heartvasc [address] Maintenance   Topic Date Due    Depression Screen  12/01/2022    Annual Wellness Visit (AWV)  12/02/2022    Lipids  02/28/2023    DTaP/Tdap/Td vaccine (2 - Td or Tdap) 07/02/2029    DEXA (modify frequency per FRAX score)  Completed    Flu vaccine  Completed    Shingles vaccine  Completed    Pneumococcal 65+ years Vaccine  Completed    COVID-19 Vaccine  Completed    Hepatitis A vaccine  Aged Out    Hib vaccine  Aged Out    Meningococcal (ACWY) vaccine  Aged Out       Hemoglobin A1C (%)   Date Value   03/31/2022 6.5 (H)   11/23/2020 6.0   02/13/2018 6.3             ( goal A1C is < 7)   Microalb/Crt.  Ratio (mcg/mg creat)   Date Value   03/31/2022 573 (H)     LDL Cholesterol (mg/dL)   Date Value   02/28/2022 57   11/04/2020 47       (goal LDL is <100)   AST (U/L)   Date Value   02/28/2022 15     ALT (U/L)   Date Value   02/28/2022 8     BUN (mg/dL)   Date Value   02/28/2022 17     BP Readings from Last 3 Encounters:   03/31/22 125/62   03/03/22 126/64   12/01/21 (!) 148/64          (goal 120/80)    All Future Testing planned in [facility]  Lab Frequency Next Occurrence   VL ARTERIAL PVR LOWER WO EXERCISE Once 03/03/2023   CBC every 3 months    Albumin every 3 months    Basic Metabolic Panel every 3 months    Phosphorus every 3 months    PTH, Intact every 3 months    Protein / creatinine ratio, urine every 3 months                Patient Active Problem List:     DM (diabetes mellitus) ([address]     Hypercholesteremia     CAD (coronary artery disease)     Hypothyroidism     Lumbar spondylosis with myelopathy     S/P cardiac cath 8/19/14-Dr. covarrubias     CKD (chronic kidney disease) stage 3, GFR 30-59 ml/min ([facility])     Morbid obesity with BMI of 40.0-44.9, adult ([facility])     At high risk for hyperkalemia     Edema     COPD (chronic obstructive pulmonary disease) ([facility])     Hyperparathyroidism due to renal insufficiency ([facility])     Carotid stenosis, asymptomatic     Claudication in peripheral vascular disease ([facility])     Nonproliferative diabetic retinopathy of both eyes ([facility])     Type 2 diabetes mellitus with stage 3 chronic kidney disease, with long-term current use of insulin ([address]     Coronary artery disease involving coronary bypass graft of native heart without angina pectoris     Essential hypertension     Localized edema     DM (diabetes mellitus), type 2 with peripheral vascular complications ([facility])     Mild nonproliferative diabetic retinopathy associated with type 2 diabetes mellitus ([address]     Panlobular emphysema ([facility])     S/P drug eluting coronary stent placement-OM1 3/26/18-Dr. covarrubias     CAD S/P percutaneous coronary angioplasty     Persistent proteinuria     Lymphedema of right lower extremity     Chronic bilateral low back pain without sciatica     Deficiency of vitamin B12     Wrist arthritis     Gastroesophageal reflux disease without esophagitis     Neck sprain

## 2022-05-23 ENCOUNTER — TELEPHONE (OUTPATIENT)
Dept: FAMILY MEDICINE CLINIC | Age: 82
End: 2022-05-23

## 2022-05-23 DIAGNOSIS — E11.51 DM (DIABETES MELLITUS), TYPE 2 WITH PERIPHERAL VASCULAR COMPLICATIONS (HCC): ICD-10-CM

## 2022-05-23 DIAGNOSIS — E11.3299 MILD NONPROLIFERATIVE DIABETIC RETINOPATHY WITHOUT MACULAR EDEMA ASSOCIATED WITH TYPE 2 DIABETES MELLITUS, UNSPECIFIED LATERALITY (HCC): ICD-10-CM

## 2022-05-23 DIAGNOSIS — E11.9 TYPE 2 DIABETES MELLITUS WITHOUT COMPLICATION, UNSPECIFIED WHETHER LONG TERM INSULIN USE (HCC): ICD-10-CM

## 2022-05-23 DIAGNOSIS — E13.9 DIABETES 1.5, MANAGED AS TYPE 1 (HCC): Primary | ICD-10-CM

## 2022-05-23 RX ORDER — GLUCOSAMINE HCL/CHONDROITIN SU 500-400 MG
1 CAPSULE ORAL
Qty: 100 STRIP | Refills: 1 | Status: CANCELLED | OUTPATIENT
Start: 2022-05-23

## 2022-05-23 NOTE — TELEPHONE ENCOUNTER
Last visit:   Last Med refill:   Does patient have enough medication for 72 hours: No:     Next Visit Date:  Future Appointments   Date Time Provider Lena Julesi   5/25/2022 11:30 AM Lavrene Armendariz MD [facility] None   6/28/2022 11:15 AM Jose Tyson DPM [facility]   3/9/2023  1:15 PM Palmer Barkley MD heartvasc [address] Maintenance   Topic Date Due    Depression Screen  12/01/2022    Annual Wellness Visit (AWV)  12/02/2022    Lipids  02/28/2023    DTaP/Tdap/Td vaccine (2 - Td or Tdap) 07/02/2029    DEXA (modify frequency per FRAX score)  Completed    Flu vaccine  Completed    Shingles vaccine  Completed    Pneumococcal 65+ years Vaccine  Completed    COVID-19 Vaccine  Completed    Hepatitis A vaccine  Aged Out    Hib vaccine  Aged Out    Meningococcal (ACWY) vaccine  Aged Out       Hemoglobin A1C (%)   Date Value   03/31/2022 6.5 (H)   11/23/2020 6.0   02/13/2018 6.3             ( goal A1C is < 7)   Microalb/Crt.  Ratio (mcg/mg creat)   Date Value   03/31/2022 573 (H)     LDL Cholesterol (mg/dL)   Date Value   02/28/2022 57   11/04/2020 47       (goal LDL is <100)   AST (U/L)   Date Value   02/28/2022 15     ALT (U/L)   Date Value   02/28/2022 8     BUN (mg/dL)   Date Value   05/19/2022 18     BP Readings from Last 3 Encounters:   03/31/22 125/62   03/03/22 126/64   12/01/21 (!) 148/64          (goal 120/80)    All Future Testing planned in [facility]  Lab Frequency Next Occurrence   VL ARTERIAL PVR LOWER WO EXERCISE Once 03/03/2023   CBC every 3 months    Albumin every 3 months    Basic Metabolic Panel every 3 months    Phosphorus every 3 months    PTH, Intact every 3 months    Protein / creatinine ratio, urine every 3 months                Patient Active Problem List:     DM (diabetes mellitus) ([address]     Hypercholesteremia     CAD (coronary artery disease)     Hypothyroidism     Lumbar spondylosis with myelopathy     S/P cardiac cath 8/19/14-Dr. Pancho Herrera CKD (chronic kidney disease) stage 3, GFR 30-59 ml/min ([facility])     Morbid obesity with BMI of 40.0-44.9, adult ([facility])     At high risk for hyperkalemia     Edema     COPD (chronic obstructive pulmonary disease) ([facility])     Hyperparathyroidism due to renal insufficiency ([facility])     Carotid stenosis, asymptomatic     Claudication in peripheral vascular disease ([facility])     Nonproliferative diabetic retinopathy of both eyes ([facility])     Type 2 diabetes mellitus with stage 3 chronic kidney disease, with long-term current use of insulin ([facility]     Coronary artery disease involving coronary bypass graft of native heart without angina pectoris     Essential hypertension     Localized edema     DM (diabetes mellitus), type 2 with peripheral vascular complications ([facility])     Mild nonproliferative diabetic retinopathy associated with type 2 diabetes mellitus ([facility])     Panlobular emphysema ([facility])     S/P drug eluting coronary stent placement-OM1 3/26/18-Dr. covarrubias     CAD S/P percutaneous coronary angioplasty     Persistent proteinuria     Lymphedema of right lower extremity     Chronic bilateral low back pain without sciatica     Deficiency of vitamin B12     Wrist arthritis     Gastroesophageal reflux disease without esophagitis     Neck sprain           Please address the medication refill and close the encounter. If I can be of assistance, please route to the applicable pool. Thank you.

## 2022-05-23 NOTE — TELEPHONE ENCOUNTER
Writer spoke with patient, patient is requesting four boxes of her test strips sent to [address]. Patient stated she needs this done so she does not run out. Patient then went on to say she does not understand why nobody called her when the prescription was sent to [facility] pharmacy, patient then asked to speak with PCP's nurse, when she was told the medical assistant is busy at the moment rooming patients, patient then got mad and stated never mind I will just look for another doctor and hung up.

## 2022-05-24 ENCOUNTER — TELEPHONE (OUTPATIENT)
Dept: FAMILY MEDICINE CLINIC | Age: 82
End: 2022-05-24

## 2022-05-25 RX ORDER — BLOOD SUGAR DIAGNOSTIC
STRIP MISCELLANEOUS
Qty: 300 STRIP | Refills: 3 | Status: SHIPPED | OUTPATIENT
Start: 2022-05-25

## 2022-05-27 ENCOUNTER — TELEPHONE (OUTPATIENT)
Dept: FAMILY MEDICINE CLINIC | Age: 82
End: 2022-05-27

## 2022-05-27 NOTE — TELEPHONE ENCOUNTER
Patient is requesting a call from PCP due to her test strips, patient has called multiple times with issues that she is having with her test strips, she is requesting four boxes to be sent to [address] [place] [address] Dr she stated the last prescription that was sent was for the wrong test strips. She is requesting a call from the physician to have a better understanding.     Please advise

## 2022-05-31 DIAGNOSIS — E11.51 DM (DIABETES MELLITUS), TYPE 2 WITH PERIPHERAL VASCULAR COMPLICATIONS (HCC): ICD-10-CM

## 2022-05-31 NOTE — TELEPHONE ENCOUNTER
Last visit: 03/31/2022  Last Med refill:   Does patient have enough medication for 72 hours: No:     Next Visit Date:  Future Appointments   Date Time Provider Lena Brisa   6/28/2022 11:15 AM Sindy Ballesteros DPM [facility]   11/30/2022 11:10 AM Corinna Gum Frederich Kayser, MD [facility] None   3/9/2023  1:15 PM Mohammed Conchetta Nyhan, MD heartvasc [address] Maintenance   Topic Date Due    Depression Screen  12/01/2022    Annual Wellness Visit (AWV)  12/02/2022    Lipids  02/28/2023    DTaP/Tdap/Td vaccine (2 - Td or Tdap) 07/02/2029    DEXA (modify frequency per FRAX score)  Completed    Flu vaccine  Completed    Shingles vaccine  Completed    Pneumococcal 65+ years Vaccine  Completed    COVID-19 Vaccine  Completed    Hepatitis A vaccine  Aged Out    Hib vaccine  Aged Out    Meningococcal (ACWY) vaccine  Aged Out       Hemoglobin A1C (%)   Date Value   03/31/2022 6.5 (H)   11/23/2020 6.0   02/13/2018 6.3             ( goal A1C is < 7)   Microalb/Crt.  Ratio (mcg/mg creat)   Date Value   03/31/2022 573 (H)     LDL Cholesterol (mg/dL)   Date Value   02/28/2022 57   11/04/2020 47       (goal LDL is <100)   AST (U/L)   Date Value   02/28/2022 15     ALT (U/L)   Date Value   02/28/2022 8     BUN (mg/dL)   Date Value   05/19/2022 18     BP Readings from Last 3 Encounters:   05/25/22 128/62   03/31/22 125/62   03/03/22 126/64          (goal 120/80)    All Future Testing planned in [facility]  Lab Frequency Next Occurrence   VL ARTERIAL PVR LOWER WO EXERCISE Once 03/03/2023   CBC every 3 months    Albumin every 3 months    Basic Metabolic Panel every 3 months    Phosphorus every 3 months    PTH, Intact every 3 months    Protein / creatinine ratio, urine every 3 months                Patient Active Problem List:     DM (diabetes mellitus) ([address]     Hypercholesteremia     CAD (coronary artery disease)     Hypothyroidism     Lumbar spondylosis with myelopathy     S/P cardiac cath 8/19/14- satish     CKD (chronic kidney disease) stage 3, GFR 30-59 ml/min ([facility])     Morbid obesity with BMI of 40.0-44.9, adult ([facility])     At high risk for hyperkalemia     Edema     COPD (chronic obstructive pulmonary disease) ([facility])     Hyperparathyroidism due to renal insufficiency ([facility])     Carotid stenosis, asymptomatic     Claudication in peripheral vascular disease ([facility])     Nonproliferative diabetic retinopathy of both eyes ([facility])     Type 2 diabetes mellitus with stage 3 chronic kidney disease, with long-term current use of insulin ([facility])     Coronary artery disease involving coronary bypass graft of native heart without angina pectoris     Essential hypertension     Localized edema     DM (diabetes mellitus), type 2 with peripheral vascular complications ([facility])     Mild nonproliferative diabetic retinopathy associated with type 2 diabetes mellitus ([facility])     Panlobular emphysema ([facility])     S/P drug eluting coronary stent placement-OM1 3/26/18-Dr. covarrubias     CAD S/P percutaneous coronary angioplasty     Persistent proteinuria     Lymphedema of right lower extremity     Chronic bilateral low back pain without sciatica     Deficiency of vitamin B12     Wrist arthritis     Gastroesophageal reflux disease without esophagitis     Neck sprain

## 2022-06-01 RX ORDER — GLUCOSAMINE HCL/CHONDROITIN SU 500-400 MG
1 CAPSULE ORAL
Qty: 100 STRIP | Refills: 1 | OUTPATIENT
Start: 2022-06-01

## 2022-06-02 ENCOUNTER — TELEPHONE (OUTPATIENT)
Dept: FAMILY MEDICINE CLINIC | Age: 82
End: 2022-06-02

## 2022-06-02 DIAGNOSIS — E11.9 TYPE 2 DIABETES MELLITUS WITHOUT COMPLICATION, UNSPECIFIED WHETHER LONG TERM INSULIN USE (HCC): Primary | ICD-10-CM

## 2022-06-02 DIAGNOSIS — E11.3299 MILD NONPROLIFERATIVE DIABETIC RETINOPATHY WITHOUT MACULAR EDEMA ASSOCIATED WITH TYPE 2 DIABETES MELLITUS, UNSPECIFIED LATERALITY (HCC): ICD-10-CM

## 2022-06-02 DIAGNOSIS — E13.9 DIABETES 1.5, MANAGED AS TYPE 2 (HCC): ICD-10-CM

## 2022-06-02 DIAGNOSIS — E13.9 DIABETES 1.5, MANAGED AS TYPE 1 (HCC): ICD-10-CM

## 2022-06-02 NOTE — TELEPHONE ENCOUNTER
Needs a refill for [facility] yuback True Matrix Test Strips(50). All other test strips need removed from patient med list. Patient has been out of test strips for over a week. Please send new script to [facility].

## 2022-06-02 NOTE — PROGRESS NOTES
No chief complaint on file. There were no vitals taken for this visit. Review of Systems      Physical Exam      ASSESSMENT AND PLAN      Diagnosis Orders   1. Diabetes 1.5, managed as type 1 ([facility]     2.  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, unspecified laterality ([facility]           Electronicallysigned by Aishwarya Paredes DO on 6/2/2022 at 11:45 AM

## 2022-06-20 RX ORDER — CLOPIDOGREL BISULFATE 75 MG/1
TABLET ORAL
Qty: 90 TABLET | Refills: 3 | Status: SHIPPED | OUTPATIENT
Start: 2022-06-20

## 2022-06-20 NOTE — TELEPHONE ENCOUNTER
Last visit: 3/31/22  Last Med refill:   Does patient have enough medication for 72 hours: No:     Next Visit Date:  Future Appointments   Date Time Provider Lena Brisa   6/28/2022 11:15 AM Roddy Quinn DPM [facility]   11/30/2022 11:10 AM Tiffanie Gonzales MD [facility] None   3/9/2023  1:15 PM Palmer Patino MD heart[facility]asc [address] Maintenance   Topic Date Due    Depression Screen  12/01/2022    Annual Wellness Visit (AWV)  12/02/2022    Lipids  02/28/2023    DTaP/Tdap/Td vaccine (2 - Td or Tdap) 07/02/2029    DEXA (modify frequency per FRAX score)  Completed    Flu vaccine  Completed    Shingles vaccine  Completed    Pneumococcal 65+ years Vaccine  Completed    COVID-19 Vaccine  Completed    Hepatitis A vaccine  Aged Out    Hib vaccine  Aged Out    Meningococcal (ACWY) vaccine  Aged Out       Hemoglobin A1C (%)   Date Value   03/31/2022 6.5 (H)   11/23/2020 6.0   02/13/2018 6.3             ( goal A1C is < 7)   Microalb/Crt.  Ratio (mcg/mg creat)   Date Value   03/31/2022 573 (H)     LDL Cholesterol (mg/dL)   Date Value   02/28/2022 57   11/04/2020 47       (goal LDL is <100)   AST (U/L)   Date Value   02/28/2022 15     ALT (U/L)   Date Value   02/28/2022 8     BUN (mg/dL)   Date Value   05/19/2022 18     BP Readings from Last 3 Encounters:   05/25/22 128/62   03/31/22 125/62   03/03/22 126/64          (goal 120/80)    All Future Testing planned in [facility]  Lab Frequency Next Occurrence   VL ARTERIAL PVR LOWER WO EXERCISE Once 03/03/2023   CBC every 3 months    Albumin every 3 months    Basic Metabolic Panel every 3 months    Phosphorus every 3 months    PTH, Intact every 3 months    Protein / creatinine ratio, urine every 3 months                Patient Active Problem List:     DM (diabetes mellitus) ([address]     Hypercholesteremia     CAD (coronary artery disease)     Hypothyroidism     Lumbar spondylosis with myelopathy     S/P cardiac cath 8/19/14-

## 2022-06-28 ENCOUNTER — OFFICE VISIT (OUTPATIENT)
Dept: PODIATRY | Age: 82
End: 2022-06-28
Payer: MEDICARE

## 2022-06-28 VITALS — BODY MASS INDEX: 41.82 KG/M2 | WEIGHT: 213 LBS | HEIGHT: 60 IN

## 2022-06-28 DIAGNOSIS — R26.2 TROUBLE WALKING: ICD-10-CM

## 2022-06-28 DIAGNOSIS — D23.72 BENIGN NEOPLASM OF SKIN OF LOWER LIMB, INCLUDING HIP, LEFT: ICD-10-CM

## 2022-06-28 DIAGNOSIS — I73.9 PERIPHERAL VASCULAR DISEASE (HCC): ICD-10-CM

## 2022-06-28 DIAGNOSIS — B35.1 DERMATOPHYTOSIS OF NAIL: ICD-10-CM

## 2022-06-28 DIAGNOSIS — D23.71 BENIGN NEOPLASM OF SKIN OF LOWER LIMB, INCLUDING HIP, RIGHT: ICD-10-CM

## 2022-06-28 DIAGNOSIS — M79.671 PAIN IN BOTH FEET: ICD-10-CM

## 2022-06-28 DIAGNOSIS — E11.51 TYPE II DIABETES MELLITUS WITH PERIPHERAL CIRCULATORY DISORDER (HCC): Primary | ICD-10-CM

## 2022-06-28 DIAGNOSIS — M79.672 PAIN IN BOTH FEET: ICD-10-CM

## 2022-06-28 PROCEDURE — 17110 DESTRUCTION B9 LES UP TO 14: CPT | Performed by: PODIATRIST

## 2022-06-28 PROCEDURE — 99999 PR OFFICE/OUTPT VISIT,PROCEDURE ONLY: CPT | Performed by: PODIATRIST

## 2022-06-28 PROCEDURE — 11721 DEBRIDE NAIL 6 OR MORE: CPT | Performed by: PODIATRIST

## 2022-06-28 NOTE — PROGRESS NOTES
[address] [facility] PODIATRY [facility]  [address] [address] [place] [address]  Dept: [phone]  Dept Fax: [phone]    DIABETIC PROGRESS NOTE  Date of patient's visit: 6/28/2022  Patient's Name:  Abdifatah Parker YOB: 1940            Patient Care Team:  Luna Lopez DO as PCP - General (Family Medicine)  Luna Lopez DO as PCP - [facility] Empaneled Provider  Klarissa Courtney DPM as Physician (Podiatry)          Chief Complaint   Patient presents with    Diabetes     diabetic foot care    Peripheral Neuropathy     bilateral feet       Subjective:   Abdifatah Parker comes to clinic for Diabetes (diabetic foot care) and Peripheral Neuropathy (bilateral feet)    she is a diabetic and states that she is doing well. Pt currently has complaint of thickened, elongated nails that they cannot manage by themselves. Pt's primary care physician is Rebekah Arthur DO last seen 03/31/2022   Pt's last blood sugar was 89 . Pt also relates to painful skin spots to the bottom of both feet. Pt has tried pads and changing shoes but it has note helped the pain      Pt has a new complaint of none today . Lab Results   Component Value Date    LABA1C 6.5 (H) 03/31/2022      Complains of numbness in the feet bilat.   Past Medical History:   Diagnosis Date    Abnormal cardiovascular stress test 02/2021    LARGE ANTERIOR INFARCTION / LARGE AREA OF INFERIOR LATERAL ISCHEMIA WITH REDUCED EF 38%    Abnormal stress test 07/18/2014    going to do cath- not urgent just abnormal    Ambulates with cane     PRN    Arthritis     At high risk for hyperkalemia 10/22/2014    From type 4 RTA    Back pain     CAD (coronary artery disease)     Circulation problem     decreased circulation to  zbigniew extremities    CKD (chronic kidney disease) stage 3, GFR 30-59 ml/min (formerly [facility]) 10/22/2014    From diabetic and ischemic nephrosclerosis, baseline 1.4, GFR 40-45 ml/min, UPC 0.3    COPD [facility]ch[facility] obstructive [facility] disease) [facility]     DM (diabetes mellitus) [facility]     Edema     chronic lower extremity    Foot pain, bilateral     Former smoker     Gastroesophageal reflux disease without esophagitis 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    Hyperlipidemia     Hypertension     Hypothyroidism     Intermittent claudication (HCC)     loly to left leg    Kidney disease     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 ([facility] 04/20/2016    Morbid obesity with BMI of 45.0-49.9, adult ([facility] 10/22/2014    Osteoarthritis     Other activity(E029.9)     Acute renal failure secondary to prerenal azotemia    Other activity(E029.9)     Atherosclerotic coronary artery disease status-post bypass surgery in 2003    Persistent proteinuria 05/09/2018    From diabetic nephrosclerosis, urine protein creatinine ratio 0.81    PVD (peripheral vascular disease) ([facility]     Sciatica     Secondary hyperparathyroidism (of renal origin) 10/22/2014    Not on VDRA    Shortness of breath     Shoulder fracture, right 12/2020    STATES FELL ON MARLYN CALIN    Sleep apnea     Type II or unspecified type diabetes mellitus without mention of complication, not stated as uncontrolled        No Known Allergies  Current Outpatient Medications on File Prior to Visit   Medication Sig Dispense Refill    clopidogrel (PLAVIX) 75 MG tablet TAKE 1 TABLET EVERY DAY 90 tablet 3    blood glucose test strips (ACCU-CHEK DONG PLUS) strip Test with all meals daily, as directed.  300 strip 3    blood glucose monitor strips 1 strip by Other route every morning (before breakfast) True Metrix Test__1_times daily Diagnosis: 250.0   Diabetes Mellitus____Insulin Dependent__x__Non-Insulin Dependent 100 strip 1    cyanocobalamin 1000 MCG/ML injection INJECT decreased,Bilateral.    Dorsally contracted digits present digits 2, Bilateral.     Vascular: DP pulses 1/4 bilateral.  PT pulses 0/4 bilateral.   CFT <5 seconds, Bilateral.  Hair growth absent to the level of the digits, Bilateral.  Edema present, Bilateral.  Varicosities absent, Bilateral. Erythema absent, Bilateral    Neurological: Sensation diminshed to light touch to level of digits, Bilateral.  Protective sensation intact 6/10 sites via 5.07/10g Smithwick-Mabel Monofilament, Bilateral.  negative Tinel's, Bilateral.  negative Valleix sign, Bilateral.      Integument: Warm, dry, supple, Bilateral.  Open lesion absent, Bilateral.  Interdigital maceration absent to web spaces 4, Bilateral.  Nails 1-5 left and 1-5 right thickened > 3.0 mm, dystrophic and crumbly, discolored with subungual debris. Fissures absent, Bilateral.   General: AAO x 3 in NAD.     Derm  Toenail Description  Sites of Onychomycosis Involvement (Check affected area)  [x] [x] [x] [x] [x] [x] [x] [x] [x] [x]  5 4 3 2 1 1 2 3 4 5                          Right                                        Left    Thickness  [x] [x] [x] [x] [x] [x] [x] [x] [x] [x]  5 4 3 2 1 1 2 3 4 5                         Right                                        Left    Dystrophic Changes   [x] [x] [x] [x] [x] [x] [x] [x] [x] [x]  5 4 3 2 1 1 2 3 4 5                         Right                                        Left    Color   [x] [x] [x] [x] [x] [x] [x] [x] [x] [x]  5 4 3 2 1 1 2 3 4 5                          Right                                        Left    Incurvation/Ingrowin   [] [] [] [] [] [] [] [] [] []  5 4 3 2 1 1 2 3 4 5                         Right                                        Left    Inflammation/Pain   [x] [x] [x] [x] [x] [x] [x] [x] [x] [x]  5 4 3 2 1 1 2 3 4 5                         Right                                        Left        Visual inspection:  Deformity: hammertoe deformity zbigniew feet  amputation: absent  Skin lesions: present - as above  Edema: right- 2+ pitting edema, left- 2+ pitting edema    Sensory exam:  Monofilament sensation: abnormal - 6/10 via SW 5.07/10g monofilament to the plantar foot bilateral feet    Pulses: abnormal - 1/4 dorsalis pedis pulse and 0/4 Posterior tibial pulse,   Pinprick: Impaired  Proprioception: Impaired  Vibration (128 Hz): Impaired       DM with PVD       [x]Yes    []No      Assessment:  [de-identified] y.o. female with:   Diagnosis Orders   1. Type II diabetes mellitus with peripheral circulatory disorder (HCC)   DIABETES FOOT EXAM    00827 - PA DEBRIDEMENT OF NAILS, 6 OR MORE    24508 - PA DESTRUCTION BENIGN LESIONS UP TO 14   2. Dermatophytosis of nail   DIABETES FOOT EXAM    95603 - PA DEBRIDEMENT OF NAILS, 6 OR MORE   3. Benign neoplasm of skin of lower limb, including hip, left   DIABETES FOOT EXAM    16799 - PA DESTRUCTION BENIGN LESIONS UP TO 14   4. Benign neoplasm of skin of lower limb, including hip, right   DIABETES FOOT EXAM    32478 - PA DESTRUCTION BENIGN LESIONS UP TO 14   5. Trouble walking   DIABETES FOOT EXAM    95804 - PA DEBRIDEMENT OF NAILS, 6 OR MORE    71251 - PA DESTRUCTION BENIGN LESIONS UP TO 14   6. Peripheral vascular disease (HCC)   DIABETES FOOT EXAM    30997 - PA DEBRIDEMENT OF NAILS, 6 OR MORE    09867 - PA DESTRUCTION BENIGN LESIONS UP TO 14   7. Pain in both feet   DIABETES FOOT EXAM    93536 - PA DEBRIDEMENT OF NAILS, 6 OR MORE    48169 - PA DESTRUCTION BENIGN LESIONS UP TO 14           Q7   []Yes    []No                Q8   [x]Yes    []No                     Q9   []Yes    []No    Plan:   Pt was evaluated and examined. Patient was given personalized discharge instructions. Pt to continue to wear her compression stockigns as they are helping with her edema      The lesions were partially excised via 15 blade and silver nitrate was applied under occlusion. The patient tolerated the procedure well and without complication.   Advised patient to use vasoline to the area after tomorrow to prevent surrounding tissue irritation. Nails 1-10 were debrided sharply in length and thickness with a nipper and , without incident. Pt will follow up in 9 weeks or sooner if any problems arise. Diagnosis was discussed with the pt and all of their questions were answered in detail. Proper foot hygiene and care was discussed with the pt. Informed patient on proper diabetic foot care and importance of tight glycemic control. Patient to check feet daily and contact the office with any questions/problems/concerns.    Other comorbidity noted and will be managed by PCP.  6/28/2022    Electronically signed by Garrett Peralta DPM on 6/28/2022 at 11:10 AM  6/28/2022

## 2022-08-09 DIAGNOSIS — I25.810 CORONARY ARTERY DISEASE INVOLVING CORONARY BYPASS GRAFT OF NATIVE HEART WITHOUT ANGINA PECTORIS: ICD-10-CM

## 2022-08-09 NOTE — TELEPHONE ENCOUNTER
Last visit: 05/25/2022  Last Med refill:   Does patient have enough medication for 72 hours: No:     Next Visit Date:  Future Appointments   Date Time Provider Lena Ledezma   9/1/2022 11:15 AM Governor RODRÍGUEZ Mirza [facility]   11/30/2022 11:10 AM Laverne Courtney MD [facility] None   3/9/2023  1:15 PM Palmer King MD heartvasc [address] Maintenance   Topic Date Due    COVID-19 Vaccine (4 - Booster for Pfizer series) 03/18/2022    Flu vaccine (1) 09/01/2022    Depression Screen  12/01/2022    Annual Wellness Visit (AWV)  12/02/2022    Lipids  02/28/2023    DTaP/Tdap/Td vaccine (2 - Td or Tdap) 07/02/2029    DEXA (modify frequency per FRAX score)  Completed    Shingles vaccine  Completed    Pneumococcal 65+ years Vaccine  Completed    Hepatitis A vaccine  Aged Out    Hib vaccine  Aged Out    Meningococcal (ACWY) vaccine  Aged Out       Hemoglobin A1C (%)   Date Value   03/31/2022 6.5 (H)   11/23/2020 6.0   02/13/2018 6.3             ( goal A1C is < 7)   Microalb/Crt.  Ratio (mcg/mg creat)   Date Value   03/31/2022 573 (H)     LDL Cholesterol (mg/dL)   Date Value   02/28/2022 57   11/04/2020 47       (goal LDL is <100)   AST (U/L)   Date Value   02/28/2022 15     ALT (U/L)   Date Value   02/28/2022 8     BUN (mg/dL)   Date Value   05/19/2022 18     BP Readings from Last 3 Encounters:   05/25/22 128/62   03/31/22 125/62   03/03/22 126/64          (goal 120/80)    All Future Testing planned in [facility]  Lab Frequency Next Occurrence   VL ARTERIAL PVR LOWER WO EXERCISE Once 03/03/2023   CBC every 3 months    Albumin every 3 months    Basic Metabolic Panel every 3 months    Phosphorus every 3 months    PTH, Intact every 3 months    Protein / creatinine ratio, urine every 3 months                Patient Active Problem List:     DM (diabetes mellitus) ([address]     Hypercholesteremia     CAD (coronary artery disease)     Hypothyroidism     Lumbar spondylosis with myelopathy     S/P cardiac cath 8/19/14-Dr. covarrubias     CKD (chronic kidney disease) stage 3, GFR 30-59 ml/min ([facility])     Morbid obesity with BMI of 40.0-44.9, adult ([facility])     At high risk for hyperkalemia     Edema     COPD (chronic obstructive pulmonary disease) ([facility])     Hyperparathyroidism due to renal insufficiency ([facility])     Carotid stenosis, asymptomatic     Claudication in peripheral vascular disease ([facility])     Nonproliferative diabetic retinopathy of both eyes ([facility])     Type 2 diabetes mellitus with stage 3 chronic kidney disease, with long-term current use of insulin ([address]     Coronary artery disease involving coronary bypass graft of native heart without angina pectoris     Essential hypertension     Localized edema     DM (diabetes mellitus), type 2 with peripheral vascular complications ([facility])     Mild nonproliferative diabetic retinopathy associated with type 2 diabetes mellitus ([address]     Panlobular emphysema ([facility])     S/P drug eluting coronary stent placement-OM1 3/26/18-Dr. covarrubias     CAD S/P percutaneous coronary angioplasty     Persistent proteinuria     Lymphedema of right lower extremity     Chronic bilateral low back pain without sciatica     Deficiency of vitamin B12     Wrist arthritis     Gastroesophageal reflux disease without esophagitis     Neck sprain

## 2022-08-09 NOTE — TELEPHONE ENCOUNTER
Last visit: 3/31/22  Last Med refill: 7/19/22  Does patient have enough medication for 72 hours: Yes    Next Visit Date:  Future Appointments   Date Time Provider Lena Ledezma   9/1/2022 11:15 AM Marie Peabody, DPM [facility]   11/30/2022 11:10 AM Laverne Guerrier MD [facility] None   3/9/2023  1:15 PM Palmer Cee MD heartvasc [address] Maintenance   Topic Date Due    COVID-19 Vaccine (4 - Booster for Pfizer series) 03/18/2022    Flu vaccine (1) 09/01/2022    Depression Screen  12/01/2022    Annual Wellness Visit (AWV)  12/02/2022    Lipids  02/28/2023    DTaP/Tdap/Td vaccine (2 - Td or Tdap) 07/02/2029    DEXA (modify frequency per FRAX score)  Completed    Shingles vaccine  Completed    Pneumococcal 65+ years Vaccine  Completed    Hepatitis A vaccine  Aged Out    Hib vaccine  Aged Out    Meningococcal (ACWY) vaccine  Aged Out       Hemoglobin A1C (%)   Date Value   03/31/2022 6.5 (H)   11/23/2020 6.0   02/13/2018 6.3             ( goal A1C is < 7)   Microalb/Crt.  Ratio (mcg/mg creat)   Date Value   03/31/2022 573 (H)     LDL Cholesterol (mg/dL)   Date Value   02/28/2022 57   11/04/2020 47       (goal LDL is <100)   AST (U/L)   Date Value   02/28/2022 15     ALT (U/L)   Date Value   02/28/2022 8     BUN (mg/dL)   Date Value   05/19/2022 18     BP Readings from Last 3 Encounters:   05/25/22 128/62   03/31/22 125/62   03/03/22 126/64          (goal 120/80)    All Future Testing planned in [facility]  Lab Frequency Next Occurrence   VL ARTERIAL PVR LOWER WO EXERCISE Once 03/03/2023   CBC every 3 months    Albumin every 3 months    Basic Metabolic Panel every 3 months    Phosphorus every 3 months    PTH, Intact every 3 months    Protein / creatinine ratio, urine every 3 months                Patient Active Problem List:     DM (diabetes mellitus) ([address]     Hypercholesteremia     CAD (coronary artery disease)     Hypothyroidism     Lumbar spondylosis with myelopathy     S/P cardiac cath 8/19/14-Dr. covarrubias     CKD (chronic kidney disease) stage 3, GFR 30-59 ml/min ([facility])     Morbid obesity with BMI of 40.0-44.9, adult ([facility])     At high risk for hyperkalemia     Edema     COPD (chronic obstructive pulmonary disease) ([facility])     Hyperparathyroidism due to renal insufficiency ([facility])     Carotid stenosis, asymptomatic     Claudication in peripheral vascular disease ([facility])     Nonproliferative diabetic retinopathy of both eyes ([facility])     Type 2 diabetes mellitus with stage 3 chronic kidney disease, with long-term current use of insulin ([address]     Coronary artery disease involving coronary bypass graft of native heart without angina pectoris     Essential hypertension     Localized edema     DM (diabetes mellitus), type 2 with peripheral vascular complications ([facility])     Mild nonproliferative diabetic retinopathy associated with type 2 diabetes mellitus ([address]     Panlobular emphysema ([facility])     S/P drug eluting coronary stent placement-OM1 3/26/18-Dr. covarrubias     CAD S/P percutaneous coronary angioplasty     Persistent proteinuria     Lymphedema of right lower extremity     Chronic bilateral low back pain without sciatica     Deficiency of vitamin B12     Wrist arthritis     Gastroesophageal reflux disease without esophagitis     Neck sprain

## 2022-08-10 DIAGNOSIS — K21.9 GASTROESOPHAGEAL REFLUX DISEASE WITHOUT ESOPHAGITIS: ICD-10-CM

## 2022-08-10 RX ORDER — LISINOPRIL 20 MG/1
TABLET ORAL
Qty: 180 TABLET | Refills: 3 | Status: SHIPPED | OUTPATIENT
Start: 2022-08-10 | End: 2022-08-12 | Stop reason: SDUPTHER

## 2022-08-10 RX ORDER — LANSOPRAZOLE 15 MG/1
CAPSULE, DELAYED RELEASE ORAL
Qty: 90 CAPSULE | Refills: 3 | Status: SHIPPED | OUTPATIENT
Start: 2022-08-10 | End: 2022-08-12 | Stop reason: SDUPTHER

## 2022-08-10 RX ORDER — ATORVASTATIN CALCIUM 40 MG/1
TABLET, FILM COATED ORAL
Qty: 90 TABLET | Refills: 3 | Status: SHIPPED | OUTPATIENT
Start: 2022-08-10 | End: 2022-08-12 | Stop reason: SDUPTHER

## 2022-08-10 NOTE — TELEPHONE ENCOUNTER
E-scribe request for med refill. Please review and e-scribe if applicable. Last Visit Date:  03/31/2022  Next Visit Date:  Visit date not found    Hemoglobin A1C (%)   Date Value   03/31/2022 6.5 (H)   11/23/2020 6.0   02/13/2018 6.3             ( goal A1C is < 7)   Microalb/Crt.  Ratio (mcg/mg creat)   Date Value   03/31/2022 573 (H)     LDL Cholesterol (mg/dL)   Date Value   02/28/2022 57       (goal LDL is <100)   AST (U/L)   Date Value   02/28/2022 15     ALT (U/L)   Date Value   02/28/2022 8     BUN (mg/dL)   Date Value   05/19/2022 18     BP Readings from Last 3 Encounters:   05/25/22 128/62   03/31/22 125/62   03/03/22 126/64          (goal 120/80)        Patient Active Problem List:     DM (diabetes mellitus) ([facility]     Hypercholesteremia     CAD (coronary artery disease)     Hypothyroidism     Lumbar spondylosis with myelopathy     S/P cardiac cath 8/19/14-Dr. covarrubias     CKD (chronic kidney disease) stage 3, GFR 30-59 ml/min ([facility])     Morbid obesity with BMI of 40.0-44.9, adult ([facility]     At high risk for hyperkalemia     Edema     COPD (chronic obstructive pulmonary disease) ([facility])     Hyperparathyroidism due to renal insufficiency ([facility])     Carotid stenosis, asymptomatic     Claudication in peripheral vascular disease ([facility])     Nonproliferative diabetic retinopathy of both eyes ([facility])     Type 2 diabetes mellitus with stage 3 chronic kidney disease, with long-term current use of insulin ([facility] [address]     Coronary artery disease involving coronary bypass graft of native heart without angina pectoris     Essential hypertension     Localized edema     DM (diabetes mellitus), type 2 with peripheral vascular complications ([facility])     Mild nonproliferative diabetic retinopathy associated with type 2 diabetes mellitus ([address]     Panlobular emphysema ([facility])     S/P drug eluting coronary stent placement-OM1 3/26/18-Dr. covarrubias     CAD S/P percutaneous coronary angioplasty     Persistent proteinuria     Lymphedema of right lower extremity     Chronic bilateral low back pain without sciatica     Deficiency of vitamin B12     Wrist arthritis     Gastroesophageal reflux disease without esophagitis     Neck sprain      ----Radha Weir

## 2022-08-12 DIAGNOSIS — I25.810 CORONARY ARTERY DISEASE INVOLVING CORONARY BYPASS GRAFT OF NATIVE HEART WITHOUT ANGINA PECTORIS: ICD-10-CM

## 2022-08-12 DIAGNOSIS — K21.9 GASTROESOPHAGEAL REFLUX DISEASE WITHOUT ESOPHAGITIS: ICD-10-CM

## 2022-08-12 RX ORDER — METOPROLOL SUCCINATE 100 MG/1
TABLET, EXTENDED RELEASE ORAL
Qty: 90 TABLET | Refills: 3 | Status: SHIPPED | OUTPATIENT
Start: 2022-08-12

## 2022-08-12 RX ORDER — LANSOPRAZOLE 15 MG/1
CAPSULE, DELAYED RELEASE ORAL
Qty: 90 CAPSULE | Refills: 3 | Status: SHIPPED | OUTPATIENT
Start: 2022-08-12

## 2022-08-12 RX ORDER — ATORVASTATIN CALCIUM 40 MG/1
TABLET, FILM COATED ORAL
Qty: 90 TABLET | Refills: 3 | Status: SHIPPED | OUTPATIENT
Start: 2022-08-12

## 2022-08-12 RX ORDER — LISINOPRIL 20 MG/1
TABLET ORAL
Qty: 180 TABLET | Refills: 3 | Status: SHIPPED | OUTPATIENT
Start: 2022-08-12

## 2022-08-12 NOTE — TELEPHONE ENCOUNTER
E-scribe request for med refill. Please review and e-scribe if applicable. THEY WERE JUST FILLED FOR HER BUT SENT TO THE WRONG PHARMACY. Last Visit Date:  3/31/22  Next Visit Date:  Visit date not found    Hemoglobin A1C (%)   Date Value   03/31/2022 6.5 (H)   11/23/2020 6.0   02/13/2018 6.3             ( goal A1C is < 7)   Microalb/Crt.  Ratio (mcg/mg creat)   Date Value   03/31/2022 573 (H)     LDL Cholesterol (mg/dL)   Date Value   02/28/2022 57       (goal LDL is <100)   AST (U/L)   Date Value   02/28/2022 15     ALT (U/L)   Date Value   02/28/2022 8     BUN (mg/dL)   Date Value   05/19/2022 18     BP Readings from Last 3 Encounters:   05/25/22 128/62   03/31/22 125/62   03/03/22 126/64          (goal 120/80)        Patient Active Problem List:     DM (diabetes mellitus) ([address]     Hypercholesteremia     CAD (coronary artery disease)     Hypothyroidism     Lumbar spondylosis with myelopathy     S/P cardiac cath 8/19/14-Dr. covarrubias     CKD (chronic kidney disease) stage 3, GFR 30-59 ml/min ([facility])     Morbid obesity with BMI of 40.0-44.9, adult ([address]     At high risk for hyperkalemia     Edema     COPD (chronic obstructive pulmonary disease) ([facility])     Hyperparathyroidism due to renal insufficiency ([facility])     Carotid stenosis, asymptomatic     Claudication in peripheral vascular disease ([facility])     Nonproliferative diabetic retinopathy of both eyes ([facility])     Type 2 diabetes mellitus with stage 3 chronic kidney disease, with long-term current use of insulin ([address]     Coronary artery disease involving coronary bypass graft of native heart without angina pectoris     Essential hypertension     Localized edema     DM (diabetes mellitus), type 2 with peripheral vascular complications ([facility])     Mild nonproliferative diabetic retinopathy associated with type 2 diabetes mellitus ([address]     Panlobular emphysema ([facility])     S/P drug eluting coronary stent placement-OM1 3/26/18-Dr. covarrubias     CAD S/P percutaneous coronary angioplasty     Persistent proteinuria     Lymphedema of right lower extremity     Chronic bilateral low back pain without sciatica     Deficiency of vitamin B12     Wrist arthritis     Gastroesophageal reflux disease without esophagitis     Neck sprain      ----Audrey Benavidez

## 2022-08-31 ENCOUNTER — HOSPITAL ENCOUNTER (OUTPATIENT)
Age: 82
Discharge: HOME | End: 2022-08-31
Payer: MEDICARE

## 2022-08-31 LAB
ALBUMIN SERPL-MCNC: 4.1 G/DL (ref 3.5–5.2)
ANION GAP SERPL CALCULATED.3IONS-SCNC: 12 MMOL/L (ref 9–17)
BUN BLDV-MCNC: 24 MG/DL (ref 8–23)
CALCIUM SERPL-MCNC: 10 MG/DL (ref 8.6–10.4)
CHLORIDE BLD-SCNC: 106 MMOL/L (ref 98–107)
CO2: 23 MMOL/L (ref 20–31)
CREAT SERPL-MCNC: 1.69 MG/DL (ref 0.5–0.9)
CREATININE URINE: 80.7 MG/DL (ref 28–217)
GFR AFRICAN AMERICAN: 35 ML/MIN
GFR NON-AFRICAN AMERICAN: 29 ML/MIN
GFR SERPL CREATININE-BSD FRML MDRD: ABNORMAL ML/MIN/{1.73_M2}
GLUCOSE BLD-MCNC: 147 MG/DL (ref 70–99)
HCT VFR BLD CALC: 38.5 % (ref 36.3–47.1)
HEMOGLOBIN: 12.7 G/DL (ref 11.9–15.1)
MCH RBC QN AUTO: 29.1 PG (ref 25.2–33.5)
MCHC RBC AUTO-ENTMCNC: 33 G/DL (ref 28.4–34.8)
MCV RBC AUTO: 88.1 FL (ref 82.6–102.9)
NRBC AUTOMATED: 0 PER 100 WBC
PDW BLD-RTO: 14.6 % (ref 11.8–14.4)
PHOSPHORUS: 2.9 MG/DL (ref 2.6–4.5)
PLATELET # BLD: 230 K/UL (ref 138–453)
PMV BLD AUTO: 11.9 FL (ref 8.1–13.5)
POTASSIUM SERPL-SCNC: 4.3 MMOL/L (ref 3.7–5.3)
PTH INTACT: 213 PG/ML (ref 14–72)
RBC # BLD: 4.37 M/UL (ref 3.95–5.11)
SODIUM BLD-SCNC: 141 MMOL/L (ref 135–144)
TOTAL PROTEIN, URINE: 59 MG/DL
URINE TOTAL PROTEIN CREATININE RATIO: 0.73 (ref 0–0.2)
WBC # BLD: 7.3 K/UL (ref 3.5–11.3)

## 2022-08-31 PROCEDURE — 80048 BASIC METABOLIC PNL TOTAL CA: CPT

## 2022-08-31 PROCEDURE — 82040 ASSAY OF SERUM ALBUMIN: CPT

## 2022-08-31 PROCEDURE — 36415 COLL VENOUS BLD VENIPUNCTURE: CPT

## 2022-08-31 PROCEDURE — 85027 COMPLETE CBC AUTOMATED: CPT

## 2022-08-31 PROCEDURE — 82570 ASSAY OF URINE CREATININE: CPT

## 2022-08-31 PROCEDURE — 83970 ASSAY OF PARATHORMONE: CPT

## 2022-08-31 PROCEDURE — 84156 ASSAY OF PROTEIN URINE: CPT

## 2022-08-31 PROCEDURE — 84100 ASSAY OF PHOSPHORUS: CPT

## 2022-09-01 ENCOUNTER — OFFICE VISIT (OUTPATIENT)
Dept: PODIATRY | Age: 82
End: 2022-09-01
Payer: MEDICARE

## 2022-09-01 VITALS — WEIGHT: 213 LBS | HEIGHT: 60 IN | BODY MASS INDEX: 41.82 KG/M2

## 2022-09-01 DIAGNOSIS — M79.671 PAIN IN BOTH FEET: ICD-10-CM

## 2022-09-01 DIAGNOSIS — R26.2 TROUBLE WALKING: ICD-10-CM

## 2022-09-01 DIAGNOSIS — I73.9 PERIPHERAL VASCULAR DISEASE (HCC): ICD-10-CM

## 2022-09-01 DIAGNOSIS — D23.72 BENIGN NEOPLASM OF SKIN OF LOWER LIMB, INCLUDING HIP, LEFT: ICD-10-CM

## 2022-09-01 DIAGNOSIS — D23.71 BENIGN NEOPLASM OF SKIN OF LOWER LIMB, INCLUDING HIP, RIGHT: ICD-10-CM

## 2022-09-01 DIAGNOSIS — E11.51 TYPE II DIABETES MELLITUS WITH PERIPHERAL CIRCULATORY DISORDER (HCC): Primary | ICD-10-CM

## 2022-09-01 DIAGNOSIS — M79.672 PAIN IN BOTH FEET: ICD-10-CM

## 2022-09-01 DIAGNOSIS — B35.1 DERMATOPHYTOSIS OF NAIL: ICD-10-CM

## 2022-09-01 PROCEDURE — 11721 DEBRIDE NAIL 6 OR MORE: CPT | Performed by: PODIATRIST

## 2022-09-01 PROCEDURE — 17110 DESTRUCTION B9 LES UP TO 14: CPT | Performed by: PODIATRIST

## 2022-09-01 PROCEDURE — 99999 PR OFFICE/OUTPT VISIT,PROCEDURE ONLY: CPT | Performed by: PODIATRIST

## 2022-09-01 NOTE — PROGRESS NOTES
[address] [facility] PODIATRY [facility]  [address] [facility] [address]  Dept: [phone]  Dept Fax: [phone]    DIABETIC PROGRESS NOTE  Date of patient's visit: 9/1/2022  Patient's Name:  Amy Lopez YOB: 1940            Patient Care Team:  Esha Olmstead DO as PCP - General (Family Medicine)  Esha Olmstead DO as PCP - [facility] Empaneled Provider  Lizeth Carter DPM as Physician (Podiatry)          Chief Complaint   Patient presents with    Diabetes     Diabetic foot care    Peripheral Neuropathy     Bilateral feet       Subjective:   Amy Lopez comes to clinic for Diabetes (Diabetic foot care) and Peripheral Neuropathy (Bilateral feet)    she is a diabetic and states that needs toenails trimmed today. Pt currently has complaint of thickened, elongated nails that they cannot manage by themselves. Pt's primary care physician is Feliz Villarreal DO last seen 3/31/22. Pt's last blood sugar/a1c was 6.5-3/31/22. Pt also relates to painful skin spots to the bottom of both feet. Pt has tried pads and changing shoes but it has note helped the pain      Pt has a new complaint of none today . Lab Results   Component Value Date    LABA1C 6.5 (H) 03/31/2022      Complains of numbness in the feet bilat.   Past Medical History:   Diagnosis Date    Abnormal cardiovascular stress test 02/2021    LARGE ANTERIOR INFARCTION / LARGE AREA OF INFERIOR LATERAL ISCHEMIA WITH REDUCED EF 38%    Abnormal stress test 07/18/2014    going to do cath- not urgent just abnormal    Ambulates with cane     PRN    Arthritis     At high risk for hyperkalemia 10/22/2014    From type 4 RTA    Back pain     CAD (coronary artery disease)     Circulation problem     decreased circulation to  zbigniew extremities    CKD (chronic kidney disease) stage 3, GFR 30-59 ml/min ([facility]) 10/22/2014    From diabetic and ischemic blood glucose test strips (ACCU-CHEK DONG PLUS) strip Test with all meals daily, as directed. 300 strip 3    blood glucose monitor strips 1 strip by Other route every morning (before breakfast) True Metrix Test__1_times daily Diagnosis: 250.0   Diabetes Mellitus____Insulin Dependent__x__Non-Insulin Dependent 100 strip 1    cyanocobalamin 1000 MCG/ML injection INJECT 1ML EVERY OTHER MONTH 2 mL 2    Insulin Syringe-Needle U-100 31G X 5/16\" 0.3 ML MISC 1 each by Does not apply route daily 100 each 5    Syringe/Needle, Disp, (SYRINGE 3CC/25GX1\") 25G X 1\" 3 ML MISC Used one every other month. 6 each 5    levothyroxine (SYNTHROID) 88 MCG tablet TAKE 1 TABLET EVERY DAY 90 tablet 3    Blood Glucose Monitoring Suppl (TRUE METRIX AIR GLUCOSE METER) w/Device KIT 1 Device by Does not apply route 2 times daily 1 kit 0    Lancets MISC 1 each by Does not apply route daily 33G Use 1 times daily Diagnisis:250.0  Diabetes Mellitus____Insulin Dependent__X_Non-Insulin Dependent 100 each 3    insulin glargine (LANTUS) 100 UNIT/ML injection vial INJECT 10 UNITS INTO THE SKIN NIGHTLY 30 mL 5    nitroGLYCERIN (NITROSTAT) 0.4 MG SL tablet Place 1 tablet under the tongue as needed for Chest pain 25 tablet 2    amLODIPine (NORVASC) 10 MG tablet Take 10 mg by mouth daily      acetaminophen (TYLENOL) 500 MG tablet Take 2 tablets by mouth every 8 hours as needed for Pain 270 tablet 1    aspirin EC 81 MG EC tablet Take 2 tablets by mouth daily 30 tablet 5    B-D INS SYR ULTRAFINE 1CC/30G 30G X 1/2\" 1 ML MISC USE 1 SYRINGE EVERY DAY 90 each 5    Compression Stockings MISC by Does not apply route 20-40 mmHg.  2 each 0    Blood Glucose Calibration (OT ULTRA/FASTTK CNTRL SOLN) SOLN       Multiple Vitamins-Minerals (OCUVITE ADULT 50+ PO) Take 1 tablet by mouth 2 times daily       Green Tea, Camillia sinensis, 315 MG CAPS Take 1 tablet by mouth daily       Cinnamon 500 MG TABS Take 2 tablets by mouth daily       Cranberry-Vitamin C-Vitamin E (CRANBERRY PLUS VITAMIN C PO) Take 1 tablet by mouth daily. Ascorbic Acid (VITAMIN C WITH VIVIENNE HIPS) 500 MG tablet Take 500 mg by mouth daily. Omega-3 Fatty Acids (FISH OIL) 1000 MG CAPS Take 1,000 mg by mouth daily. docusate sodium (COLACE) 100 MG capsule Take 100 mg by mouth 2 times daily. Blood Glucose Monitoring Suppl (ONE TOUCH ULTRA) by Does not apply route. Alcohol Swabs (B-D SINGLE USE SWABS REGULAR) PADS 1 box by Other route 3 times daily as needed (When pt checks glucose) 1 each 5    paricalcitol (ZEMPLAR) 1 MCG capsule Take 1 capsule by mouth three times a week Mon Wed Fri 36 capsule 3     No current facility-administered medications on file prior to visit. Review of Systems    Review of Systems:   History obtained from chart review and the patient  General ROS: negative for - chills, fatigue, fever, night sweats or weight gain  Constitutional: Negative for chills, diaphoresis, fatigue, fever and unexpected weight change. Musculoskeletal: Positive for arthralgias, gait problem and joint swelling. Neurological ROS: negative for - behavioral changes, confusion, headaches or seizures. Negative for weakness and numbness. Dermatological ROS: negative for - mole changes, rash  Cardiovascular: Negative for leg swelling. Gastrointestinal: Negative for constipation, diarrhea, nausea and vomiting. Objective:  Dermatologic Exam:  Skin lesion present to the right and left plantar foot with a central core and petchaie noted to the lesions periphery.   Pain on palpation of the lesion    Skin is thin, with flaky sloughing skin as well as decreased hair growth to the lower leg  Small red hemosiderin deposits seen dorsal foot   Musculoskeletal:     1st MPJ ROM decreased, Bilateral.  Muscle strength 5/5, Bilateral.  Pain present upon palpation of toenails 1-5, Bilateral. decreased medial longitudinal arch, Bilateral.  Ankle ROM decreased,Bilateral.    Dorsally contracted digits present digits 2, Bilateral.     Vascular: DP pulses 1/4 bilateral.  PT pulses 0/4 bilateral.   CFT <5 seconds, Bilateral.  Hair growth absent to the level of the digits, Bilateral.  Edema present, Bilateral.  Varicosities absent, Bilateral. Erythema absent, Bilateral    Neurological: Sensation diminshed to light touch to level of digits, Bilateral.  Protective sensation intact 6/10 sites via 5.07/10g [place] Monofilament, Bilateral.  negative Tinel's, Bilateral.  negative Valleix sign, Bilateral.      Integument: Warm, dry, supple, Bilateral.  Open lesion absent, Bilateral.  Interdigital maceration absent to web spaces 4, Bilateral.  Nails 1-5 left and 1-5 right thickened > 3.0 mm, dystrophic and crumbly, discolored with subungual debris. Fissures absent, Bilateral.   General: AAO x 3 in NAD.     Derm  Toenail Description  Sites of Onychomycosis Involvement (Check affected area)  [x] [x] [x] [x] [x] [x] [x] [x] [x] [x]  5 4 3 2 1 1 2 3 4 5                          Right                                        Left    Thickness  [x] [x] [x] [x] [x] [x] [x] [x] [x] [x]  5 4 3 2 1 1 2 3 4 5                         Right                                        Left    Dystrophic Changes   [x] [x] [x] [x] [x] [x] [x] [x] [x] [x]  5 4 3 2 1 1 2 3 4 5                         Right                                        Left    Color   [x] [x] [x] [x] [x] [x] [x] [x] [x] [x]  5 4 3 2 1 1 2 3 4 5                          Right                                        Left    Incurvation/Ingrowin   [] [] [] [] [] [] [] [] [] []  5 4 3 2 1 1 2 3 4 5                         Right                                        Left    Inflammation/Pain   [x] [x] [x] [x] [x] [x] [x] [x] [x] [x]  5 4 3 2 1 1 2 3 4 5                         Right                                        Left        Visual inspection:  Deformity: hammertoe deformity zbigniew feet  amputation: absent  Skin lesions: present - as above  Edema: right- 2+ pitting edema, left- 2+ pitting edema    Sensory exam:  Monofilament sensation: abnormal - 6/10 via SW 5.07/10g monofilament to the plantar foot bilateral feet    Pulses: abnormal - 1/4 dorsalis pedis pulse and 0/4 Posterior tibial pulse,   Pinprick: Impaired  Proprioception: Impaired  Vibration (128 Hz): Impaired       DM with PVD       [x]Yes    []No      Assessment:  [de-identified] y.o. female with:   Diagnosis Orders   1. Type II diabetes mellitus with peripheral circulatory disorder (HCC)  78618 - [place] DEBRIDEMENT OF NAILS, 6 OR MORE    HM DIABETES FOOT EXAM    42655 - [place] DESTRUCTION BENIGN LESIONS UP TO 14      2. Dermatophytosis of nail  65416 - [place] DEBRIDEMENT OF NAILS, 6 OR MORE    HM DIABETES FOOT EXAM      3. Benign neoplasm of skin of lower limb, including hip, left  HM DIABETES FOOT EXAM    52031 - [place] DESTRUCTION BENIGN LESIONS UP TO 14      4. Benign neoplasm of skin of lower limb, including hip, right  HM DIABETES FOOT EXAM    12010 - [place] DESTRUCTION BENIGN LESIONS UP TO 14      5. Trouble walking  40982 - [place] DEBRIDEMENT OF NAILS, 6 OR MORE    HM DIABETES FOOT EXAM    75912 - [place] DESTRUCTION BENIGN LESIONS UP TO 14      6. Peripheral vascular disease (HCC)  66954 - [place] DEBRIDEMENT OF NAILS, 6 OR MORE    HM DIABETES FOOT EXAM    70993 - [place] DESTRUCTION BENIGN LESIONS UP TO 14      7. Pain in both feet  10612 - [place] DEBRIDEMENT OF NAILS, 6 OR MORE    HM DIABETES FOOT EXAM    05129 - [place] DESTRUCTION BENIGN LESIONS UP TO 14              Q7   []Yes    []No                Q8   [x]Yes    []No                     Q9   []Yes    []No    Plan:   Pt was evaluated and examined. Patient was given personalized discharge instructions. The lesions were partially excised via 15 blade and silver nitrate was applied under occlusion. The patient tolerated the procedure well and without complication. Advised patient to use vasoline to the area after tomorrow to prevent surrounding tissue irritation.      Nails 1-10 were debrided sharply in length and thickness with a nipper and , without incident. Pt will follow up in 9 weeks or sooner if any problems arise. Diagnosis was discussed with the pt and all of their questions were answered in detail. Proper foot hygiene and care was discussed with the pt. Informed patient on proper diabetic foot care and importance of tight glycemic control. Patient to check feet daily and contact the office with any questions/problems/concerns.    Other comorbidity noted and will be managed by PCP.  9/1/2022    Electronically signed by Debbie Card DPM on 9/1/2022 at 11:07 AM  9/1/2022

## 2022-09-01 NOTE — PATIENT INSTRUCTIONS
Schedule a Vaccine  When you qualify to receive the vaccine, call the [facility]) COVID-19 Vaccination Hotline to schedule your appointment or to get additional information about the [facility]) locations which are offering the COVID-19 vaccine. To be 94% effective, it's important that you receive two doses of one of the COVID-19 vaccines. -If you are receiving the [employer] vaccine, your second shot will be scheduled as close to 21 days after the first shot as possible. -If you are receiving the Moderna vaccine, your second shot will be scheduled as close to 28 days after the first shot as possible. [facility]) COVID-19 Vaccination Hotline: [phone]    Links to [facility]) website and [facility] website:    [employer]-monitoring-coronavirus-covid-19/covid-19-vaccine/[email or web address]-vaccine    [email or web address]

## 2022-09-30 ENCOUNTER — OFFICE VISIT (OUTPATIENT)
Dept: FAMILY MEDICINE CLINIC | Age: 82
End: 2022-09-30
Payer: MEDICARE

## 2022-09-30 VITALS
TEMPERATURE: 99.1 F | BODY MASS INDEX: 40.8 KG/M2 | HEIGHT: 60 IN | SYSTOLIC BLOOD PRESSURE: 128 MMHG | DIASTOLIC BLOOD PRESSURE: 80 MMHG | WEIGHT: 207.8 LBS | HEART RATE: 60 BPM

## 2022-09-30 DIAGNOSIS — E11.9 TYPE 2 DIABETES MELLITUS WITHOUT COMPLICATION, UNSPECIFIED WHETHER LONG TERM INSULIN USE (HCC): Primary | ICD-10-CM

## 2022-09-30 LAB — HBA1C MFR BLD: 5.8 %

## 2022-09-30 PROCEDURE — G8417 CALC BMI ABV UP PARAM F/U: HCPCS | Performed by: FAMILY MEDICINE

## 2022-09-30 PROCEDURE — 1036F TOBACCO NON-USER: CPT | Performed by: FAMILY MEDICINE

## 2022-09-30 PROCEDURE — 99211 OFF/OP EST MAY X REQ PHY/QHP: CPT | Performed by: FAMILY MEDICINE

## 2022-09-30 PROCEDURE — 99213 OFFICE O/P EST LOW 20 MIN: CPT | Performed by: FAMILY MEDICINE

## 2022-09-30 PROCEDURE — G8427 DOCREV CUR MEDS BY ELIG CLIN: HCPCS | Performed by: FAMILY MEDICINE

## 2022-09-30 PROCEDURE — 1090F PRES/ABSN URINE INCON ASSESS: CPT | Performed by: FAMILY MEDICINE

## 2022-09-30 PROCEDURE — 83036 HEMOGLOBIN GLYCOSYLATED A1C: CPT | Performed by: FAMILY MEDICINE

## 2022-09-30 PROCEDURE — 90686 IIV4 VACC NO PRSV 0.5 ML IM: CPT | Performed by: FAMILY MEDICINE

## 2022-09-30 PROCEDURE — G8399 PT W/DXA RESULTS DOCUMENT: HCPCS | Performed by: FAMILY MEDICINE

## 2022-09-30 PROCEDURE — 1123F ACP DISCUSS/DSCN MKR DOCD: CPT | Performed by: FAMILY MEDICINE

## 2022-09-30 PROCEDURE — 3044F HG A1C LEVEL LT 7.0%: CPT | Performed by: FAMILY MEDICINE

## 2022-09-30 SDOH — ECONOMIC STABILITY: FOOD INSECURITY: WITHIN THE PAST 12 MONTHS, YOU WORRIED THAT YOUR FOOD WOULD RUN OUT BEFORE YOU GOT MONEY TO BUY MORE.: NEVER TRUE

## 2022-09-30 SDOH — ECONOMIC STABILITY: FOOD INSECURITY: WITHIN THE PAST 12 MONTHS, THE FOOD YOU BOUGHT JUST DIDN'T LAST AND YOU DIDN'T HAVE MONEY TO GET MORE.: NEVER TRUE

## 2022-09-30 ASSESSMENT — PATIENT HEALTH QUESTIONNAIRE - PHQ9
SUM OF ALL RESPONSES TO PHQ QUESTIONS 1-9: 0
SUM OF ALL RESPONSES TO PHQ9 QUESTIONS 1 & 2: 0
SUM OF ALL RESPONSES TO PHQ QUESTIONS 1-9: 0
1. LITTLE INTEREST OR PLEASURE IN DOING THINGS: 0
SUM OF ALL RESPONSES TO PHQ QUESTIONS 1-9: 0
2. FEELING DOWN, DEPRESSED OR HOPELESS: 0
SUM OF ALL RESPONSES TO PHQ QUESTIONS 1-9: 0

## 2022-09-30 ASSESSMENT — SOCIAL DETERMINANTS OF HEALTH (SDOH): HOW HARD IS IT FOR YOU TO PAY FOR THE VERY BASICS LIKE FOOD, HOUSING, MEDICAL CARE, AND HEATING?: NOT HARD AT ALL

## 2022-09-30 NOTE — PROGRESS NOTES
Diabetic visit information    BP Readings from Last 3 Encounters:   05/25/22 128/62   03/31/22 125/62   03/03/22 126/64       Hemoglobin A1C (%)   Date Value   03/31/2022 6.5 (H)   11/23/2020 6.0   02/13/2018 6.3     Microalb/Crt. Ratio (mcg/mg creat)   Date Value   03/31/2022 573 (H)     LDL Cholesterol (mg/dL)   Date Value   02/28/2022 57               Have you changed or started any medications since your last visit including any over-the-counter medicines, vitamins, or herbal medicines? no   Have you stopped taking any of your medications? Is so, why? -  no  Are you having any side effects from any of your medications? - no    Have you seen any other physician or provider since your last visit?  no   Have you had any other diagnostic tests since your last visit?  no   Have you been seen in the emergency room and/or had an admission in a hospital since we last saw you?  no     Have you had your annual diabetic retinal (eye) exam? Yes   (ensure copy of exam is in the chart)    Have you had your routine dental cleaning in the past 6 months? no    Do you have an active MyChart account? If not, what are your barriers? yes    Patient Care Team:  Lc Quesada DO as PCP - General (Family Medicine)  Lc Quesada DO as PCP - [facility] Empaneled Provider  Saumya Lane DPM as Physician (Podiatry)    Medical history Review  Past Medical, Family, and Social History reviewed and does not contribute to the patient presenting condition.     Health Maintenance   Topic Date Due    COVID-19 Vaccine (4 - Booster for Pfizer series) 03/18/2022    Flu vaccine (1) 08/01/2022    Depression Screen  12/01/2022    Annual Wellness Visit (AWV)  12/02/2022    Lipids  02/28/2023    DTaP/Tdap/Td vaccine (2 - Td or Tdap) 07/02/2029    DEXA (modify frequency per FRAX score)  Completed    Shingles vaccine  Completed    Pneumococcal 65+ years Vaccine  Completed    Hepatitis A vaccine  Aged Out    Hib vaccine  Aged Out Meningococcal (ACWY) vaccine  Aged Out

## 2022-09-30 NOTE — PATIENT INSTRUCTIONS
Thank you for letting us take care of you today. We hope all your questions were addressed. If a question was overlooked or something else comes to mind after you return home, please contact a member of your Care Team listed below. Your Care Team at [employer] is Team #2  Yunior Amor DO (Faculty)  Sherren Pierce (Faculty)  Nurys Lima MD (Resident)  Sarmad Tuttle MD (Resident)  Jhoan Smith MD (Resident)  Astrid Aaron MD (Resident)  Cassius Page., WANG Vicente.,  SARINA Juan., SPEEDYN  Devante Castillo., [facility])  Vinnie Duron, [address] (Clinical Practice Manager)  Estelita Tavarez [facility] (Clinical Pharmacist)     Office phone number: [phone]    If you need to get in right away due to illness, please be advised we have \"Same Day\" appointments available Monday-Friday. Please call us at [phone] option #3 to schedule your \"Same Day\" appointment.

## 2022-09-30 NOTE — PROGRESS NOTES
Consultants:    Michelle Charlton  [facility]  Nelda - upcoming November OV    Patient highly compliant with DM care plan  RBG monitors - twice daily    Negative for:     Worry / mood complaints  Headache  Dizziness  Visual Disturbance  Hearing Changes  Nasal / sinus Symptoms  Mouth / tooth symptom, pain  Throat pain  Difficulty swallowing  Neck pain  Chest discomfort  Cough  SOB  N/V/D/C  Pelvic area discomfort  Bladder / voiding discomfort  Bowel complaints  MS complaints   Numbness/tingling/abnormal sensations   Edema / Leg swelling  Dizziness  Fatigue  Bleeding   Skin    Pertinent Pos: See HPI - neg    Vitals:    09/30/22 1400   BP: 128/80   Pulse:    Temp:        Alert and oriented to PPT  NAD    HEENT - neg  Neck - no bruits, no lymphadenopathy  Chest  HRRR w/o murmer  LCTAB no wheezes / rhonchi    Extremities - 0+ PTE    Gait / Station - stable, no dysequilibrium, uniform pace, no assist device, cane. Diagnosis Orders   1.  Type 2 diabetes mellitus without complication, unspecified whether long term insulin use (HCC)  POCT glycosylated hemoglobin (Hb A1C)          Plan:  1.)  encourage COVID booster #2  2.)  HM discussed  3.)  lois 6 m

## 2022-11-17 ENCOUNTER — OFFICE VISIT (OUTPATIENT)
Dept: PODIATRY | Age: 82
End: 2022-11-17
Payer: MEDICARE

## 2022-11-17 VITALS — WEIGHT: 207 LBS | BODY MASS INDEX: 40.64 KG/M2 | HEIGHT: 60 IN

## 2022-11-17 DIAGNOSIS — E11.51 TYPE II DIABETES MELLITUS WITH PERIPHERAL CIRCULATORY DISORDER (HCC): Primary | ICD-10-CM

## 2022-11-17 DIAGNOSIS — D23.71 BENIGN NEOPLASM OF SKIN OF LOWER LIMB, INCLUDING HIP, RIGHT: ICD-10-CM

## 2022-11-17 DIAGNOSIS — R26.2 TROUBLE WALKING: ICD-10-CM

## 2022-11-17 DIAGNOSIS — I73.9 PERIPHERAL VASCULAR DISEASE (HCC): ICD-10-CM

## 2022-11-17 DIAGNOSIS — B35.1 DERMATOPHYTOSIS OF NAIL: ICD-10-CM

## 2022-11-17 DIAGNOSIS — D23.72 BENIGN NEOPLASM OF SKIN OF LOWER LIMB, INCLUDING HIP, LEFT: ICD-10-CM

## 2022-11-17 PROCEDURE — 17110 DESTRUCTION B9 LES UP TO 14: CPT | Performed by: PODIATRIST

## 2022-11-17 PROCEDURE — 99999 PR OFFICE/OUTPT VISIT,PROCEDURE ONLY: CPT | Performed by: PODIATRIST

## 2022-11-17 PROCEDURE — 11721 DEBRIDE NAIL 6 OR MORE: CPT | Performed by: PODIATRIST

## 2022-11-23 ENCOUNTER — HOSPITAL ENCOUNTER (OUTPATIENT)
Age: 82
Discharge: HOME | End: 2022-11-23
Payer: MEDICARE

## 2022-11-23 LAB
ALBUMIN SERPL-MCNC: 3.9 G/DL (ref 3.5–5.2)
ANION GAP SERPL CALCULATED.3IONS-SCNC: 14 MMOL/L (ref 9–17)
BUN BLDV-MCNC: 21 MG/DL (ref 8–23)
CALCIUM SERPL-MCNC: 9.8 MG/DL (ref 8.6–10.4)
CHLORIDE BLD-SCNC: 105 MMOL/L (ref 98–107)
CO2: 21 MMOL/L (ref 20–31)
CREAT SERPL-MCNC: 1.4 MG/DL (ref 0.5–0.9)
CREATININE URINE: 150.5 MG/DL (ref 28–217)
GFR SERPL CREATININE-BSD FRML MDRD: 38 ML/MIN/1.73M2
GLUCOSE BLD-MCNC: 118 MG/DL (ref 70–99)
HCT VFR BLD CALC: 40.5 % (ref 36.3–47.1)
HEMOGLOBIN: 12.7 G/DL (ref 11.9–15.1)
MCH RBC QN AUTO: 28.2 PG (ref 25.2–33.5)
MCHC RBC AUTO-ENTMCNC: 31.4 G/DL (ref 28.4–34.8)
MCV RBC AUTO: 89.8 FL (ref 82.6–102.9)
NRBC AUTOMATED: 0 PER 100 WBC
PDW BLD-RTO: 14.7 % (ref 11.8–14.4)
PHOSPHORUS: 3.1 MG/DL (ref 2.6–4.5)
PLATELET # BLD: 248 K/UL (ref 138–453)
PMV BLD AUTO: 12 FL (ref 8.1–13.5)
POTASSIUM SERPL-SCNC: 4.3 MMOL/L (ref 3.7–5.3)
PTH INTACT: 214.5 PG/ML (ref 14–72)
RBC # BLD: 4.51 M/UL (ref 3.95–5.11)
SODIUM BLD-SCNC: 140 MMOL/L (ref 135–144)
TOTAL PROTEIN, URINE: 224 MG/DL
URINE TOTAL PROTEIN CREATININE RATIO: 1.49 (ref 0–0.2)
WBC # BLD: 9.3 K/UL (ref 3.5–11.3)

## 2022-11-23 PROCEDURE — 84100 ASSAY OF PHOSPHORUS: CPT

## 2022-11-23 PROCEDURE — 83970 ASSAY OF PARATHORMONE: CPT

## 2022-11-23 PROCEDURE — 82040 ASSAY OF SERUM ALBUMIN: CPT

## 2022-11-23 PROCEDURE — 82570 ASSAY OF URINE CREATININE: CPT

## 2022-11-23 PROCEDURE — 84156 ASSAY OF PROTEIN URINE: CPT

## 2022-11-23 PROCEDURE — 36415 COLL VENOUS BLD VENIPUNCTURE: CPT

## 2022-11-23 PROCEDURE — 85027 COMPLETE CBC AUTOMATED: CPT

## 2022-11-23 PROCEDURE — 80048 BASIC METABOLIC PNL TOTAL CA: CPT

## 2022-11-28 NOTE — PROGRESS NOTES
Jovanna Mac [address] [facility] [address]  Dept: [phone]  Dept Fax: [phone]     FOOT PAIN & BENIGN NEOPLASM PROGRESS NOTE  Date of patient's visit: 11/17/2022  Patient's Name:  Marie Yan YOB: 1940            Patient Care Team:  Gerson Kirk DO as PCP - General (Family Medicine)  Gerson Kirk DO as PCP - [facility] Empaneled Provider  Evelin Issa DPM as Physician (Podiatry)          Chief Complaint   Patient presents with    Diabetes     Diabetic foot care    Peripheral Neuropathy    Nail Problem     Toenail trim        Subjective: This Marie Yan comes to clinic for foot and nail care. Pt currently has complaint of thickened, painful, elongated nails that he/she cannot manage by themselves. Pt. Relates pain to nails with shoe gear. Pt's primary care physician is Seb Eugene seen 9/24/2022.   Past Medical History:   Diagnosis Date    Abnormal cardiovascular stress test 02/2021    LARGE ANTERIOR INFARCTION / LARGE AREA OF INFERIOR LATERAL ISCHEMIA WITH REDUCED EF 38%    Abnormal stress test 07/18/2014    going to do cath- not urgent just abnormal    Ambulates with cane     PRN    Arthritis     At high risk for hyperkalemia 10/22/2014    From type 4 RTA    Back pain     CAD (coronary artery disease)     Circulation problem     decreased circulation to  zbigniew extremities    CKD (chronic kidney disease) stage 3, GFR 30-59 ml/min ([facility]) 10/22/2014    From diabetic and ischemic nephrosclerosis, baseline 1.4, GFR 40-45 ml/min, UPC 0.3    COPD (chronic obstructive pulmonary disease) ([facility])     DM (diabetes mellitus) ([facility])     Edema     chronic lower extremity    Foot pain, bilateral     Former smoker     Gastroesophageal reflux disease without esophagitis 11/23/2020    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    Hyperlipidemia     Hypertension Hypothyroidism     Intermittent claudication (HCC)     loly to left leg    Kidney disease     chronic    Lymphedema     MI (mitral incompetence)     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 ([facility] 04/20/2016    Morbid obesity with BMI of 45.0-49.9, adult ([facility] 10/22/2014    Osteoarthritis     Other activity(E029.9)     Acute renal failure secondary to prerenal azotemia    Other activity(E029.9)     Atherosclerotic coronary artery disease status-post bypass surgery in 2003    Persistent proteinuria 05/09/2018    From diabetic nephrosclerosis, urine protein creatinine ratio 0.8-1    PVD (peripheral vascular disease) ([facility]     Sciatica     Secondary hyperparathyroidism (of renal origin) 10/22/2014    Not on VDRA    Shortness of breath     Shoulder fracture, right 12/2020    STATES FELL ON MARLYN CALIN    Sleep apnea     Type II or unspecified type diabetes mellitus without mention of complication, not stated as uncontrolled    Tapan Marie is a [de-identified] y.o. female. Painful lesions her   feet. . They have been present for 2 month(s) duration. The lesions are present on the bilateral foot. The patient has 2 lesion that is deep seated and has a painful core. Treatment has included pads and previus acid treatment   Pt has a new complaint of none today .        No Known Allergies  Current Outpatient Medications on File Prior to Visit   Medication Sig Dispense Refill    atorvastatin (LIPITOR) 40 MG tablet TAKE 1 TABLET EVERY DAY 90 tablet 3    lisinopril (PRINIVIL;ZESTRIL) 20 MG tablet TAKE 1 TABLET TWICE DAILY (DOSE INCREASE PER DR Yuliet Minor) 180 tablet 3    lansoprazole (PREVACID) 15 MG delayed release capsule TAKE 1 CAPSULE EVERY DAY 90 capsule 3    metoprolol succinate (TOPROL XL) 100 MG extended release tablet TAKE 1 TABLET EVERY DAY 90 tablet 3    clopidogrel (PLAVIX) 75 MG tablet TAKE 1 TABLET EVERY DAY 90 tablet 3    blood glucose test strips (ACCU-CHEK DONG PLUS) strip Test with all meals daily, as directed. 300 strip 3    blood glucose monitor strips 1 strip by Other route every morning (before breakfast) True Metrix Test__1_times daily Diagnosis: 250.0   Diabetes Mellitus____Insulin Dependent__x__Non-Insulin Dependent 100 strip 1    cyanocobalamin 1000 MCG/ML injection INJECT 1ML EVERY OTHER MONTH 2 mL 2    Insulin Syringe-Needle U-100 31G X 5/16\" 0.3 ML MISC 1 each by Does not apply route daily 100 each 5    Syringe/Needle, Disp, (SYRINGE 3CC/25GX1\") 25G X 1\" 3 ML MISC Used one every other month. 6 each 5    levothyroxine (SYNTHROID) 88 MCG tablet TAKE 1 TABLET EVERY DAY 90 tablet 3    Blood Glucose Monitoring Suppl (TRUE METRIX AIR GLUCOSE METER) w/Device KIT 1 Device by Does not apply route 2 times daily 1 kit 0    Lancets MISC 1 each by Does not apply route daily 33G Use 1 times daily Diagnisis:250.0  Diabetes Mellitus____Insulin Dependent__X_Non-Insulin Dependent 100 each 3    insulin glargine (LANTUS) 100 UNIT/ML injection vial INJECT 10 UNITS INTO THE SKIN NIGHTLY 30 mL 5    nitroGLYCERIN (NITROSTAT) 0.4 MG SL tablet Place 1 tablet under the tongue as needed for Chest pain 25 tablet 2    amLODIPine (NORVASC) 10 MG tablet Take 10 mg by mouth daily      acetaminophen (TYLENOL) 500 MG tablet Take 2 tablets by mouth every 8 hours as needed for Pain 270 tablet 1    aspirin EC 81 MG EC tablet Take 2 tablets by mouth daily 30 tablet 5    B-D INS SYR ULTRAFINE 1CC/30G 30G X 1/2\" 1 ML MISC USE 1 SYRINGE EVERY DAY 90 each 5    Compression Stockings MISC by Does not apply route 20-40 mmHg.  2 each 0    Blood Glucose Calibration (OT ULTRA/FASTTK CNTRL SOLN) SOLN       Multiple Vitamins-Minerals (OCUVITE ADULT 50+ PO) Take 1 tablet by mouth 2 times daily       Green Tea, Camillia sinensis, 315 MG CAPS Take 1 tablet by mouth daily       Cinnamon 500 MG TABS Take 2 tablets by mouth daily       Cranberry-Vitamin C-Vitamin E (CRANBERRY PLUS VITAMIN C PO) Take 1 tablet by mouth daily. Ascorbic Acid (VITAMIN C WITH VIVIENNE HIPS) 500 MG tablet Take 500 mg by mouth daily. Omega-3 Fatty Acids (FISH OIL) 1000 MG CAPS Take 1,000 mg by mouth daily. docusate sodium (COLACE) 100 MG capsule Take 100 mg by mouth 2 times daily. Blood Glucose Monitoring Suppl (ONE TOUCH ULTRA) by Does not apply route. Alcohol Swabs (B-D SINGLE USE SWABS REGULAR) PADS 1 box by Other route 3 times daily as needed (When pt checks glucose) 1 each 5    paricalcitol (ZEMPLAR) 1 MCG capsule Take 1 capsule by mouth three times a week Mon Wed Fri 36 capsule 3     No current facility-administered medications on file prior to visit. Review of Systems    Review of Systems:   History obtained from chart review and the patient  General ROS: negative for - chills, fatigue, fever, night sweats or weight gain  Constitutional: Negative for chills, diaphoresis, fatigue, fever and unexpected weight change. Musculoskeletal: Positive for arthralgias, gait problem and joint swelling. Neurological ROS: negative for - behavioral changes, confusion, headaches or seizures. Negative for weakness and numbness. Dermatological ROS: negative for - mole changes, rash  Cardiovascular: Negative for leg swelling. Gastrointestinal: Negative for constipation, diarrhea, nausea and vomiting. Objective:  Dermatologic Exam:Skin lesion present to the right and left plantar foot with a central core and petchaie noted to the lesions periphery.   Pain on palpation of the lesion    Skin is thin, with flaky sloughing skin as well as decreased hair growth to the lower leg  Small red hemosiderin deposits seen dorsal foot   Musculoskeletal:     1st MPJ ROM decreased, Bilateral.  Muscle strength 5/5, Bilateral.  Pain present upon palpation of toenails 1-5, Bilateral. decreased medial longitudinal arch, Bilateral.  Ankle ROM decreased,Bilateral.    Dorsally contracted digits present digits 2, Bilateral.     Vascular: DP pulses 1/4 bilateral.  PT pulses 0/4 bilateral.   CFT <5 seconds, Bilateral.  Hair growth absent to the level of the digits, Bilateral.  Edema present, Bilateral.  Varicosities absent, Bilateral. Erythema absent, Bilateral    Neurological: Sensation diminshed to light touch to level of digits, Bilateral.  Protective sensation intact 6/10 sites via 5.07/10g [place] Monofilament, Bilateral.  negative Tinel's, Bilateral.  negative Valleix sign, Bilateral.      Integument: Warm, dry, supple, Bilateral.  Open lesion absent, Bilateral.  Interdigital maceration absent to web spaces 4, Bilateral.  Nails 1-5 left and 1-5 right thickened > 3.0 mm, dystrophic and crumbly, discolored with subungual debris. Fissures absent, Bilateral.   General: AAO x 3 in NAD. Derm  Toenail Description  Sites of Onychomycosis Involvement (Check affected area)  [x] [x] [x] [x] [x] [x] [x] [x] [x] [x]  5 4 3 2 1 1 2 3 4 5                          Right                                        Left    Thickness  [x] [x] [x] [x] [x] [x] [x] [x] [x] [x]  5 4 3 2 1 1 2 3 4 5                         Right                                        Left    Dystrophic Changes   [x] [x] [x] [x] [x] [x] [x] [x] [x] [x]  5 4 3 2 1 1 2 3 4 5                         Right                                        Left    Color  [x] [x] [x] [x] [x] [x] [x] [x] [x] [x]  5 4 3 2 1 1 2 3 4 5                          Right                                        Left    Incurvation/Ingrowin   [] [] [] [] [] [] [] [] [] []  5 4 3 2 1 1 2 3 4 5                         Right                                        Left    Inflammation/Pain   [x] [x] [x] [x] [x] [x] [x] [x] [x] [x]  5 4 3  2 1 1 2 3 4 5                         Right                                        Left       Nails are painful 1-10 with direct palpation.       Q7   []Yes  []No                Q8   [x]Yes  []No                     Q9   []Yes    []No    Assessment:  [de-identified] y.o. female with: Diagnosis Orders   1. Type II diabetes mellitus with peripheral circulatory disorder (HCC)  GA DEBRIDEMENT OF NAILS, 6 OR MORE    GA DESTRUCTION BENIGN LESIONS UP TO 14      2. Dermatophytosis of nail  GA DEBRIDEMENT OF NAILS, 6 OR MORE      3. Benign neoplasm of skin of lower limb, including hip, left  GA DESTRUCTION BENIGN LESIONS UP TO 14      4. Benign neoplasm of skin of lower limb, including hip, right  GA DESTRUCTION BENIGN LESIONS UP TO 14      5. Trouble walking  GA DEBRIDEMENT OF NAILS, 6 OR MORE      6. Peripheral vascular disease (HCC)  GA DEBRIDEMENT OF NAILS, 6 OR MORE                Plan:   Pt was evaluated and examined. Patient was given personalized discharge instructions. The lesions were partially excised via 15 blade and silver nitrate was applied under occlusion. The patient tolerated the procedure well and without complication. Advised patient to use vasoline to the area after tomorrow to prevent surrounding tissue irritation. Nails 1-10 were debrided in length and thickness sharply with a nail nipper and  without incident. Pt will follow up in 9 weeks or sooner if any problems arise. Diagnosis was discussed with the pt and all of their questions were answered in detail. Proper foot hygiene and care was discussed with the pt. Patient to check feet daily and contact the office with any questions/problems/concerns. Other comorbidity noted and will be managed by PCP. Pain waiver discussed with patient and confirmed.    11/17/2022          Electronically signed by Ponce Kovacs DPM on 11/28/2022 at 1:05 PM  11/17/2022

## 2022-12-05 ENCOUNTER — OFFICE VISIT (OUTPATIENT)
Dept: FAMILY MEDICINE CLINIC | Age: 82
End: 2022-12-05
Payer: MEDICARE

## 2022-12-05 VITALS
SYSTOLIC BLOOD PRESSURE: 139 MMHG | WEIGHT: 205.6 LBS | HEIGHT: 60 IN | DIASTOLIC BLOOD PRESSURE: 59 MMHG | HEART RATE: 67 BPM | BODY MASS INDEX: 40.37 KG/M2

## 2022-12-05 DIAGNOSIS — Z00.00 MEDICARE ANNUAL WELLNESS VISIT, SUBSEQUENT: Primary | ICD-10-CM

## 2022-12-05 PROCEDURE — 1123F ACP DISCUSS/DSCN MKR DOCD: CPT | Performed by: FAMILY MEDICINE

## 2022-12-05 PROCEDURE — G0439 PPPS, SUBSEQ VISIT: HCPCS | Performed by: FAMILY MEDICINE

## 2022-12-05 PROCEDURE — 3078F DIAST BP <80 MM HG: CPT | Performed by: FAMILY MEDICINE

## 2022-12-05 PROCEDURE — 3074F SYST BP LT 130 MM HG: CPT | Performed by: FAMILY MEDICINE

## 2022-12-05 PROCEDURE — G8482 FLU IMMUNIZE ORDER/ADMIN: HCPCS | Performed by: FAMILY MEDICINE

## 2022-12-05 ASSESSMENT — PATIENT HEALTH QUESTIONNAIRE - PHQ9
SUM OF ALL RESPONSES TO PHQ QUESTIONS 1-9: 0
SUM OF ALL RESPONSES TO PHQ QUESTIONS 1-9: 0
2. FEELING DOWN, DEPRESSED OR HOPELESS: 0
SUM OF ALL RESPONSES TO PHQ QUESTIONS 1-9: 0
1. LITTLE INTEREST OR PLEASURE IN DOING THINGS: 0
SUM OF ALL RESPONSES TO PHQ QUESTIONS 1-9: 0
SUM OF ALL RESPONSES TO PHQ9 QUESTIONS 1 & 2: 0

## 2022-12-05 ASSESSMENT — LIFESTYLE VARIABLES
HOW OFTEN DO YOU HAVE A DRINK CONTAINING ALCOHOL: MONTHLY OR LESS
HOW MANY STANDARD DRINKS CONTAINING ALCOHOL DO YOU HAVE ON A TYPICAL DAY: 1 OR 2

## 2022-12-05 NOTE — PROGRESS NOTES
Medicare Annual Wellness Visit    Annmarie Heimlich is here for Medicare AWV (Annual)    Assessment & Plan   Medicare annual wellness visit, subsequent      Recommendations for Preventive Services Due: see orders and patient instructions/AVS.  Recommended screening schedule for the next 5-10 years is provided to the patient in written form: see Patient Instructions/AVS.     No follow-ups on file. Subjective   The following acute and/or chronic problems were also addressed today:  Diabetes type 2    Patient's complete Health Risk Assessment and screening values have been reviewed and are found in Flowsheets. The following problems were reviewed today and where indicated follow up appointments were made and/or referrals ordered.     Positive Risk Factor Screenings with Interventions:     Cognitive:  Clock Drawing Test (CDT): Normal  Total Score Interpretation: Abnormal Mini-Cog  Did the patient refuse to take the cognition test?: No    Cognitive Impairment Interventions:  Patient declines any further evaluation/treatment for cognitive impairment           General Health and ACP:  General  In general, how would you say your health is?: Very Good  In the past 7 days, have you experienced any of the following: New or Increased Pain, New or Increased Fatigue, Loneliness, Social Isolation, Stress or Anger?: No  Do you get the social and emotional support that you need?: Yes  Do you have a Living Will?: (!) No    Advance Directives       Power of  Living Will ACP-Advance Directive ACP-Power of     Not on File Not on File Not on File Not on File          General Health Risk Interventions:  No Living Will: Patient declines ACP discussion/assistance    Health Habits/Nutrition:  Physical Activity: Sufficiently Active    Days of Exercise per Week: 7 days    Minutes of Exercise per Session: 30 min     Have you lost any weight without trying in the past 3 months?: No  Body mass index: (!) 40.15 Health Habits/Nutrition Interventions:  Inadequate physical activity:  patient is not ready to increase his/her physical activity level at this time             Objective   Vitals:    12/05/22 1313   Weight: 205 lb 9.6 oz (93.3 kg)   Height: 5' (1.524 m)      Body mass index is 40.15 kg/m². No Known Allergies  Prior to Visit Medications    Medication Sig Taking? Authorizing Provider   sodium bicarbonate 650 MG tablet Take 1 tablet by mouth 2 times daily Yes Sophia Blanton MD   Finerenone (KERENDIA) 10 MG TABS Take 5 mg by mouth daily Yes Sophia Blanton MD   atorvastatin (LIPITOR) 40 MG tablet TAKE 1 TABLET EVERY DAY Yes Gita Cook, DO   lisinopril (PRINIVIL;ZESTRIL) 20 MG tablet TAKE 1 TABLET TWICE DAILY (DOSE INCREASE PER DR Ary Carbone) Yes Gita Cook, DO   lansoprazole (PREVACID) 15 MG delayed release capsule TAKE 1 CAPSULE EVERY DAY Yes Gita Cook, DO   metoprolol succinate (TOPROL XL) 100 MG extended release tablet TAKE 1 TABLET EVERY DAY Yes Gita Cook, DO   clopidogrel (PLAVIX) 75 MG tablet TAKE 1 TABLET EVERY DAY Yes Gita Cook, DO   blood glucose test strips (ACCU-CHEK DONG PLUS) strip Test with all meals daily, as directed. Yes Gita Cook, DO   blood glucose monitor strips 1 strip by Other route every morning (before breakfast) True Metrix Test__1_times daily Diagnosis: 250.0   Diabetes Mellitus____Insulin Dependent__x__Non-Insulin Dependent Yes Gita Cook, DO   cyanocobalamin 1000 MCG/ML injection INJECT 1ML EVERY OTHER MONTH Yes Gita Cook, DO   Insulin Syringe-Needle U-100 31G X 5/16\" 0.3 ML MISC 1 each by Does not apply route daily Yes Gita Cook, DO   Syringe/Needle, Disp, (SYRINGE 3CC/25GX1\") 25G X 1\" 3 ML MISC Used one every other month.  Yes Gita Cook, DO   levothyroxine (SYNTHROID) 88 MCG tablet TAKE 1 TABLET EVERY DAY Yes Gita Cook, DO   Blood Glucose Monitoring Suppl (TRUE METRIX AIR GLUCOSE METER) w/Device KIT 1 Device by Does not apply route 2 times daily Yes Natalie Sanchez, DO   Lancets MISC 1 each by Does not apply route daily 33G Use 1 times daily Diagnisis:250.0  Diabetes Mellitus____Insulin Dependent__X_Non-Insulin Dependent Yes Natalie Sanchez, DO   insulin glargine (LANTUS) 100 UNIT/ML injection vial INJECT 10 UNITS INTO THE SKIN NIGHTLY Yes Natalie Sanchez DO   nitroGLYCERIN (NITROSTAT) 0.4 MG SL tablet Place 1 tablet under the tongue as needed for Chest pain Yes Natalie Sanchez, DO   amLODIPine (NORVASC) 10 MG tablet Take 10 mg by mouth daily Yes Historical Provider, MD   acetaminophen (TYLENOL) 500 MG tablet Take 2 tablets by mouth every 8 hours as needed for Pain Yes Jose Raza MD   aspirin EC 81 MG EC tablet Take 2 tablets by mouth daily Yes Jose Raza MD   B-D INS SYR ULTRAFINE 1CC/30G 30G X 1/2\" 1 ML MISC USE 1 SYRINGE EVERY DAY Yes Shivani Reed MD   Compression Stockings MISC by Does not apply route 20-40 mmHg. Yes Meghna Salinas MD   Blood Glucose Calibration (OT ULTRA/FASTTK CNTRL SOLN) SOLN  Yes Historical Provider, MD   Multiple Vitamins-Minerals (OCUVITE ADULT 50+ PO) Take 1 tablet by mouth 2 times daily  Yes Historical Provider, MD Fady Wade Tea, Camillia sinensis, 315 MG CAPS Take 1 tablet by mouth daily  Yes Historical Provider, MD   Cinnamon 500 MG TABS Take 2 tablets by mouth daily  Yes Historical Provider, MD   Cranberry-Vitamin C-Vitamin E (CRANBERRY PLUS VITAMIN C PO) Take 1 tablet by mouth daily. Yes Historical Provider, MD   Ascorbic Acid (VITAMIN C WITH VIVIENNE HIPS) 500 MG tablet Take 500 mg by mouth daily. Yes Historical Provider, MD   Omega-3 Fatty Acids (FISH OIL) 1000 MG CAPS Take 1,000 mg by mouth daily. Yes Historical Provider, MD   docusate sodium (COLACE) 100 MG capsule Take 100 mg by mouth 2 times daily. Yes Historical Provider, MD   Blood Glucose Monitoring Suppl (ONE TOUCH ULTRA) by Does not apply route.  Yes Historical Provider, MD   Alcohol Swabs (B-D SINGLE USE SWABS REGULAR) PADS 1 box by Other route 3 times daily as needed (When pt checks glucose)  Leeann Villarreal DO   paricalcitol (ZEMPLAR) 1 MCG capsule Take 1 capsule by mouth three times a week Mon Wed Fri  Kam Peace MD       CareTeam (Including outside providers/suppliers regularly involved in providing care):   Patient Care Team:  Leeann Villarreal DO as PCP - General (Family Medicine)  Leeann [facility]real DO [facility]  Parker Sawyer DPM as Physician (Podiatry)     Reviewed and updated this visit:  Tobacco  Allergies  Meds  Med Hx  Surg Hx  Soc Hx  Fam Hx            I, Kristie Sinha LPN, 21/9/0231, performed the documented evaluation under the direct supervision of the attending physician.

## 2022-12-05 NOTE — PATIENT INSTRUCTIONS
Personalized Preventive Plan for Iker Chopra - 12/5/2022  Medicare offers a range of preventive health benefits. Some of the tests and screenings are paid in full while other may be subject to a deductible, co-insurance, and/or copay. Some of these benefits include a comprehensive review of your medical history including lifestyle, illnesses that may run in your family, and various assessments and screenings as appropriate. After reviewing your medical record and screening and assessments performed today your provider may have ordered immunizations, labs, imaging, and/or referrals for you. A list of these orders (if applicable) as well as your Preventive Care list are included within your After Visit Summary for your review. Other Preventive Recommendations:    A preventive eye exam performed by an eye specialist is recommended every 1-2 years to screen for glaucoma; cataracts, macular degeneration, and other eye disorders. A preventive dental visit is recommended every 6 months. Try to get at least 150 minutes of exercise per week or 10,000 steps per day on a pedometer . Order or download the FREE \"Exercise & Physical Activity: Your Everyday Guide\" from The "[employer]. Call 3-908.770.2258 or search The "[employer] online. You need 3992-5868 mg of calcium and 8682-8794 IU of vitamin D per day. It is possible to meet your calcium requirement with diet alone, but a vitamin D supplement is usually necessary to meet this goal.  When exposed to the sun, use a sunscreen that protects against both UVA and UVB radiation with an SPF of 30 or greater. Reapply every 2 to 3 hours or after sweating, drying off with a towel, or swimming. Always wear a seat belt when traveling in a car. Always wear a helmet when riding a bicycle or motorcycle.

## 2022-12-27 DIAGNOSIS — E11.51 DM (DIABETES MELLITUS), TYPE 2 WITH PERIPHERAL VASCULAR COMPLICATIONS (HCC): ICD-10-CM

## 2022-12-27 DIAGNOSIS — E13.9 DIABETES 1.5, MANAGED AS TYPE 1 (HCC): ICD-10-CM

## 2022-12-27 DIAGNOSIS — E11.3299 MILD NONPROLIFERATIVE DIABETIC RETINOPATHY WITHOUT MACULAR EDEMA ASSOCIATED WITH TYPE 2 DIABETES MELLITUS, UNSPECIFIED LATERALITY (HCC): ICD-10-CM

## 2022-12-27 DIAGNOSIS — E03.9 HYPOTHYROIDISM, UNSPECIFIED TYPE: ICD-10-CM

## 2022-12-27 NOTE — TELEPHONE ENCOUNTER
Levothyroxine pending refill      Health Maintenance   Topic Date Due    COVID-19 Vaccine (4 - Booster for Pfizer series) 01/13/2022    Lipids  02/28/2023    Depression Screen  12/05/2023    Annual Wellness Visit (AWV)  12/06/2023    DTaP/Tdap/Td vaccine (2 - Td or Tdap) 07/02/2029    DEXA (modify frequency per FRAX score)  Completed    Flu vaccine  Completed    Shingles vaccine  Completed    Pneumococcal 65+ years Vaccine  Completed    Hepatitis A vaccine  Aged Out    Hib vaccine  Aged Out    Meningococcal (ACWY) vaccine  Aged Out             (applicable per patient's age: Cancer Screenings, Depression Screening, Fall Risk Screening, Immunizations)    Hemoglobin A1C (%)   Date Value   09/30/2022 5.8   03/31/2022 6.5 (H)   11/23/2020 6.0     Microalb/Crt.  Ratio (mcg/mg creat)   Date Value   03/31/2022 573 (H)     LDL Cholesterol (mg/dL)   Date Value   02/28/2022 57     AST (U/L)   Date Value   02/28/2022 15     ALT (U/L)   Date Value   02/28/2022 8     BUN (mg/dL)   Date Value   11/23/2022 21      (goal A1C is < 7)   (goal LDL is <100) need 30-50% reduction from baseline     BP Readings from Last 3 Encounters:   12/05/22 (!) 139/59   11/30/22 128/70   09/30/22 128/80    (goal /80)      All Future Testing planned in [facility]:  Lab Frequency Next Occurrence   VL ARTERIAL PVR LOWER WO EXERCISE Once 03/03/2023   CBC every 3 months    Albumin every 3 months    Basic Metabolic Panel every 3 months    Phosphorus every 3 months    PTH, Intact every 3 months    Protein / creatinine ratio, urine every 3 months        Next Visit Date:  Future Appointments   Date Time Provider Lena Ledezma   1/19/2023 11:00 AM Anabel Nelson DPM [facility]   3/1/2023 11:50 AM Laverne Martinez MD [facility] None   3/9/2023  1:15 PM Palmer Jerome MD heartvasc [facility]   12/6/2023  1:30 PM SCHEDULE, [facility] JOHNIE Roman            Patient Active Problem List:     DM (diabetes mellitus) ([facility] Hypercholesteremia     CAD (coronary artery disease)     Hypothyroidism     Lumbar spondylosis with myelopathy     S/P cardiac cath 8/19/14-Dr. covarrubias     CKD (chronic kidney disease) stage 3, GFR 30-59 ml/min ([facility])     Morbid obesity with BMI of 40.0-44.9, adult ([facility])     At high risk for hyperkalemia     Edema     COPD (chronic obstructive pulmonary disease) ([facility])     Hyperparathyroidism due to renal insufficiency ([facility])     Carotid stenosis, asymptomatic     Claudication in peripheral vascular disease ([facility])     Nonproliferative diabetic retinopathy of both eyes ([facility])     Type 2 diabetes mellitus with stage 3 chronic kidney disease, with long-term current use of insulin ([address]     Coronary artery disease involving coronary bypass graft of native heart without angina pectoris     Essential hypertension     Localized edema     DM (diabetes mellitus), type 2 with peripheral vascular complications ([facility])     Mild nonproliferative diabetic retinopathy associated with type 2 diabetes mellitus ([address]     Panlobular emphysema ([facility])     S/P drug eluting coronary stent placement-OM1 3/26/18-Dr. covarrubias     CAD S/P percutaneous coronary angioplasty     Persistent proteinuria     Lymphedema of right lower extremity     Chronic bilateral low back pain without sciatica     Deficiency of vitamin B12     Wrist arthritis     Gastroesophageal reflux disease without esophagitis     Neck sprain

## 2022-12-28 DIAGNOSIS — E11.51 DM (DIABETES MELLITUS), TYPE 2 WITH PERIPHERAL VASCULAR COMPLICATIONS (HCC): ICD-10-CM

## 2022-12-28 DIAGNOSIS — E11.9 TYPE 2 DIABETES MELLITUS WITHOUT COMPLICATION, UNSPECIFIED WHETHER LONG TERM INSULIN USE (HCC): ICD-10-CM

## 2022-12-28 NOTE — TELEPHONE ENCOUNTER
Please address the medication refill and close the encounter. If I can be of assistance, please route to the applicable pool. Thank you. Last visit: 9-30-22  Last Med refill: 3-2-15  Does patient have enough medication for 72 hours: No:     Next Visit Date:  Future Appointments   Date Time Provider Lena Brisa   1/19/2023 11:00 AM Jhonatan Domínguez [place] [facility]   3/1/2023 11:50 AM Pepito Thomas MD [facility] None   3/9/2023  1:15 PM Palmer Toure MD heartvasc [facility]   12/6/2023  1:30 PM SCHEDULE, [facility] AWV  [facility] Maintenance   Topic Date Due    COVID-19 Vaccine (4 - Booster for Pfizer series) 01/13/2022    Lipids  02/28/2023    Depression Screen  12/05/2023    Annual Wellness Visit (AWV)  12/06/2023    DTaP/Tdap/Td vaccine (2 - Td or Tdap) 07/02/2029    DEXA (modify frequency per FRAX score)  Completed    Flu vaccine  Completed    Shingles vaccine  Completed    Pneumococcal 65+ years Vaccine  Completed    Hepatitis A vaccine  Aged Out    Hib vaccine  Aged Out    Meningococcal (ACWY) vaccine  Aged Out       Hemoglobin A1C (%)   Date Value   09/30/2022 5.8   03/31/2022 6.5 (H)   11/23/2020 6.0             ( goal A1C is < 7)   Microalb/Crt.  Ratio (mcg/mg creat)   Date Value   03/31/2022 573 (H)     LDL Cholesterol (mg/dL)   Date Value   02/28/2022 57   11/04/2020 47       (goal LDL is <100)   AST (U/L)   Date Value   02/28/2022 15     ALT (U/L)   Date Value   02/28/2022 8     BUN (mg/dL)   Date Value   11/23/2022 21     BP Readings from Last 3 Encounters:   12/05/22 (!) 139/59   11/30/22 128/70   09/30/22 128/80          (goal 120/80)    All Future Testing planned in [facility]  Lab Frequency Next Occurrence   VL ARTERIAL PVR LOWER WO EXERCISE Once 03/03/2023   CBC every 3 months    Albumin every 3 months    Basic Metabolic Panel every 3 months    Phosphorus every 3 months    PTH, Intact every 3 months    Protein / creatinine ratio, urine every 3 months                Patient Active Problem List:     DM (diabetes mellitus) ([facility]     Hypercholesteremia     CAD (coronary artery disease)     Hypothyroidism     Lumbar spondylosis with myelopathy     S/P cardiac cath 8/19/14-Dr. covarrubias     CKD (chronic kidney disease) stage 3, GFR 30-59 ml/min ([facility])     Morbid obesity with BMI of 40.0-44.9, adult ([facility]     At high risk for hyperkalemia     Edema     COPD (chronic obstructive pulmonary disease) ([facility]     Hyperparathyroidism due to renal insufficiency ([facility])     Carotid stenosis, asymptomatic     Claudication in peripheral vascular disease ([facility])     Nonproliferative diabetic retinopathy of both eyes ([facility])     Type 2 diabetes mellitus with stage 3 chronic kidney disease, with long-term current use of insulin ([facility]     Coronary artery disease involving coronary bypass graft of native heart without angina pectoris     Essential hypertension     Localized edema     DM (diabetes mellitus), type 2 with peripheral vascular complications ([facility])     Mild nonproliferative diabetic retinopathy associated with type 2 diabetes mellitus ([facility])     Panlobular emphysema ([facility])     S/P drug eluting coronary stent placement-OM1 3/26/18-Dr. covarrubias     CAD S/P percutaneous coronary angioplasty     Persistent proteinuria     Lymphedema of right lower extremity     Chronic bilateral low back pain without sciatica     Deficiency of vitamin B12     Wrist arthritis     Gastroesophageal reflux disease without esophagitis     Neck sprain

## 2022-12-28 NOTE — TELEPHONE ENCOUNTER
E-scribe request for med refill. Please review and e-scribe if applicable. Last Visit Date:  12/05/2022  Next Visit Date:  Visit date not found    Hemoglobin A1C (%)   Date Value   09/30/2022 5.8   03/31/2022 6.5 (H)   11/23/2020 6.0             ( goal A1C is < 7)   Microalb/Crt.  Ratio (mcg/mg creat)   Date Value   03/31/2022 573 (H)     LDL Cholesterol (mg/dL)   Date Value   02/28/2022 57       (goal LDL is <100)   AST (U/L)   Date Value   02/28/2022 15     ALT (U/L)   Date Value   02/28/2022 8     BUN (mg/dL)   Date Value   11/23/2022 21     BP Readings from Last 3 Encounters:   12/05/22 (!) 139/59   11/30/22 128/70   09/30/22 128/80          (goal 120/80)        Patient Active Problem List:     DM (diabetes mellitus) ([facility] [address]     Hypercholesteremia     CAD (coronary artery disease)     Hypothyroidism     Lumbar spondylosis with myelopathy     S/P cardiac cath 8/19/14-Dr. covarrubias     CKD (chronic kidney disease) stage 3, GFR 30-59 ml/min ([facility])     Morbid obesity with BMI of 40.0-44.9, adult ([address]     At high risk for hyperkalemia     Edema     COPD (chronic obstructive pulmonary disease) ([facility])     Hyperparathyroidism due to renal insufficiency ([facility])     Carotid stenosis, asymptomatic     Claudication in peripheral vascular disease ([facility])     Nonproliferative diabetic retinopathy of both eyes ([facility])     Type 2 diabetes mellitus with stage 3 chronic kidney disease, with long-term current use of insulin ([address]     Coronary artery disease involving coronary bypass graft of native heart without angina pectoris     Essential hypertension     Localized edema     DM (diabetes mellitus), type 2 with peripheral vascular complications ([facility])     Mild nonproliferative diabetic retinopathy associated with type 2 diabetes mellitus ([address]     Panlobular emphysema (HCC)     S/P drug eluting coronary stent placement-OM1 3/26/18-Dr. covarrubias     CAD S/P percutaneous coronary angioplasty     Persistent proteinuria     Lymphedema of right

## 2022-12-29 RX ORDER — LEVOTHYROXINE SODIUM 88 UG/1
TABLET ORAL
Qty: 90 TABLET | Refills: 3 | Status: SHIPPED | OUTPATIENT
Start: 2022-12-29

## 2022-12-29 RX ORDER — DIPHENHYDRAMINE HCL 25 MG
1 TABLET ORAL 2 TIMES DAILY
Qty: 1 KIT | Refills: 0 | Status: SHIPPED | OUTPATIENT
Start: 2022-12-29

## 2022-12-29 RX ORDER — BLOOD SUGAR DIAGNOSTIC
STRIP MISCELLANEOUS
Qty: 300 STRIP | Refills: 3 | Status: SHIPPED | OUTPATIENT
Start: 2022-12-29

## 2022-12-29 RX ORDER — GLUCOSAMINE HCL/CHONDROITIN SU 500-400 MG
1 CAPSULE ORAL
Qty: 100 STRIP | Refills: 1 | Status: SHIPPED | OUTPATIENT
Start: 2022-12-29

## 2022-12-29 RX ORDER — BLOOD-GLUCOSE CONTROL, NORMAL
1 EACH MISCELLANEOUS AS NEEDED
Qty: 1 EACH | Refills: 1 | Status: SHIPPED | OUTPATIENT
Start: 2022-12-29

## 2022-12-29 RX ORDER — GLUCOSAM/CHON-MSM1/C/MANG/BOSW 500-416.6
TABLET ORAL
Qty: 100 EACH | Refills: 3 | Status: SHIPPED | OUTPATIENT
Start: 2022-12-29

## 2022-12-29 RX ORDER — ISOPROPYL ALCOHOL 0.75 G/1
1 SWAB TOPICAL 3 TIMES DAILY PRN
Qty: 1 EACH | Refills: 5 | Status: SHIPPED | OUTPATIENT
Start: 2022-12-29 | End: 2023-04-08

## 2022-12-29 RX ORDER — DIPHENHYDRAMINE HCL 25 MG
TABLET ORAL
Qty: 1 KIT | Refills: 0 | Status: SHIPPED | OUTPATIENT
Start: 2022-12-29

## 2023-01-19 ENCOUNTER — OFFICE VISIT (OUTPATIENT)
Dept: PODIATRY | Age: 83
End: 2023-01-19
Payer: MEDICARE

## 2023-01-19 VITALS — HEIGHT: 60 IN | WEIGHT: 198 LBS | BODY MASS INDEX: 38.87 KG/M2

## 2023-01-19 DIAGNOSIS — B35.1 DERMATOPHYTOSIS OF NAIL: ICD-10-CM

## 2023-01-19 DIAGNOSIS — R26.2 TROUBLE WALKING: ICD-10-CM

## 2023-01-19 DIAGNOSIS — E11.51 TYPE II DIABETES MELLITUS WITH PERIPHERAL CIRCULATORY DISORDER (HCC): Primary | ICD-10-CM

## 2023-01-19 DIAGNOSIS — I73.9 PERIPHERAL VASCULAR DISEASE (HCC): ICD-10-CM

## 2023-01-19 DIAGNOSIS — D23.71 BENIGN NEOPLASM OF SKIN OF LOWER LIMB, INCLUDING HIP, RIGHT: ICD-10-CM

## 2023-01-19 DIAGNOSIS — D23.72 BENIGN NEOPLASM OF SKIN OF LOWER LIMB, INCLUDING HIP, LEFT: ICD-10-CM

## 2023-01-19 PROCEDURE — 99999 PR OFFICE/OUTPT VISIT,PROCEDURE ONLY: CPT | Performed by: PODIATRIST

## 2023-01-19 PROCEDURE — 11721 DEBRIDE NAIL 6 OR MORE: CPT | Performed by: PODIATRIST

## 2023-01-19 PROCEDURE — 17110 DESTRUCTION B9 LES UP TO 14: CPT | Performed by: PODIATRIST

## 2023-01-19 NOTE — PROGRESS NOTES
[address] PODIATRY [facility]  [address]  [address]  Dept: [phone]  Dept Fax: [phone]    DIABETIC PROGRESS NOTE  Date of patient's visit: 1/19/2023  Patient's Name:  Faraz Wadsworth YOB: 1940            Patient Care Team:  Nasrin Thornton DO as PCP - General (Family Medicine)  Nasrin Thornton DO as PCP - [facility] Empaneled Provider  Uri Mitchell DPM as Physician (Podiatry)  Uri Mitchell DPM as Physician (Podiatry)          Chief Complaint   Patient presents with    Diabetes     Diabetic foot care     Peripheral Neuropathy       Subjective:   Faraz Wadsworth comes to clinic for Diabetes (Diabetic foot care ) and Peripheral Neuropathy    she is a diabetic and states that needs toenails trimmed today. Pt currently has complaint of thickened, elongated nails that they cannot manage by themselves. Pt's primary care physician is Stan Thayer DO last seen 12/5/22. Pt's last blood sugar/a1c was 6.5-3/31/22. Pt also relates to painful skin spots to the bottom of both feet. Pt has tried pads and changing shoes but it has note helped the pain    Pt has a new complaint of none today . Lab Results   Component Value Date    LABA1C 5.8 09/30/2022      Complains of numbness in the feet bilat.   Past Medical History:   Diagnosis Date    Abnormal cardiovascular stress test 02/2021    LARGE ANTERIOR INFARCTION / LARGE AREA OF INFERIOR LATERAL ISCHEMIA WITH REDUCED EF 38%    Abnormal stress test 07/18/2014    going to do cath- not urgent just abnormal    Ambulates with cane     PRN    Arthritis     At high risk for hyperkalemia 10/22/2014    From type 4 RTA    Back pain     CAD (coronary artery disease)     Circulation problem     decreased circulation to  zbigniew extremities    CKD (chronic kidney disease) stage 3, GFR 30-59 ml/min ([facility]) 10/22/2014    From diabetic and ischemic nephrosclerosis, baseline 1.4, GFR 40-45 ml/min, UPC 0.3    COPD (chronic obstructive pulmonary disease) ([facility])     DM (diabetes mellitus) ([facility]     Edema     chronic lower extremity    Foot pain, bilateral     Former smoker     Gastroesophageal reflux disease without esophagitis 11/23/2020    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    Hyperlipidemia     Hypertension     Hypothyroidism     Intermittent claudication (HCC)     loly to left leg    Kidney disease     chronic    Lymphedema     MI (mitral incompetence)     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 ([facility] [address] 04/20/2016    Morbid obesity with BMI of 45.0-49.9, adult ([facility] 10/22/2014    Osteoarthritis     Other activity(E029.9)     Acute renal failure secondary to prerenal azotemia    Other activity(E029.9)     Atherosclerotic coronary artery disease status-post bypass surgery in 2003    Persistent proteinuria 05/09/2018    From diabetic nephrosclerosis, urine protein creatinine ratio 0.8-1    PVD (peripheral vascular disease) ([facility]     Sciatica     Secondary hyperparathyroidism (of renal origin) 10/22/2014    Not on VDRA    Shortness of breath     Shoulder fracture, right 12/2020    STATES FELL ON MARLYN CALIN    Sleep apnea     Type II or unspecified type diabetes mellitus without mention of complication, not stated as uncontrolled        No Known Allergies  Current Outpatient Medications on File Prior to Visit   Medication Sig Dispense Refill    levothyroxine (SYNTHROID) 88 MCG tablet TAKE 1 TABLET EVERY DAY 90 tablet 3    Blood Glucose Monitoring Suppl (TRUE METRIX AIR GLUCOSE METER) w/Device KIT 1 Device by Does not apply route 2 times daily 1 kit 0    blood glucose test strips (ACCU-CHEK DONG PLUS) strip Test with all meals daily, as directed.  300 strip 3    blood glucose monitor strips 1 strip by Other route every morning (before breakfast) True Metrix Test__1_times daily Diagnosis: 250.0 Diabetes Mellitus____Insulin Dependent__x__Non-Insulin Dependent 100 strip 1    Alcohol Swabs (B-D SINGLE USE SWABS REGULAR) PADS 1 box by Other route 3 times daily as needed (When pt checks glucose) 1 each 5    Blood Glucose Calibration (OT ULTRA/FASTTK CNTRL SOLN) SOLN 1 Product by Does not apply route as needed (use as directed for calibration of device) 1 each 1    TRUEplus Lancets 33G MISC USE ONE TIME DAILY 100 each 3    Blood Glucose Monitoring Suppl (TRUE METRIX AIR GLUCOSE METER) w/Device KIT USE AS DIRECTED 1 kit 0    sodium bicarbonate 650 MG tablet Take 1 tablet by mouth 2 times daily      Finerenone (KERENDIA) 10 MG TABS Take 5 mg by mouth daily 90 tablet 3    atorvastatin (LIPITOR) 40 MG tablet TAKE 1 TABLET EVERY DAY 90 tablet 3    lisinopril (PRINIVIL;ZESTRIL) 20 MG tablet TAKE 1 TABLET TWICE DAILY (DOSE INCREASE PER DR BRITO) 180 tablet 3    lansoprazole (PREVACID) 15 MG delayed release capsule TAKE 1 CAPSULE EVERY DAY 90 capsule 3    metoprolol succinate (TOPROL XL) 100 MG extended release tablet TAKE 1 TABLET EVERY DAY 90 tablet 3    clopidogrel (PLAVIX) 75 MG tablet TAKE 1 TABLET EVERY DAY 90 tablet 3    cyanocobalamin 1000 MCG/ML injection INJECT 1ML EVERY OTHER MONTH 2 mL 2    Insulin Syringe-Needle U-100 31G X 5/16\" 0.3 ML MISC 1 each by Does not apply route daily 100 each 5    Syringe/Needle, Disp, (SYRINGE 3CC/25GX1\") 25G X 1\" 3 ML MISC Used one every other month.  6 each 5    insulin glargine (LANTUS) 100 UNIT/ML injection vial INJECT 10 UNITS INTO THE SKIN NIGHTLY 30 mL 5    nitroGLYCERIN (NITROSTAT) 0.4 MG SL tablet Place 1 tablet under the tongue as needed for Chest pain 25 tablet 2    amLODIPine (NORVASC) 10 MG tablet Take 10 mg by mouth daily      acetaminophen (TYLENOL) 500 MG tablet Take 2 tablets by mouth every 8 hours as needed for Pain 270 tablet 1    aspirin EC 81 MG EC tablet Take 2 tablets by mouth daily 30 tablet 5    B-D INS SYR ULTRAFINE 1CC/30G 30G X 1/2\" 1 ML MISC USE 1 SYRINGE EVERY DAY 90 each 5    Compression Stockings MISC by Does not apply route 20-40 mmHg. 2 each 0    Multiple Vitamins-Minerals (OCUVITE ADULT 50+ PO) Take 1 tablet by mouth 2 times daily       Green Tea, Camillia sinensis, 315 MG CAPS Take 1 tablet by mouth daily       Cinnamon 500 MG TABS Take 2 tablets by mouth daily       Cranberry-Vitamin C-Vitamin E (CRANBERRY PLUS VITAMIN C PO) Take 1 tablet by mouth daily. Ascorbic Acid (VITAMIN C WITH VIVIENNE HIPS) 500 MG tablet Take 500 mg by mouth daily. Omega-3 Fatty Acids (FISH OIL) 1000 MG CAPS Take 1,000 mg by mouth daily. docusate sodium (COLACE) 100 MG capsule Take 100 mg by mouth 2 times daily. Blood Glucose Monitoring Suppl (ONE TOUCH ULTRA) by Does not apply route. paricalcitol (ZEMPLAR) 1 MCG capsule Take 1 capsule by mouth three times a week Mon Wed Fri 36 capsule 3     No current facility-administered medications on file prior to visit. Review of Systems    Review of Systems:   History obtained from chart review and the patient  General ROS: negative for - chills, fatigue, fever, night sweats or weight gain  Constitutional: Negative for chills, diaphoresis, fatigue, fever and unexpected weight change. Musculoskeletal: Positive for arthralgias, gait problem and joint swelling. Neurological ROS: negative for - behavioral changes, confusion, headaches or seizures. Negative for weakness and numbness. Dermatological ROS: negative for - mole changes, rash  Cardiovascular: Negative for leg swelling. Gastrointestinal: Negative for constipation, diarrhea, nausea and vomiting. Objective:  Dermatologic Exam:  Skin lesion/ulceration Absent . Skin No rashes or nodules noted. .   Skin is thin, with flaky sloughing skin as well as decreased hair growth to the lower leg  Small red hemosiderin deposits seen dorsal foot   Musculoskeletal:     1st MPJ ROM decreased, Bilateral.  Muscle strength 5/5, Bilateral.  Pain present upon palpation of toenails 1-5, Bilateral. decreased medial longitudinal arch, Bilateral.  Ankle ROM decreased,Bilateral.    Dorsally contracted digits present digits 2, Bilateral.     Vascular: DP pulses 1/4 bilateral.  PT pulses 0/4 bilateral.   CFT <5 seconds, Bilateral.  Hair growth absent to the level of the digits, Bilateral.  Edema present, Bilateral.  Varicosities absent, Bilateral. Erythema absent, Bilateral    Neurological: Sensation diminshed to light touch to level of digits, Bilateral.  Protective sensation intact 6/10 sites via 5.07/10g [place] Monofilament, Bilateral.  negative Tinel's, Bilateral.  negative Valleix sign, Bilateral.      Integument: Warm, dry, supple, Bilateral.  Open lesion absent, Bilateral.  Interdigital maceration absent to web spaces 4, Bilateral.  Nails 1-5 left and 1-5 right thickened > 3.0 mm, dystrophic and crumbly, discolored with subungual debris. Fissures absent, Bilateral.   General: AAO x 3 in NAD.     Derm  Toenail Description  Sites of Onychomycosis Involvement (Check affected area)  [x] [x] [x] [x] [x] [x] [x] [x] [x] [x]  5 4 3 2 1 1 2 3 4 5                          Right                                        Left    Thickness  [x] [x] [x] [x] [x] [x] [x] [x] [x] [x]  5 4 3 2 1 1 2 3 4 5                         Right                                        Left    Dystrophic Changes   [x] [x] [x] [x] [x] [x] [x] [x] [x] [x]  5 4 3 2 1 1 2 3 4 5                         Right                                        Left    Color   [x] [x] [x] [x] [x] [x] [x] [x] [x] [x]  5 4 3 2 1 1 2 3 4 5                          Right                                        Left    Incurvation/Ingrowin   [] [] [] [] [] [] [] [] [] []  5 4 3 2 1 1 2 3 4 5                         Right                                        Left    Inflammation/Pain   [x] [x] [x] [x] [x] [x] [x] [x] [x] [x]  5 4 3 2 1 1 2 3 4 5                         Right Left        Visual inspection:  Deformity: hammertoe deformity zbigniew feet  amputation: absent  Skin lesions: present - as above  Edema: right- 2+ pitting edema, left- 2+ pitting edema    Sensory exam:  Monofilament sensation: abnormal - 6/10 via SW 5.07/10g monofilament to the plantar foot bilateral feet    Pulses: abnormal - 1/4 dorsalis pedis pulse and 0/4 Posterior tibial pulse,   Pinprick: Impaired  Proprioception: Impaired  Vibration (128 Hz): Impaired       DM with PVD       [x]Yes    []No      Assessment:  82 y.o. female with:   Diagnosis Orders   1. Type II diabetes mellitus with peripheral circulatory disorder (HCC)  NH DESTRUCTION BENIGN LESIONS UP TO 14    48382 - NH DEBRIDEMENT OF NAILS, 6 OR MORE    HM DIABETES FOOT EXAM      2. Dermatophytosis of nail  22720 - NH DEBRIDEMENT OF NAILS, 6 OR MORE    HM DIABETES FOOT EXAM      3. Benign neoplasm of skin of lower limb, including hip, left  NH DESTRUCTION BENIGN LESIONS UP TO 14    HM DIABETES FOOT EXAM      4. Benign neoplasm of skin of lower limb, including hip, right  NH DESTRUCTION BENIGN LESIONS UP TO 14    HM DIABETES FOOT EXAM      5. Trouble walking  NH DESTRUCTION BENIGN LESIONS UP TO 14    43472 - NH DEBRIDEMENT OF NAILS, 6 OR MORE    HM DIABETES FOOT EXAM      6. Peripheral vascular disease (HCC)  NH DESTRUCTION BENIGN LESIONS UP TO 14    81478 - NH DEBRIDEMENT OF NAILS, 6 OR MORE    HM DIABETES FOOT EXAM              Q7   []Yes    []No                Q8   [x]Yes    []No                     Q9   []Yes    []No    Plan:   Pt was evaluated and examined. Patient was given personalized discharge instructions. The lesions were partially excised via 15 blade and silver nitrate was applied under occlusion. The patient tolerated the procedure well and without complication. Advised patient to use vasoline to the area after tomorrow to prevent surrounding tissue irritation.      Nails 1-10 were debrided sharply in length and thickness with a nipper and , without incident. Pt will follow up in 9 weeks or sooner if any problems arise. Diagnosis was discussed with the pt and all of their questions were answered in detail. Proper foot hygiene and care was discussed with the pt. Informed patient on proper diabetic foot care and importance of tight glycemic control. Patient to check feet daily and contact the office with any questions/problems/concerns.    Other comorbidity noted and will be managed by PCP.  1/19/2023    Electronically signed by Yaakov Joshi DPM on 1/19/2023 at 11:35 AM  1/19/2023

## 2023-02-24 ENCOUNTER — HOSPITAL ENCOUNTER (OUTPATIENT)
Age: 83
Discharge: HOME | End: 2023-02-24
Payer: MEDICARE

## 2023-02-24 DIAGNOSIS — N18.32 STAGE 3B CHRONIC KIDNEY DISEASE (HCC): ICD-10-CM

## 2023-02-24 DIAGNOSIS — R80.1 PERSISTENT PROTEINURIA: ICD-10-CM

## 2023-02-24 LAB
ALBUMIN SERPL-MCNC: 3.8 G/DL (ref 3.5–5.2)
ANION GAP SERPL CALCULATED.3IONS-SCNC: 14 MMOL/L (ref 9–17)
BUN SERPL-MCNC: 32 MG/DL (ref 8–23)
CALCIUM SERPL-MCNC: 10.2 MG/DL (ref 8.6–10.4)
CHLORIDE SERPL-SCNC: 108 MMOL/L (ref 98–107)
CO2 SERPL-SCNC: 21 MMOL/L (ref 20–31)
CREAT SERPL-MCNC: 1.5 MG/DL (ref 0.5–0.9)
CREATININE URINE: 115.1 MG/DL (ref 28–217)
GFR SERPL CREATININE-BSD FRML MDRD: 35 ML/MIN/1.73M2
GLUCOSE SERPL-MCNC: 124 MG/DL (ref 70–99)
HCT VFR BLD AUTO: 39.1 % (ref 36.3–47.1)
HGB BLD-MCNC: 12.1 G/DL (ref 11.9–15.1)
MCH RBC QN AUTO: 28.1 PG (ref 25.2–33.5)
MCHC RBC AUTO-ENTMCNC: 30.9 G/DL (ref 28.4–34.8)
MCV RBC AUTO: 90.7 FL (ref 82.6–102.9)
NRBC AUTOMATED: 0 PER 100 WBC
PDW BLD-RTO: 15.3 % (ref 11.8–14.4)
PLATELET # BLD AUTO: 212 K/UL (ref 138–453)
PMV BLD AUTO: 12.2 FL (ref 8.1–13.5)
POTASSIUM SERPL-SCNC: 4.7 MMOL/L (ref 3.7–5.3)
PTH-INTACT SERPL-MCNC: 223.8 PG/ML (ref 14–72)
RBC # BLD: 4.31 M/UL (ref 3.95–5.11)
SODIUM SERPL-SCNC: 143 MMOL/L (ref 135–144)
TOTAL PROTEIN, URINE: 144 MG/DL
URINE TOTAL PROTEIN CREATININE RATIO: 1.25 (ref 0–0.2)
WBC # BLD AUTO: 5.6 K/UL (ref 3.5–11.3)

## 2023-02-24 PROCEDURE — 83970 ASSAY OF PARATHORMONE: CPT

## 2023-02-24 PROCEDURE — 80048 BASIC METABOLIC PNL TOTAL CA: CPT

## 2023-02-24 PROCEDURE — 36415 COLL VENOUS BLD VENIPUNCTURE: CPT

## 2023-02-24 PROCEDURE — 85027 COMPLETE CBC AUTOMATED: CPT

## 2023-02-24 PROCEDURE — 82570 ASSAY OF URINE CREATININE: CPT

## 2023-02-24 PROCEDURE — 82040 ASSAY OF SERUM ALBUMIN: CPT

## 2023-02-24 PROCEDURE — 84156 ASSAY OF PROTEIN URINE: CPT

## 2023-03-09 DIAGNOSIS — E53.8 DEFICIENCY OF VITAMIN B12: ICD-10-CM

## 2023-03-09 RX ORDER — SYRINGE WITH NEEDLE, 1 ML 25GX5/8"
SYRINGE, EMPTY DISPOSABLE MISCELLANEOUS
Qty: 2 EACH | Refills: 5 | Status: SHIPPED | OUTPATIENT
Start: 2023-03-09

## 2023-03-09 NOTE — TELEPHONE ENCOUNTER
E-scribe request for med refills. Please review and e-scribe if applicable. Last Visit Date:  12/5/22  Next Visit Date:  Visit date not found    Hemoglobin A1C (%)   Date Value   09/30/2022 5.8   03/31/2022 6.5 (H)   11/23/2020 6.0             ( goal A1C is < 7)   Microalb/Crt.  Ratio (mcg/mg creat)   Date Value   03/31/2022 573 (H)     LDL Cholesterol (mg/dL)   Date Value   02/28/2022 57       (goal LDL is <100)   AST (U/L)   Date Value   02/28/2022 15     ALT (U/L)   Date Value   02/28/2022 8     BUN (mg/dL)   Date Value   02/24/2023 32 (H)     BP Readings from Last 3 Encounters:   03/01/23 130/78   12/05/22 (!) 139/59   11/30/22 128/70          (goal 120/80)        Patient Active Problem List:     DM (diabetes mellitus) ([facility]     Hypercholesteremia     CAD (coronary artery disease)     Hypothyroidism     Lumbar spondylosis with myelopathy     S/P cardiac cath 8/19/14-Dr. covarrubias     CKD (chronic kidney disease) stage 3, GFR 30-59 ml/min ([facility])     Morbid obesity with BMI of 40.0-44.9, adult ([address]     At high risk for hyperkalemia     Edema     COPD (chronic obstructive pulmonary disease) ([facility])     Primary hyperparathyroidism ([facility])     Carotid stenosis, asymptomatic     Claudication in peripheral vascular disease ([facility])     Nonproliferative diabetic retinopathy of both eyes ([facility])     Type 2 diabetes mellitus with stage 3 chronic kidney disease, with long-term current use of insulin ([address]     Coronary artery disease involving coronary bypass graft of native heart without angina pectoris     Essential hypertension     Localized edema     DM (diabetes mellitus), type 2 with peripheral vascular complications ([facility])     Mild nonproliferative diabetic retinopathy associated with type 2 diabetes mellitus ([address]     Panlobular emphysema ([facility])     S/P drug eluting coronary stent placement-OM1 3/26/18-Dr. covarrubias     CAD S/P percutaneous coronary angioplasty     Persistent proteinuria     Lymphedema of right lower extremity Chronic bilateral low back pain without sciatica     Deficiency of vitamin B12     Wrist arthritis     Gastroesophageal reflux disease without esophagitis     Neck sprain      ----Vanessa Foley

## 2023-03-13 DIAGNOSIS — N18.30 TYPE 2 DIABETES MELLITUS WITH STAGE 3 CHRONIC KIDNEY DISEASE, WITH LONG-TERM CURRENT USE OF INSULIN, UNSPECIFIED WHETHER STAGE 3A OR 3B CKD (HCC): ICD-10-CM

## 2023-03-13 DIAGNOSIS — I25.810 CORONARY ARTERY DISEASE INVOLVING CORONARY BYPASS GRAFT OF NATIVE HEART WITHOUT ANGINA PECTORIS: ICD-10-CM

## 2023-03-13 DIAGNOSIS — E11.9 TYPE 2 DIABETES MELLITUS WITHOUT COMPLICATION, UNSPECIFIED WHETHER LONG TERM INSULIN USE (HCC): ICD-10-CM

## 2023-03-13 DIAGNOSIS — E11.22 TYPE 2 DIABETES MELLITUS WITH STAGE 3 CHRONIC KIDNEY DISEASE, WITH LONG-TERM CURRENT USE OF INSULIN, UNSPECIFIED WHETHER STAGE 3A OR 3B CKD (HCC): ICD-10-CM

## 2023-03-13 DIAGNOSIS — Z79.4 TYPE 2 DIABETES MELLITUS WITH STAGE 3 CHRONIC KIDNEY DISEASE, WITH LONG-TERM CURRENT USE OF INSULIN, UNSPECIFIED WHETHER STAGE 3A OR 3B CKD (HCC): ICD-10-CM

## 2023-03-13 RX ORDER — BLOOD SUGAR DIAGNOSTIC
1 STRIP MISCELLANEOUS DAILY
Qty: 100 EACH | Refills: 5 | Status: SHIPPED | OUTPATIENT
Start: 2023-03-13

## 2023-03-13 RX ORDER — INSULIN GLARGINE 100 [IU]/ML
INJECTION, SOLUTION SUBCUTANEOUS
Qty: 30 ML | Refills: 5 | Status: SHIPPED | OUTPATIENT
Start: 2023-03-13

## 2023-03-13 RX ORDER — NITROGLYCERIN 0.4 MG/1
0.4 TABLET SUBLINGUAL PRN
Qty: 25 TABLET | Refills: 2 | Status: SHIPPED | OUTPATIENT
Start: 2023-03-13

## 2023-03-13 NOTE — TELEPHONE ENCOUNTER
NEW PHARMACY REQUESTING REFILLS. PATIENT APPROVED CHANGE IN PHARMACY. Attempted to schedule an appointment with patient, but Dr. Jethro Raymond schedule is completely booked. Patient prefers to only see Dr. Chrissy Murillo. Can patient be double booked? Please advise.

## 2023-03-16 ENCOUNTER — HOSPITAL ENCOUNTER (OUTPATIENT)
Dept: VASCULAR LAB | Age: 83
Discharge: HOME | End: 2023-03-16
Payer: MEDICARE

## 2023-03-16 DIAGNOSIS — I73.9 PAD (PERIPHERAL ARTERY DISEASE) (HCC): ICD-10-CM

## 2023-03-16 PROCEDURE — 93923 UPR/LXTR ART STDY 3+ LVLS: CPT

## 2023-03-23 ENCOUNTER — OFFICE VISIT (OUTPATIENT)
Dept: PODIATRY | Age: 83
End: 2023-03-23
Payer: MEDICARE

## 2023-03-23 VITALS — HEIGHT: 60 IN | BODY MASS INDEX: 39.66 KG/M2 | WEIGHT: 202 LBS

## 2023-03-23 DIAGNOSIS — M79.671 PAIN IN BOTH FEET: ICD-10-CM

## 2023-03-23 DIAGNOSIS — B35.1 DERMATOPHYTOSIS OF NAIL: ICD-10-CM

## 2023-03-23 DIAGNOSIS — E11.51 TYPE II DIABETES MELLITUS WITH PERIPHERAL CIRCULATORY DISORDER (HCC): Primary | ICD-10-CM

## 2023-03-23 DIAGNOSIS — I73.9 PERIPHERAL VASCULAR DISEASE (HCC): ICD-10-CM

## 2023-03-23 DIAGNOSIS — M79.672 PAIN IN BOTH FEET: ICD-10-CM

## 2023-03-23 PROCEDURE — 99999 PR OFFICE/OUTPT VISIT,PROCEDURE ONLY: CPT | Performed by: PODIATRIST

## 2023-03-23 PROCEDURE — 11721 DEBRIDE NAIL 6 OR MORE: CPT | Performed by: PODIATRIST

## 2023-03-30 NOTE — PROGRESS NOTES
monofilament to the plantar foot bilateral feet    Pulses: abnormal - 1/4 dorsalis pedis pulse and 0/4 Posterior tibial pulse,   Pinprick: Impaired  Proprioception: Impaired  Vibration (128 Hz): Impaired       DM with PVD       [x]Yes    []No      Assessment:  82 y.o. female with:   Diagnosis Orders   1. Type II diabetes mellitus with peripheral circulatory disorder (HCC)  95890 - [place] DEBRIDEMENT OF NAILS, 6 OR MORE    HM DIABETES FOOT EXAM      2. Dermatophytosis of nail  94016 - MS DEBRIDEMENT OF NAILS, 6 OR MORE    HM DIABETES FOOT EXAM      3. Peripheral vascular disease (HCC)  97350 - MS DEBRIDEMENT OF NAILS, 6 OR MORE    HM DIABETES FOOT EXAM      4. Pain in both feet  45499 - MS DEBRIDEMENT OF NAILS, 6 OR MORE    HM DIABETES FOOT EXAM                  Q7   []Yes    []No                Q8   [x]Yes    []No                     Q9   []Yes    []No    Plan:   Pt was evaluated and examined. Patient was given personalized discharge instructions. Nails 1-10 were debrided sharply in length and thickness with a nipper and , without incident. Pt will follow up in 9 weeks or sooner if any problems arise. Diagnosis was discussed with the pt and all of their questions were answered in detail. Proper foot hygiene and care was discussed with the pt. Informed patient on proper diabetic foot care and importance of tight glycemic control. Patient to check feet daily and contact the office with any questions/problems/concerns.    Other comorbidity noted and will be managed by PCP.  3/23/2023    Electronically signed by Karely Carrillo DPM on 3/30/2023 at 12:58 PM  3/23/2023

## 2023-05-25 ENCOUNTER — OFFICE VISIT (OUTPATIENT)
Dept: PODIATRY | Age: 83
End: 2023-05-25
Payer: MEDICARE

## 2023-05-25 VITALS — HEIGHT: 60 IN | BODY MASS INDEX: 39.66 KG/M2 | WEIGHT: 202 LBS

## 2023-05-25 DIAGNOSIS — M79.672 PAIN IN BOTH FEET: ICD-10-CM

## 2023-05-25 DIAGNOSIS — I73.9 PERIPHERAL VASCULAR DISEASE (HCC): ICD-10-CM

## 2023-05-25 DIAGNOSIS — M79.671 PAIN IN BOTH FEET: ICD-10-CM

## 2023-05-25 DIAGNOSIS — E11.51 TYPE II DIABETES MELLITUS WITH PERIPHERAL CIRCULATORY DISORDER (HCC): Primary | ICD-10-CM

## 2023-05-25 DIAGNOSIS — B35.1 DERMATOPHYTOSIS OF NAIL: ICD-10-CM

## 2023-05-25 PROCEDURE — 11721 DEBRIDE NAIL 6 OR MORE: CPT | Performed by: PODIATRIST

## 2023-05-25 PROCEDURE — 99999 PR OFFICE/OUTPT VISIT,PROCEDURE ONLY: CPT | Performed by: PODIATRIST

## 2023-05-30 NOTE — PROGRESS NOTES
[address] PODIATRY [facility]  [address] [address]  Dept: [phone]  Dept Fax: [phone]    DIABETIC PROGRESS NOTE  Date of patient's visit: 5/25/2023  Patient's Name:  Leia Murrell YOB: 1940            Patient Care Team:  Barby Villanueva DO as PCP - General (Family Medicine)  Barby Villanueva DO as PCP - Empaneled Provider  Valentín Barnett DPM as Physician (Podiatry)          Chief Complaint   Patient presents with    Diabetes     Diabetic foot care   BS 53    Peripheral Neuropathy     Bl feet        Subjective:   Leia Murrell comes to clinic for Diabetes (Diabetic foot care /BS 53) and Peripheral Neuropathy (Bl feet )    she is a diabetic and states that she checks her feet daily . Pt currently has complaint of thickened, elongated nails that they cannot manage by themselves. Pt's primary care physician is Barby Villanueva DO last seen 2/7/2023   Pt's last blood sugar was 124     . Pt has a new complaint of none today . Lab Results   Component Value Date    LABA1C 5.8 09/30/2022      Complains of numbness in the feet bilat.   Past Medical History:   Diagnosis Date    Abnormal cardiovascular stress test 02/2021    LARGE ANTERIOR INFARCTION / LARGE AREA OF INFERIOR LATERAL ISCHEMIA WITH REDUCED EF 38%    Abnormal stress test 07/18/2014    going to do cath- not urgent just abnormal    Ambulates with cane     PRN    Arthritis     At high risk for hyperkalemia 10/22/2014    From type 4 RTA    Back pain     CAD (coronary artery disease)     Circulation problem     decreased circulation to  zbigniew extremities    CKD (chronic kidney disease) stage 3, GFR 30-59 ml/min ([facility]) 10/22/2014    From diabetic and ischemic nephrosclerosis, baseline 1.4, GFR 40-45 ml/min, UPC 0.3    COPD (chronic obstructive pulmonary disease) ([facility])     DM (diabetes mellitus) ([facility])     Edema     chronic

## 2023-06-05 NOTE — TELEPHONE ENCOUNTER
E-scribe request for PENDED GLUCOMETER. Please review and e-scribe if applicable. Last Visit Date:  12/5/2022  Next Visit Date:  Visit date not found    Hemoglobin A1C (%)   Date Value   09/30/2022 5.8   03/31/2022 6.5 (H)   11/23/2020 6.0             ( goal A1C is < 7)   Microalb/Crt.  Ratio (mcg/mg creat)   Date Value   03/31/2022 573 (H)     LDL Cholesterol (mg/dL)   Date Value   02/28/2022 57       (goal LDL is <100)   AST (U/L)   Date Value   02/28/2022 15     ALT (U/L)   Date Value   02/28/2022 8     BUN (mg/dL)   Date Value   02/24/2023 32 (H)     BP Readings from Last 3 Encounters:   03/16/23 (!) 165/68   03/01/23 130/78   12/05/22 (!) 139/59          (goal 120/80)        Patient Active Problem List:     DM (diabetes mellitus) ([address]     Hypercholesteremia     CAD (coronary artery disease)     Hypothyroidism     Lumbar spondylosis with myelopathy     S/P cardiac cath 8/19/14-Dr. covarrubias     CKD (chronic kidney disease) stage 3, GFR 30-59 ml/min ([facility])     Morbid obesity with BMI of 40.0-44.9, adult ([address]     At high risk for hyperkalemia     Edema     COPD (chronic obstructive pulmonary disease) ([facility])     Primary hyperparathyroidism ([facility])     Carotid stenosis, asymptomatic     Claudication in peripheral vascular disease ([facility])     Nonproliferative diabetic retinopathy of both eyes ([facility])     Type 2 diabetes mellitus with stage 3 chronic kidney disease, with long-term current use of insulin ([address]     Coronary artery disease involving coronary bypass graft of native heart without angina pectoris     Essential hypertension     Localized edema     DM (diabetes mellitus), type 2 with peripheral vascular complications ([facility])     Mild nonproliferative diabetic retinopathy associated with type 2 diabetes mellitus ([address]     Panlobular emphysema ([facility])     S/P drug eluting coronary stent placement-OM1 3/26/18-Dr. covarrubias     CAD S/P percutaneous coronary angioplasty     Persistent proteinuria     Lymphedema of right lower

## 2023-06-06 ENCOUNTER — HOSPITAL ENCOUNTER (OUTPATIENT)
Age: 83
Discharge: HOME | End: 2023-06-06
Payer: MEDICARE

## 2023-06-06 DIAGNOSIS — R80.1 PERSISTENT PROTEINURIA: ICD-10-CM

## 2023-06-06 DIAGNOSIS — N18.32 STAGE 3B CHRONIC KIDNEY DISEASE (HCC): ICD-10-CM

## 2023-06-06 LAB
ALBUMIN SERPL-MCNC: 3.8 G/DL (ref 3.5–5.2)
ALT SERPL-CCNC: 10 U/L (ref 5–33)
ANION GAP SERPL CALCULATED.3IONS-SCNC: 18 MMOL/L (ref 9–17)
AST SERPL-CCNC: 17 U/L
BUN SERPL-MCNC: 22 MG/DL (ref 8–23)
CALCIUM SERPL-MCNC: 10.3 MG/DL (ref 8.6–10.4)
CHLORIDE SERPL-SCNC: 103 MMOL/L (ref 98–107)
CHOLEST SERPL-MCNC: 143 MG/DL
CHOLESTEROL/HDL RATIO: 2.6
CO2 SERPL-SCNC: 22 MMOL/L (ref 20–31)
CREAT SERPL-MCNC: 1.71 MG/DL (ref 0.5–0.9)
CREAT UR-MCNC: 163.8 MG/DL (ref 28–217)
ERYTHROCYTE [DISTWIDTH] IN BLOOD BY AUTOMATED COUNT: 15.3 % (ref 11.8–14.4)
GFR SERPL CREATININE-BSD FRML MDRD: 30 ML/MIN/1.73M2
GLUCOSE SERPL-MCNC: 123 MG/DL (ref 70–99)
HCT VFR BLD AUTO: 39.2 % (ref 36.3–47.1)
HDLC SERPL-MCNC: 56 MG/DL
HGB BLD-MCNC: 12.4 G/DL (ref 11.9–15.1)
LDLC SERPL CALC-MCNC: 58 MG/DL (ref 0–130)
MCH RBC QN AUTO: 28.6 PG (ref 25.2–33.5)
MCHC RBC AUTO-ENTMCNC: 31.6 G/DL (ref 28.4–34.8)
MCV RBC AUTO: 90.3 FL (ref 82.6–102.9)
NRBC AUTOMATED: 0 PER 100 WBC
PHOSPHATE SERPL-MCNC: 3.5 MG/DL (ref 2.6–4.5)
PLATELET # BLD AUTO: 260 K/UL (ref 138–453)
PMV BLD AUTO: 12.4 FL (ref 8.1–13.5)
POTASSIUM SERPL-SCNC: 4.4 MMOL/L (ref 3.7–5.3)
PTH-INTACT SERPL-MCNC: 225.1 PG/ML (ref 14–72)
RBC # BLD AUTO: 4.34 M/UL (ref 3.95–5.11)
SODIUM SERPL-SCNC: 143 MMOL/L (ref 135–144)
TOTAL PROTEIN, URINE: 389 MG/DL
TRIGL SERPL-MCNC: 147 MG/DL
URINE TOTAL PROTEIN CREATININE RATIO: 2.37 (ref 0–0.2)
WBC OTHER # BLD: 6.9 K/UL (ref 3.5–11.3)

## 2023-06-06 PROCEDURE — 80061 LIPID PANEL: CPT

## 2023-06-06 PROCEDURE — 84450 TRANSFERASE (AST) (SGOT): CPT

## 2023-06-06 PROCEDURE — 36415 COLL VENOUS BLD VENIPUNCTURE: CPT

## 2023-06-06 PROCEDURE — 85027 COMPLETE CBC AUTOMATED: CPT

## 2023-06-06 PROCEDURE — 82570 ASSAY OF URINE CREATININE: CPT

## 2023-06-06 PROCEDURE — 82040 ASSAY OF SERUM ALBUMIN: CPT

## 2023-06-06 PROCEDURE — 84100 ASSAY OF PHOSPHORUS: CPT

## 2023-06-06 PROCEDURE — 84156 ASSAY OF PROTEIN URINE: CPT

## 2023-06-06 PROCEDURE — 83970 ASSAY OF PARATHORMONE: CPT

## 2023-06-06 PROCEDURE — 80048 BASIC METABOLIC PNL TOTAL CA: CPT

## 2023-06-06 PROCEDURE — 84460 ALANINE AMINO (ALT) (SGPT): CPT

## 2023-06-06 RX ORDER — DIPHENHYDRAMINE HCL 25 MG
TABLET ORAL
Qty: 1 KIT | Refills: 0 | Status: SHIPPED | OUTPATIENT
Start: 2023-06-06

## 2023-06-14 NOTE — FLOWSHEET NOTE
[x] [facility]) [facility] &  Therapy  [address]    [phone]  F: [phone]   [] [address]  [facility]   [address]  P: [phone]  F: [phone]  [] [address]  [address]  P: [phone]  F: [phone] [] [address]  P: [phone]  F: [phone]  [] [address]  [facility]   [address]   [place]: [phone]  F: [phone]   [] [place] [address] [facility] [address]  [place]: [phone]   F: [phone]     Physical Therapy Cancel/No Show note    Date: 2021  Patient: Ajit Benavidez  : 1940  MRN: [id number]    Cancels/No Shows to date: 3/0    For today's appointment patient:    [x]  Cancelled pt ill. [] Rescheduled appointment    [] No-show     Reason given by patient:    [x]  Patient ill    []  Conflicting appointment    [] No transportation      [] Conflict with work    [] No reason given    [] Weather related    [] [id number]    [x] Other:      Comments:  Ended up  Cancelling appt due to authorization ended 3/31/21. Pt will continue with HEP an follow up with MD. Will discharge from PT , pt in agreement.     [] Next appointment was confirmed    Electronically signed by: Carlin Wilburn PTA
Family

## 2023-06-16 ENCOUNTER — TELEPHONE (OUTPATIENT)
Dept: FAMILY MEDICINE CLINIC | Age: 83
End: 2023-06-16

## 2023-06-21 ENCOUNTER — HOSPITAL ENCOUNTER (INPATIENT)
Age: 83
LOS: 3 days | Discharge: HOME | End: 2023-06-24
Attending: EMERGENCY MEDICINE | Admitting: INTERNAL MEDICINE
Payer: MEDICARE

## 2023-06-21 ENCOUNTER — APPOINTMENT (OUTPATIENT)
Dept: CT IMAGING | Age: 83
End: 2023-06-21
Payer: MEDICARE

## 2023-06-21 ENCOUNTER — APPOINTMENT (OUTPATIENT)
Dept: GENERAL RADIOLOGY | Age: 83
End: 2023-06-21
Payer: MEDICARE

## 2023-06-21 ENCOUNTER — APPOINTMENT (OUTPATIENT)
Dept: NUCLEAR MEDICINE | Age: 83
End: 2023-06-21
Payer: MEDICARE

## 2023-06-21 DIAGNOSIS — I50.23 ACUTE ON CHRONIC SYSTOLIC CONGESTIVE HEART FAILURE (HCC): Primary | ICD-10-CM

## 2023-06-21 DIAGNOSIS — I50.21 ACUTE SYSTOLIC HEART FAILURE (HCC): ICD-10-CM

## 2023-06-21 PROBLEM — I50.9 CHF WITH UNKNOWN LVEF (HCC): Status: ACTIVE | Noted: 2023-06-21

## 2023-06-21 LAB
ALBUMIN SERPL-MCNC: 3.5 G/DL (ref 3.5–5.2)
ALP SERPL-CCNC: 86 U/L (ref 35–104)
ALT SERPL-CCNC: 11 U/L (ref 5–33)
ANION GAP SERPL CALCULATED.3IONS-SCNC: 8 MMOL/L (ref 9–17)
AST SERPL-CCNC: 16 U/L
BASOPHILS # BLD: 0.08 K/UL (ref 0–0.2)
BASOPHILS NFR BLD: 1 % (ref 0–2)
BILIRUB SERPL-MCNC: 0.4 MG/DL (ref 0.3–1.2)
BILIRUB UR QL STRIP: NEGATIVE
BNP SERPL-MCNC: ABNORMAL PG/ML
BUN SERPL-MCNC: 17 MG/DL (ref 8–23)
BUN/CREAT SERPL: 11 (ref 9–20)
CALCIUM SERPL-MCNC: 9.6 MG/DL (ref 8.6–10.4)
CHLORIDE SERPL-SCNC: 96 MMOL/L (ref 98–107)
CLARITY UR: CLEAR
CO2 SERPL-SCNC: 27 MMOL/L (ref 20–31)
COLOR UR: YELLOW
CREAT SERPL-MCNC: 1.48 MG/DL (ref 0.5–0.9)
D DIMER PPP FEU-MCNC: 0.93 UG/ML FEU (ref 0–0.59)
EOSINOPHIL # BLD: 0.09 K/UL (ref 0–0.44)
EOSINOPHILS RELATIVE PERCENT: 1 % (ref 1–4)
EPI CELLS #/AREA URNS HPF: ABNORMAL /HPF (ref 0–5)
ERYTHROCYTE [DISTWIDTH] IN BLOOD BY AUTOMATED COUNT: 15.4 % (ref 11.8–14.4)
GFR SERPL CREATININE-BSD FRML MDRD: 35 ML/MIN/1.73M2
GLUCOSE BLD-MCNC: 159 MG/DL (ref 65–105)
GLUCOSE SERPL-MCNC: 97 MG/DL (ref 70–99)
GLUCOSE UR STRIP-MCNC: NEGATIVE MG/DL
HCT VFR BLD AUTO: 37.6 % (ref 36.3–47.1)
HGB BLD-MCNC: 11.8 G/DL (ref 11.9–15.1)
HGB UR QL STRIP.AUTO: ABNORMAL
IMM GRANULOCYTES # BLD AUTO: 0.02 K/UL (ref 0–0.3)
IMM GRANULOCYTES NFR BLD: 0 %
KETONES UR STRIP-MCNC: NEGATIVE MG/DL
LEUKOCYTE ESTERASE UR QL STRIP: NEGATIVE
LYMPHOCYTES # BLD: 16 % (ref 24–43)
LYMPHOCYTES NFR BLD: 1.12 K/UL (ref 1.1–3.7)
MCH RBC QN AUTO: 28 PG (ref 25.2–33.5)
MCHC RBC AUTO-ENTMCNC: 31.4 G/DL (ref 28.4–34.8)
MCV RBC AUTO: 89.1 FL (ref 82.6–102.9)
MONOCYTES NFR BLD: 0.62 K/UL (ref 0.1–1.2)
MONOCYTES NFR BLD: 9 % (ref 3–12)
NEUTROPHILS NFR BLD: 73 % (ref 36–65)
NEUTS SEG NFR BLD: 5.16 K/UL (ref 1.5–8.1)
NITRITE UR QL STRIP: NEGATIVE
NRBC AUTOMATED: 0 PER 100 WBC
PH UR STRIP: 6 [PH] (ref 5–8)
PLATELET # BLD AUTO: 240 K/UL (ref 138–453)
PMV BLD AUTO: 11.8 FL (ref 8.1–13.5)
POTASSIUM SERPL-SCNC: 4.6 MMOL/L (ref 3.7–5.3)
PROT SERPL-MCNC: 5.7 G/DL (ref 6.4–8.3)
PROT UR STRIP-MCNC: ABNORMAL MG/DL
RBC # BLD AUTO: 4.22 M/UL (ref 3.95–5.11)
RBC # BLD: ABNORMAL 10*6/UL
RBC #/AREA URNS HPF: ABNORMAL /HPF (ref 0–2)
SODIUM SERPL-SCNC: 131 MMOL/L (ref 135–144)
SP GR UR STRIP: 1.01 (ref 1–1.03)
T4 FREE SERPL-MCNC: 1.1 NG/DL (ref 0.9–1.7)
TROPONIN I SERPL HS-MCNC: 41 NG/L (ref 0–14)
TROPONIN I SERPL HS-MCNC: 43 NG/L (ref 0–14)
TSH SERPL-MCNC: 28.17 UIU/ML (ref 0.3–5)
UROBILINOGEN UR STRIP-ACNC: NORMAL
WBC #/AREA URNS HPF: ABNORMAL /HPF (ref 0–5)
WBC OTHER # BLD: 7.1 K/UL (ref 3.5–11.3)

## 2023-06-21 PROCEDURE — 93005 ELECTROCARDIOGRAM TRACING: CPT | Performed by: EMERGENCY MEDICINE

## 2023-06-21 PROCEDURE — 2580000003 HC RX 258: Performed by: NURSE PRACTITIONER

## 2023-06-21 PROCEDURE — 6370000000 HC RX 637 (ALT 250 FOR IP): Performed by: NURSE PRACTITIONER

## 2023-06-21 PROCEDURE — 6360000002 HC RX W HCPCS: Performed by: NURSE PRACTITIONER

## 2023-06-21 PROCEDURE — 93970 EXTREMITY STUDY: CPT

## 2023-06-21 PROCEDURE — 81001 URINALYSIS AUTO W/SCOPE: CPT

## 2023-06-21 PROCEDURE — 83880 ASSAY OF NATRIURETIC PEPTIDE: CPT

## 2023-06-21 PROCEDURE — 84439 ASSAY OF FREE THYROXINE: CPT

## 2023-06-21 PROCEDURE — A9540 TC99M MAA: HCPCS | Performed by: EMERGENCY MEDICINE

## 2023-06-21 PROCEDURE — 99222 1ST HOSP IP/OBS MODERATE 55: CPT | Performed by: NURSE PRACTITIONER

## 2023-06-21 PROCEDURE — 85027 COMPLETE CBC AUTOMATED: CPT

## 2023-06-21 PROCEDURE — 6360000002 HC RX W HCPCS: Performed by: EMERGENCY MEDICINE

## 2023-06-21 PROCEDURE — 85379 FIBRIN DEGRADATION QUANT: CPT

## 2023-06-21 PROCEDURE — 70450 CT HEAD/BRAIN W/O DYE: CPT

## 2023-06-21 PROCEDURE — 71045 X-RAY EXAM CHEST 1 VIEW: CPT

## 2023-06-21 PROCEDURE — 72125 CT NECK SPINE W/O DYE: CPT

## 2023-06-21 PROCEDURE — 84484 ASSAY OF TROPONIN QUANT: CPT

## 2023-06-21 PROCEDURE — 3430000000 HC RX DIAGNOSTIC RADIOPHARMACEUTICAL: Performed by: EMERGENCY MEDICINE

## 2023-06-21 PROCEDURE — 78582 LUNG VENTILAT&PERFUS IMAGING: CPT

## 2023-06-21 PROCEDURE — 82947 ASSAY GLUCOSE BLOOD QUANT: CPT

## 2023-06-21 PROCEDURE — 99285 EMERGENCY DEPT VISIT HI MDM: CPT

## 2023-06-21 PROCEDURE — 2060000000 HC ICU INTERMEDIATE R&B

## 2023-06-21 PROCEDURE — 80053 COMPREHEN METABOLIC PANEL: CPT

## 2023-06-21 PROCEDURE — 83036 HEMOGLOBIN GLYCOSYLATED A1C: CPT

## 2023-06-21 PROCEDURE — A9539 TC99M PENTETATE: HCPCS | Performed by: EMERGENCY MEDICINE

## 2023-06-21 PROCEDURE — 84443 ASSAY THYROID STIM HORMONE: CPT

## 2023-06-21 RX ORDER — ASPIRIN 81 MG/1
162 TABLET ORAL DAILY
Status: DISCONTINUED | OUTPATIENT
Start: 2023-06-22 | End: 2023-06-24 | Stop reason: HOSPADM

## 2023-06-21 RX ORDER — KIT FOR THE PREPARATION OF TECHNETIUM TC 99M PENTETATE 20 MG/1
42.4 INJECTION, POWDER, LYOPHILIZED, FOR SOLUTION INTRAVENOUS; RESPIRATORY (INHALATION)
Status: COMPLETED | OUTPATIENT
Start: 2023-06-21 | End: 2023-06-21

## 2023-06-21 RX ORDER — ATORVASTATIN CALCIUM 40 MG/1
40 TABLET, FILM COATED ORAL NIGHTLY
Status: DISCONTINUED | OUTPATIENT
Start: 2023-06-21 | End: 2023-06-24 | Stop reason: HOSPADM

## 2023-06-21 RX ORDER — HEPARIN SODIUM 5000 [USP'U]/ML
5000 INJECTION, SOLUTION INTRAVENOUS; SUBCUTANEOUS EVERY 8 HOURS SCHEDULED
Status: DISCONTINUED | OUTPATIENT
Start: 2023-06-21 | End: 2023-06-24 | Stop reason: HOSPADM

## 2023-06-21 RX ORDER — PANTOPRAZOLE SODIUM 40 MG/1
40 TABLET, DELAYED RELEASE ORAL
Status: DISCONTINUED | OUTPATIENT
Start: 2023-06-22 | End: 2023-06-24 | Stop reason: HOSPADM

## 2023-06-21 RX ORDER — INSULIN LISPRO 100 [IU]/ML
0-4 INJECTION, SOLUTION INTRAVENOUS; SUBCUTANEOUS NIGHTLY
Status: DISCONTINUED | OUTPATIENT
Start: 2023-06-21 | End: 2023-06-24 | Stop reason: HOSPADM

## 2023-06-21 RX ORDER — FUROSEMIDE 10 MG/ML
20 INJECTION INTRAMUSCULAR; INTRAVENOUS DAILY
Status: DISCONTINUED | OUTPATIENT
Start: 2023-06-22 | End: 2023-06-21

## 2023-06-21 RX ORDER — FUROSEMIDE 10 MG/ML
20 INJECTION INTRAMUSCULAR; INTRAVENOUS ONCE
Status: COMPLETED | OUTPATIENT
Start: 2023-06-21 | End: 2023-06-21

## 2023-06-21 RX ORDER — ONDANSETRON 2 MG/ML
4 INJECTION INTRAMUSCULAR; INTRAVENOUS EVERY 6 HOURS PRN
Status: DISCONTINUED | OUTPATIENT
Start: 2023-06-21 | End: 2023-06-24 | Stop reason: HOSPADM

## 2023-06-21 RX ORDER — LEVOTHYROXINE SODIUM 88 UG/1
88 TABLET ORAL DAILY
Status: DISCONTINUED | OUTPATIENT
Start: 2023-06-22 | End: 2023-06-24 | Stop reason: HOSPADM

## 2023-06-21 RX ORDER — AMLODIPINE BESYLATE 10 MG/1
10 TABLET ORAL DAILY
Status: DISCONTINUED | OUTPATIENT
Start: 2023-06-22 | End: 2023-06-24 | Stop reason: HOSPADM

## 2023-06-21 RX ORDER — DEXTROSE MONOHYDRATE 100 MG/ML
INJECTION, SOLUTION INTRAVENOUS CONTINUOUS PRN
Status: DISCONTINUED | OUTPATIENT
Start: 2023-06-21 | End: 2023-06-24 | Stop reason: HOSPADM

## 2023-06-21 RX ORDER — SODIUM CHLORIDE 0.9 % (FLUSH) 0.9 %
10 SYRINGE (ML) INJECTION PRN
Status: DISCONTINUED | OUTPATIENT
Start: 2023-06-21 | End: 2023-06-24 | Stop reason: HOSPADM

## 2023-06-21 RX ORDER — POTASSIUM CHLORIDE 7.45 MG/ML
10 INJECTION INTRAVENOUS PRN
Status: DISCONTINUED | OUTPATIENT
Start: 2023-06-21 | End: 2023-06-24 | Stop reason: HOSPADM

## 2023-06-21 RX ORDER — POLYETHYLENE GLYCOL 3350 17 G/17G
17 POWDER, FOR SOLUTION ORAL DAILY PRN
Status: DISCONTINUED | OUTPATIENT
Start: 2023-06-21 | End: 2023-06-24 | Stop reason: HOSPADM

## 2023-06-21 RX ORDER — INSULIN GLARGINE 100 [IU]/ML
10 INJECTION, SOLUTION SUBCUTANEOUS NIGHTLY
Status: DISCONTINUED | OUTPATIENT
Start: 2023-06-21 | End: 2023-06-24 | Stop reason: HOSPADM

## 2023-06-21 RX ORDER — INSULIN LISPRO 100 [IU]/ML
0-4 INJECTION, SOLUTION INTRAVENOUS; SUBCUTANEOUS
Status: DISCONTINUED | OUTPATIENT
Start: 2023-06-22 | End: 2023-06-24 | Stop reason: HOSPADM

## 2023-06-21 RX ORDER — METOPROLOL SUCCINATE 50 MG/1
100 TABLET, EXTENDED RELEASE ORAL DAILY
Status: DISCONTINUED | OUTPATIENT
Start: 2023-06-22 | End: 2023-06-24 | Stop reason: HOSPADM

## 2023-06-21 RX ORDER — ACETAMINOPHEN 650 MG/1
650 SUPPOSITORY RECTAL EVERY 6 HOURS PRN
Status: DISCONTINUED | OUTPATIENT
Start: 2023-06-21 | End: 2023-06-24 | Stop reason: HOSPADM

## 2023-06-21 RX ORDER — SODIUM CHLORIDE 9 MG/ML
INJECTION, SOLUTION INTRAVENOUS PRN
Status: DISCONTINUED | OUTPATIENT
Start: 2023-06-21 | End: 2023-06-24 | Stop reason: HOSPADM

## 2023-06-21 RX ORDER — ACETAMINOPHEN 500 MG
1000 TABLET ORAL EVERY 8 HOURS PRN
Status: DISCONTINUED | OUTPATIENT
Start: 2023-06-21 | End: 2023-06-21 | Stop reason: SDUPTHER

## 2023-06-21 RX ORDER — ONDANSETRON 4 MG/1
4 TABLET, ORALLY DISINTEGRATING ORAL EVERY 8 HOURS PRN
Status: DISCONTINUED | OUTPATIENT
Start: 2023-06-21 | End: 2023-06-24 | Stop reason: HOSPADM

## 2023-06-21 RX ORDER — ACETAMINOPHEN 325 MG/1
650 TABLET ORAL EVERY 6 HOURS PRN
Status: DISCONTINUED | OUTPATIENT
Start: 2023-06-21 | End: 2023-06-24 | Stop reason: HOSPADM

## 2023-06-21 RX ORDER — MAGNESIUM SULFATE IN WATER 40 MG/ML
2000 INJECTION, SOLUTION INTRAVENOUS PRN
Status: DISCONTINUED | OUTPATIENT
Start: 2023-06-21 | End: 2023-06-24 | Stop reason: HOSPADM

## 2023-06-21 RX ORDER — ATORVASTATIN CALCIUM 40 MG/1
40 TABLET, FILM COATED ORAL DAILY
Status: DISCONTINUED | OUTPATIENT
Start: 2023-06-22 | End: 2023-06-21

## 2023-06-21 RX ORDER — POTASSIUM CHLORIDE 20 MEQ/1
40 TABLET, EXTENDED RELEASE ORAL PRN
Status: DISCONTINUED | OUTPATIENT
Start: 2023-06-21 | End: 2023-06-24 | Stop reason: HOSPADM

## 2023-06-21 RX ORDER — SODIUM BICARBONATE 650 MG/1
650 TABLET ORAL 2 TIMES DAILY
Status: DISCONTINUED | OUTPATIENT
Start: 2023-06-21 | End: 2023-06-24 | Stop reason: HOSPADM

## 2023-06-21 RX ORDER — ENOXAPARIN SODIUM 100 MG/ML
40 INJECTION SUBCUTANEOUS DAILY
Status: DISCONTINUED | OUTPATIENT
Start: 2023-06-22 | End: 2023-06-21

## 2023-06-21 RX ORDER — CLOPIDOGREL BISULFATE 75 MG/1
75 TABLET ORAL DAILY
Status: DISCONTINUED | OUTPATIENT
Start: 2023-06-22 | End: 2023-06-24 | Stop reason: HOSPADM

## 2023-06-21 RX ORDER — LISINOPRIL 20 MG/1
20 TABLET ORAL 2 TIMES DAILY
Status: DISCONTINUED | OUTPATIENT
Start: 2023-06-21 | End: 2023-06-24 | Stop reason: HOSPADM

## 2023-06-21 RX ORDER — PARICALCITOL 1 UG/1
1 CAPSULE, LIQUID FILLED ORAL
Status: ON HOLD | COMMUNITY
End: 2023-06-21

## 2023-06-21 RX ORDER — DOCUSATE SODIUM 100 MG/1
100 CAPSULE, LIQUID FILLED ORAL 2 TIMES DAILY PRN
Status: DISCONTINUED | OUTPATIENT
Start: 2023-06-21 | End: 2023-06-24 | Stop reason: HOSPADM

## 2023-06-21 RX ORDER — FUROSEMIDE 10 MG/ML
20 INJECTION INTRAMUSCULAR; INTRAVENOUS 2 TIMES DAILY
Status: DISCONTINUED | OUTPATIENT
Start: 2023-06-21 | End: 2023-06-23

## 2023-06-21 RX ORDER — SODIUM CHLORIDE 0.9 % (FLUSH) 0.9 %
5-40 SYRINGE (ML) INJECTION EVERY 12 HOURS SCHEDULED
Status: DISCONTINUED | OUTPATIENT
Start: 2023-06-21 | End: 2023-06-24 | Stop reason: HOSPADM

## 2023-06-21 RX ADMIN — LISINOPRIL 20 MG: 20 TABLET ORAL at 20:48

## 2023-06-21 RX ADMIN — KIT FOR THE PREPARATION OF TECHNETIUM TC 99M PENTETATE 42.4 MILLICURIE: 20 INJECTION, POWDER, LYOPHILIZED, FOR SOLUTION INTRAVENOUS; RESPIRATORY (INHALATION) at 13:15

## 2023-06-21 RX ADMIN — SODIUM CHLORIDE, PRESERVATIVE FREE 10 ML: 5 INJECTION INTRAVENOUS at 20:53

## 2023-06-21 RX ADMIN — Medication 9.6 MILLICURIE: at 13:30

## 2023-06-21 RX ADMIN — SODIUM BICARBONATE 650 MG: 650 TABLET ORAL at 20:48

## 2023-06-21 RX ADMIN — FUROSEMIDE 20 MG: 10 INJECTION, SOLUTION INTRAMUSCULAR; INTRAVENOUS at 20:50

## 2023-06-21 RX ADMIN — INSULIN GLARGINE 10 UNITS: 100 INJECTION, SOLUTION SUBCUTANEOUS at 20:49

## 2023-06-21 RX ADMIN — ATORVASTATIN CALCIUM 40 MG: 40 TABLET, FILM COATED ORAL at 20:48

## 2023-06-21 RX ADMIN — FUROSEMIDE 20 MG: 10 INJECTION, SOLUTION INTRAMUSCULAR; INTRAVENOUS at 11:52

## 2023-06-21 ASSESSMENT — ENCOUNTER SYMPTOMS
SHORTNESS OF BREATH: 1
PHOTOPHOBIA: 0
CONSTIPATION: 0
NAUSEA: 0
DIARRHEA: 0
CHOKING: 0
CHEST TIGHTNESS: 0
RHINORRHEA: 0
VOMITING: 0
COUGH: 0

## 2023-06-21 ASSESSMENT — PAIN - FUNCTIONAL ASSESSMENT: PAIN_FUNCTIONAL_ASSESSMENT: NONE - DENIES PAIN

## 2023-06-21 ASSESSMENT — LIFESTYLE VARIABLES
HOW MANY STANDARD DRINKS CONTAINING ALCOHOL DO YOU HAVE ON A TYPICAL DAY: PATIENT DOES NOT DRINK
HOW OFTEN DO YOU HAVE A DRINK CONTAINING ALCOHOL: NEVER

## 2023-06-21 NOTE — ED NOTES
Pt noted to have oxygen saturation right at high 80's to low 90's, supplemental oxygen provided at 2 lpm via NC with positive response. Oxygen increased to 92%. Pt provided with crackers, peanut butter, and diet ginger ale per request. Pt updated on room assignment pending housekeeping. Pt denies further needs. Will continue to monitor.      Deedra Bence, RN  06/21/23 0026

## 2023-06-21 NOTE — ED NOTES
Pt to VQ scan accompanied by Nuclear Med tech, stable condition.      Pamella Hamilton, RN  06/21/23 6720

## 2023-06-21 NOTE — ED NOTES
Pt assisted to restroom via w/c and instructed on restroom call light use. Hat placed in toilet for urine collection.      Heber Corey RN  06/21/23 3895

## 2023-06-21 NOTE — ED NOTES
ED to inpatient nurses report     Chief Complaint   Patient presents with    Leg Swelling    Shortness of Breath    Fatigue      Present to ED from home for c/o increased leg swelling, fatigue and mild shortness of breath ongoing for a couple weeks per pt. Pt denies known hx of CHF, labs/XR performed in ED are indicative of CHF requiring further workup as reason for admission. Pt does report hx of CKD, not on dialysis and patient does produce urine. LOC: alert and orientated to name, place, date  Vital signs   Vitals:    06/21/23 1408 06/21/23 1500 06/21/23 1501 06/21/23 1527   BP:  (!) 158/58  (!) 140/100   Pulse: 82 77 70 75   Resp: 20 (!) 31 24 22   Temp:       TempSrc:       SpO2: 92% 91% 92% 90%   Weight:       Height:          Oxygen Baseline RA    Current needs required RA   LDAs:   Peripheral IV 06/21/23 Right Forearm (Active)   Site Assessment Clean, dry & intact 06/21/23 1053   Line Status Blood return noted; Flushed 06/21/23 1053   Dressing Status New dressing applied;Clean, dry & intact 06/21/23 1053     Mobility: Requires assistance * 1 - requires more time/standby assist  Fall Risk:    Pending ED orders: N/a  Present condition: Stable  Code Status: Unknown  Consults: IP CONSULT TO HOSPITALIST  IP CONSULT TO HEART FAILURE NURSE/COORDINATOR  IP CONSULT TO DIETITIAN  IP CONSULT TO CARDIOLOGY  [x]  Hospitalist  Completed  [x] yes [] no Who: [facility] - AMooreNP  []  Medicine  Completed  [] yes [] No Who:   [x]  Cardiology  Completed  [] yes [x] No Who:   []  GI   Completed  [] yes [] No Who:   []  Neurology  Completed  [] yes [] No Who:   []  Nephrology Completed  [] yes [] No Who:    []  Vascular  Completed  [] yes [] No Who:   []  Ortho  Completed  [] yes [] No Who:     []  Surgery  Completed  [] yes [] No Who:    []  Urology  Completed  [] yes [] No Who:    []  CT Surgery Completed  [] yes [] No Who:   []  Podiatry  Completed  [] yes [] No Who:    []  Other    Completed  [] yes []

## 2023-06-21 NOTE — ED NOTES
This RN with 2 unsuccessful IV attempts. Adam Osborn, charge RN notified and US IV start requested.      Pamella Hamilton, RN  06/21/23 6527

## 2023-06-21 NOTE — ED PROVIDER NOTES
[address]  Emergency Department Encounter     Pt Name: Philippe Luis  MRN: [id number]  [place] 1940  Date of evaluation: 6/21/23  PCP:  Eleonora Deshpande, [address] [place] [address]       Chief Complaint   Patient presents with    Leg Swelling    Shortness of Breath    Fatigue       HISTORY OF PRESENT ILLNESS  (Location/Symptom, Timing/Onset, Context/Setting, Quality, Duration, Modifying Factors, Severity.)    Philippe Luis is a 82 y.o. female who has a past medical history of coronary artery disease, COPD, chronic kidney disease follows with cardiology presents emergency department with generalized malaise, fatigue, shortness of breath worse with exertion. Patient states that about a month ago she fell and hit the back of her head, however did not lose consciousness. However she states since then she just has not been feeling herself and believes that she may have concussion. Does have chronic lower extremity swelling, however states that currently they are way worse than they usually are. She has not had any chest pain or palpitations. No prior history of DVT or PE.   She does follow with nephrology for chronic kidney disease and sees Dr. Ned Villasenor / Geraldine Will / Sidra Mcbride / Shaista Mcneill    has a past medical history of Abnormal cardiovascular stress test, Abnormal stress test, Ambulates with cane, Arthritis, At high risk for hyperkalemia, Back pain, CAD (coronary artery disease), Circulation problem, CKD (chronic kidney disease) stage 3, GFR 30-59 ml/min ([facility]), COPD (chronic obstructive pulmonary disease) ([address] DM (diabetes mellitus) ([address] Edema, Foot pain, bilateral, Former smoker, Gastroesophageal reflux disease without esophagitis, Hyperkalemia, Hyperlipidemia, Hypertension, Hypothyroidism, Intermittent claudication ([address] Kidney disease, Lymphedema, MI (mitral incompetence), 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

## 2023-06-22 ENCOUNTER — APPOINTMENT (OUTPATIENT)
Dept: GENERAL RADIOLOGY | Age: 83
End: 2023-06-22
Payer: MEDICARE

## 2023-06-22 PROBLEM — I50.21 ACUTE SYSTOLIC HEART FAILURE (HCC): Status: ACTIVE | Noted: 2023-06-21

## 2023-06-22 LAB
ANION GAP SERPL CALCULATED.3IONS-SCNC: 6 MMOL/L (ref 9–17)
BASOPHILS # BLD: 0.09 K/UL (ref 0–0.2)
BASOPHILS NFR BLD: 1 % (ref 0–2)
BNP SERPL-MCNC: ABNORMAL PG/ML
BUN SERPL-MCNC: 18 MG/DL (ref 8–23)
BUN/CREAT SERPL: 11 (ref 9–20)
CALCIUM SERPL-MCNC: 9.2 MG/DL (ref 8.6–10.4)
CHLORIDE SERPL-SCNC: 94 MMOL/L (ref 98–107)
CHOLEST SERPL-MCNC: 123 MG/DL
CHOLESTEROL/HDL RATIO: 2.1
CO2 SERPL-SCNC: 31 MMOL/L (ref 20–31)
CREAT SERPL-MCNC: 1.58 MG/DL (ref 0.5–0.9)
EKG ATRIAL RATE: 65 BPM
EKG P AXIS: 36 DEGREES
EKG P-R INTERVAL: 210 MS
EKG Q-T INTERVAL: 442 MS
EKG QRS DURATION: 110 MS
EKG QTC CALCULATION (BAZETT): 459 MS
EKG R AXIS: -36 DEGREES
EKG T AXIS: 134 DEGREES
EKG VENTRICULAR RATE: 65 BPM
EOSINOPHIL # BLD: 0.21 K/UL (ref 0–0.44)
EOSINOPHILS RELATIVE PERCENT: 3 % (ref 1–4)
ERYTHROCYTE [DISTWIDTH] IN BLOOD BY AUTOMATED COUNT: 15.4 % (ref 11.8–14.4)
GFR SERPL CREATININE-BSD FRML MDRD: 33 ML/MIN/1.73M2
GLUCOSE BLD-MCNC: 109 MG/DL (ref 65–105)
GLUCOSE BLD-MCNC: 124 MG/DL (ref 65–105)
GLUCOSE BLD-MCNC: 144 MG/DL (ref 65–105)
GLUCOSE BLD-MCNC: 92 MG/DL (ref 65–105)
GLUCOSE SERPL-MCNC: 93 MG/DL (ref 70–99)
HCT VFR BLD AUTO: 38 % (ref 36.3–47.1)
HDLC SERPL-MCNC: 59 MG/DL
HGB BLD-MCNC: 12 G/DL (ref 11.9–15.1)
IMM GRANULOCYTES # BLD AUTO: 0.01 K/UL (ref 0–0.3)
IMM GRANULOCYTES NFR BLD: 0 %
LDLC SERPL CALC-MCNC: 47 MG/DL (ref 0–130)
LV EF: 18 %
LVEF MODALITY: NORMAL
LYMPHOCYTES # BLD: 20 % (ref 24–43)
LYMPHOCYTES NFR BLD: 1.5 K/UL (ref 1.1–3.7)
MAGNESIUM SERPL-MCNC: 1.8 MG/DL (ref 1.6–2.6)
MCH RBC QN AUTO: 28.2 PG (ref 25.2–33.5)
MCHC RBC AUTO-ENTMCNC: 31.6 G/DL (ref 28.4–34.8)
MCV RBC AUTO: 89.4 FL (ref 82.6–102.9)
MONOCYTES NFR BLD: 0.84 K/UL (ref 0.1–1.2)
MONOCYTES NFR BLD: 11 % (ref 3–12)
NEUTROPHILS NFR BLD: 65 % (ref 36–65)
NEUTS SEG NFR BLD: 4.92 K/UL (ref 1.5–8.1)
NRBC AUTOMATED: 0 PER 100 WBC
PLATELET # BLD AUTO: 246 K/UL (ref 138–453)
PMV BLD AUTO: 11.4 FL (ref 8.1–13.5)
POTASSIUM SERPL-SCNC: 4.4 MMOL/L (ref 3.7–5.3)
RBC # BLD AUTO: 4.25 M/UL (ref 3.95–5.11)
RBC # BLD: ABNORMAL 10*6/UL
SODIUM SERPL-SCNC: 131 MMOL/L (ref 135–144)
TRIGL SERPL-MCNC: 83 MG/DL
TSH SERPL-MCNC: 31.26 UIU/ML (ref 0.3–5)
WBC OTHER # BLD: 7.6 K/UL (ref 3.5–11.3)

## 2023-06-22 PROCEDURE — 93010 ELECTROCARDIOGRAM REPORT: CPT | Performed by: INTERNAL MEDICINE

## 2023-06-22 PROCEDURE — 6370000000 HC RX 637 (ALT 250 FOR IP): Performed by: NURSE PRACTITIONER

## 2023-06-22 PROCEDURE — 99232 SBSQ HOSP IP/OBS MODERATE 35: CPT | Performed by: INTERNAL MEDICINE

## 2023-06-22 PROCEDURE — 80048 BASIC METABOLIC PNL TOTAL CA: CPT

## 2023-06-22 PROCEDURE — 36415 COLL VENOUS BLD VENIPUNCTURE: CPT

## 2023-06-22 PROCEDURE — 83735 ASSAY OF MAGNESIUM: CPT

## 2023-06-22 PROCEDURE — 83880 ASSAY OF NATRIURETIC PEPTIDE: CPT

## 2023-06-22 PROCEDURE — 93306 TTE W/DOPPLER COMPLETE: CPT

## 2023-06-22 PROCEDURE — 6360000002 HC RX W HCPCS: Performed by: NURSE PRACTITIONER

## 2023-06-22 PROCEDURE — 2580000003 HC RX 258: Performed by: NURSE PRACTITIONER

## 2023-06-22 PROCEDURE — 99223 1ST HOSP IP/OBS HIGH 75: CPT | Performed by: NURSE PRACTITIONER

## 2023-06-22 PROCEDURE — 80061 LIPID PANEL: CPT

## 2023-06-22 PROCEDURE — 71046 X-RAY EXAM CHEST 2 VIEWS: CPT

## 2023-06-22 PROCEDURE — 84443 ASSAY THYROID STIM HORMONE: CPT

## 2023-06-22 PROCEDURE — 85027 COMPLETE CBC AUTOMATED: CPT

## 2023-06-22 PROCEDURE — 2060000000 HC ICU INTERMEDIATE R&B

## 2023-06-22 PROCEDURE — 82947 ASSAY GLUCOSE BLOOD QUANT: CPT

## 2023-06-22 RX ORDER — MULTIVIT WITH CALCIUM,IRON,MIN 18MG-0.4MG
1 TABLET ORAL DAILY
COMMUNITY

## 2023-06-22 RX ADMIN — FUROSEMIDE 20 MG: 10 INJECTION, SOLUTION INTRAMUSCULAR; INTRAVENOUS at 08:49

## 2023-06-22 RX ADMIN — HEPARIN SODIUM 5000 UNITS: 5000 INJECTION INTRAVENOUS; SUBCUTANEOUS at 05:44

## 2023-06-22 RX ADMIN — LISINOPRIL 20 MG: 20 TABLET ORAL at 08:47

## 2023-06-22 RX ADMIN — PANTOPRAZOLE SODIUM 40 MG: 40 TABLET, DELAYED RELEASE ORAL at 05:44

## 2023-06-22 RX ADMIN — ASPIRIN 162 MG: 81 TABLET, COATED ORAL at 08:46

## 2023-06-22 RX ADMIN — HEPARIN SODIUM 5000 UNITS: 5000 INJECTION INTRAVENOUS; SUBCUTANEOUS at 20:19

## 2023-06-22 RX ADMIN — AMLODIPINE BESYLATE 10 MG: 10 TABLET ORAL at 08:47

## 2023-06-22 RX ADMIN — FUROSEMIDE 20 MG: 10 INJECTION, SOLUTION INTRAMUSCULAR; INTRAVENOUS at 17:56

## 2023-06-22 RX ADMIN — SODIUM BICARBONATE 650 MG: 650 TABLET ORAL at 08:47

## 2023-06-22 RX ADMIN — LISINOPRIL 20 MG: 20 TABLET ORAL at 20:17

## 2023-06-22 RX ADMIN — CLOPIDOGREL BISULFATE 75 MG: 75 TABLET ORAL at 08:47

## 2023-06-22 RX ADMIN — LEVOTHYROXINE SODIUM 88 MCG: 0.09 TABLET ORAL at 05:44

## 2023-06-22 RX ADMIN — SODIUM BICARBONATE 650 MG: 650 TABLET ORAL at 20:17

## 2023-06-22 RX ADMIN — METOPROLOL SUCCINATE 100 MG: 50 TABLET, EXTENDED RELEASE ORAL at 08:48

## 2023-06-22 RX ADMIN — SODIUM CHLORIDE, PRESERVATIVE FREE 10 ML: 5 INJECTION INTRAVENOUS at 20:21

## 2023-06-22 RX ADMIN — ATORVASTATIN CALCIUM 40 MG: 40 TABLET, FILM COATED ORAL at 20:17

## 2023-06-22 RX ADMIN — SODIUM CHLORIDE, PRESERVATIVE FREE 10 ML: 5 INJECTION INTRAVENOUS at 08:52

## 2023-06-22 RX ADMIN — INSULIN GLARGINE 10 UNITS: 100 INJECTION, SOLUTION SUBCUTANEOUS at 20:19

## 2023-06-22 RX ADMIN — HEPARIN SODIUM 5000 UNITS: 5000 INJECTION INTRAVENOUS; SUBCUTANEOUS at 14:08

## 2023-06-22 NOTE — CARE COORDINATION
Case Management Assessment  Initial Evaluation    Date/Time of Evaluation: 6/22/2023 12:48 PM  Assessment Completed by: Alma Rosa Bowman RN    If patient is discharged prior to next notation, then this note serves as note for discharge by case management. Patient Name: William Garcia                   YOB: 1940  Diagnosis: Acute on chronic systolic congestive heart failure ([facility] [I50.23]  CHF with unknown LVEF ([facility] [I50.9]                   Date / Time: 6/21/2023  9:20 AM    Patient Admission Status: Inpatient   Readmission Risk (Low < 19, Mod (19-27), High > 27): Readmission Risk Score: 13.9    Current PCP: Vasquez Lantigua, DO  PCP verified by CM? Yes    Chart Reviewed: Yes      History Provided by: Patient  Patient Orientation: Alert and Oriented    Patient Cognition: Alert    Hospitalization in the last 30 days (Readmission):  No    If yes, Readmission Assessment in CM Navigator will be completed. Advance Directives:      Code Status: Full Code   Patient's Primary Decision Maker is: Legal Next of Kin      Discharge Planning:    Patient lives with: Alone Type of Home: House  Primary Care Giver: Self  Patient Support Systems include: Family Members   Current Financial resources: Medicare  Current community resources: None  Current services prior to admission: Durable Medical Equipment            Current DME: Cristino Arriola            Type of Home Care services:  None    ADLS  Prior functional level: Independent in ADLs/IADLs  Current functional level: Independent in ADLs/IADLs    PT AM-PAC:   /24  OT AM-PAC:   /24    Family can provide assistance at DC: Yes  Would you like Case Management to discuss the discharge plan with any other family members/significant others, and if so, who?  No  Plans to Return to Present Housing: Yes  Other Identified Issues/Barriers to RETURNING to current housing: None Identified  Potential Assistance needed at discharge: N/A

## 2023-06-22 NOTE — H&P
down    New onset CHF: Inpatient consult to cardiology. Appreciate recommendations. Lasix 20 mg twice daily. Further diuresis per nephrology direction. Pending echocardiogram.  Daily weight. DM management: Accu-Chek ACHS with SSI. Lantus nightly carb controlled diet. Hypoglycemia treatment orders as needed. Manage HTN: Stable. Continue amlodipine, lisinopril, Toprol  CAD: No chest pain. Continue ASA, Plavix  CKD: Baseline creatinine 1.4-1.7. Continue to trend kidney function. Continue Pepper Babe. Nephrology to follow. Appreciate recommendations. Avoid nephrotoxic agents. Accurate I/O. Daily weight. Elevated troponin: No chest pain. High-sensitivity troponin 43--41. Cardiology to follow. Appreciate recommendations. COPD without exacerbation: Nebulizers, inhalers, supplemental oxygen as needed. Continuous pulse oximetry. DVT prophylaxis: Heparin 3 times daily  PT/OT eval and treat    Consultations:   IP CONSULT TO HOSPITALIST  IP CONSULT TO HEART FAILURE NURSE/COORDINATOR  IP CONSULT TO DIETITIAN  IP CONSULT TO SPIRITUAL SERVICES  IP CONSULT TO CARDIOLOGY     Patient is admitted as inpatient status because of co-morbidities listed above, severity of signs and symptoms as outlined, requirement for current medical therapies and most importantly because of direct risk to patient if care not provided in a hospital setting. Expected length of stay > 48 hours.     MARIO Harris NP  6/21/2023  10:39 PM    Copy sent to Dr. Gloria Oglesby DO

## 2023-06-22 NOTE — PLAN OF CARE
Problem: Discharge Planning  Goal: Discharge to home or other facility with appropriate resources  Outcome: Progressing  Note: Discharge teaching and instructions for diagnosis/procedure explained with patient using teachback method. Patient voiced understanding regarding prescriptions, follow up appointments, and care of self at home. Problem: Skin/Tissue Integrity  Goal: Absence of new skin breakdown  Description: 1. Monitor for areas of redness and/or skin breakdown  2. Assess vascular access sites hourly  3. Every 4-6 hours minimum:  Change oxygen saturation probe site  4. Every 4-6 hours:  If on nasal continuous positive airway pressure, respiratory therapy assess nares and determine need for appliance change or resting period. Outcome: Progressing  Note: Continuing to monitor for skin integrity risks. Patient independent with turning/repositioning. Turning/repositioning encouraged at least once every 2 hrs, and prn basis. Hygiene care being completed independently per patient; assistance provided when deemed necessary. Problem: Safety - Adult  Goal: Free from fall injury  Outcome: Progressing  Note: Pt fall risk, fall band present, falling star, safety alarm activated and in use as needed. Hourly rounding performed. Pt encouraged to use call light. See Kamille Donis fall risk assessment. Problem: ABCDS Injury Assessment  Goal: Absence of physical injury  Outcome: Progressing  Note: Non-skid socks in place, up with assistance, bed in lowest position, bed exit & alarm as needed, provide toileting every 2 hours an d as needed.

## 2023-06-22 NOTE — PLAN OF CARE
Problem: Discharge Planning  Goal: Discharge to home or other facility with appropriate resources  Outcome: Progressing  Note: Care plan and Safety parameters reviewed. Patient remained free from falls during shift. Pt was up in chair most of the day. On 2 L O2 in the morning and was weaned down to 1 L during the day. Echo was done in the morning but still awaiting final results. Edema still appears to be present in lower extremeties     Problem: Skin/Tissue Integrity  Goal: Absence of new skin breakdown  Description: 1. Monitor for areas of redness and/or skin breakdown  2. Assess vascular access sites hourly  3. Every 4-6 hours minimum:  Change oxygen saturation probe site  4. Every 4-6 hours:  If on nasal continuous positive airway pressure, respiratory therapy assess nares and determine need for appliance change or resting period.   Outcome: Progressing     Problem: Safety - Adult  Goal: Free from fall injury  Outcome: Progressing     Problem: ABCDS Injury Assessment  Goal: Absence of physical injury  Outcome: Progressing     Problem: Chronic Conditions and Co-morbidities  Goal: Patient's chronic conditions and co-morbidity symptoms are monitored and maintained or improved  Outcome: Progressing

## 2023-06-22 NOTE — CONSULTS
ventricular complexes  Left axis deviation  Anterolateral infarct , age undetermined  Abnormal ECG          ECHO:   12/22 In [facility] office  EF 40%   Global LV hypokinesis  Grade I DD   Severe left atrial enlargement      Stress Test (3/2/18):     LVEF 32%     Cardiac Angiography:   CAD with previous CABG x 2, with all grafts occluded and nonfunctioning LIMA-LAD. Only radial-PDA was patent. S/P angioplasty and stenting of the LAD and circumflex in November 2007. Heart cath 2014: Patent LAD stent with 40% stenosis in OM stent with difffuse disease in Radial -PDA    Labs:     CBC:   Recent Labs     06/21/23  1047 06/22/23  0533   WBC 7.1 7.6   HGB 11.8* 12.0   HCT 37.6 38.0    246     BMP:   Recent Labs     06/21/23  1047 06/22/23  0533   * 131*   K 4.6 4.4   CO2 27 31   BUN 17 18   CREATININE 1.48* 1.58*   LABGLOM 35* 33*   GLUCOSE 97 93     BNP: No results for input(s): BNP in the last 72 hours. PT/INR: No results for input(s): PROTIME, INR in the last 72 hours. APTT:No results for input(s): APTT in the last 72 hours. CARDIAC ENZYMES:No results for input(s): CKTOTAL, CKMB, CKMBINDEX, TROPONINI in the last 72 hours.   FASTING LIPID PANEL:  Lab Results   Component Value Date/Time    HDL 59 06/22/2023 05:33 AM    TRIG 83 06/22/2023 05:33 AM     LIVER PROFILE:  Recent Labs     06/21/23  1047   AST 16   ALT 11   LABALBU 3.5       IMPRESSION:    Patient Active Problem List   Diagnosis    DM (diabetes mellitus) ([facility]    Hypercholesteremia    CAD (coronary artery disease)    Hypothyroidism    Lumbar spondylosis with myelopathy    S/P cardiac cath 8/19/14-Dr. covarrubias    CKD (chronic kidney disease) stage 3, GFR 30-59 ml/min ([facility])    Morbid obesity with BMI of 40.0-44.9, adult ([facility])    At high risk for hyperkalemia    Edema    COPD (chronic obstructive pulmonary disease) ([facility]    Primary hyperparathyroidism ([facility]    Carotid stenosis, asymptomatic    Claudication in peripheral vascular disease ([facility]

## 2023-06-23 PROBLEM — I34.0 MITRAL VALVE INSUFFICIENCY: Status: ACTIVE | Noted: 2023-06-23

## 2023-06-23 LAB
ANION GAP SERPL CALCULATED.3IONS-SCNC: 6 MMOL/L (ref 9–17)
BASOPHILS # BLD: 0.07 K/UL (ref 0–0.2)
BASOPHILS NFR BLD: 1 % (ref 0–2)
BUN SERPL-MCNC: 19 MG/DL (ref 8–23)
BUN/CREAT SERPL: 13 (ref 9–20)
CALCIUM SERPL-MCNC: 8.8 MG/DL (ref 8.6–10.4)
CHLORIDE SERPL-SCNC: 93 MMOL/L (ref 98–107)
CO2 SERPL-SCNC: 32 MMOL/L (ref 20–31)
CREAT SERPL-MCNC: 1.49 MG/DL (ref 0.5–0.9)
EOSINOPHIL # BLD: 0.3 K/UL (ref 0–0.44)
EOSINOPHILS RELATIVE PERCENT: 5 % (ref 1–4)
ERYTHROCYTE [DISTWIDTH] IN BLOOD BY AUTOMATED COUNT: 14.9 % (ref 11.8–14.4)
EST. AVERAGE GLUCOSE BLD GHB EST-MCNC: 114 MG/DL
GFR SERPL CREATININE-BSD FRML MDRD: 35 ML/MIN/1.73M2
GLUCOSE BLD-MCNC: 100 MG/DL (ref 65–105)
GLUCOSE BLD-MCNC: 108 MG/DL (ref 65–105)
GLUCOSE BLD-MCNC: 116 MG/DL (ref 65–105)
GLUCOSE BLD-MCNC: 136 MG/DL (ref 65–105)
GLUCOSE BLD-MCNC: 140 MG/DL (ref 65–105)
GLUCOSE BLD-MCNC: 53 MG/DL (ref 65–105)
GLUCOSE SERPL-MCNC: 51 MG/DL (ref 70–99)
HBA1C MFR BLD: 5.6 % (ref 4–6)
HCT VFR BLD AUTO: 34.5 % (ref 36.3–47.1)
HGB BLD-MCNC: 10.8 G/DL (ref 11.9–15.1)
IMM GRANULOCYTES # BLD AUTO: 0.02 K/UL (ref 0–0.3)
IMM GRANULOCYTES NFR BLD: 0 %
LYMPHOCYTES # BLD: 22 % (ref 24–43)
LYMPHOCYTES NFR BLD: 1.39 K/UL (ref 1.1–3.7)
MAGNESIUM SERPL-MCNC: 1.7 MG/DL (ref 1.6–2.6)
MCH RBC QN AUTO: 27.8 PG (ref 25.2–33.5)
MCHC RBC AUTO-ENTMCNC: 31.3 G/DL (ref 28.4–34.8)
MCV RBC AUTO: 88.9 FL (ref 82.6–102.9)
MONOCYTES NFR BLD: 0.81 K/UL (ref 0.1–1.2)
MONOCYTES NFR BLD: 13 % (ref 3–12)
NEUTROPHILS NFR BLD: 59 % (ref 36–65)
NEUTS SEG NFR BLD: 3.73 K/UL (ref 1.5–8.1)
NRBC AUTOMATED: 0 PER 100 WBC
PLATELET # BLD AUTO: 205 K/UL (ref 138–453)
PMV BLD AUTO: 11.6 FL (ref 8.1–13.5)
POTASSIUM SERPL-SCNC: 4.2 MMOL/L (ref 3.7–5.3)
RBC # BLD AUTO: 3.88 M/UL (ref 3.95–5.11)
RBC # BLD: ABNORMAL 10*6/UL
SODIUM SERPL-SCNC: 131 MMOL/L (ref 135–144)
WBC OTHER # BLD: 6.3 K/UL (ref 3.5–11.3)

## 2023-06-23 PROCEDURE — 6360000002 HC RX W HCPCS: Performed by: NURSE PRACTITIONER

## 2023-06-23 PROCEDURE — 36415 COLL VENOUS BLD VENIPUNCTURE: CPT

## 2023-06-23 PROCEDURE — 99232 SBSQ HOSP IP/OBS MODERATE 35: CPT | Performed by: INTERNAL MEDICINE

## 2023-06-23 PROCEDURE — 97530 THERAPEUTIC ACTIVITIES: CPT

## 2023-06-23 PROCEDURE — 85027 COMPLETE CBC AUTOMATED: CPT

## 2023-06-23 PROCEDURE — 6370000000 HC RX 637 (ALT 250 FOR IP): Performed by: NURSE PRACTITIONER

## 2023-06-23 PROCEDURE — 82947 ASSAY GLUCOSE BLOOD QUANT: CPT

## 2023-06-23 PROCEDURE — 2580000003 HC RX 258: Performed by: NURSE PRACTITIONER

## 2023-06-23 PROCEDURE — 83735 ASSAY OF MAGNESIUM: CPT

## 2023-06-23 PROCEDURE — 2060000000 HC ICU INTERMEDIATE R&B

## 2023-06-23 PROCEDURE — 99233 SBSQ HOSP IP/OBS HIGH 50: CPT | Performed by: INTERNAL MEDICINE

## 2023-06-23 PROCEDURE — 6360000002 HC RX W HCPCS: Performed by: INTERNAL MEDICINE

## 2023-06-23 PROCEDURE — 97162 PT EVAL MOD COMPLEX 30 MIN: CPT

## 2023-06-23 PROCEDURE — 80048 BASIC METABOLIC PNL TOTAL CA: CPT

## 2023-06-23 PROCEDURE — 97166 OT EVAL MOD COMPLEX 45 MIN: CPT

## 2023-06-23 RX ORDER — FUROSEMIDE 20 MG/1
20 TABLET ORAL DAILY
Status: DISCONTINUED | OUTPATIENT
Start: 2023-06-24 | End: 2023-06-24 | Stop reason: HOSPADM

## 2023-06-23 RX ORDER — LANOLIN ALCOHOL/MO/W.PET/CERES
3 CREAM (GRAM) TOPICAL NIGHTLY PRN
Status: DISCONTINUED | OUTPATIENT
Start: 2023-06-23 | End: 2023-06-24 | Stop reason: HOSPADM

## 2023-06-23 RX ORDER — FUROSEMIDE 10 MG/ML
20 INJECTION INTRAMUSCULAR; INTRAVENOUS 2 TIMES DAILY
Status: COMPLETED | OUTPATIENT
Start: 2023-06-23 | End: 2023-06-23

## 2023-06-23 RX ADMIN — PANTOPRAZOLE SODIUM 40 MG: 40 TABLET, DELAYED RELEASE ORAL at 05:33

## 2023-06-23 RX ADMIN — SODIUM CHLORIDE, PRESERVATIVE FREE 10 ML: 5 INJECTION INTRAVENOUS at 21:48

## 2023-06-23 RX ADMIN — SODIUM CHLORIDE, PRESERVATIVE FREE 10 ML: 5 INJECTION INTRAVENOUS at 08:25

## 2023-06-23 RX ADMIN — FUROSEMIDE 20 MG: 10 INJECTION, SOLUTION INTRAMUSCULAR; INTRAVENOUS at 08:25

## 2023-06-23 RX ADMIN — CLOPIDOGREL BISULFATE 75 MG: 75 TABLET ORAL at 08:25

## 2023-06-23 RX ADMIN — HEPARIN SODIUM 5000 UNITS: 5000 INJECTION INTRAVENOUS; SUBCUTANEOUS at 05:34

## 2023-06-23 RX ADMIN — ATORVASTATIN CALCIUM 40 MG: 40 TABLET, FILM COATED ORAL at 21:47

## 2023-06-23 RX ADMIN — LISINOPRIL 20 MG: 20 TABLET ORAL at 08:25

## 2023-06-23 RX ADMIN — FUROSEMIDE 20 MG: 10 INJECTION, SOLUTION INTRAMUSCULAR; INTRAVENOUS at 17:38

## 2023-06-23 RX ADMIN — METOPROLOL SUCCINATE 100 MG: 50 TABLET, EXTENDED RELEASE ORAL at 08:25

## 2023-06-23 RX ADMIN — ASPIRIN 162 MG: 81 TABLET, COATED ORAL at 08:25

## 2023-06-23 RX ADMIN — LEVOTHYROXINE SODIUM 88 MCG: 0.09 TABLET ORAL at 05:33

## 2023-06-23 RX ADMIN — SODIUM BICARBONATE 650 MG: 650 TABLET ORAL at 08:25

## 2023-06-23 RX ADMIN — SODIUM CHLORIDE, PRESERVATIVE FREE 10 ML: 5 INJECTION INTRAVENOUS at 17:39

## 2023-06-23 RX ADMIN — Medication 3 MG: at 00:40

## 2023-06-23 RX ADMIN — Medication 16 G: at 06:11

## 2023-06-23 RX ADMIN — SODIUM BICARBONATE 650 MG: 650 TABLET ORAL at 21:47

## 2023-06-23 RX ADMIN — HEPARIN SODIUM 5000 UNITS: 5000 INJECTION INTRAVENOUS; SUBCUTANEOUS at 21:47

## 2023-06-23 RX ADMIN — AMLODIPINE BESYLATE 10 MG: 10 TABLET ORAL at 08:25

## 2023-06-23 RX ADMIN — LISINOPRIL 20 MG: 20 TABLET ORAL at 21:47

## 2023-06-23 RX ADMIN — HEPARIN SODIUM 5000 UNITS: 5000 INJECTION INTRAVENOUS; SUBCUTANEOUS at 15:20

## 2023-06-23 NOTE — PLAN OF CARE
Problem: Discharge Planning  Goal: Discharge to home or other facility with appropriate resources  6/22/2023 2106 by Thu Blanchard RN  Outcome: Progressing  Note: Discharge teaching and instructions for diagnosis/procedure explained with patient using teachback method. Patient voiced understanding regarding prescriptions, follow up appointments, and care of self at home. 6/22/2023 1833 by Rubens Betancourt  Outcome: Progressing  Note: Care plan and Safety parameters reviewed. Patient remained free from falls during shift. Pt was up in chair most of the day. On 2 L O2 in the morning and was weaned down to 1 L during the day. Echo was done in the morning but still awaiting final results. Edema still appears to be present in lower extremeties     Problem: Skin/Tissue Integrity  Goal: Absence of new skin breakdown  Description: 1. Monitor for areas of redness and/or skin breakdown  2. Assess vascular access sites hourly  3. Every 4-6 hours minimum:  Change oxygen saturation probe site  4. Every 4-6 hours:  If on nasal continuous positive airway pressure, respiratory therapy assess nares and determine need for appliance change or resting period. 6/22/2023 2106 by Thu Blanchard RN  Outcome: Progressing  Note: Continuing to monitor for skin integrity risks. Patient independent with turning/repositioning. Turning/repositioning encouraged at least once every 2 hrs, and prn basis. Hygiene care being completed independently per patient; assistance provided when deemed necessary. 6/22/2023 1833 by Rubens Betancourt  Outcome: Progressing     Problem: Safety - Adult  Goal: Free from fall injury  6/22/2023 2106 by Thu Blanchard RN  Outcome: Progressing  Note: Pt fall risk, fall band present, falling star, safety alarm activated and in use as needed. Hourly rounding performed. Pt encouraged to use call light. See Grover Bourgeois fall risk assessment.    6/22/2023 1833 by Rubens Betancourt  Outcome: Progressing     Problem: ABCDS

## 2023-06-23 NOTE — CARE COORDINATION
Discharge planning    RN updated writer that cardiology will be ordering a life vest. Sent rx, face sheet, H&P, cardiology notes along with echo to [facility]. Notified [facility] at [place] [address] that referral was in system. Devorah Reyes [phone]  Phone [phone]  Fax [phone]    Met back with patient to see if interested in home care since will go home with life vest. She is declining    [address] has order and can have someone out tomorrow unless patient is discharging tonight. Spoke with attending. Anticipated discharge tomorrow.

## 2023-06-24 ENCOUNTER — CLINICAL DOCUMENTATION (OUTPATIENT)
Dept: NEPHROLOGY | Age: 83
End: 2023-06-24

## 2023-06-24 VITALS
DIASTOLIC BLOOD PRESSURE: 71 MMHG | TEMPERATURE: 98.1 F | BODY MASS INDEX: 42.33 KG/M2 | SYSTOLIC BLOOD PRESSURE: 132 MMHG | HEIGHT: 60 IN | RESPIRATION RATE: 18 BRPM | HEART RATE: 64 BPM | OXYGEN SATURATION: 94 % | WEIGHT: 215.6 LBS

## 2023-06-24 DIAGNOSIS — I25.5 ISCHEMIC CARDIOMYOPATHY: ICD-10-CM

## 2023-06-24 LAB
ANION GAP SERPL CALCULATED.3IONS-SCNC: 6 MMOL/L (ref 9–17)
BASOPHILS # BLD: 0.07 K/UL (ref 0–0.2)
BASOPHILS NFR BLD: 1 % (ref 0–2)
BUN SERPL-MCNC: 19 MG/DL (ref 8–23)
BUN/CREAT SERPL: 13 (ref 9–20)
CALCIUM SERPL-MCNC: 9 MG/DL (ref 8.6–10.4)
CHLORIDE SERPL-SCNC: 89 MMOL/L (ref 98–107)
CO2 SERPL-SCNC: 32 MMOL/L (ref 20–31)
CREAT SERPL-MCNC: 1.52 MG/DL (ref 0.5–0.9)
EOSINOPHIL # BLD: 0.34 K/UL (ref 0–0.44)
EOSINOPHILS RELATIVE PERCENT: 5 % (ref 1–4)
ERYTHROCYTE [DISTWIDTH] IN BLOOD BY AUTOMATED COUNT: 14.7 % (ref 11.8–14.4)
GFR SERPL CREATININE-BSD FRML MDRD: 34 ML/MIN/1.73M2
GLUCOSE BLD-MCNC: 133 MG/DL (ref 65–105)
GLUCOSE BLD-MCNC: 166 MG/DL (ref 65–105)
GLUCOSE SERPL-MCNC: 122 MG/DL (ref 70–99)
HCT VFR BLD AUTO: 36.7 % (ref 36.3–47.1)
HGB BLD-MCNC: 11.8 G/DL (ref 11.9–15.1)
IMM GRANULOCYTES # BLD AUTO: 0.01 K/UL (ref 0–0.3)
IMM GRANULOCYTES NFR BLD: 0 %
LYMPHOCYTES # BLD: 19 % (ref 24–43)
LYMPHOCYTES NFR BLD: 1.29 K/UL (ref 1.1–3.7)
MAGNESIUM SERPL-MCNC: 1.7 MG/DL (ref 1.6–2.6)
MCH RBC QN AUTO: 28 PG (ref 25.2–33.5)
MCHC RBC AUTO-ENTMCNC: 32.2 G/DL (ref 28.4–34.8)
MCV RBC AUTO: 87 FL (ref 82.6–102.9)
MONOCYTES NFR BLD: 0.77 K/UL (ref 0.1–1.2)
MONOCYTES NFR BLD: 12 % (ref 3–12)
NEUTROPHILS NFR BLD: 63 % (ref 36–65)
NEUTS SEG NFR BLD: 4.22 K/UL (ref 1.5–8.1)
NRBC BLD-RTO: 0 PER 100 WBC
PLATELET # BLD AUTO: 221 K/UL (ref 138–453)
PMV BLD AUTO: 11.4 FL (ref 8.1–13.5)
POTASSIUM SERPL-SCNC: 4 MMOL/L (ref 3.7–5.3)
RBC # BLD AUTO: 4.22 M/UL (ref 3.95–5.11)
RBC # BLD: ABNORMAL 10*6/UL
SODIUM SERPL-SCNC: 127 MMOL/L (ref 135–144)
WBC OTHER # BLD: 6.7 K/UL (ref 3.5–11.3)

## 2023-06-24 PROCEDURE — 6360000002 HC RX W HCPCS: Performed by: NURSE PRACTITIONER

## 2023-06-24 PROCEDURE — 85027 COMPLETE CBC AUTOMATED: CPT

## 2023-06-24 PROCEDURE — 6370000000 HC RX 637 (ALT 250 FOR IP): Performed by: INTERNAL MEDICINE

## 2023-06-24 PROCEDURE — 6370000000 HC RX 637 (ALT 250 FOR IP): Performed by: NURSE PRACTITIONER

## 2023-06-24 PROCEDURE — 80048 BASIC METABOLIC PNL TOTAL CA: CPT

## 2023-06-24 PROCEDURE — 99232 SBSQ HOSP IP/OBS MODERATE 35: CPT | Performed by: INTERNAL MEDICINE

## 2023-06-24 PROCEDURE — 36415 COLL VENOUS BLD VENIPUNCTURE: CPT

## 2023-06-24 PROCEDURE — 83735 ASSAY OF MAGNESIUM: CPT

## 2023-06-24 PROCEDURE — 82947 ASSAY GLUCOSE BLOOD QUANT: CPT

## 2023-06-24 RX ORDER — FUROSEMIDE 20 MG/1
20 TABLET ORAL DAILY
Qty: 60 TABLET | Refills: 3 | Status: SHIPPED | OUTPATIENT
Start: 2023-06-25

## 2023-06-24 RX ADMIN — PANTOPRAZOLE SODIUM 40 MG: 40 TABLET, DELAYED RELEASE ORAL at 05:24

## 2023-06-24 RX ADMIN — ASPIRIN 162 MG: 81 TABLET, COATED ORAL at 08:51

## 2023-06-24 RX ADMIN — LISINOPRIL 20 MG: 20 TABLET ORAL at 08:51

## 2023-06-24 RX ADMIN — CLOPIDOGREL BISULFATE 75 MG: 75 TABLET ORAL at 08:51

## 2023-06-24 RX ADMIN — AMLODIPINE BESYLATE 10 MG: 10 TABLET ORAL at 08:52

## 2023-06-24 RX ADMIN — FUROSEMIDE 20 MG: 20 TABLET ORAL at 08:51

## 2023-06-24 RX ADMIN — LEVOTHYROXINE SODIUM 88 MCG: 0.09 TABLET ORAL at 05:23

## 2023-06-24 RX ADMIN — METOPROLOL SUCCINATE 100 MG: 50 TABLET, EXTENDED RELEASE ORAL at 08:51

## 2023-06-24 RX ADMIN — SODIUM BICARBONATE 650 MG: 650 TABLET ORAL at 08:51

## 2023-06-24 RX ADMIN — HEPARIN SODIUM 5000 UNITS: 5000 INJECTION INTRAVENOUS; SUBCUTANEOUS at 05:24

## 2023-06-24 NOTE — PROGRESS NOTES
.Patient admitted to room 1008. VS taken, telemetry placed and call light given/within reach. Patient A&O and given room orientation. Orders released/reviewed, initial assessment completed and navigator started. See chart for more detail.
Advance Care Planning   Healthcare Decision Maker:    Primary Decision Maker: Erlinda Marquis JANAE - Brother/Sister - [phone]    Secondary Decision Maker: Eliel Bray - Niece/Nephew - [phone]  Today we documented Decision Maker(s) consistent with Legal Next of Kin hierarchy. 06/22/23 1905   Encounter Summary   Service Provided For: Patient   Referral/Consult From: Nurse   Support System Family members   Last Encounter  06/22/23   Complexity of Encounter Moderate   Begin Time 1800   End Time  1900   Total Time Calculated 60 min   Encounter    Type Initial Screen/Assessment   Spiritual/Emotional needs   Type Spiritual Support   Advance Care Planning   Type Completed AD/ACP document(s)   Assessment/Intervention/Outcome   Assessment Calm;Coping; Impaired resilience   Intervention Active listening;Explored/Affirmed feelings, thoughts, concerns;Explored Coping Skills/Resources; Discussed illness injury and its impact;Nurtured Hope;Prayer (assurance of)/Marlow;Sustaining Presence/Ministry of presence   Outcome Comfort; Connection/Belonging;Expressed feelings, needs, and concerns;Receptive
[facility]  Office: [address], DO, Sridevi Go, DO, Odis Dancer, DO, Vamshi Brissa Blood, DO, Marika Moore MD, Tony Hurst MD, Marcia Hollingsworth MD, Oz Villalba MD,  Tien Sanderson MD, Ramses Hart MD, Ba Jin, DO, Aldo Rodriguez MD,  Violeta López MD, Nicholas James MD, Rosa M Hayes, DO, Humberto Frias MD, Dixie Frank MD, Brianna Gusman, DO, Lazaro Gurrola MD, Severiano Brooms, MD, Mikhail Lemon MD, Maria Antonia Douglas MD,  Gigi Adkins, DO, Cory Syed MD,  Adrianna Rodrigues, CNP,  Alesha Walter, CNP, Radha Elliott, CNP, Watson Maria, CNP,  Shelly Howeat, DNP, Florina Burroughs, CNP, Princess Sauceda, CNP, Annie Escobar, CNP, Allen Back, CNP, Carly Childress, CNP, Cruz Iqbal, PA-C, Gayla Puckett, CNS, Vu Horner, CNP, Jo Hart, CNP         [facility]    Progress Note    6/24/2023    11:56 AM    Name:   Lety Camargo  MRN:     [id number]     Acct:      [de-identified]   Room:   [address] [place] [address] Day:  3  Admit Date:  6/21/2023  9:20 AM    PCP:   Dianna Gerardo DO  Code Status:  Full Code    Subjective:     C/C:   Chief Complaint   Patient presents with    Leg Swelling    Shortness of Breath    Fatigue     Interval History Status: improved. Shortness of breath is resolved, edema improving. Denies any complaints of chest pain, nausea vomiting, fevers or chills or acute complaints. LifeVest delivered and then placed    Brief History: This is an 51-year-old female presents with a complaint of shortness of breath, fatigue and lower extremity edema. She is found to have evidence of congestive heart failure on exam and blood testing. She has a history of coronary disease with prior bypass and stents.   She noticed over the last 4 days she had increased lower extremity edema, she has some chronic anemia but her legs were 3 times more swollen than normal.  Has been
Blood glucose rechecked = 100. Patient remains asymptomatic. Will monitor.
Blood sugar = 51, rechecked with glucometer = 53. Glucose tablets given per hypoglycemia protocol, and applesauce. Patient is asymptomatic. Will recheck blood glucose.
Call received from [facility] with Life vest, states that she will be in on the following day (06/24/2023) to deliver Life Vest
Iv and telemetry removed, discharge teaching/ instructions given to patient using AVS . Information on medication pick-up reviewed. Instructed on repeat lab work due. Patient voiced understanding regarding prescriptions/medications, follow up appointments, and care of self at home.  Pt taken to front entrance with personal belongings, discharged home with sister
[facility]  Office: [address], DO, Grover Neosalas, DO, Ambrocio Adrian, DO, Polina Lizarraga, DO, Marko Maddox MD, Leslie Weller MD, Maday Sargent MD, Yadira Jackson MD,  Corona Piper MD, Darrick Randall MD, Carlos Mcmahon DO, Herve Piedra MD,  Ellen Ballesteros MD, Josephine Coleman MD, Iwona Laboy DO, Giovana Mora MD, Jose Crow MD, Fifi Corado DO, Edy Mayers MD, Martin Diaz MD, Za Lozano MD, Karon Farias MD,  Jeremias Yo DO, Iris Wynn MD,  Jonah Womack, CNP,  Jessica Ng, CNP, Annie Cerda, CNP, Emmanuel Regalado, CNP,  Charline Morris, [facility], Ayala Jones, CNP, Janice Jhaveri, CNP, Angeline Sanchez, CNP, Aly Amaya, CNP, Rich Larry, [facility], Teodora Anderson, PA-C, Alexei Niño, CNS, Karla Dunbar, CNP, Monserrat Frausto, [facility]    Progress Note    6/23/2023    11:12 AM    Name:   Shay Dexter  MRN:     [id number]     Acct:      [de-identified]   Room:   [address] [place] [address] Day:  2  Admit Date:  6/21/2023  9:20 AM    PCP:   Christia Cushing, DO  Code Status:  Full Code    Subjective:     C/C:   Chief Complaint   Patient presents with    Leg Swelling    Shortness of Breath    Fatigue     Interval History Status: improved. Patient feels much better today, lower EXTR edema back to baseline. Denies shortness of breath, fevers or chills, nausea vomiting, chest pain or other acute complaints. Brief History: This is an 65-year-old female presents with a complaint of shortness of breath, fatigue and lower extremity edema. She is found to have evidence of congestive heart failure on exam and blood testing. She has a history of coronary disease with prior bypass and stents.   She noticed over the last 4 days she had increased lower extremity edema, she has some chronic anemia but her legs were 3 times more swollen than normal.  Has been diuresed
Life vest delivered and representative with patient, issuing instructions
Nutrition Education    Educated on CHF  Learners: Patient  Readiness: Acceptance  Method: Explanation and Handout (Low Sodium Nutrition Therapy)  Response: Verbalizes Understanding  Contact name and number provided.         Susan RODRÍGUEZ, RDN, LDN  Lead Clinical Dietitian  RD Office Phone [phone]
Occupational Therapy  Facility/Department: [facility] PROGRESSIVE CARE  Occupational Therapy Initial Assessment    Name: Zuhair Ventura  : 1940  MRN: [id number]  Date of Service: 2023    RN reports patient is medically stable for therapy treatment this date. Chart reviewed prior to treatment and patient is agreeable for therapy. All lines intact and patient positioned comfortably at end of treatment. All patient needs addressed prior to ending therapy session. Due to recent hospitalization and medical condition, pt would benefit from additional intermittent skilled therapy at time of discharge. Please refer to the AM-PAC score for current functional status. Discharge Recommendations:  Patient would benefit from continued therapy after discharge  OT Equipment Recommendations  Equipment Needed: Yes (CTA)  Mobility Devices: ADL Assistive Devices  ADL Assistive Devices: Reacher;Long-handled Sponge       Per H&P: Zuhair Ventura is a 82 y.o. Non- / non  female who presents with Leg Swelling, Shortness of Breath, and Fatigue and is admitted to the hospital for the management of CHF with unknown LVEF ([facility] [address] This is a pleasant, cooperative 53-year-old female who presented to our hospital for evaluation of worsening bilateral lower leg edema, fatigue and exertional dyspnea. She states that she fell approximately 1 month ago. She had no traumatic injuries. She did not lose consciousness. However, she feels she had exacerbation of presenting symptoms since the fall. She has a medical history significant for CAD, CHF, CKD, COPD, diabetes, GERD, HLD, HTN, hypothyroidism. She does follow with Dr. Soraida Nguyen (nephrology), Dr. Vance Melgar (cardiology), Dr. Nella Chi (vascular surgery). ED evaluation today significant for pBNP 19,417 (no comparative value). Imaging showing mild pulmonary edema.   She did have a slightly elevated D-dimer, DVT study negative, V/Q scan negative for
Page to Dr. Wanda Rahman for new consult. Awaiting call back.
Patient had relatively uneventful evening. Patient c/o insomnia, treated with PRN Melatonin. Patient up to void in [facility] with standby assist.  97-99% on 1L/NC. Bilateral lower extremities remain swallow, with pitting edema, right worse than left. Will monitor.
Patient had relatively uneventful evening. Patient up to void in bed side commode, having total of 1000 ml of urine in commode. Bilateral lower pitting edema in feet & legs, right worse than left, but decreased since last evening. Patient received scheduled Lasix last night. Will monitor.
[facility]  Office: [address], DO, Kandace Sheree, DO, Gisel [place], DO, Nisreen Camacho Blood, DO, Yuki Syed MD, Rashmi Wilson MD, Grayson Grigsby MD, Kierra Oliver MD,  Indira Marte MD, Salina Colbert MD, Vasile Foley, DO, Trini England MD,  Ketan Spangler MD, Melinda Bateman MD, Suraj Irwin, DO, Xavi Cohn MD, Digna Serna MD, Osmin Pace DO, Ricardo Leigh MD, Sara Allen MD, Matthew Burgess MD, Kareem Bell MD,  Truong Barnes, DO, Bishop Rimma MD,  Kami Whitney, CNP,  Amie Murillo, CNP, Trish Molina, CNP, Celena Thompson, CNP,  Pauline Acevedo, [facility], Alesia De Jesus, CNP, Jennifer Salcido, CNP, Delisa Bolanos, CNP, Princess Prince, CNP, Berta Smith, CNP, Emmanuel Mobley PA-C, Nilda Norris, CNS, Poli Marquez, CNP, Sam Wade, [facility]    Progress Note    6/22/2023    10:56 AM    Name:   Dharmesh Esquivel  MRN:     [id number]     Acct:      [de-identified]   Room:   [facility]1008-02   Day:  1  Admit Date:  6/21/2023  9:20 AM    PCP:   Colin Rodriguez DO  Code Status:  Full Code    Subjective:     C/C:   Chief Complaint   Patient presents with    Leg Swelling    Shortness of Breath    Fatigue     Interval History Status: improved. Patient feels a little better and reports decreased edema. Denies any chest pain, nausea vomiting, fevers or chills. Continues to have shortness of breath with intermittent cough, denies wheezing    Brief History: This is an 37-year-old female presents with a complaint of shortness of breath, fatigue and lower extremity edema. She is found to have evidence of congestive heart failure on exam and blood testing. She has a history of coronary disease with prior bypass and stents.   She noticed over the last 4 days she had increased lower extremity edema, she has some chronic anemia but her legs were 3 times more swollen than
Physical Therapy  Facility/Department: [facility] PROGRESSIVE CARE  Physical Therapy Initial Assessment    Name: Rhea Ellington  : 1940  MRN: [id number]  Date of Service: 2023  RN reports patient is medically stable for therapy treatment this date. Chart reviewed prior to treatment and patient is agreeable for therapy. All lines intact and patient positioned comfortably at end of treatment. All patient needs addressed prior to ending therapy session. Discharge Recommendations:  Patient would benefit from continued therapy after discharge   Due to recent hospitalization and medical condition, pt would benefit from additional intermittent skilled therapy at time of discharge. Please refer to the AM-PAC score for current functional status. Per H&P: Rhea Ellington is a 82 y.o. Non- / non  female who presents with Leg Swelling, Shortness of Breath, and Fatigue and is admitted to the hospital for the management of CHF with unknown LVEF ([facility] [address] This is a pleasant, cooperative 72-year-old female who presented to our hospital for evaluation of worsening bilateral lower leg edema, fatigue and exertional dyspnea. She states that she fell approximately 1 month ago. She had no traumatic injuries. She did not lose consciousness. However, she feels she had exacerbation of presenting symptoms since the fall. She has a medical history significant for CAD, CHF, CKD, COPD, diabetes, GERD, HLD, HTN, hypothyroidism. She does follow with Dr. Tristen Mejia (nephrology), Dr. Hina Garcia (cardiology), Dr. America Sneed (vascular surgery). ED evaluation today significant for pBNP 19,417 (no comparative value). Imaging showing mild pulmonary edema. She did have a slightly elevated D-dimer, DVT study negative, V/Q scan negative for PE. Patient Diagnosis(es): The encounter diagnosis was Acute on chronic systolic congestive heart failure ([facility] [address]   Past Medical History:  has a past medical history of
Transitions of Care Pharmacy Service   Medication Review    The patient's list of current home medications has been reviewed. Source(s) of information: spoke to patient, home medication list and sure scripts     Based on information provided by the above source(s), I have updated the patient's home med list as described below. I changed or updated the following medications on the patient's home medication list:  Discontinued Cranberry-Vitamin C-Vitamin E (CRANBERRY PLUS VITAMIN C PO)  Ascorbic Acid (VITAMIN C WITH VIVIENNE HIPS) 500 MG tablet     Added Cranberry 500 MG TABS  Multiple Vitamins-Minerals (QC WOMENS DAILY MULTIVITAMIN) TABS  Ascorbic Acid (VITAMIN C WITH VIVIENNE HIPS) 1000 MG tablet     Adjusted   insulin glargine (LANTUS) 100 UNIT/ML injection vial  atorvastatin (LIPITOR) 40 MG tablet  Cinnamon 500 MG TABS     Other Notes None            Please feel free to call me with any questions about this encounter. Thank you. This note will be reviewed and co-signed by the Transitions of Care Pharmacist. The pharmacist will review inpatient orders and contact the physician about any discrepancies.     Juana Jordan, pharmacy technician  Transitions of Care Pharmacy Service  Phone:  [phone]  Fax: [phone]      Electronically signed by Juana Jordan on 6/22/2023 at 1:33 PM       Prior to Admission medications    Medication Sig   Cranberry 500 MG TABS   Take 1 tablet by mouth daily   Multiple Vitamins-Minerals (QC WOMENS DAILY MULTIVITAMIN) TABS Take 1 tablet by mouth daily   nitroGLYCERIN (NITROSTAT) 0.4 MG SL tablet Place 1 tablet under the tongue as needed for Chest pain   insulin glargine (LANTUS) 100 UNIT/ML injection vial INJECT 5-10 UNITS INTO THE SKIN NIGHTLY as needed for blood sugars over 120   sodium bicarbonate 650 MG tablet Take 1 tablet by mouth 2 times daily   levothyroxine (SYNTHROID) 88 MCG tablet TAKE 1 TABLET EVERY DAY   Finerenone (KERENDIA) 10 MG TABS Take 5 mg by mouth daily
[x] There are one or more home medications that need clarification before Medication Reconciliation can be completed. The Med List Status has been marked as In Progress. To assist with Home Medication Reconciliation the following actions have been taken:    [x] Pharmacy medication reconciliation service requested.  (Note: This can be done by sending a Perfect Serve message to [facility] Rec Pharmacist or by Aaron Valdez [phone].)  [] Family requested to bring medications into the hospital  [] Family requested to call hospital with medication list  [] Message left with physician office
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 covarrubias     CAD S/P percutaneous coronary angioplasty     Persistent proteinuria     Lymphedema of right lower extremity     Chronic bilateral low back pain without sciatica     Deficiency of vitamin B12     Wrist arthritis     Gastroesophageal reflux disease without esophagitis     Neck sprain     Acute systolic heart failure (HCC)      Plan of Treatment:   CHF, HFrEF, LVEF 15 to 20%  Patient with LifeVest already ordered  Fluid level is being controlled by nephrology  Patient continues follow-up for echocardiogram if still low then patient may need AICD  Hypertension fairly controlled continue medications  Moderate mitral regurg need to continue follow  If you get worse  Patient name candidate of mitral clip  Patient can be discharged home from cardiac point  Vidhya Parker MD, MD  [facility]  [phone]

## 2023-06-24 NOTE — PLAN OF CARE
Problem: Discharge Planning  Goal: Discharge to home or other facility with appropriate resources  6/24/2023 1513 by Ashanti Herbert RN  Outcome: Progressing     Problem: Skin/Tissue Integrity  Goal: Absence of new skin breakdown  Description: 1. Monitor for areas of redness and/or skin breakdown  2. Assess vascular access sites hourly  6/24/2023 1230 by Ashanti Herbert RN  Outcome: Adequate for Discharge     Problem: Safety - Adult  Goal: Free from fall injury  6/24/2023 1230 by Ashanti Herbert RN  Outcome: Adequate for Discharge  Alert/oriented, up with SBA, gait steady, Pt remains free from falls and injuries, freq. Rounding, call light at reach, utilized appropriately, bed/chair alarm armed, personal items within reach, bed in lowest position with wheels locked, no attempts to get out of bed unassisted, falling star and hourly rounds implemented, will continue to monitor and educate as needed.       Problem: Chronic Conditions and Co-morbidities  Goal: Patient's chronic conditions and co-morbidity symptoms are monitored and maintained or improved  6/24/2023 1230 by Ashanti Herbert RN  Outcome: Adequate for Discharge  Patient instructed to follow-up with PCP in 7-10 weeks

## 2023-06-24 NOTE — DISCHARGE INSTRUCTIONS
to your doctor about stop-smoking programs and medicines. These can increase your chances of quitting for good. Quitting smoking may be the most important step you can take to protect your heart. Stay at a healthy weight. Lose weight if you need to. Manage other health problems such as diabetes and high blood pressure. Limit or avoid alcohol. Ask your doctor how much alcohol, if any, is safe for you. If you think you may have a problem with alcohol or drug use, talk to your doctor. Avoid infections such as COVID-19, colds, and the flu. Get the flu vaccine every year. Get a pneumococcal vaccine shot. If you have had one before, ask your doctor whether you need another dose. Stay up to date on your COVID-19 vaccines. When should you call for help? Call 911  if you have symptoms of sudden heart failure such as: You have severe trouble breathing. You cough up pink, foamy mucus. You have a new irregular or rapid heartbeat. Call your doctor now or seek immediate medical care if:    You have new or increased shortness of breath. You are dizzy or lightheaded, or you feel like you may faint. You have sudden weight gain, such as more than 2 to 3 pounds in a day or 5 pounds in a week. (Your doctor may suggest a different range of weight gain.)     You have increased swelling in your legs, ankles, or feet. You are suddenly so tired or weak that you cannot do your usual activities. Watch closely for changes in your health, and be sure to contact your doctor if you develop new symptoms. Where can you learn more? Go to http://www.woods.com/ and enter E597 to learn more about \"Heart Failure: Care Instructions. \"  Current as of: September 7, 2022               Content Version: 13.7  © 2534-0764 [employer]. Care instructions adapted under license by [address]  If you have questions about a medical condition or this instruction, always ask your

## 2023-06-24 NOTE — DISCHARGE SUMMARY
[facility]  Office: [address], DO, Shahrzad Mireles, DO, Sundar Thompson, DO, Tonie Lizarraga, DO, Roger Alexis MD, Sierra Guevara MD, Paola Velazquez MD, Mick Graham MD,  Lisseth Davenport MD, Sara Myers MD, Amaris Arechiga DO, Ning Gannon MD,  Power Donnelly MD, Kar Adkins MD, Sofie Montiel DO, Lori Chaudhry MD, Eh Mccartney MD, Mohit Islas DO, Steph Betancourt MD, Meng Kellogg MD, Da Garrett MD, Neelima Wu MD,  China Vazquez DO, Heladio Maddox MD,  Oralia Blankenship, CNP,  Jamee Hamlin, CNP, Colleen Enrique, CNP, Monika Lawrence, CNP,  Keith Ruth, [facility], Chevy Toure, CNP, Meche Rubio, CNP, Alonzo Mcintyre, CNP, Solitario Spangler, [facility], [facility], CNP, Leda Mora PA-C, Mian Collado, CNS, Eduardo Saldivar, CNP, [facility]    Discharge Summary     Patient ID: Theo Vazquez  :  1940   MRN: [id number]     ACCOUNT:  [de-identified]   Patient's PCP: Amee Saxena DO  Admit Date: 2023   Discharge Date: 2023     Length of Stay: 3  Code Status:  Full Code  Admitting Physician: Kristine Elliott DO  Discharge Physician: Kristine Elliott DO     Active Discharge Diagnoses:     Hospital Problem Lists:  Principal Problem:    Acute systolic heart failure [facility])  Active Problems:    Mitral valve insufficiency    DM (diabetes mellitus) ([facility]    Secondary hypertension    CAD (coronary artery disease)    Hypothyroidism    CKD (chronic kidney disease) stage 3, GFR 30-59 ml/min ([facility])    Morbid obesity with BMI of 40.0-44.9, adult ([facility]    COPD without exacerbation ([facility]    Type 2 diabetes mellitus with stage 3 chronic kidney disease, with long-term current use of insulin ([address]  Resolved Problems:    * No resolved hospital problems.  *      Admission Condition:  fair     Discharged Condition: stable    Hospital Stay:     Hospital Course:

## 2023-06-24 NOTE — PLAN OF CARE
Pt had relatively uneventful night. Evening lantus dose held due to glucose drop in 50s the morning prior. Pt currently on .5 L nasal canula. Pt diuresing well with lasix dose. All needs currently met. Problem: Discharge Planning  Goal: Discharge to home or other facility with appropriate resources  Outcome: Progressing     Problem: Skin/Tissue Integrity  Goal: Absence of new skin breakdown  Description: 1. Monitor for areas of redness and/or skin breakdown  2. Assess vascular access sites hourly  3. Every 4-6 hours minimum:  Change oxygen saturation probe site  4. Every 4-6 hours:  If on nasal continuous positive airway pressure, respiratory therapy assess nares and determine need for appliance change or resting period.   Outcome: Progressing     Problem: Safety - Adult  Goal: Free from fall injury  Outcome: Progressing     Problem: ABCDS Injury Assessment  Goal: Absence of physical injury  Outcome: Progressing     Problem: Chronic Conditions and Co-morbidities  Goal: Patient's chronic conditions and co-morbidity symptoms are monitored and maintained or improved  Outcome: Progressing

## 2023-06-26 ENCOUNTER — CARE COORDINATION (OUTPATIENT)
Dept: CARE COORDINATION | Age: 83
End: 2023-06-26

## 2023-06-26 ENCOUNTER — CARE COORDINATION (OUTPATIENT)
Dept: CASE MANAGEMENT | Age: 83
End: 2023-06-26

## 2023-06-26 DIAGNOSIS — I50.21 ACUTE SYSTOLIC HEART FAILURE (HCC): Primary | ICD-10-CM

## 2023-06-26 PROCEDURE — 1111F DSCHRG MED/CURRENT MED MERGE: CPT

## 2023-06-27 ENCOUNTER — CARE COORDINATION (OUTPATIENT)
Dept: CASE MANAGEMENT | Age: 83
End: 2023-06-27

## 2023-06-27 ENCOUNTER — CARE COORDINATION (OUTPATIENT)
Dept: CARE COORDINATION | Age: 83
End: 2023-06-27

## 2023-06-27 DIAGNOSIS — I50.21 ACUTE SYSTOLIC HEART FAILURE (HCC): Primary | ICD-10-CM

## 2023-06-27 DIAGNOSIS — J43.1 PANLOBULAR EMPHYSEMA (HCC): ICD-10-CM

## 2023-06-28 ENCOUNTER — TELEPHONE (OUTPATIENT)
Dept: FAMILY MEDICINE CLINIC | Age: 83
End: 2023-06-28

## 2023-07-03 ENCOUNTER — OFFICE VISIT (OUTPATIENT)
Dept: FAMILY MEDICINE CLINIC | Age: 83
End: 2023-07-03
Payer: MEDICARE

## 2023-07-03 ENCOUNTER — HOSPITAL ENCOUNTER (OUTPATIENT)
Age: 83
Setting detail: SPECIMEN
Discharge: HOME | End: 2023-07-03

## 2023-07-03 VITALS
HEART RATE: 66 BPM | HEIGHT: 60 IN | SYSTOLIC BLOOD PRESSURE: 129 MMHG | WEIGHT: 207 LBS | DIASTOLIC BLOOD PRESSURE: 60 MMHG | BODY MASS INDEX: 40.64 KG/M2

## 2023-07-03 DIAGNOSIS — E11.22 TYPE 2 DIABETES MELLITUS WITH STAGE 3A CHRONIC KIDNEY DISEASE, WITH LONG-TERM CURRENT USE OF INSULIN (HCC): ICD-10-CM

## 2023-07-03 DIAGNOSIS — I50.21 ACUTE SYSTOLIC HEART FAILURE (HCC): ICD-10-CM

## 2023-07-03 DIAGNOSIS — I50.20 HFREF (HEART FAILURE WITH REDUCED EJECTION FRACTION) (HCC): Primary | ICD-10-CM

## 2023-07-03 DIAGNOSIS — Z79.4 TYPE 2 DIABETES MELLITUS WITH STAGE 3A CHRONIC KIDNEY DISEASE, WITH LONG-TERM CURRENT USE OF INSULIN (HCC): ICD-10-CM

## 2023-07-03 DIAGNOSIS — N18.31 TYPE 2 DIABETES MELLITUS WITH STAGE 3A CHRONIC KIDNEY DISEASE, WITH LONG-TERM CURRENT USE OF INSULIN (HCC): ICD-10-CM

## 2023-07-03 LAB
ANION GAP SERPL CALCULATED.3IONS-SCNC: 15 MMOL/L (ref 9–17)
BUN SERPL-MCNC: 19 MG/DL (ref 8–23)
CALCIUM SERPL-MCNC: 9.7 MG/DL (ref 8.6–10.4)
CHLORIDE SERPL-SCNC: 102 MMOL/L (ref 98–107)
CO2 SERPL-SCNC: 26 MMOL/L (ref 20–31)
CREAT SERPL-MCNC: 1.64 MG/DL (ref 0.5–0.9)
GFR SERPL CREATININE-BSD FRML MDRD: 31 ML/MIN/1.73M2
GLUCOSE SERPL-MCNC: 70 MG/DL (ref 70–99)
POTASSIUM SERPL-SCNC: 3.9 MMOL/L (ref 3.7–5.3)
SODIUM SERPL-SCNC: 143 MMOL/L (ref 135–144)

## 2023-07-03 PROCEDURE — G8427 DOCREV CUR MEDS BY ELIG CLIN: HCPCS

## 2023-07-03 PROCEDURE — 3078F DIAST BP <80 MM HG: CPT

## 2023-07-03 PROCEDURE — 99213 OFFICE O/P EST LOW 20 MIN: CPT

## 2023-07-03 PROCEDURE — 3044F HG A1C LEVEL LT 7.0%: CPT

## 2023-07-03 PROCEDURE — 1036F TOBACCO NON-USER: CPT

## 2023-07-03 PROCEDURE — 1090F PRES/ABSN URINE INCON ASSESS: CPT

## 2023-07-03 PROCEDURE — G8399 PT W/DXA RESULTS DOCUMENT: HCPCS

## 2023-07-03 PROCEDURE — G8417 CALC BMI ABV UP PARAM F/U: HCPCS

## 2023-07-03 PROCEDURE — 1123F ACP DISCUSS/DSCN MKR DOCD: CPT

## 2023-07-03 PROCEDURE — 1111F DSCHRG MED/CURRENT MED MERGE: CPT

## 2023-07-03 PROCEDURE — 3074F SYST BP LT 130 MM HG: CPT

## 2023-07-03 RX ORDER — GLUCOSAMINE HCL/CHONDROITIN SU 500-400 MG
CAPSULE ORAL
Qty: 200 STRIP | Refills: 2 | Status: SHIPPED | OUTPATIENT
Start: 2023-07-03

## 2023-07-03 SDOH — ECONOMIC STABILITY: HOUSING INSECURITY
IN THE LAST 12 MONTHS, WAS THERE A TIME WHEN YOU DID NOT HAVE A STEADY PLACE TO SLEEP OR SLEPT IN A SHELTER (INCLUDING NOW)?: NO

## 2023-07-03 SDOH — ECONOMIC STABILITY: INCOME INSECURITY: HOW HARD IS IT FOR YOU TO PAY FOR THE VERY BASICS LIKE FOOD, HOUSING, MEDICAL CARE, AND HEATING?: NOT HARD AT ALL

## 2023-07-03 SDOH — ECONOMIC STABILITY: FOOD INSECURITY: WITHIN THE PAST 12 MONTHS, YOU WORRIED THAT YOUR FOOD WOULD RUN OUT BEFORE YOU GOT MONEY TO BUY MORE.: NEVER TRUE

## 2023-07-03 SDOH — ECONOMIC STABILITY: FOOD INSECURITY: WITHIN THE PAST 12 MONTHS, THE FOOD YOU BOUGHT JUST DIDN'T LAST AND YOU DIDN'T HAVE MONEY TO GET MORE.: NEVER TRUE

## 2023-07-03 ASSESSMENT — ENCOUNTER SYMPTOMS
ABDOMINAL PAIN: 0
CONSTIPATION: 0
DIARRHEA: 0
VOMITING: 0
SHORTNESS OF BREATH: 1
WHEEZING: 0
NAUSEA: 0

## 2023-07-03 ASSESSMENT — PATIENT HEALTH QUESTIONNAIRE - PHQ9
SUM OF ALL RESPONSES TO PHQ9 QUESTIONS 1 & 2: 1
2. FEELING DOWN, DEPRESSED OR HOPELESS: 0
SUM OF ALL RESPONSES TO PHQ QUESTIONS 1-9: 1
SUM OF ALL RESPONSES TO PHQ QUESTIONS 1-9: 1
1. LITTLE INTEREST OR PLEASURE IN DOING THINGS: 1
SUM OF ALL RESPONSES TO PHQ QUESTIONS 1-9: 1
SUM OF ALL RESPONSES TO PHQ QUESTIONS 1-9: 1

## 2023-07-03 NOTE — PROGRESS NOTES
Subjective:    Alba Reeder is a 82 y.o. female with  has a past medical history of Abnormal cardiovascular stress test, Abnormal stress test, Ambulates with cane, Arthritis, At high risk for hyperkalemia, Back pain, CAD (coronary artery disease), CHF with unknown LVEF ([address]), Circulation problem, CKD (chronic kidney disease) stage 3, GFR 30-59 ml/min ([facility]), COPD (chronic obstructive pulmonary disease) ([address]), DM (diabetes mellitus) ([address]), Edema, Foot pain, bilateral, Former smoker, Gastroesophageal reflux disease without esophagitis, Hyperkalemia, Hyperlipidemia, Hypertension, Hypothyroidism, Intermittent claudication ([address]), Ischemic cardiomyopathy, Kidney disease, Lymphedema, MI (mitral incompetence), 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 ([address]), Morbid obesity with BMI of 45.0-49.9, adult ([address]), Osteoarthritis, Other activity(E029.9), Other activity(E029.9), Persistent proteinuria, PVD (peripheral vascular disease) ([address]), Sciatica, Secondary hyperparathyroidism (of renal origin), Shortness of breath, Shoulder fracture, right, Sleep apnea, and Type II or unspecified type diabetes mellitus without mention of complication, not stated as uncontrolled. Presented to the office today for:  Chief Complaint   Patient presents with    Follow-Up from [address]    Discuss Medications     Needs to discuss glucose and how many time testing is needed - patient will need refills on test strips if testing more than once daily - [employer] testing supplies -        HPI  Presents today for a post hospital follow up visit.      CHF   Was admitted for 3 days from 06/21/23 to 06/24/23   Initially went for SOB and B/L LE swelling  Was found to be in CHF and treated with Lasix, responded well  Currently denies any SOB or LE swelling  Echo 06/22/23 showed EF 15-20%  Discharged with Lasix 20 mg daily, and lifevest   Follows up with Dr Emma Castillo, last

## 2023-07-03 NOTE — PATIENT INSTRUCTIONS
Thank you for letting us take care of you today. We hope all your questions were addressed. If a question was overlooked or something else comes to mind after you return home, please contact a member of your Care Team listed below. Your Care Team at [facility] is Team #4  Soco Koroma (Faculty)  Joel Magallanes (Resident)  Vandana Corral (Resident)  Jane Nails (Resident)  Martinez Cabello (Resident)  Carlos Merino (Resident)  Keena Quintero, [address], [facility]  Ryan Tapia, [facility], [facility]  Cheyenne Boas, [facility]  Pallavi Rogers, [facility]  Lenore Vásquez, [facility]  Hardik Sharif, [facility]  LaJoyce Zetta Felty) [place], [place] (Clinical Practice Manager)  Nikkie Kimball, [facility] (Clinical Pharmacist)       Office phone number: [phone]    If you need to get in right away due to illness, please be advised we have \"Same Day\" appointments available Monday-Friday. Please call us at [phone] option #3 to schedule your \"Same Day\" appointment.

## 2023-07-03 NOTE — PROGRESS NOTES
Visit Information    Have you changed or started any medications since your last visit including any over-the-counter medicines, vitamins, or herbal medicines? no   Have you stopped taking any of your medications? Is so, why? -  no  Are you having any side effects from any of your medications? - no    Have you seen any other physician or provider since your last visit? yes - Podiatry, Vascular Surgery, Nephrology   Have you had any other diagnostic tests since your last visit? yes - Labs,XR, VL, CT, EKG, ECHO   Have you been seen in the emergency room and/or had an admission in a hospital since we last saw you?  yes - [facility]   Have you had your routine dental cleaning in the past 6 months?  no     Do you have an active MyChart account? If no, what is the barrier?   Yes    Patient Care Team:  Leatha Kimball DO as PCP - General (Family Medicine)  Leatha Kimball DO as PCP - Empaneled Provider  Jaguar Antunez DPM as Physician (Podiatry)  Zohra King RN as Care Transitions Nurse  Soheila Merino RD, LD as Dietitian    Medical History Review  Past Medical, Family, and Social History reviewed and does not contribute to the patient presenting condition    Health Maintenance   Topic Date Due    COVID-19 Vaccine (4 - Booster for [place] series) 01/13/2022    Flu vaccine (1) 08/01/2023    Depression Screen  12/05/2023    Annual Wellness Visit (AWV)  12/06/2023    Lipids  06/22/2024    DTaP/Tdap/Td vaccine (2 - Td or Tdap) 07/02/2029    DEXA (modify frequency per FRAX score)  Completed    Shingles vaccine  Completed    Pneumococcal 65+ years Vaccine  Completed    Hepatitis A vaccine  Aged Out    Hib vaccine  Aged Out    Meningococcal (ACWY) vaccine  Aged Out

## 2023-07-05 ENCOUNTER — CARE COORDINATION (OUTPATIENT)
Dept: CASE MANAGEMENT | Age: 83
End: 2023-07-05

## 2023-07-05 NOTE — CARE COORDINATION
Care Transitions Outreach Attempt #1      Attempt #1 to reach patient for transitions of care follow up. Unable to reach patient. No answer on home number, left VM on mobile number. Noted pt has RPM kit order on 23, has not activated it at this time. Patient: Ayala Serrano Patient : 1940 MRN: [id number]    Last Discharge [address]       Date Complaint Diagnosis Description Type Department Provider    23 Leg Swelling; Shortness of Breath; Fatigue Acute on chronic systolic congestive heart failure ([facility]) . .. ED to Hosp-Admission (Discharged) (ADMITTED) 1215 Fara Velazquez, DO; Hui PARSONS.. [facility] Care Transitions Follow Up Call    Patient Current Location:  Home: [address] [place] [address]    Care Transition Nurse contacted the patient by telephone to follow up after admission on 23. Verified name and  with patient as identifiers. Patient: Ayala Serrano  Patient : 1940   MRN: [id number]  Reason for Admission: Acute on chronic CHF  Discharge Date: 23 RARS: Readmission Risk Score: 13.2      Needs to be reviewed by the provider   Additional needs identified to be addressed with provider: No  none             Method of communication with provider: none. Spoke to Perlita braga for transitions follow up. Pt is doing better, LE edema gone down, still sl swollen in right leg but improved. Breathing is baseline, sugars wnl, was 100 this a.m. Pt is calling RPM to compete setup today. Requested calls only to home number as mobile is for emergencies only. Pt has Cardiology f/u on 23. Wearing chest vest.     Patient called back, stated she just got RPM set up and was wondering how long she will be monitored for. Informed her it woill be b/w 30-90 days and she will be followed by ACM after CTN. Questions answered, v/u. Vitals were wnl today. Pt sees Dr Velasquez Given Cardiologist on 23. Will see NP Jesústg Carroll.      Addressed changes since last

## 2023-07-07 ENCOUNTER — CARE COORDINATION (OUTPATIENT)
Dept: CARE COORDINATION | Age: 83
End: 2023-07-07

## 2023-07-07 ENCOUNTER — CARE COORDINATION (OUTPATIENT)
Dept: CASE MANAGEMENT | Age: 83
End: 2023-07-07

## 2023-07-07 NOTE — CARE COORDINATION
Remote Alert Monitoring Note  Rpm alert to be reviewed by the provider   red alert   pulse ox reading (90%)   Additional needs to be addressed by N/A: No      Date/Time:  2023 11:48 AM  Patient Current Location: [facility] contacted patient by telephone regarding red alert received for pulse ox reading (90%). Verified patients name and  as identifiers. Background: Pt enrolled in RPM r/t COPD, CHF. Refer to 911 immediately if:  Patient unresponsive or unable to provide history  Change in cognition or sudden confusion  Patient unable to respond in complete sentences  Intense chest pain/tightness  Any concern for any clinical emergency  Red Alert: Provider response time of 1 hr required for any red alert requiring intervention  Yellow Alert: Provider response time of 3hr required for any escalated yellow alert  O2 Triage  Are you having any Chest Pain? no   Are you having any Shortness of Breath? no   Swelling in your hands or feet? no   Are you having any other health concerns or issues? no   Clinical Interventions: Reviewed and followed up on alerts and treatments-Pt speaking in full sentences, no apparent distress. Denies any SOB/dyspnea, new/increasing edema. She does verbalize that she is wearing fingernail polish. She is agreeable to recheck oxygen level at this time with updated reading of 93%. Educated pt that darker fingernail polish is sometimes difficult for oximeter to register and if she receives a reading below 92% she should reposition oximeter and recheck metric. Verbalizes understanding. Plan/Follow Up: Will continue to review, monitor and address alerts with follow up based on severity of symptoms and risk factors.

## 2023-07-07 NOTE — CARE COORDINATION
responded to in the same day  Patient weighs self at same time every day (or after urinating and waking up)  Take blood pressure 1-2 hrs after medications   RPM team may have different phone area code (including [facility], [place], [facility] or [address] [place] [address])                              [x] Instructed patient to keep scale on flat surface                                                         [x] Instructed patient to keep tablet plugged in at all times                         [x] Instructed how to contact IT support ([phone])  [x] Provided Remote Patient Monitoring care  information               All questions answered at this time. Geo Orellana RN BSN CCM  Care Transitions Nurse  [phone]   [email or web address]

## 2023-07-11 ENCOUNTER — CARE COORDINATION (OUTPATIENT)
Dept: CASE MANAGEMENT | Age: 83
End: 2023-07-11

## 2023-07-11 PROBLEM — I50.21 ACUTE SYSTOLIC HEART FAILURE (HCC): Status: ACTIVE | Noted: 2023-07-11

## 2023-07-11 PROBLEM — I50.23 ACUTE ON CHRONIC SYSTOLIC CONGESTIVE HEART FAILURE (HCC): Status: ACTIVE | Noted: 2023-06-21

## 2023-07-11 NOTE — CARE COORDINATION
[address] [address] [facility] 250 Care Transitions Follow Up Call    Patient Current Location:  Home: [facility] [place] [address]    Care Transition Nurse contacted the patient by telephone to follow up after admission on 23. Verified name and  with patient as identified  Patient: Antwon Recinos  Patient : 1940   MRN: [id number]  Reason for Admission:   Discharge Date: 23 RARS: Readmission Risk Score: 13.2      Needs to be reviewed by the provider   Additional needs identified to be addressed with provider: No  none             Method of communication with provider: none. Writer spoke to patient, she is doing pretty good, stated she is returning the Holter Monitor today, has been going out and about, feeling better, using the RPM equipment, denies any c/o fever, chills n/v/d, sob or chest pain at this time, stated her 02 sats have been in the 90%'s, no new needs at this time, will continue to follow//JU    Addressed changes since last contact:  none  Discussed follow-up appointments. If no appointment was previously scheduled, appointment scheduling offered: Yes. Is follow up appointment scheduled within 7 days of discharge? Yes. Follow Up  Future Appointments   Date Time Provider [address]   2023  3:45 PM MARIO Otero - CNP AFL TCC SYLV AFL BURNS C   8/3/2023 11:15 AM Nevin Meter, DPM [facility]   2023 10:00 AM [facility]   2023 11:10 AM Laverne Younger MD AFL Neph [facility] None   2023  2:00 PM DO [facility]   3/21/2024 11:45 AM [facility] patient enrollment in the Remote Patient Monitoring (RPM) program for in-home monitoring: Yes, patient already enrolled.      Care Transitions Subsequent and Final Call    Subsequent and Final Calls  Do you have any ongoing symptoms?: No  Do you have any questions related to your medications?: No  Do you currently have any

## 2023-07-14 ENCOUNTER — CARE COORDINATION (OUTPATIENT)
Dept: CARE COORDINATION | Age: 83
End: 2023-07-14

## 2023-07-19 ENCOUNTER — CARE COORDINATION (OUTPATIENT)
Dept: CASE MANAGEMENT | Age: 83
End: 2023-07-19

## 2023-07-19 NOTE — CARE COORDINATION
[facility] Care Transitions Follow Up Call    Patient Current Location:  Home: [address] [place] [address]    Care Transition Nurse contacted the patient by telephone to follow up after admission on 23. Verified name and  with patient as identifiers. Patient: Javid Abler  Patient : 1940   MRN: [id number]  Reason for Admission:   Discharge Date: 23 RARS: Readmission Risk Score: 13.2      Needs to be reviewed by the provider   Additional needs identified to be addressed with provider: No  none             Method of communication with provider: none. Writer spoke to patient, she is doing well, has f/u with cardiology on Monday, her vitals and weight has been really good, likes the RPM equipment, will go over results of Holter Monitor on Monday, denies any c/o fever, chills, n/v/d, sob or chest pain at this time, denies and BLE edema, following low sodium diet, no new needs at this time, will continue to follow//JU    Addressed changes since last contact:  none  Discussed follow-up appointments. If no appointment was previously scheduled, appointment scheduling offered: Yes. Is follow up appointment scheduled within 7 days of discharge? Yes. Follow Up  Future Appointments   Date Time Provider [address]   2023  3:45 PM MARIO Camacho - CNP AFL TCC SYLV AFL BURNS C   8/3/2023 11:15 AM Tania Caldwell DPM [facility]   2023 10:00 AM [facility]   2023 11:10 AM Laverne Mccurdy MD AFL Neph [place] None   2023  2:00 PM [facility]   3/21/2024 11:45 AM [facility] patient enrollment in the Remote Patient Monitoring (RPM) program for in-home monitoring: Yes, patient already enrolled.      Care Transitions Subsequent and Final Call    Schedule Follow Up Appointment with PCP: Completed  Subsequent and Final Calls  Do you have any ongoing symptoms?:

## 2023-07-24 ENCOUNTER — CARE COORDINATION (OUTPATIENT)
Dept: CARE COORDINATION | Age: 83
End: 2023-07-24

## 2023-07-24 NOTE — CARE COORDINATION
7/24/23 3:12 PM Patient is currently enrolled in Remote Patient Monitoring for CHF and COPD. Pt has scheduled cardiology visit today at 3:45 PM. RPM metrics added for review.

## 2023-07-26 ENCOUNTER — CARE COORDINATION (OUTPATIENT)
Dept: CASE MANAGEMENT | Age: 83
End: 2023-07-26

## 2023-07-26 NOTE — CARE COORDINATION
none  Discussed follow-up appointments. If no appointment was previously scheduled, appointment scheduling offered: Yes. Is follow up appointment scheduled within 7 days of discharge? Yes. Follow Up  Future Appointments   Date Time Provider [address]   8/3/2023 11:15 AM [place] [facility] TOLPP   8/7/2023 10:00 AM [facility]   8/30/2023 11:10 AM Virginia Baltazar MD AFL Neph [place] None   9/27/2023 10:30 AM SCHEDULE, AFL TCC [place] ECHO AFL TCC SYLV AFL BURNS C   10/2/2023  1:00 PM Augustina Lea, APRN - CNP AFL TCC SYLV AFL BURNS C   12/8/2023  2:00 PM DO [facility] Beebe [facility]   3/21/2024 11:45 AM [facility] Bedolla [facility]       Offered patient enrollment in the Remote Patient Monitoring (RPM) program for in-home monitoring: Yes, patient already enrolled. Care Transitions Subsequent and Final Call    Schedule Follow Up Appointment with PCP: Completed  Subsequent and Final Calls  Do you currently have any active services?: No  Do you have any needs or concerns that I can assist you with?: No  Care Transitions Interventions    Registered Dietician: Completed    Disease Association: Completed  Specialty Service Referral: Completed    Other Interventions:             Care Transition Nurse provided contact information for future needs. No further follow-up call indicated based on severity of symptoms and risk factors.   Plan for next call: referral to ambulatory care manager-Yuki Bird RN
negative

## 2023-07-27 ENCOUNTER — CARE COORDINATION (OUTPATIENT)
Dept: CARE COORDINATION | Age: 83
End: 2023-07-27

## 2023-07-27 RX ORDER — BLOOD SUGAR DIAGNOSTIC
STRIP MISCELLANEOUS DAILY
COMMUNITY

## 2023-08-03 ENCOUNTER — CARE COORDINATION (OUTPATIENT)
Dept: CARE COORDINATION | Age: 83
End: 2023-08-03

## 2023-08-03 ENCOUNTER — OFFICE VISIT (OUTPATIENT)
Dept: PODIATRY | Age: 83
End: 2023-08-03
Payer: MEDICARE

## 2023-08-03 VITALS — BODY MASS INDEX: 35.33 KG/M2 | HEIGHT: 62 IN | WEIGHT: 192 LBS

## 2023-08-03 DIAGNOSIS — E11.51 TYPE II DIABETES MELLITUS WITH PERIPHERAL CIRCULATORY DISORDER (HCC): Primary | ICD-10-CM

## 2023-08-03 DIAGNOSIS — M79.672 PAIN IN BOTH FEET: ICD-10-CM

## 2023-08-03 DIAGNOSIS — D23.72 BENIGN NEOPLASM OF SKIN OF LOWER LIMB, INCLUDING HIP, LEFT: ICD-10-CM

## 2023-08-03 DIAGNOSIS — B35.1 DERMATOPHYTOSIS OF NAIL: ICD-10-CM

## 2023-08-03 DIAGNOSIS — D23.71 BENIGN NEOPLASM OF SKIN OF LOWER LIMB, INCLUDING HIP, RIGHT: ICD-10-CM

## 2023-08-03 DIAGNOSIS — M79.671 PAIN IN BOTH FEET: ICD-10-CM

## 2023-08-03 DIAGNOSIS — I73.9 PERIPHERAL VASCULAR DISEASE (HCC): ICD-10-CM

## 2023-08-03 PROCEDURE — 11721 DEBRIDE NAIL 6 OR MORE: CPT | Performed by: PODIATRIST

## 2023-08-03 PROCEDURE — 99999 PR OFFICE/OUTPT VISIT,PROCEDURE ONLY: CPT | Performed by: PODIATRIST

## 2023-08-03 NOTE — CARE COORDINATION
Ambulatory Care Coordination Note  2023    Patient Current Location:  Home: [address] [place] [address]     ACM contacted the patient by telephone. Verified name and  with patient as identifiers. Provided introduction to self, and explanation of the ACM role. Challenges to be reviewed by the provider   Additional needs identified to be addressed with provider: No  none               Method of communication with provider: staff message. ACM: Tip Chavis RN    CC Plan:      Review upcoming appointments with Patient. Patient has an upcoming appointment with Dr. Carol Garcia on 23. 2.  Review medications with Patient. 3.  Review RPM vital signs. 4.  Patient kept appointment with Dr. Edvin George, DPM, [facility] today. Offered patient enrollment in the Remote Patient Monitoring (RPM) program for in-home monitoring: Yes, patient already enrolled. Writer phoned Patient for ACM update and to discuss cc plan of care. Patient states she continues to feel well. She denies chest discomfort, palpitations and shortness of breath. Patient states she has lost quite a bit of weight. She denies swelling to her lower extremities/feet. She states she has frequent urination on Lasix. Patient informed Writer that she has RPM equipment. Her vital signs in Epic were verified with her. Her blood pressure was 122/72, HR=73, P.O.=97%, Blood Glucose = 93; weight = 192.4 lb. Patient got her air conditioner fixed. Patient expressed sadness regarding Dr. Mariaelena Hagen leaving [facility]. She plans to call their office to see who her Cardiologist will be. Patient plans to keep appointment with PCP next week. She denies need for transportation. Writer plans to follow up with Patient next week.     Diabetes Assessment    Medic Alert ID: No  Meal Planning: None   How often do you test your blood sugar?:  (Comment: twice daily)   Do you have barriers with adherence to non-pharmacologic

## 2023-08-03 NOTE — PROGRESS NOTES
all of their questions were answered in detail. Proper foot hygiene and care was discussed with the pt. Informed patient on proper diabetic foot care and importance of tight glycemic control. Patient to check feet daily and contact the office with any questions/problems/concerns.    Other comorbidity noted and will be managed by PCP.  8/3/2023    Electronically signed by Tavo Robins DPM on 8/3/2023 at 11:35 AM  8/3/2023

## 2023-08-07 ENCOUNTER — CARE COORDINATION (OUTPATIENT)
Dept: CARE COORDINATION | Age: 83
End: 2023-08-07

## 2023-08-07 ENCOUNTER — OFFICE VISIT (OUTPATIENT)
Dept: FAMILY MEDICINE CLINIC | Age: 83
End: 2023-08-07
Payer: MEDICARE

## 2023-08-07 VITALS
HEART RATE: 72 BPM | DIASTOLIC BLOOD PRESSURE: 66 MMHG | WEIGHT: 198 LBS | SYSTOLIC BLOOD PRESSURE: 150 MMHG | BODY MASS INDEX: 36.21 KG/M2

## 2023-08-07 DIAGNOSIS — I50.21 ACUTE SYSTOLIC HEART FAILURE (HCC): Primary | ICD-10-CM

## 2023-08-07 PROCEDURE — 1036F TOBACCO NON-USER: CPT

## 2023-08-07 PROCEDURE — 3078F DIAST BP <80 MM HG: CPT

## 2023-08-07 PROCEDURE — 1123F ACP DISCUSS/DSCN MKR DOCD: CPT

## 2023-08-07 PROCEDURE — G8399 PT W/DXA RESULTS DOCUMENT: HCPCS

## 2023-08-07 PROCEDURE — G8417 CALC BMI ABV UP PARAM F/U: HCPCS

## 2023-08-07 PROCEDURE — 1090F PRES/ABSN URINE INCON ASSESS: CPT

## 2023-08-07 PROCEDURE — G8427 DOCREV CUR MEDS BY ELIG CLIN: HCPCS

## 2023-08-07 PROCEDURE — 99213 OFFICE O/P EST LOW 20 MIN: CPT

## 2023-08-07 PROCEDURE — 3074F SYST BP LT 130 MM HG: CPT

## 2023-08-07 ASSESSMENT — ENCOUNTER SYMPTOMS
ABDOMINAL PAIN: 0
SHORTNESS OF BREATH: 1
CONSTIPATION: 0
DIARRHEA: 0
COUGH: 0
WHEEZING: 0
VOMITING: 0
NAUSEA: 0

## 2023-08-07 NOTE — CARE COORDINATION
Remote Patient Monitoring Note  Date/Time:  8/7/2023 9:21 AM  LPN reviewed patients reported daily Remote Patient Monitoring metrics. All reported metrics are within alert parameters. Pt has scheduled office visit this morning at 10:00.  RPM metrics added for review. Plan/Follow Up: Will continue to review, monitor and address alerts with follow up based on severity of symptoms and risk factors.

## 2023-08-10 ENCOUNTER — CARE COORDINATION (OUTPATIENT)
Dept: CARE COORDINATION | Age: 83
End: 2023-08-10

## 2023-08-10 NOTE — CARE COORDINATION
Nutrition Care Coordinator Follow-Up visit:    Food Recall:eating 2-3 meals/d    Activity Level:  Sedentary:X  Lightly Active: Moderately Active:  Very Active:    Adult BMI:  Underweight (below 18.5)  Normal Weight (18.5-24.9)  Overweight (25-29. 9)  Obese (30-39.9)X  Morbidly Obese (>40)    Weight Change:none    Plan:  Plan was established with patient:  Increase dietary fiber by consuming whole grains, fruits and vegetables:X  Limit dietary cholesterol to >200mg/day: Increase water intake:  Avoid added sugar:  Avoid sweetened beverages:  Choose lean meats:      Monitoring: Will monitor weight:  Will monitor adherence to meal plan:X  Will monitor adherence to exercise plan: Will monitor HGA1c:    Handouts Provided :  Low Carb snacking:  Carb counting /individual meal plan:  Portion Control:  Food Labels:X  Physical Activity:  Low Fat/Cholesterol:  Hypo/Hyperglycemia:  Calorie Controlled Meal Plan:    Goals: Increase water consumption to 8oz. 6-8 times daily:  Manage blood sugars by consuming 3 meals spaced every 4-5 hours with 2-3 snacks daily:  Increase fiber and decrease fat intake by consuming 1-2 fruit servings and 2-3 vegetable servings per day. Increase physical activity by:  Consume less than 2,000mg of sodium/day: reviewed  Avoid consumption of sweetened beverages and added sugar by reading food labels:  Monitor blood sugars by using meter to check blood glucose before morning meal and 2 hours after a meal daily:  Decrease risk of coronary heart disease by consuming fish that contains omega-3 fatty acids at least twice a week, avoiding partially hydrogenated oil/trans fats and limiting saturated fat intake by reading food labels:reviewed    Patient goals set: 1. Reviewed DASH guidelines- lowfat/cholesterol and low sodium. Reviewed reading food labels-what to look for on labels. Encouraged to limit saturated fat, trans fat, cholesterol and sodium intake.   2. Reviewed avoiding canned, prepackaged

## 2023-08-10 NOTE — CARE COORDINATION
Ambulatory Care Coordination Note  8/10/2023    Patient Current Location:  Home: [address] [place] [address] Road [address]     ACM contacted the patient by telephone. Verified name and  with patient as identifiers. Provided introduction to self, and explanation of the ACM role. Challenges to be reviewed by the provider   Additional needs identified to be addressed with provider: No  none               Method of communication with provider: staff message. ACM: Gwendolyn Moya RN    CC Plan:       Patient kept appointment with Dr. Harris Randall on 23. Assessment and plan is copied and pasted below for review with Patient:    Assessment and Plan:    1. Acute systolic heart failure ([address])  -Patient presents today for 1 month follow-up for HFrEF. Patient was seen by Dr. Gui Chavez office by NP Rachel Bass. Patient returned her LifeVest device mentioning that she was only supposed to wear for 2 weeks. According to NP notes, patient is now wearing a LifeVest due to a rash and that risks associated with not wearing 1 were discussed with her however patient states otherwise is willing to continue to wear the LifeVest considering the risks associated with not wearing 1. I called [facility] cardiology clinic 2 times however could not reach the nurses dealing with LifeVest and was informed that I will receive a call back. I have informed this information to the patient and that once I receive more information and clarification regarding her LifeVest device I will let her know. Currently CHF is stable as patient denies any chest pains however continues to have shortness of breath which is unchanged from baseline and bilateral lower extremity swelling has improved. Patient is currently wearing bilateral compression stockings and taking Lasix 20 mg daily which she should continue. Patient will follow-up with me in 2 months. Next appointment with cardiology is 10/23 with a limited echo just prior to that on 2023.   Patient

## 2023-08-15 ENCOUNTER — CARE COORDINATION (OUTPATIENT)
Dept: CARE COORDINATION | Age: 83
End: 2023-08-15

## 2023-08-15 DIAGNOSIS — I50.21 ACUTE SYSTOLIC CONGESTIVE HEART FAILURE (HCC): Primary | ICD-10-CM

## 2023-08-15 NOTE — CARE COORDINATION
Ambulatory Care Coordination Note  8/15/2023      ACM contacted the patient by telephone. Method of communication with provider: staff message. ACM: Zara Sage RN    CC Plan:       Review RPM vital signs:  BP:  128/64, HR:  72 & 80; Pulse Ox:  94%; weight: 193 lb    2. What is the status of Life Vest?  Writer left message on Cardiology Nurse line on 8/11/23 regarding life vest.  No return call received to Writer. Offered patient enrollment in the Remote Patient Monitoring (RPM) program for in-home monitoring: Yes, patient already enrolled. Phoned Patient for ACM update and to discuss cc plan of care. Message left on Patient's voice mail requesting a return call. Writer's contact information provided. Return call received from Patient at 4:16 pm.  Writer missed her call. Patient left a message stating she has not heard anything regarding the life vest.  She plans to call Cardiologist office tomorrow. Writer returned call to Patient at 4:57 pm.  Her line was busy. Writer will call Patient tomorrow. Lab Results       None            Care Coordination Interventions    Referral from Primary Care Provider: No  Suggested Interventions and Community Resources  Diabetes Education: In Process  Fall Risk Prevention: In Process  Disease Specific Clinic: In Process (Comment: Dr. Rashawn Fontaine, Vascular;  Dr. Michael Fam, Nephrology;)  Registered Dietician: Completed (Comment: Anusha Edward RD, [facility])  Transportation Support: Declined  Zone Management Tools:  In Process          Goals Addressed    None         Future Appointments   Date Time Provider [facility] [address]   8/30/2023 11:10 AM Clementina Gonzales MD AFL [facility] None   9/27/2023 10:30 AM SCHEDULE, AFL [facility] [place] [facility] C   10/2/2023  1:00 PM Patricia Amanda APRN - CNP AFL TCC [facility] AFL [facility]NS C   10/19/2023 11:00 AM Dinorah Castellanos DPM [facility]   12/8/2023  2:00 PM DO Ivon Hebert

## 2023-08-15 NOTE — PROGRESS NOTES
Pt requesting new lifevest.  Will reorder today. Pt had one previously but sent it back due to \"rash\"    Pt is now agreeable to wear.

## 2023-08-16 ENCOUNTER — CARE COORDINATION (OUTPATIENT)
Dept: CARE COORDINATION | Age: 83
End: 2023-08-16

## 2023-08-16 NOTE — CARE COORDINATION
Ambulatory Care Coordination Note  2023    Patient Current Location:  Home: [address] [id number]     ACM contacted the patient by telephone. Verified name and  with patient as identifiers. Provided introduction to self, and explanation of the ACM role. Challenges to be reviewed by the provider   Additional needs identified to be addressed with provider: Yes  none               Method of communication with provider: staff message. ACM: Michelle Glynn RN    CC Plan:        Review RPM vital signs:  BP:  121/62, HR:  64; Pulse Ox:  96%; weight: 192 lb     2. What is the status of Life Vest?  Writer left message on Cardiology Nurse line on 23 regarding life vest.  No return call received to Writer. For questions about the Wearable Cardioverter Defibrillator (LifeVest), please contact [address] Patient Support at [phone] or [email or web address]. Offered patient enrollment in the Remote Patient Monitoring (RPM) program for in-home monitoring: Yes, patient already enrolled. Phoned Patient for ACM update and to discuss cc plan of care. Patient is feeling good today. She denies chest pain, shortness of breath, and palpitations. Her RPM vital signs were reviewed. Patient informed Writer that she spoke with Charu Cannon at her Cardiology office today. Charu Cannon informed her NEHA Camejo, re-ordered a Life Vest for her. She was informed the order was submitted to [facility] [place] yesterday. Writer gave Patient the phone number for [address] Patient Support. Patient denies concerns today. Writer plans to follow up with Patient next week. Diabetes Assessment    Medic Alert ID: No  Meal Planning: None   How often do you test your blood sugar?:  (Comment: twice daily)   Do you have barriers with adherence to non-pharmacologic self-management interventions?  (Nutrition/Exercise/Self-Monitoring): No   Have you ever had to go to the ED for symptoms of low

## 2023-08-17 ENCOUNTER — CARE COORDINATION (OUTPATIENT)
Dept: CARE COORDINATION | Age: 83
End: 2023-08-17

## 2023-08-17 NOTE — CARE COORDINATION
Ambulatory Care Coordination Note  2023      ACM was contacted by  Jessica Adair from [facility]  by telephone. Verified name and  with  Jessica Adair  as identifiers. Method of communication with provider: staff message. ACM: DONYA Saucedo states she has been unable to contact Patient in regards to getting her fitted for her Life vest.  She states the [employer] has been unable to contact Patient as well. Writer phoned Patient at 1:55 pm.  Message was left on her voice mail stating Jessica Adair from [address] [place] [address] Representative have been trying to contact her to fit her for her life vest.     Offered patient enrollment in the Remote Patient Monitoring (RPM) program for in-home monitoring: Yes, patient already enrolled. Lab Results       None            Care Coordination Interventions    Referral from Primary Care Provider: No  Suggested Interventions and Community Resources  Diabetes Education: In Process  Fall Risk Prevention: In Process  Disease Specific Clinic: In Process (Comment: Dr. Karthikeyan Arceo, Vascular;  Dr. Suki Esquivel, Nephrology;)  Registered Dietician: Completed (Comment: Steffi Burrows RD, [facility])  Transportation Support: Declined  Zone Management Tools:  In Process          Goals Addressed    None         Future Appointments   Date Time Provider [address]  [facility]   2023 11:10 AM Zahra Silva MD AFL [facility] None   2023 10:30 AM SCHEDULE, AFL [facility] [place] [facility] AFL [facility] AFL [facility]NS C   10/2/2023  1:00 PM Kimani Franks APRN - CNP AFL TCC [facility] AFL [facility]URNS C   10/19/2023 11:00 AM Saundra Ramos DPM [facility]   2023  2:00 PM DO Ivon Loera FP [facility]   3/21/2024 11:45 AM [facility]

## 2023-08-23 ENCOUNTER — CARE COORDINATION (OUTPATIENT)
Dept: CARE COORDINATION | Age: 83
End: 2023-08-23

## 2023-08-23 NOTE — CARE COORDINATION
Ambulatory Care Coordination Note  2023    Patient Current Location:  Home: [address] [place] [id number]     ACM contacted the patient by telephone. Verified name and  with patient as identifiers. Provided introduction to self, and explanation of the ACM role. Challenges to be reviewed by the provider   Additional needs identified to be addressed with provider: No  none               Method of communication with provider: staff message. ACM: Michelle Glynn RN    CC Plan:        Review RPM vital signs:  BP:  135/59, HR:  61; Pulse Ox:  93%; weight: 193.2 lb;  Blood sugar = 100     2. What is the status of Life Vest?      Offered patient enrollment in the Remote Patient Monitoring (RPM) program for in-home monitoring: Yes, patient already enrolled. Phoned Patient for ACM update and to discuss cc plan of care. Patient states she is feeling well. She denies chest discomfort and palpitations. She denies difficulty breathing and shortness of breath. She received her life vest and is wearing it. She states she received it on Saturday. She states she is waiting to receive her spare vest.  She states she'll be able to wear her spare vest while she washes the other one. Patient received education on her life vest and has the phone number to the customer service department. Patient states the swelling to her legs has decreased tremendously. She states she puts on her compression stockings first thing in the morning. Patient states it's difficult to know the amount of sodium content in her meal.  She states her sodium content should be less than 2000 in 24 hours. She states she has been reading labels but has difficulty figuring out content of sodium in meal.  She is in agreement to referral to Felecia Christianson RD, LD, [facility]. Patient states her blood sugar was 100 this morning. Writer plans to follow up with Patient next week.     Congestive Heart Failure Assessment    Are you currently

## 2023-08-24 ENCOUNTER — CARE COORDINATION (OUTPATIENT)
Dept: CARE COORDINATION | Age: 83
End: 2023-08-24

## 2023-08-24 ENCOUNTER — HOSPITAL ENCOUNTER (OUTPATIENT)
Age: 83
Discharge: HOME | End: 2023-08-24
Payer: MEDICARE

## 2023-08-24 DIAGNOSIS — N18.32 STAGE 3B CHRONIC KIDNEY DISEASE (HCC): ICD-10-CM

## 2023-08-24 DIAGNOSIS — R80.1 PERSISTENT PROTEINURIA: ICD-10-CM

## 2023-08-24 LAB
ALBUMIN SERPL-MCNC: 3.9 G/DL (ref 3.5–5.2)
ANION GAP SERPL CALCULATED.3IONS-SCNC: 12 MMOL/L (ref 9–17)
BUN SERPL-MCNC: 38 MG/DL (ref 8–23)
CALCIUM SERPL-MCNC: 9.9 MG/DL (ref 8.6–10.4)
CHLORIDE SERPL-SCNC: 103 MMOL/L (ref 98–107)
CO2 SERPL-SCNC: 23 MMOL/L (ref 20–31)
CREAT SERPL-MCNC: 1.9 MG/DL (ref 0.5–0.9)
CREAT UR-MCNC: 44.3 MG/DL (ref 28–217)
ERYTHROCYTE [DISTWIDTH] IN BLOOD BY AUTOMATED COUNT: 15.7 % (ref 11.8–14.4)
GFR SERPL CREATININE-BSD FRML MDRD: 26 ML/MIN/1.73M2
GLUCOSE SERPL-MCNC: 88 MG/DL (ref 70–99)
HCT VFR BLD AUTO: 39.6 % (ref 36.3–47.1)
HGB BLD-MCNC: 12.6 G/DL (ref 11.9–15.1)
MCH RBC QN AUTO: 28.4 PG (ref 25.2–33.5)
MCHC RBC AUTO-ENTMCNC: 31.8 G/DL (ref 28.4–34.8)
MCV RBC AUTO: 89.4 FL (ref 82.6–102.9)
NRBC BLD-RTO: 0 PER 100 WBC
PHOSPHATE SERPL-MCNC: 3.9 MG/DL (ref 2.6–4.5)
PLATELET # BLD AUTO: 231 K/UL (ref 138–453)
PMV BLD AUTO: 12.1 FL (ref 8.1–13.5)
POTASSIUM SERPL-SCNC: 4.4 MMOL/L (ref 3.7–5.3)
PTH-INTACT SERPL-MCNC: 271.3 PG/ML (ref 14–72)
RBC # BLD AUTO: 4.43 M/UL (ref 3.95–5.11)
SODIUM SERPL-SCNC: 138 MMOL/L (ref 135–144)
TOTAL PROTEIN, URINE: 58 MG/DL
URINE TOTAL PROTEIN CREATININE RATIO: 1.31 (ref 0–0.2)
WBC OTHER # BLD: 8.4 K/UL (ref 3.5–11.3)

## 2023-08-24 PROCEDURE — 85027 COMPLETE CBC AUTOMATED: CPT

## 2023-08-24 PROCEDURE — 82570 ASSAY OF URINE CREATININE: CPT

## 2023-08-24 PROCEDURE — 82040 ASSAY OF SERUM ALBUMIN: CPT

## 2023-08-24 PROCEDURE — 84100 ASSAY OF PHOSPHORUS: CPT

## 2023-08-24 PROCEDURE — 83970 ASSAY OF PARATHORMONE: CPT

## 2023-08-24 PROCEDURE — 36415 COLL VENOUS BLD VENIPUNCTURE: CPT

## 2023-08-24 PROCEDURE — 84156 ASSAY OF PROTEIN URINE: CPT

## 2023-08-24 PROCEDURE — 80048 BASIC METABOLIC PNL TOTAL CA: CPT

## 2023-08-24 NOTE — CARE COORDINATION
Message for Blase Less, RN-  Patient has questions in how to determine the amount of sodium content in her meal.  She states she reads the food labels but has a hard time breaking the sodium content down for her meal.  She was informed I'd let you know. Attempted to reach patient, LVM with contact information. Will attempt to reach  Again within 1-2 weeks.   L,LongRD,MAAME

## 2023-08-25 NOTE — CARE COORDINATION
Nutrition Care Coordinator Follow-Up visit:    Food Recall:eating 3 meals/d    Activity Level:  Sedentary:X  Lightly Active: Moderately Active:  Very Active:    Adult BMI:  Underweight (below 18.5)  Normal Weight (18.5-24.9)  Overweight (25-29. 9)  Obese (30-39.9)X  Morbidly Obese (>40)    Plan:  Plan was established with patient:  Increase dietary fiber by consuming whole grains, fruits and vegetables:X  Limit dietary cholesterol to >200mg/day: Increase water intake:  Avoid added sugar:  Avoid sweetened beverages:  Choose lean meats:X  Limit sodium itnake: X    Monitoring: Will monitor weight:  Will monitor adherence to meal plan:X  Will monitor adherence to exercise plan: Will monitor HGA1c:    Handouts Provided :  Low Carb snacking:  Carb counting /individual meal plan:  Portion Control:  Food Labels:X  Physical Activity:  Low Fat/Cholesterol:  Hypo/Hyperglycemia:  Calorie Controlled Meal Plan:    Goals: Increase water consumption to 8oz. 6-8 times daily:  Manage blood sugars by consuming 3 meals spaced every 4-5 hours with 2-3 snacks daily:  Increase fiber and decrease fat intake by consuming 1-2 fruit servings and 2-3 vegetable servings per day. Increase physical activity by:  Consume less than 2,000mg of sodium/day: reviewed  Avoid consumption of sweetened beverages and added sugar by reading food labels:  Monitor blood sugars by using meter to check blood glucose before morning meal and 2 hours after a meal daily:  Decrease risk of coronary heart disease by consuming fish that contains omega-3 fatty acids at least twice a week, avoiding partially hydrogenated oil/trans fats and limiting saturated fat intake by reading food labels:reviewed    Patient goals set: 1. Reviewed DASH guidelines- lowfat/cholesterol and low sodium. Reviewed reading food labels-what to look for on labels. Encouraged to limit saturated fat, trans fat, cholesterol and sodium intake.   2. Reviewed avoiding canned, prepackaged

## 2023-08-30 ENCOUNTER — CARE COORDINATION (OUTPATIENT)
Dept: CARE COORDINATION | Age: 83
End: 2023-08-30

## 2023-08-30 NOTE — CARE COORDINATION
Remote Patient Monitoring Note  Date/Time:  8/30/2023 9:46 AM  LPN reviewed patients reported daily Remote Patient Monitoring metrics. All reported metrics are within alert parameters. Pt has scheduled Nephrology appointment this morning at 11:10 am.  RPM metrics added for review. Plan/Follow Up: Will continue to review, monitor and address alerts with follow up based on severity of symptoms and risk factors.

## 2023-08-30 NOTE — CARE COORDINATION
Ambulatory Care Coordination Note  8/30/2023    ACM contacted the patient by telephone. No answer. Patient has a Nephrology appointment this morning. Writer will return call this afternoon. Writer returned call to Patient. Message left on her voice mail with request for return call. Writer's contact information was provided. Method of communication with provider: staff message. ACM: Maribell Strickland RN    CC Plan:       Patient is enrolled in RPM.  Her vital signs this morning are:  BP:  141/68; HR:  61 & 66; P.O.+  95%; weight:  191.8 lb    2. Patient is wearing a Life vest.    3.  Patient has an appointment today with her Nephrologist today. Review plan of care with Patient. 4.  Review medications with Patient. Offered patient enrollment in the Remote Patient Monitoring (RPM) program for in-home monitoring: Yes, patient already enrolled.     Lab Results       None                 Goals Addressed    None         Future Appointments   Date Time Provider [address]   8/30/2023 11:10 AM Enrico Scruggs MD [facility] [place] None   9/27/2023 10:30 AM SCHEDULE, AFL [facility] [place] [facility] AFL [facility] AFL B[facility]NS C   10/2/2023  1:00 PM Kimberley Londono APRN - CNP AFL TCC [facility] AFL BURNS C   10/19/2023 11:00 AM Shawnee Mckeon DPM [facility]   12/8/2023  2:00 PM DO Ivon Saha FP [facility]   3/21/2024 11:45 AM [facility]

## 2023-09-01 ENCOUNTER — CARE COORDINATION (OUTPATIENT)
Dept: CARE COORDINATION | Age: 83
End: 2023-09-01

## 2023-09-01 NOTE — CARE COORDINATION
Remote Alert Monitoring Note  Rpm alert to be reviewed by the provider   red alert   blood pressure reading (137/112)   Additional needs to be addressed by N/A: No      Date/Time:  2023 11:08 AM  Patient Current Location: [address] [place] [address]  Patient returned call at this time. Verified patients name and  as identifiers. Background: Pt enrolled in RPM r/t COPD, CHF. Refer to 911 immediately if:  Patient unresponsive or unable to provide history  Change in cognition or sudden confusion  Patient unable to respond in complete sentences  Intense chest pain/tightness  Any concern for any clinical emergency  Red Alert: Provider response time of 1 hr required for any red alert requiring intervention  Yellow Alert: Provider response time of 3hr required for any escalated yellow alert  BP Triage  Are you having any Chest Pain? no   Are you having any Shortness of Breath? no   Do you have a headache or have any vision changes? no   Are you having any numbness or tingling? no   Are you having any other health concerns or issues? no   Clinical Interventions: Reviewed and followed up on alerts and treatments-Spoke with pt who is in no apparent distress. States she took HTN medications around 7 this morning and was rushing when she used RPM BP monitor. She felt like she should reposition cuff and recheck at the time, but wasn't sure if monitor would allow her to do so. Educated pt that she can use equipment more than once a day and reading will populate in RPM system. She is agreeable to recheck BP at this time with updated reading of 135/77. Plan/Follow Up: Will continue to review, monitor and address alerts with follow up based on severity of symptoms and risk factors.

## 2023-09-01 NOTE — CARE COORDINATION
Date/Time:  9/1/2023 10:51 AM  LPN attempted to reach patient by telephone regarding red alert in remote patient monitoring program. Left HIPPA compliant message requesting a return call. Will attempt to reach patient again.

## 2023-09-06 ENCOUNTER — CARE COORDINATION (OUTPATIENT)
Dept: CARE COORDINATION | Age: 83
End: 2023-09-06

## 2023-09-06 NOTE — CARE COORDINATION
Ambulatory Care Coordination Note  9/6/2023      ACM attempted to contact the patient by telephone. Message left on voice mail requesting return call. Writer's contact information provided. Method of communication with provider: staff message. ACM: Gwendolyn Moya RN    CC Plan:        Patient is enrolled in RPM.  Her vital signs today are:  BP:  123/63; HR:  68 & 79; P.O.+  95%; weight:  189.2 lb     2. Patient is wearing a Life vest.     3.  Patient has an appointment today with her Nephrologist today. Review plan of care with Patient. 4.  Review medications with Patient. Offered patient enrollment in the Remote Patient Monitoring (RPM) program for in-home monitoring: Yes, patient already enrolled. Phoned Patient for ACM update and to discuss cc plan of care.      Lab Results       None                 Goals Addressed    None         Future Appointments   Date Time Provider [address] [place] [address]   9/27/2023 10:30 AM SCHEDULE, AFL [facility] [place] [facility]   10/2/2023  1:00 PM MARIO Henderson - CNP AFL TCC [facility] AFL BURNS C   10/19/2023 11:00 AM Thad Morrison DPM [facility]   12/8/2023  2:00 PM DO Ivon Young FP [facility]   2/7/2024 11:30 AM Laverne Mcallister MD AFL [facility] None   3/21/2024 11:45 AM Erasmo Maharaj MD [facility]

## 2023-09-07 ENCOUNTER — CARE COORDINATION (OUTPATIENT)
Dept: CARE COORDINATION | Age: 83
End: 2023-09-07

## 2023-09-07 NOTE — CARE COORDINATION
interventions?  (Nutrition/Exercise/Self-Monitoring): No   Have you ever had to go to the ED for symptoms of low blood sugar?: No       Last Blood Sugar Value: 115   Blood Sugar Monitoring Regimen: Morning Fasting   Blood Sugar Trends: No Change             Lab Results       None                 Goals Addressed    None         Future Appointments   Date Time Provider [facility] [address]   9/27/2023 10:30 AM SCHEDULE, AFL TCC [place] [facility] AFL TCC [facility] AFL [facility] C   10/2/2023  1:00 PM MARIO Henderson - CNP AFL TCC [facility] AFL BURNS C   10/19/2023 11:00 [facility]   12/8/2023  2:00 PM [facility]   2/7/2024 11:30 AM Laverne Mcallister MD AFL [facility] None   3/21/2024 11:45 AM Erasmo Maharaj MD [facility]

## 2023-09-08 VITALS
HEART RATE: 65 BPM | DIASTOLIC BLOOD PRESSURE: 66 MMHG | BODY MASS INDEX: 34.79 KG/M2 | OXYGEN SATURATION: 95 % | SYSTOLIC BLOOD PRESSURE: 119 MMHG | WEIGHT: 190.2 LBS

## 2023-09-12 ENCOUNTER — CARE COORDINATION (OUTPATIENT)
Dept: CARE COORDINATION | Age: 83
End: 2023-09-12

## 2023-09-12 NOTE — CARE COORDINATION
CCSS place call to patient to arrange equipment exchange per HRS IT due to pulse oximeter not working. RMA submitted for [id number]. New equipment has been ordered. Unable to reach the pt- CCSS explained to patient in a voicemail message- equipment arrival, return, and installation of new equipment. Will route to LPN and ACM/CTN for awareness.

## 2023-09-12 NOTE — CARE COORDINATION
Nutrition Care Coordinator Follow-Up visit:    Food Recall:eating 3 meals/d    Activity Level:  Sedentary:X  Lightly Active: Moderately Active:  Very Active:    Adult BMI:  Underweight (below 18.5)  Normal Weight (18.5-24.9)  Overweight (25-29. 9)  Obese (30-39.9)X  Morbidly Obese (>40)    Weight Change:no change    Plan:  Plan was established with patient:  Increase dietary fiber by consuming whole grains, fruits and vegetables:X  Limit dietary cholesterol to >200mg/day: Increase water intake:  Avoid added sugar:  Avoid sweetened beverages:  Choose lean meats:X  Limit sodium intake: X    Monitoring: Will monitor weight:  Will monitor adherence to meal plan:X  Will monitor adherence to exercise plan: Will monitor HGA1c:    Handouts Provided :  Low Carb snacking:  Carb counting /individual meal plan:  Portion Control:  Food Labels:X  Physical Activity:  Low Fat/Cholesterol:  Hypo/Hyperglycemia:  Calorie Controlled Meal Plan:    Goals: Increase water consumption to 8oz. 6-8 times daily:  Manage blood sugars by consuming 3 meals spaced every 4-5 hours with 2-3 snacks daily:  Increase fiber and decrease fat intake by consuming 1-2 fruit servings and 2-3 vegetable servings per day. Increase physical activity by:  Consume less than 2,000mg of sodium/day: reviewed  Avoid consumption of sweetened beverages and added sugar by reading food labels:  Monitor blood sugars by using meter to check blood glucose before morning meal and 2 hours after a meal daily:  Decrease risk of coronary heart disease by consuming fish that contains omega-3 fatty acids at least twice a week, avoiding partially hydrogenated oil/trans fats and limiting saturated fat intake by reading food labels:reviewed    Patient goals set: 1. Reviewed DASH guidelines- lowfat/cholesterol and low sodium. Reviewed reading food labels-what to look for on labels. Encouraged to limit saturated fat, trans fat, cholesterol and sodium intake.   2. Reviewed

## 2023-09-14 ENCOUNTER — CARE COORDINATION (OUTPATIENT)
Dept: CARE COORDINATION | Age: 83
End: 2023-09-14

## 2023-09-14 NOTE — CARE COORDINATION
Ambulatory Care Coordination Note  9/14/2023    Patient Current Location: [address] [place] [address] attempted to contact the patient by telephone. Message left on voice mail requesting return call. Method of communication with provider: staff message. ACM: Tawana Trujillo RN    CC Plan:        Patient is enrolled in RPM.  Her vital signs today are:  BP:  106/59 ; HR:  77 weight:  189.2 lb     2. Patient is wearing a Life vest.     3.  Review medications with Patient. Offered patient enrollment in the Remote Patient Monitoring (RPM) program for in-home monitoring: Yes, patient already enrolled. Phoned Patient for ACM update and to discuss cc plan of care. Message left on voice mail requesting return call. Writer's contact information provided.     Lab Results       None                 Goals Addressed    None         Future Appointments   Date Time Provider [address] [place] [address]   9/27/2023 10:30 AM SCHEDULE, AFL [facility] [place] [facility] AFL [facility] AFL [facility] C   10/2/2023  1:00 PM MARIO Duffy - CNP AFL TCC [facility] AFL BURNS C   10/19/2023 11:00 AM RODRÍGUEZ [facility]   12/8/2023  2:00 PM DO Ivon Gaona FP [facility]   2/7/2024 11:30 AM Laverne Herbert MD AFL [facility] None   3/21/2024 11:45 AM [facility]

## 2023-09-15 ENCOUNTER — CARE COORDINATION (OUTPATIENT)
Dept: CARE COORDINATION | Age: 83
End: 2023-09-15

## 2023-09-18 ENCOUNTER — CARE COORDINATION (OUTPATIENT)
Dept: CARE COORDINATION | Age: 83
End: 2023-09-18

## 2023-09-18 NOTE — CARE COORDINATION
Ambulatory Care Coordination Note  9/15/2023    Patient Current Location:  Home: [address] [address]     ACM was contacted the patient by telephone. Message received from Patient. Method of communication with provider: staff message. ACM: Jai Dobson RN    Message received from Patient. She states she saw Dr. Gisele Santa on Friday. She visited his office to complete an application for financial help with the cost of a medication. She states the medication cost $300.00. Patient states she received new RPM scale and BP cuff. Patient states she is doing well. She will be available for a return call on Monday, 9/18/23. Offered patient enrollment in the Remote Patient Monitoring (RPM) program for in-home monitoring: Yes, patient already enrolled.     Lab Results       None                 Goals Addressed    None         Future Appointments   Date Time Provider [address] [place] [address]   9/27/2023 10:30 AM SCHEDULE, AFL T[facility] [place] [facility] AFL [facility] AFL [facility] C   10/2/2023  1:00 PM MARIO Klein - CNP AFL TCC [facility] AFL BURNS C   10/19/2023 11:00 AM RODRÍGUEZ [facility]   12/8/2023  2:00 PM DO Ivon Collier FP [facility]   2/7/2024 11:30 AM Laverne Herrera MD AFL [facility] None   3/21/2024 11:45 AM [facility]

## 2023-09-18 NOTE — CARE COORDINATION
Ambulatory Care Coordination Note  2023    Patient Current Location:  Home: [address] [place] [address] Road [address]     ACM contacted the patient by telephone. Verified name and  with patient as identifiers. Provided introduction to self, and explanation of the ACM role. Challenges to be reviewed by the provider   Additional needs identified to be addressed with provider: No  none               Method of communication with provider: staff message. ACM: Abbie Goldberg RN     CC Plan:        Patient is enrolled in RPM.  Her vital signs today are:  BP:  111/64 ; HR:  86 & 77 weight:  189.8 lb     2. Patient is wearing a Life vest.     3.  Review medications with Patient. Offered patient enrollment in the Remote Patient Monitoring (RPM) program for in-home monitoring: Yes, patient already enrolled. Writer returned call to Patient as requested on Friday, 9/15/2023. Patient states she is feeling fine. She denies chest discomfort/palpitations. She denies swelling to her lower extremities. Medications reviewed with Patient. Patient states her blood glucose was 95 this morning. Patient continues to wear her Life Vest.    Writer plans to follow up with Patient next week. Congestive Heart Failure Assessment    Are you currently restricting fluids?: 2000cc  Do you understand a low sodium diet?: Yes  Do you understand how to read food labels?: Yes  How many restaurant meals do you eat per week?: 1-2  Do you salt your food before tasting it?: No     No patient-reported symptoms      Symptoms:  None: Yes      Symptom course: stable  Patient-reported weight (lb): 189.8  Weight trend: stable  Salt intake watch compared to last visit: stable        Diabetes Assessment    Medic Alert ID: No  Meal Planning: None   How often do you test your blood sugar?:  (Comment: twice daily)   Do you have barriers with adherence to non-pharmacologic self-management interventions?  (Nutrition/Exercise/Self-Monitoring):

## 2023-09-20 DIAGNOSIS — E53.8 DEFICIENCY OF VITAMIN B12: ICD-10-CM

## 2023-09-20 DIAGNOSIS — I25.810 CORONARY ARTERY DISEASE INVOLVING CORONARY BYPASS GRAFT OF NATIVE HEART WITHOUT ANGINA PECTORIS: ICD-10-CM

## 2023-09-20 NOTE — TELEPHONE ENCOUNTER
Last visit: 08/07/2023  Last Med refill: 08/12/2022  Does patient have enough medication for 72 hours: No:     Next Visit Date:  Future Appointments   Date Time Provider [address]   9/27/2023 10:30 AM SCHEDULE, [facility] [place] ECHO [facility]   10/2/2023  1:00 PM Patricia Amanda, APRN - CNP [facility]   10/19/2023 11:00 AM Dinorah Castellanos DPM [facility]   12/8/2023  2:00 PM DO Belen Heberty FP [facility]   2/7/2024 11:30 AM Laverne Evans MD AFL Neph [place] None   3/21/2024 11:45 AM Palmer Rosen MD heartvasc [address] Maintenance   Topic Date Due    Hepatitis B vaccine (1 of 3 - Risk 3-dose series) Never done    COVID-19 Vaccine (4 - Pfizer series) 01/13/2022    Flu vaccine (1) 08/01/2023    Annual Wellness Visit (AWV)  12/06/2023    Lipids  06/22/2024    Depression Screen  07/03/2024    DTaP/Tdap/Td vaccine (2 - Td or Tdap) 07/02/2029    DEXA (modify frequency per FRAX score)  Completed    Shingles vaccine  Completed    Pneumococcal 65+ years Vaccine  Completed    Hepatitis A vaccine  Aged Out    Hib vaccine  Aged Out    Meningococcal (ACWY) vaccine  Aged Out       Hemoglobin A1C (%)   Date Value   06/21/2023 5.6   09/30/2022 5.8   03/31/2022 6.5 (H)             ( goal A1C is < 7)   No components found for: \"LABMICR\"  LDL Cholesterol (mg/dL)   Date Value   06/22/2023 47   06/06/2023 58       (goal LDL is <100)   AST (U/L)   Date Value   06/21/2023 16     ALT (U/L)   Date Value   06/21/2023 11     BUN (mg/dL)   Date Value   08/24/2023 38 (H)     BP Readings from Last 3 Encounters:   08/30/23 128/66   08/07/23 (!) 150/66   07/24/23 105/72          (goal 120/80)    All Future Testing planned in [facility]  Lab Frequency Next Occurrence   VL ARTERIAL PVR LOWER WO EXERCISE Once 03/14/2024   TCC - Echo (TTE) Complete Once 07/24/2023   CBC every 3 months    Albumin every 3 months    Basic Metabolic Panel every 3 months    Phosphorus

## 2023-09-20 NOTE — TELEPHONE ENCOUNTER
months    PTH, Intact every 3 months    CBC every 3 months    Albumin every 3 months    Basic Metabolic Panel every 3 months    Phosphorus every 3 months    PTH, Intact every 3 months    Protein / creatinine ratio, urine Every 12 Weeks                Patient Active Problem List:     DM (diabetes mellitus) ([address])     Secondary hypertension     Hypercholesteremia     CAD (coronary artery disease)     Hypothyroidism     Lumbar spondylosis with myelopathy     S/P cardiac cath 8/19/14-Dr. covarrubias     CKD (chronic kidney disease) stage 3, GFR 30-59 ml/min ([facility])     Morbid obesity with BMI of 40.0-44.9, adult ([facility])     At high risk for hyperkalemia     Edema     COPD without exacerbation ([facility])     Primary hyperparathyroidism ([facility])     Carotid stenosis, asymptomatic     Claudication in peripheral vascular disease ([facility])     Nonproliferative diabetic retinopathy of both eyes ([facility])     Type 2 diabetes mellitus with stage 3 chronic kidney disease, with long-term current use of insulin ([address])     Coronary artery disease involving coronary bypass graft of native heart without angina pectoris     Essential hypertension     Localized edema     DM (diabetes mellitus), type 2 with peripheral vascular complications ([facility])     Mild nonproliferative diabetic retinopathy associated with type 2 diabetes mellitus ([address])     Panlobular emphysema ([facility])     S/P drug eluting coronary stent placement-OM1 3/26/18-Dr. covarrubias     CAD S/P percutaneous coronary angioplasty     Persistent proteinuria     Lymphedema of right lower extremity     Chronic bilateral low back pain without sciatica     Deficiency of vitamin B12     Wrist arthritis     Gastroesophageal reflux disease without esophagitis     Neck sprain     Acute on chronic systolic congestive heart failure ([facility])     Mitral valve insufficiency     Ischemic cardiomyopathy     Acute systolic heart failure ([address])

## 2023-09-21 RX ORDER — CLOPIDOGREL BISULFATE 75 MG/1
TABLET ORAL
Qty: 90 TABLET | Refills: 3 | Status: SHIPPED | OUTPATIENT
Start: 2023-09-21

## 2023-09-21 RX ORDER — CYANOCOBALAMIN 1000 UG/ML
INJECTION, SOLUTION INTRAMUSCULAR; SUBCUTANEOUS
Qty: 2 ML | Refills: 2 | Status: SHIPPED | OUTPATIENT
Start: 2023-09-21

## 2023-09-21 RX ORDER — METOPROLOL SUCCINATE 100 MG/1
TABLET, EXTENDED RELEASE ORAL
Qty: 90 TABLET | Refills: 3 | Status: SHIPPED | OUTPATIENT
Start: 2023-09-21

## 2023-09-21 RX ORDER — LISINOPRIL 20 MG/1
TABLET ORAL
Qty: 180 TABLET | Refills: 3 | Status: SHIPPED | OUTPATIENT
Start: 2023-09-21

## 2023-09-21 RX ORDER — ATORVASTATIN CALCIUM 40 MG/1
TABLET, FILM COATED ORAL
Qty: 90 TABLET | Refills: 3 | Status: SHIPPED | OUTPATIENT
Start: 2023-09-21

## 2023-09-26 ENCOUNTER — CARE COORDINATION (OUTPATIENT)
Dept: CARE COORDINATION | Age: 83
End: 2023-09-26

## 2023-09-26 NOTE — CARE COORDINATION
Ambulatory Care Coordination Note  2023    Patient Current Location:  Home: [address] [place] [address] Road [address]     ACM contacted the patient by telephone. Verified name and  with patient as identifiers. Provided introduction to self, and explanation of the ACM role. Challenges to be reviewed by the provider   Additional needs identified to be addressed with provider: No  none               Method of communication with provider: staff message. ACM: Guillaume Johnson RN    CC Plan:        Patient is enrolled in RPM.  Her vital signs today are:  BP:  131/71 ; HR:  63 & 64 weight:  191 lb; Pulse Ox:  95%     2. Patient is wearing a Life vest.     3.  Review medications with Patient. 4.  Review upcoming appointments with Patient:    Future Appointments   Date Time Provider [address]   2023 10:30 AM SCHEDULE, [facility] [place] E[facility]   10/2/2023  1:00 PM Saul Vasques APRN - CNP [facility]   10/19/2023 11:00 AM Richard Newton DPM [facility]   2023  2:00 PM DO Ivon Sutton FP [facility]   2024 11:30 AM Kishan Medina MD AFL Neph [facility] None   3/21/2024 11:45 AM [facility]       Offered patient enrollment in the Remote Patient Monitoring (RPM) program for in-home monitoring: Yes, patient already enrolled. Phoned Patient for ACM update and to discuss cc plan of care. Patient states she is feeling well. She denies chest discomfort and shortness of breath. She denies swelling of her lower extremities today. Patient is aware she has an ECHO scheduled tomorrow. She is aware she has a follow up appointment at [facility] on 10/2/2023. Patient drives herself to her appointments. Patient has had issues with her life vest alarm going off. She states the alarm was saying the belt needs adjusting. She had to leave the grocery store and [employer] one day to re-adjust it.

## 2023-10-02 ENCOUNTER — CARE COORDINATION (OUTPATIENT)
Dept: CARE COORDINATION | Age: 83
End: 2023-10-02

## 2023-10-02 NOTE — CARE COORDINATION
Patient on her way out to an appointment. Will attempt to reach  Again within 1-2 weeks.   MAAME Joiner

## 2023-10-02 NOTE — CARE COORDINATION
10/2/23 11:32 AM Patient is currently enrolled in Remote Patient Monitoring for CHF and COPD. Pt has a scheduled office visit this afternoon at 1 pm.  RPM metrics added for review.

## 2023-10-03 ENCOUNTER — CARE COORDINATION (OUTPATIENT)
Dept: CARE COORDINATION | Age: 83
End: 2023-10-03

## 2023-10-03 NOTE — CARE COORDINATION
Remote Alert Monitoring Note  Rpm alert to be reviewed by the provider   red alert   pulse ox reading (91%)   Additional needs to be addressed by N/A: No      Date/Time:  10/3/2023 9:48 AM  Patient Current Location: [facility] contacted patient by telephone. Verified patients name and  as identifiers. Background: Pt enrolled in RPM r/t CHF, COPD. Refer to 911 immediately if:  Patient unresponsive or unable to provide history  Change in cognition or sudden confusion  Patient unable to respond in complete sentences  Intense chest pain/tightness  Any concern for any clinical emergency  Red Alert: Provider response time of 1 hr required for any red alert requiring intervention  Yellow Alert: Provider response time of 3hr required for any escalated yellow alert  O2 Triage  Are you having any Chest Pain? no   Are you having any Shortness of Breath? no   Swelling in your hands or feet? no   Are you having any other health concerns or issues? no   Clinical Interventions: Reviewed and followed up on alerts and treatments-Spoke with pt who is in no apparent distress. Denies any SOB/dyspnea at this time and agreeable to recheck metric with updated oxygen level of 98%. Pt educated that if oxygen level is below 92% and she is in no distress, she should reposition oximeter and recheck metric. Verbalizes understanding. Plan/Follow Up: Will continue to review, monitor and address alerts with follow up based on severity of symptoms and risk factors.

## 2023-10-03 NOTE — CARE COORDINATION
Ambulatory Care Coordination Note  10/3/2023      ACM attempted to contact the patient by telephone. Attempted to contact Patient 5 times. She is unable to hear Writer. Her line rings busy a couple of times. Method of communication with provider: staff message. ACM: Cleotis Severin, RN    CC Plan:        Patient is enrolled in RPM.  Her vital signs today are:  BP:  132/64; HR:  70 & 72 weight:  190.4 lb; Pulse Ox:  98%     2. Patient is wearing a Life vest.     3.  Review medications with Patient. 4.  Review upcoming appointments with Patient:    Future Appointments   Date Time Provider [address]   10/19/2023 11:00 AM Huseyin Luo DPM [facility]     5. Patient got ECHO done on 9/27/2023. She kept follow up appointment with NEHA Ramirez on 10/2/2023. Her plan of care is copied and pasted below for review with Patient:    Plan:  CAD. Stable. Continue ASA, statin, BB, ACE, plavix. SL nitro PrN   CHF, acute systolic. Stable. No signs of fluid overload. Continue BB, ACE. Started on [address] low sodium diet and fluid restrictions ECHO reviewed and EF is not clear at this time. Will proceed with MUGA scan to be sure pt does not require AICD  Continue lifevest for now. HTN. Stable. Continue BB, ACE and CCB   Discussed diet and exercise. The patient is to continue heart healthy diet, weight loss and exercise as tolerated. Patient's medications and side effects were discussed. Medication refills were provided if needed. Follow up appointment timing was discussed. All questions and concerns were addressed to patient's satisfaction. The patient is to follow up in 6 months or sooner if necessary. Thank you for allowing me to participate in the care of this patient, please do not hesitate to call if you have any questions. MARIO Schwartz - [address] [facility]  [facility]  [phone]

## 2023-10-04 ENCOUNTER — CARE COORDINATION (OUTPATIENT)
Dept: CARE COORDINATION | Age: 83
End: 2023-10-04

## 2023-10-04 NOTE — CARE COORDINATION
Ambulatory Care Coordination Note  10/4/2023      ACM attempted to contact the patient by telephone. Message left on voice mail requesting return call. Contact information provided. Method of communication with provider: staff message. ACM: Tawana Trujillo RN    CC Plan:        Patient is enrolled in RPM.  Her vital signs today are:  BP:  131/73; HR:  61 & 65 weight:  190 lb; Pulse Ox:  95%     2. Patient is wearing a Life vest.     3.  Review medications with Patient. 4.  Review upcoming appointments with Patient:            Future Appointments   Date Time Provider [address]   10/19/2023 11:00 AM Tanvir Dexter, RODRÍGUEZ Bautista Podiatry [facility]      5. Patient got ECHO done on 9/27/2023. She kept follow up appointment with NEHA Dykes on 10/2/2023. Her plan of care is copied and pasted below for review with Patient:     Plan:  CAD. Stable. Continue ASA, statin, BB, ACE, plavix. SL nitro PrN   CHF, acute systolic. Stable. No signs of fluid overload. Continue BB, ACE. Started on [address] low sodium diet and fluid restrictions ECHO reviewed and EF is not clear at this time. Will proceed with MUGA scan to be sure pt does not require AICD  Continue lifevest for now. HTN. Stable. Continue BB, ACE and CCB   Discussed diet and exercise. The patient is to continue heart healthy diet, weight loss and exercise as tolerated. Patient's medications and side effects were discussed. Medication refills were provided if needed. Follow up appointment timing was discussed. All questions and concerns were addressed to patient's satisfaction. The patient is to follow up in 6 months or sooner if necessary. Thank you for allowing me to participate in the care of this patient, please do not hesitate to call if you have any questions. MARIO Duffy - [address] [facility]  [facility]. [facility]  [phone]    Offered patient enrollment in

## 2023-10-09 ENCOUNTER — CARE COORDINATION (OUTPATIENT)
Dept: CASE MANAGEMENT | Age: 83
End: 2023-10-09

## 2023-10-09 NOTE — CARE COORDINATION
Remote Patient Monitoring Note      Date/Time:  10/9/2023 9:29 AM  Patient Current Location: Home: [address] [address]  LPN contacted patient by telephone regarding red alert received for blood pressure reading (). Verified patients name and  as identifiers. Background: COPD, CHF  Clinical Interventions: Reviewed and followed up on alerts and treatments-Spoke to patient she states she is doing well she actually just rechecked BP while I was pulling up chart and now reading is WNL denies chest pain, SOB, dizziness, no concerns      Plan/Follow Up: Will continue to review, monitor and address alerts with follow up based on severity of symptoms and risk factors.

## 2023-10-12 ENCOUNTER — CARE COORDINATION (OUTPATIENT)
Dept: CARE COORDINATION | Age: 83
End: 2023-10-12

## 2023-10-12 NOTE — CARE COORDINATION
Ambulatory Care Coordination Note  10/12/2023    Patient Current Location:  Home: [address] [place] [address] Road [address]     ACM contacted the patient by telephone. Verified name and  with patient as identifiers. Provided introduction to self, and explanation of the ACM role. Challenges to be reviewed by the provider   Additional needs identified to be addressed with provider: No  none               Method of communication with provider: staff message. ACM: Carolina Ha RN    CC Plan:       Patient is enrolled in RPM.  Her vital signs today are:  BP:  125/60; HR:  65 & 70 weight:  191.2 lb; Pulse Ox:  95%     2. Patient is wearing a Life vest.     3.  Review medications with Patient. 4.  Review upcoming appointments with Patient:                 Future Appointments   Date Time Provider [address]   10/19/2023 11:00 AM Oswaldo Pompa DPM [facility]      5. Patient got ECHO done on 2023. She kept follow up appointment with NEHA Case on 10/2/2023. Her plan of care is copied and pasted below for review with Patient:     Plan:  CAD. Stable. Continue ASA, statin, BB, ACE, plavix. SL nitro PrN   CHF, acute systolic. Stable. No signs of fluid overload. Continue BB, ACE. Started on [address] low sodium diet and fluid restrictions ECHO reviewed and EF is not clear at this time. Will proceed with MUGA scan to be sure pt does not require AICD  Continue lifevest for now. HTN. Stable. Continue BB, ACE and CCB   Discussed diet and exercise. The patient is to continue heart healthy diet, weight loss and exercise as tolerated. Patient's medications and side effects were discussed. Medication refills were provided if needed. Follow up appointment timing was discussed. All questions and concerns were addressed to patient's satisfaction. The patient is to follow up in 6 months or sooner if necessary.       Thank you for allowing me to participate in

## 2023-10-16 ENCOUNTER — CARE COORDINATION (OUTPATIENT)
Dept: CARE COORDINATION | Age: 83
End: 2023-10-16

## 2023-10-16 NOTE — CARE COORDINATION
Patient goals reviewed: 1. Reviewed DASH guidelines- lowfat/cholesterol and low sodium. Reviewed reading food labels-what to look for on labels. Encouraged to limit saturated fat, trans fat, cholesterol and sodium intake. 2. Reviewed avoiding canned, prepackaged foods, processed meats and cheese. Reviewed processed meats in detail to avoid/limit- sausage, wade, bologna, ham, ect. Goal is <2,300gm of sodium/day. 3. Reviewed best ways to cook foods-baking, broiling, grilling or steaming foods. Discussed healthy fats- using olive or canola oil if adding oil and encouraged to use butter/margarine sparingly. 4. Reviewed shopping the outer rim of the grocery store where fresher foods are found. Discussed seasonings that can be used that do not contain salt. Patient states stopped adding salt to foods. We also discussed eating out and making healthier choices- encouraged to avoid fast food. Patient states eats at home mostly, does not eat out often. 5. Reviewed portion control and using plate method at meals to increase intake of non-starchy vegetables. Goal is 1/2 of plate to be non-starchy vegetables at meals, 1/4 lean protein, 1/4 carbs. Encouraged 3 meals/day spaced every 4-5 hours- patient admits does skip lunch some days, encourage to at least have a snack. 6. Reviewed avoiding caffeine. Encouraged water and caffeine free beverages. Spoke with patient who relays she is doing well. She continues to read food labels and is   Limiting her sodium intake, avoids canned and prepackaged foods. She states her blood  Sugars have been good when she checks- . She has no further questions  Regarding diet, states does still have the information I sent her and reads through it  Frequently. Provided patient with contact information to call with questions. Removed  From panel.   MAAME Joiner

## 2023-10-19 ENCOUNTER — OFFICE VISIT (OUTPATIENT)
Dept: PODIATRY | Age: 83
End: 2023-10-19
Payer: MEDICARE

## 2023-10-19 VITALS — WEIGHT: 190 LBS | HEIGHT: 60 IN | BODY MASS INDEX: 37.3 KG/M2

## 2023-10-19 DIAGNOSIS — E11.51 TYPE II DIABETES MELLITUS WITH PERIPHERAL CIRCULATORY DISORDER (HCC): Primary | ICD-10-CM

## 2023-10-19 DIAGNOSIS — B35.1 DERMATOPHYTOSIS OF NAIL: ICD-10-CM

## 2023-10-19 DIAGNOSIS — I73.9 PERIPHERAL VASCULAR DISEASE (HCC): ICD-10-CM

## 2023-10-19 DIAGNOSIS — M79.671 PAIN IN BOTH FEET: ICD-10-CM

## 2023-10-19 DIAGNOSIS — M79.672 PAIN IN BOTH FEET: ICD-10-CM

## 2023-10-19 DIAGNOSIS — R26.2 TROUBLE WALKING: ICD-10-CM

## 2023-10-19 PROCEDURE — 99999 PR OFFICE/OUTPT VISIT,PROCEDURE ONLY: CPT | Performed by: PODIATRIST

## 2023-10-19 PROCEDURE — 11721 DEBRIDE NAIL 6 OR MORE: CPT | Performed by: PODIATRIST

## 2023-10-19 NOTE — PROGRESS NOTES
[address] [facility],[address] PODIATRY [address] [address]  Dept: [phone]  Dept Fax: [phone]    DIABETIC PROGRESS NOTE  Date of patient's visit: 10/19/2023  Patient's Name:  Nesha Herrera YOB: 1940            Patient Care Team:  Carolyn Beavers DO as PCP - General (Family Medicine)  [facility]  Mcdonald Jeans, DPM as Physician (Podiatry)  Dariel Marroquin RN as [address]  Linnette Campbell MD as Consulting Physician (Nephrology)  Romilda Nissen, MD as Consulting Physician (Cardiology)  Georgette Greenwood MD as Surgeon (Vascular Surgery)  MARIO Aguayo - CNP as Nurse Practitioner (Certified Nurse Practitioner)          Chief Complaint   Patient presents with    Diabetes     Diabetic foot care    Peripheral Neuropathy     Bilateral feet       Subjective:   Nesha Herrera comes to clinic for Diabetes (Diabetic foot care) and Peripheral Neuropathy (Bilateral feet)    she is a diabetic and states that she is doing well. Pt currently has complaint of thickened, elongated nails that they cannot manage by themselves. Pt's primary care physician is Carolyn Beavers DO last seen 08/07/2023   Pt's last blood sugar was 115. Pt has a new complaint of none today . Lab Results   Component Value Date    LABA1C 5.6 06/21/2023      Complains of numbness in the feet bilat.   Past Medical History:   Diagnosis Date    Abnormal cardiovascular stress test 02/2021    LARGE ANTERIOR INFARCTION / LARGE AREA OF INFERIOR LATERAL ISCHEMIA WITH REDUCED EF 38%    Abnormal stress test 07/18/2014    going to do cath- not urgent just abnormal    Ambulates with cane     PRN    Arthritis     At high risk for hyperkalemia 10/22/2014    From type 4 RTA    Back pain     CAD (coronary artery disease)     CHF with unknown LVEF ([address]) 6/21/2023

## 2023-10-20 ENCOUNTER — CARE COORDINATION (OUTPATIENT)
Dept: CARE COORDINATION | Age: 83
End: 2023-10-20

## 2023-10-20 NOTE — CARE COORDINATION
Ambulatory Care Coordination Note  10/20/2023    Patient Current Location:  Home: [address] [place] [address]     ACM contacted the patient by telephone. Verified name and  with patient as identifiers. Provided introduction to self, and explanation of the ACM role. Challenges to be reviewed by the provider   Additional needs identified to be addressed with provider: No  none               Method of communication with provider: staff message. ACM: Linden Toledo, RN    CC Plan:       Patient is enrolled in [facility].  Her vital signs today are:  BP:  129/66; HR:  64 & 65 weight:  191.4 lb; Pulse Ox:  95%     2. Patient is wearing a Life vest.     3.  Review medications with Patient. 4.  Has Patient's MUGA scan been scheduled as noted in Katt Doll's note? 5. Patient got ECHO done on 2023. She kept follow up appointment with NEHA Parker on 10/2/2023. Her plan of care is copied and pasted below for review with Patient:     Plan:  CAD. Stable. Continue ASA, statin, BB, ACE, plavix. SL nitro PrN   CHF, acute systolic. Stable. No signs of fluid overload. Continue BB, ACE. Started on [address] low sodium diet and fluid restrictions ECHO reviewed and EF is not clear at this time. Will proceed with MUGA scan to be sure pt does not require AICD  Continue lifevest for now. HTN. Stable. Continue BB, ACE and CCB   Discussed diet and exercise. The patient is to continue heart healthy diet, weight loss and exercise as tolerated. Patient's medications and side effects were discussed. Medication refills were provided if needed. Follow up appointment timing was discussed. All questions and concerns were addressed to patient's satisfaction. The patient is to follow up in 6 months or sooner if necessary. Thank you for allowing me to participate in the care of this patient, please do not hesitate to call if you have any questions.      Brionna Caal,

## 2023-10-26 ENCOUNTER — CARE COORDINATION (OUTPATIENT)
Dept: CASE MANAGEMENT | Age: 83
End: 2023-10-26

## 2023-10-30 ENCOUNTER — CARE COORDINATION (OUTPATIENT)
Dept: CARE COORDINATION | Age: 83
End: 2023-10-30

## 2023-10-30 ENCOUNTER — CARE COORDINATION (OUTPATIENT)
Facility: CLINIC | Age: 83
End: 2023-10-30

## 2023-10-30 NOTE — CARE COORDINATION
Remote Alert Monitoring Note  Rpm alert to be reviewed by the provider   red alert   pulse ox reading (91%)   Additional needs to be addressed by N/A: No                    Date/Time:  10/30/2023 9:47 AM  Patient Current Location: Home: [address] [address]  LPN contacted patient by telephone. Verified patients name and  as identifiers. Background: COPD, CHF  Refer to 911 immediately if:  Patient unresponsive or unable to provide history  Change in cognition or sudden confusion  Patient unable to respond in complete sentences  Intense chest pain/tightness  Any concern for any clinical emergency  Red Alert: Provider response time of 1 hr required for any red alert requiring intervention  Yellow Alert: Provider response time of 3hr required for any escalated yellow alert    O2 Triage  Are you having any Chest Pain? no   Are you having any Shortness of Breath? no   Swelling in your hands or feet? no     Are you having any other health concerns or issues? no      Clinical Interventions: Reviewed and followed up on alerts and treatments-Spoke with patient about low pulse ox. Patient states she feels fine. Patient states it is always giving her different reading. Patient is speaking in complete sentences. No distress noted. No further follow up needed. Plan/Follow Up: Will continue to review, monitor and address alerts with follow up based on severity of symptoms and risk factors.

## 2023-10-30 NOTE — CARE COORDINATION
currently wearing a Life Vest)  Transportation Support: Declined  Zone Management Tools: Completed          Goals Addressed    None         Future Appointments   Date Time Provider [address]   12/8/2023  2:00 PM Zeinab Eduardo   12/21/2023 11:00 AM Jethro Browne DPM [facility]   1/3/2024 11:30 AM Hollie Smith MD AFL TCC [facility] AFL BURNS C   2/7/2024 11:30 AM To Lutz MD AFL Neph Howard None   3/21/2024 11:45 AM Luke Patel MD heartvasc TOP

## 2023-11-02 ENCOUNTER — CARE COORDINATION (OUTPATIENT)
Dept: CASE MANAGEMENT | Age: 83
End: 2023-11-02

## 2023-11-02 NOTE — CARE COORDINATION
Remote Alert Monitoring Note  Rpm alert to be reviewed by the provider   red alert   pulse ox reading (91)   Additional needs to be addressed by N/A: No                    Date/Time:  2023 10:15 AM  Patient Current Location[address] patient by telephone. Verified patients name and  as identifiers. Background: COPD,  Refer to 911 immediately if:  Patient unresponsive or unable to provide history  Change in cognition or sudden confusion  Patient unable to respond in complete sentences  Intense chest pain/tightness  Any concern for any clinical emergency  Red Alert: Provider response time of 1 hr required for any red alert requiring intervention  Yellow Alert: Provider response time of 3hr required for any escalated yellow alert    O2 Triage  Are you having any Chest Pain? no   Are you having any Shortness of Breath? no   Swelling in your hands or feet? no     Are you having any other health concerns or issues? no      Clinical Interventions: Reviewed and followed up on alerts and treatments-spoke to patient she states she has cold fingers and machine keeps telling her it cannot read her we went over some ways to warm fingers up for morning reading she v/u denies chest pain, SOB, dizziness is speaking in full sentences states she will recheck Pulse ox after warming fingers    Plan/Follow Up: Will continue to review, monitor and address alerts with follow up based on severity of symptoms and risk factors.

## 2023-11-06 ENCOUNTER — CARE COORDINATION (OUTPATIENT)
Dept: CARE COORDINATION | Age: 83
End: 2023-11-06

## 2023-11-06 NOTE — CARE COORDINATION
Ambulatory Care Coordination Note  2023    Patient Current Location:  Home: [address] [place] [id number]     ACM contacted the patient by telephone. Verified name and  with patient as identifiers. Provided introduction to self, and explanation of the ACM role. Challenges to be reviewed by the provider   Additional needs identified to be addressed with provider: No  none               Method of communication with provider: staff message. ACM: Soy Deleon RN    CC Plan:       Patient is enrolled in [facility].  Her vital signs today are:  BP:  124/58; HR: 68 & 75; weight:  192.6 lb; Pulse Ox:  98%     2. Patient is wearing a Life vest.     3.  Review medications with Patient. 4.  Patient got ECHO done on 2023. She kept follow up appointment with NEHA Redmond on 10/2/2023. Her plan of care is copied and pasted below for review with Patient:     Plan:  CAD. Stable. Continue ASA, statin, BB, ACE, plavix. SL nitro PrN   CHF, acute systolic. Stable. No signs of fluid overload. Continue BB, ACE. Started on [address] low sodium diet and fluid restrictions ECHO reviewed and EF is not clear at this time. Will proceed with MUGA scan to be sure pt does not require AICD  Continue lifevest for now. (MUGA scan scheduled for 2023)  HTN. Stable. Continue BB, ACE and CCB   Discussed diet and exercise. The patient is to continue heart healthy diet, weight loss and exercise as tolerated. Patient's medications and side effects were discussed. Medication refills were provided if needed. Follow up appointment timing was discussed. All questions and concerns were addressed to patient's satisfaction. The patient is to follow up in 6 months or sooner if necessary. Thank you for allowing me to participate in the care of this patient, please do not hesitate to call if you have any questions.      Arielle Ignacio, [address] [place] [address] Cardiology

## 2023-11-24 ENCOUNTER — CARE COORDINATION (OUTPATIENT)
Dept: CARE COORDINATION | Age: 83
End: 2023-11-24

## 2023-11-24 NOTE — CARE COORDINATION
She was given Aurea samples by Cardiology staff. Patient denies concerns today. Writer plans to follow up with Patient in 2 weeks.          Lab Results       None            Care Coordination Interventions    Referral from Primary Care Provider: No  Suggested Interventions and Community Resources  Diabetes Education: Completed  Fall Risk Prevention: Declined  Disease Specific Clinic: Completed (Comment: Dr. Allen Brumfield, Vascular;  Dr. Alisia Ceballos, Nephrology;)  Registered Dietician: Completed (Comment: Vivienne Rivers, RD, ACM)  Specialty Services Referral: Completed (Comment: Patient is currently wearing a Life Vest)  Transportation Support: Declined  Zone Management Tools: Completed          Goals Addressed    None         Future Appointments   Date Time Provider [address]   12/13/2023 10:00 AM MD MATI Dillard [facility]   12/21/2023 11:00 AM Kyaw Santiago DPM [facility]   1/5/2024 11:30 AM Eduardo, Zein[facility]   2/7/2024 11:30 AM Martha Lutz MD [facility] None   3/21/2024 11:45 AM [facility]

## 2023-12-15 ENCOUNTER — CARE COORDINATION (OUTPATIENT)
Dept: CARE COORDINATION | Age: 83
End: 2023-12-15

## 2024-01-02 ENCOUNTER — CARE COORDINATION (OUTPATIENT)
Dept: CARE COORDINATION | Age: 84
End: 2024-01-02

## 2024-01-02 NOTE — CARE COORDINATION
Ambulatory Care Coordination Note  2024    Patient Current Location:  Home: [address] [place] [phone]     ACM contacted the patient by telephone. Verified name and  with patient as identifiers. Provided introduction to self, and explanation of the ACM role.     Challenges to be reviewed by the provider   Additional needs identified to be addressed with provider: No  none               Method of communication with provider: staff message.    ACM: Yuki Singh RN    CC Plan:       Review medications with Patient    2.   Review RPM readings:  BP:  151/73; HR:  66 & 69; P.O.:  93%; weight:  193.4 lb     3.  Patient got MUGA scan done on 23.  Patient kept appointment with Dr. Maurer to review results on 23.  His note was reviewed.  Patient needs a cath prior to ICD and Nephrology clearance. Repeat coronary and graft angiogram followed by ICD evaluation.    Procedure scheduled on 2024 at 1:00 pm.     4.  Patient wears a Life Vest    5.  Patient kept appointment with Dr. Yu, Cardiology, on 23.     Offered patient enrollment in the Remote Patient Monitoring (RPM) program for in-home monitoring: Yes, patient already enrolled.    Phoned Patient for ACM update and to discuss cc plan of care.  Patient states she is feeling well.  She denies any concerning symptoms.  She denies chest discomfort, shortness of breath and swelling of her extremities.  Patient is aware of repeat coronary and graft angiogram followed by ICD evaluation on Friday.  She is aware of pre-procedure instructions.  She is wearing her life vest.    Patient states her life vest alarmed one time since she spoke with Writer.  She doesn't know the reason for the alarm.  The company sent out someone to make adjustments to the vest.     Writer plans to follow up with Patient next week.     Diabetes Assessment    Medic Alert ID: No  Meal Planning: None   How often do you test your blood sugar?:  (Comment: twice daily)

## 2024-01-03 ENCOUNTER — CARE COORDINATION (OUTPATIENT)
Dept: CASE MANAGEMENT | Age: 84
End: 2024-01-03

## 2024-01-03 ENCOUNTER — HOSPITAL ENCOUNTER (OUTPATIENT)
Age: 84
Discharge: HOME | End: 2024-01-03
Payer: MEDICARE

## 2024-01-03 DIAGNOSIS — N18.32 TYPE 2 DIABETES MELLITUS WITH STAGE 3B CHRONIC KIDNEY DISEASE, WITH LONG-TERM CURRENT USE OF INSULIN (HCC): ICD-10-CM

## 2024-01-03 DIAGNOSIS — R80.1 PERSISTENT PROTEINURIA: ICD-10-CM

## 2024-01-03 DIAGNOSIS — I25.5 ISCHEMIC CARDIOMYOPATHY: ICD-10-CM

## 2024-01-03 DIAGNOSIS — E11.22 TYPE 2 DIABETES MELLITUS WITH STAGE 3B CHRONIC KIDNEY DISEASE, WITH LONG-TERM CURRENT USE OF INSULIN (HCC): ICD-10-CM

## 2024-01-03 DIAGNOSIS — N18.32 STAGE 3B CHRONIC KIDNEY DISEASE (HCC): ICD-10-CM

## 2024-01-03 DIAGNOSIS — Z79.4 TYPE 2 DIABETES MELLITUS WITH STAGE 3B CHRONIC KIDNEY DISEASE, WITH LONG-TERM CURRENT USE OF INSULIN (HCC): ICD-10-CM

## 2024-01-03 LAB
ALBUMIN SERPL-MCNC: 4 G/DL (ref 3.5–5.2)
ANION GAP SERPL CALCULATED.3IONS-SCNC: 12 MMOL/L (ref 9–16)
BUN SERPL-MCNC: 39 MG/DL (ref 8–23)
CALCIUM SERPL-MCNC: 9.7 MG/DL (ref 8.6–10.4)
CHLORIDE SERPL-SCNC: 103 MMOL/L (ref 98–107)
CO2 SERPL-SCNC: 25 MMOL/L (ref 20–31)
CREAT SERPL-MCNC: 1.8 MG/DL (ref 0.5–0.9)
CREAT UR-MCNC: 37.1 MG/DL (ref 28–217)
ERYTHROCYTE [DISTWIDTH] IN BLOOD BY AUTOMATED COUNT: 15 % (ref 11.8–14.4)
GFR SERPL CREATININE-BSD FRML MDRD: 29 ML/MIN/1.73M2
GLUCOSE SERPL-MCNC: 69 MG/DL (ref 74–99)
HCT VFR BLD AUTO: 42.1 % (ref 36.3–47.1)
HGB BLD-MCNC: 13.1 G/DL (ref 11.9–15.1)
MCH RBC QN AUTO: 28.4 PG (ref 25.2–33.5)
MCHC RBC AUTO-ENTMCNC: 31.1 G/DL (ref 28.4–34.8)
MCV RBC AUTO: 91.3 FL (ref 82.6–102.9)
NRBC BLD-RTO: 0 PER 100 WBC
PHOSPHATE SERPL-MCNC: 4.5 MG/DL (ref 2.5–4.5)
PLATELET # BLD AUTO: 307 K/UL (ref 138–453)
PMV BLD AUTO: 11.9 FL (ref 8.1–13.5)
POTASSIUM SERPL-SCNC: 4.5 MMOL/L (ref 3.7–5.3)
PTH-INTACT SERPL-MCNC: 353 PG/ML (ref 15–65)
RBC # BLD AUTO: 4.61 M/UL (ref 3.95–5.11)
SODIUM SERPL-SCNC: 140 MMOL/L (ref 136–145)
TOTAL PROTEIN, URINE: 36 MG/DL
URINE TOTAL PROTEIN CREATININE RATIO: 0.97 (ref 0–0.2)
WBC OTHER # BLD: 8.5 K/UL (ref 3.5–11.3)

## 2024-01-03 PROCEDURE — 36415 COLL VENOUS BLD VENIPUNCTURE: CPT

## 2024-01-03 PROCEDURE — 82040 ASSAY OF SERUM ALBUMIN: CPT

## 2024-01-03 PROCEDURE — 83970 ASSAY OF PARATHORMONE: CPT

## 2024-01-03 PROCEDURE — 84100 ASSAY OF PHOSPHORUS: CPT

## 2024-01-03 PROCEDURE — 84156 ASSAY OF PROTEIN URINE: CPT

## 2024-01-03 PROCEDURE — 80048 BASIC METABOLIC PNL TOTAL CA: CPT

## 2024-01-03 PROCEDURE — 85027 COMPLETE CBC AUTOMATED: CPT

## 2024-01-03 PROCEDURE — 82570 ASSAY OF URINE CREATININE: CPT

## 2024-01-03 NOTE — CARE COORDINATION
Remote Alert Monitoring Note  Rpm alert to be reviewed by the provider   red alert   pulse ox reading (91)   Additional needs to be addressed by N/A: No                    Date/Time:  1/3/2024 10:21 AM  Patient Current Location: Home: [address] [place] [address]  LPN contacted patient by telephone. Verified patients name and  as identifiers.  Background: COPD, CHF  Refer to 911 immediately if:  Patient unresponsive or unable to provide history  Change in cognition or sudden confusion  Patient unable to respond in complete sentences  Intense chest pain/tightness  Any concern for any clinical emergency  Red Alert: Provider response time of 1 hr required for any red alert requiring intervention  Yellow Alert: Provider response time of 3hr required for any escalated yellow alert    O2 Triage  Are you having any Chest Pain? no   Are you having any Shortness of Breath? no   Swelling in your hands or feet? no     Are you having any other health concerns or issues? no      Clinical Interventions: Reviewed and followed up on alerts and treatments-spoke with patient she is doing well , denies chest pain, SOB, dizziness, recheck of Pulse ox is now WNL no concerns at present time.     Plan/Follow Up: Will continue to review, monitor and address alerts with follow up based on severity of symptoms and risk factors.

## 2024-01-05 ENCOUNTER — HOSPITAL ENCOUNTER (OUTPATIENT)
Age: 84
Setting detail: OUTPATIENT SURGERY
Discharge: HOME | End: 2024-01-05
Attending: INTERNAL MEDICINE | Admitting: INTERNAL MEDICINE
Payer: MEDICARE

## 2024-01-05 VITALS
SYSTOLIC BLOOD PRESSURE: 125 MMHG | BODY MASS INDEX: 39.07 KG/M2 | DIASTOLIC BLOOD PRESSURE: 55 MMHG | HEART RATE: 64 BPM | WEIGHT: 199 LBS | OXYGEN SATURATION: 99 % | TEMPERATURE: 98.3 F | HEIGHT: 60 IN | RESPIRATION RATE: 19 BRPM

## 2024-01-05 DIAGNOSIS — I50.9 CHF (CONGESTIVE HEART FAILURE) (HCC): ICD-10-CM

## 2024-01-05 DIAGNOSIS — I50.21 ACUTE SYSTOLIC CONGESTIVE HEART FAILURE (HCC): Primary | ICD-10-CM

## 2024-01-05 LAB
BUN BLD-MCNC: 36 MG/DL (ref 8–26)
CHLORIDE BLD-SCNC: 109 MMOL/L (ref 98–107)
ECHO BSA: 1.95 M2
EGFR, POC: 32 ML/MIN/1.73M2
GLUCOSE BLD-MCNC: 95 MG/DL (ref 74–100)
HCT VFR BLD AUTO: 41 % (ref 36–46)
POC CREATININE: 1.6 MG/DL (ref 0.51–1.19)
POC HEMOGLOBIN (CALC): 13.8 G/DL (ref 12–16)
POTASSIUM BLD-SCNC: 4.5 MMOL/L (ref 3.5–4.5)
SODIUM BLD-SCNC: 142 MMOL/L (ref 138–146)

## 2024-01-05 PROCEDURE — 84520 ASSAY OF UREA NITROGEN: CPT

## 2024-01-05 PROCEDURE — C1894 INTRO/SHEATH, NON-LASER: HCPCS | Performed by: INTERNAL MEDICINE

## 2024-01-05 PROCEDURE — 93459 L HRT ART/GRFT ANGIO: CPT | Performed by: INTERNAL MEDICINE

## 2024-01-05 PROCEDURE — 7100000011 HC PHASE II RECOVERY - ADDTL 15 MIN: Performed by: INTERNAL MEDICINE

## 2024-01-05 PROCEDURE — 2500000003 HC RX 250 WO HCPCS: Performed by: INTERNAL MEDICINE

## 2024-01-05 PROCEDURE — 84295 ASSAY OF SERUM SODIUM: CPT

## 2024-01-05 PROCEDURE — 99152 MOD SED SAME PHYS/QHP 5/>YRS: CPT | Performed by: INTERNAL MEDICINE

## 2024-01-05 PROCEDURE — 6360000002 HC RX W HCPCS: Performed by: INTERNAL MEDICINE

## 2024-01-05 PROCEDURE — 6360000004 HC RX CONTRAST MEDICATION: Performed by: INTERNAL MEDICINE

## 2024-01-05 PROCEDURE — 2709999900 HC NON-CHARGEABLE SUPPLY: Performed by: INTERNAL MEDICINE

## 2024-01-05 PROCEDURE — 99153 MOD SED SAME PHYS/QHP EA: CPT | Performed by: INTERNAL MEDICINE

## 2024-01-05 PROCEDURE — 93454 CORONARY ARTERY ANGIO S&I: CPT | Performed by: INTERNAL MEDICINE

## 2024-01-05 PROCEDURE — 82565 ASSAY OF CREATININE: CPT

## 2024-01-05 PROCEDURE — 7100000010 HC PHASE II RECOVERY - FIRST 15 MIN: Performed by: INTERNAL MEDICINE

## 2024-01-05 PROCEDURE — 85014 HEMATOCRIT: CPT

## 2024-01-05 PROCEDURE — 82947 ASSAY GLUCOSE BLOOD QUANT: CPT

## 2024-01-05 PROCEDURE — 82435 ASSAY OF BLOOD CHLORIDE: CPT

## 2024-01-05 PROCEDURE — C1769 GUIDE WIRE: HCPCS | Performed by: INTERNAL MEDICINE

## 2024-01-05 PROCEDURE — 84132 ASSAY OF SERUM POTASSIUM: CPT

## 2024-01-05 PROCEDURE — 2580000003 HC RX 258: Performed by: INTERNAL MEDICINE

## 2024-01-05 RX ORDER — MIDAZOLAM HYDROCHLORIDE 1 MG/ML
INJECTION INTRAMUSCULAR; INTRAVENOUS PRN
Status: DISCONTINUED | OUTPATIENT
Start: 2024-01-05 | End: 2024-01-05 | Stop reason: HOSPADM

## 2024-01-05 RX ORDER — SODIUM CHLORIDE 9 MG/ML
INJECTION, SOLUTION INTRAVENOUS CONTINUOUS
Status: DISCONTINUED | OUTPATIENT
Start: 2024-01-05 | End: 2024-01-05 | Stop reason: HOSPADM

## 2024-01-05 RX ORDER — HEPARIN SODIUM 1000 [USP'U]/ML
INJECTION, SOLUTION INTRAVENOUS; SUBCUTANEOUS PRN
Status: DISCONTINUED | OUTPATIENT
Start: 2024-01-05 | End: 2024-01-05 | Stop reason: HOSPADM

## 2024-01-05 RX ORDER — FENTANYL CITRATE 50 UG/ML
INJECTION, SOLUTION INTRAMUSCULAR; INTRAVENOUS PRN
Status: DISCONTINUED | OUTPATIENT
Start: 2024-01-05 | End: 2024-01-05 | Stop reason: HOSPADM

## 2024-01-05 RX ADMIN — SODIUM CHLORIDE: 9 INJECTION, SOLUTION INTRAVENOUS at 09:32

## 2024-01-05 NOTE — PROGRESS NOTES
Pt ambulated through hallway without difficulty, IV removed, no bleeding noted. Discharge instructions reviewed again, no questions.

## 2024-01-05 NOTE — DISCHARGE INSTRUCTIONS
DISCHARGE INSTRUCTIONS / ARM CARE POST CATHERIZATION        ENCOURAGE FLUIDS    NO STRENUOUS LIFTING WITH AFFECTED ARM FOR 3 DAYS ANYTHING HEAVIER THAN 8 TO 10 POUNDS    REMOVE BAND-AID/PRESSURE DRESSING THE FOLLOWING DAY AND DO NOT APPLY ANY FURTHER BAND-AIDS    KEEP INCISION CLEAN DRY AND OPEN TO THE AIR / NO HAND LOTION NEAR PUNCTURE SITE    WATCH FOR SIGNS OF INFECTION /  REDNESS / SWELLING / DRAINAGE / WARMTH / TEMPERATURE GREATER THAN 101    IF BLEEDING OCCURS HOLD MANUAL PRESSURE DIRECTLY OVER SITE  (YOU WILL FEEL PULSATION OF ARTERY) AND IF BLEEDING DOES NOT STOP AFTER 2 MINUTES CALL 911    OK TO SHOWER THE NEXT DAY, NO TUB BATHING OR HOT TUBS/SWIMMING FOR 7 DAYS    IF AREA BECOMES HARD AND SWOLLEN AND IF YOU ARE AT ALL CONCERNED SEEK HELP IMMEDIATELY    SEEK HELP IMMEDIATELY IF AFFECTED ARM BECOMES COLD / NUMB / SEVERE PAIN / NAILBEDS TURN BLUE     IF ON METFORMIN / GLUCOPHAGE DO NOT RESTART MEDICATION FOR 48 HOURS    PLEASE PRACTICE GOOD HAND WASHING AND INCLUDE PUNCTURE SITE ESPECIALLY AFTER USING THE RESTROOM    AVOID USING ALCOHOL BASED HAND SANITIZERS FOR ONE WEEK      CALL 911 if you have symptoms including:   Drooping facial muscles   Changes in vision or speech   Difficulty walking or using your limbs   Change in sensation to affected leg, including numbness, feeling cold, or change in color   Extreme sweating, nausea or vomiting   Dizziness or lightheadedness   Chest pain   Rapid, irregular heartbeat   Palpitations   Cough, shortness of breath, or difficulty breathing   Weakness or fainting   If you think you have an emergency, CALL 911 .        SEDATION / ANALGESIA INFORMATION / HOME GOING ADVICE  You have received the sedation/analgesia medication during your visit    Sedation/analgesia is used during short medical procedures under controlled supervision. The medication will produce a strong relaxation. You will be able to hear, speak and follow instructions, but your memory and alertness will be  you are having problems. It's also a good idea to know your test results and keep a list of the medicines you take.    Where can you learn more?    Go to [email or web address] and sign in to your [employer] account. Enter C643 in the Search Health Information box to learn more about “Learning About Coronary Artery Disease (CAD).”    If you do not have an account, please click on the “Sign Up Now” link.

## 2024-01-05 NOTE — PROGRESS NOTES
Patient returned to room.. Patient awake and alert, denies any complaints.    Right arm with dressing intact. No hematoma or drainage noted.   Left hand and fingers are pink, warm with good movement.    Side rails up times 2, call light with in reach.   Family at bedside.

## 2024-01-05 NOTE — H&P
[facility]  Procedure History and Physical Update          Patient Name: Charisse Marquis  MRN:    [id number]  YOB: 1940  Date of evaluation:  1/5/2024    Procedure:    Cardiac cath +/- PCI    Indication for procedure:  Drop in EF      Please refer to the office note completed by Dr. Gigi Maurer on 12/13/2023 in the medical record and note that:    [x] I have examined the patient and reviewed the H&P/Consult and there are no changes to be made to the assessment or plan.    [] I have examined the patient and reviewed the H&P/Consult and have noted the following changes:    Past Medical History:   Diagnosis Date    Abnormal cardiovascular stress test 02/2021    LARGE ANTERIOR INFARCTION / LARGE AREA OF INFERIOR LATERAL ISCHEMIA WITH REDUCED EF 38%    Abnormal stress test 07/18/2014    going to do cath- not urgent just abnormal    Ambulates with cane     PRN    Arthritis     At high risk for hyperkalemia 10/22/2014    From type 4 RTA    Back pain     CAD (coronary artery disease)     CHF with unknown LVEF ([facility]) 06/21/2023    Circulation problem     decreased circulation to  zbigniew extremities    CKD (chronic kidney disease) stage 3, GFR 30-59 ml/min ([facility]) 10/22/2014    From diabetic and ischemic nephrosclerosis, baseline 1.4, GFR 40-45 ml/min, UPC 0.3    Claudication in peripheral vascular disease ([facility]) 02/09/2015    COPD (chronic obstructive pulmonary disease) ([facility])     COPD without exacerbation ([facility]) 10/22/2014    DM (diabetes mellitus) ([facility])     Edema     chronic lower extremity    Foot pain, bilateral     Former smoker     Gastroesophageal reflux disease without esophagitis 11/23/2020    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    Hyperlipidemia     Hypertension     Hypothyroidism     Intermittent claudication ([facility])     loly to left leg    Ischemic cardiomyopathy 06/24/2023    Ejection fraction June 20 23: 15 to 20%    Kidney disease     chronic    Lymphedema     MI (mitral  MD Olivia   docusate sodium (COLACE) 100 MG capsule Take 1 capsule by mouth 2 times daily    Provider, MD Olivia         Vitals:    01/05/24 0922   BP:    Pulse: 69   Resp:    Temp:    SpO2:        Constitutional and General Appearance:   alert, cooperative, no distress and appears stated age  HEENT:  PERRL, EOMI  Respiratory:  Normal excursion and expansion without use of accessory muscles  Resp Auscultation:  Good respiratory effort. No for increased work of breathing.On auscultation: clear to auscultation bilaterally  Cardiovascular:  Regular rate and rhythm.  S1/S2  No murmurs.  The apical impulse is not displaced  Abdomen:  Soft  Bowel sounds present  Non-tender to palpation  Extremities:  No cyanosis or clubbing  Lower extremity edema: No.  Skin:  Warm and dry  Neurological:  Alert and oriented.  Moves all extremities well    Plan :     Multivessel coronary artery disease with previous CABG x 2, with all grafts occluded and nonfunctioning LIMA-LAD. Only radial-PDA was patent. S/P angioplasty and stenting of the LAD and circumflex in November 2007. Heart cath 2014: Patent LAD stent with 40% stenosis in OM stent with difffuse disease in Radial -PDA  -patient had recent drop in lVEF to 15-20% in June 2023. No ischemia work up done. The EF remains persistently reduced. I have recommended repeat coronary and graft angiogram followed by ICD evaluation.      2. Continue asa, plavix, statin and BB in the meantime.   HFrEF secondary to ischemic CMP. LVEF 20-25%. Plan for ischemia work up as above followed by ICD evaluation.   clinically compensated  Continue bb, acei, farxiga.   Last cr 1.9 ,  Stable.   No signs of fluid overload.      3. HTN.  Stable. Continue BB, ACE and CCB      4. CKD, stable, needs nephrology clearance prior to left heart cath        Plan:  Proceed with planned procedure.  Further orders to follow.      Risks, benefits, and alternatives of cardiac catheterization were discussed, in

## 2024-01-05 NOTE — PROGRESS NOTES
Patient admitted, consent signed and questions answered. Patient ready for procedure. Call light to reach with side rails up 2 of 2. Bilateral groins clipped with writer and Brea present.  family at bedside with patient.  History and physical needed.

## 2024-01-07 ENCOUNTER — CLINICAL DOCUMENTATION (OUTPATIENT)
Dept: NEPHROLOGY | Age: 84
End: 2024-01-07

## 2024-01-07 DIAGNOSIS — N25.81 SECONDARY HYPERPARATHYROIDISM (HCC): Primary | ICD-10-CM

## 2024-01-09 ENCOUNTER — CARE COORDINATION (OUTPATIENT)
Dept: CARE COORDINATION | Age: 84
End: 2024-01-09

## 2024-01-09 NOTE — CARE COORDINATION
Ambulatory Care Coordination Note  2024    Patient Current Location:  Home: [address] [place] [address]     ACM contacted the patient by telephone. Verified name and  with patient as identifiers. Provided introduction to self, and explanation of the ACM role.     Challenges to be reviewed by the provider   Additional needs identified to be addressed with provider: Yes and Patient request information on plan for AICD in the near future  none               Method of communication with provider: staff message.    ACM: Yuki Singh RN    CC Plan:       Review medications with Patient     2.   Review RPM readings:  BP:   Not completed today; weight:  191.6 lb     3.   Patient is post left heart cath, coronary angiography on 24 for decrease in IVEF to 15 - 20% in 2023.    4.  Dr. Lutz ordered Vitamin D 25 Hydroxy Level     5.  Review upcoming appointments with Patient:    Future Appointments   Date Time Provider Department Center   2024 11:30 AM Laverne Lutz MD [facility] None   2024  8:20 [facility]   3/21/2024 11:45 [facility]     Offered patient enrollment in the Remote Patient Monitoring (RPM) program for in-home monitoring: Yes, patient already enrolled.    Phoned Patient for ACM update and to discuss cc plan of care.  Patient states she feels great.  She denies chest discomfort, lower extremity edema, and shortness of breath.  Patient states she has a bruise on her arm from her angiography.  She states her arm is sore.  She denies drainage.  Her incision looks good.    Patient states she is wearing her compression stockings.      Patient states she is wearing her life vest.  She states she was told she would get an AICD in a couple of weeks.  She states she wasn't given any information on the plan for the defibrillator.  She plans to continue to wear her life vest until she gets an AICD.  Patient states her next

## 2024-01-12 PROBLEM — Z95.810 ICD (IMPLANTABLE CARDIOVERTER-DEFIBRILLATOR) IN PLACE: Status: ACTIVE | Noted: 2024-01-12

## 2024-01-19 ENCOUNTER — CARE COORDINATION (OUTPATIENT)
Dept: CARE COORDINATION | Age: 84
End: 2024-01-19

## 2024-01-19 NOTE — CARE COORDINATION
Ambulatory Care Coordination Note  2024    Patient Current Location:  Home: [address] [place] [phone]     ACM contacted the patient by telephone. Verified name and  with patient as identifiers. Provided introduction to self, and explanation of the ACM role.     Challenges to be reviewed by the provider   Additional needs identified to be addressed with provider: No  none               Method of communication with provider: staff message.    ACM: Yuki Singh RN    CC Plan:       Review medications with Patient     2.   Review RPM readings:  BP:  121/62;  HR;  65 & 69; P.O.:  98%    weight:  189.6 lb      3.   Patient is post left heart cath, coronary angiography on 24 for decrease in IVEF to 15 - 20% in 2023.     4.  Dr. Lutz ordered Vitamin D 25 Hydroxy Level      5.  Review upcoming appointments with Patient:     Future Appointments   Date Time Provider Department Center   2024 11:30 AM Laverne Lutz MD [facility] None   2024  8:20 AM Lizbeth Pinto MD [facility]     6.  Patient kept appointment with Dr. Haas on 24 for consideration for ICD implantation. His plan is for implantation of single chamber ICD for primary prevention.  Procedure scheduled on 24 at 11:00 am    Offered patient enrollment in the Remote Patient Monitoring (RPM) program for in-home monitoring: Yes, patient already enrolled.    Phoned Patient for ACM update and to discuss cc plan of care. Patient states she is feeling good.  She is aware of upcoming appointment for AICD placement on 2023.  She was able to verbalize pre procedure instructions.  She states the plan is for her to have an over night stay following the procedure.  Patient will continue to wear her life vest until her AICD is placed and she receives further recommendations from Cardiologist.    Patient denies lower extremity edema and shortness of breath.  She continues to wear her compression stockings.    Patient

## 2024-01-29 ENCOUNTER — HOSPITAL ENCOUNTER (OUTPATIENT)
Age: 84
Discharge: HOME | End: 2024-01-29
Payer: MEDICARE

## 2024-01-29 DIAGNOSIS — N25.81 SECONDARY HYPERPARATHYROIDISM (HCC): ICD-10-CM

## 2024-01-29 LAB — 25(OH)D3 SERPL-MCNC: 42 NG/ML (ref 30–100)

## 2024-01-29 PROCEDURE — 36415 COLL VENOUS BLD VENIPUNCTURE: CPT

## 2024-01-29 PROCEDURE — 82306 VITAMIN D 25 HYDROXY: CPT

## 2024-01-30 ENCOUNTER — CARE COORDINATION (OUTPATIENT)
Dept: CARE COORDINATION | Age: 84
End: 2024-01-30

## 2024-01-30 NOTE — CARE COORDINATION
Ambulatory Care Coordination Note  2024    Patient Current Location:  Home: [address] [place], [place] [id number]     ACM contacted the patient by telephone. Verified name and  with patient as identifiers. Provided introduction to self, and explanation of the ACM role.     Challenges to be reviewed by the provider   Additional needs identified to be addressed with provider: No  none               Method of communication with provider: staff message.    ACM: Yuki Singh RN    CC Plan:       Review medications with Patient     2.   Review RPM readings:  BP:  137/68;  HR;  66 & 74; P.O.:  99%    weight:  191.4 lb      3.   Patient is post left heart cath, coronary angiography on 24 for decrease in IVEF to 15 - 20% in 2023.     4. Review upcoming appointments with Patient:    Future Appointments   Date Time Provider Department Center   2024 11:30 AM Laverne Lutz MD [facility] None   2024  8:20 AM Lizbeth Pinto MD [facility]   3/5/2024  2:45 PM [facility]   3/21/2024 11:45 AM Palmer Pappas MD [facility]   4/3/2024 11:00 AM Gigi Maurer MD [facility]     5.  Patient kept appointment with Dr. Haas on 24 for consideration for ICD implantation. His plan is for implantation of single chamber ICD for primary prevention.  Procedure scheduled on 24 at 11:00 am    Offered patient enrollment in the Remote Patient Monitoring (RPM) program for in-home monitoring: Yes, patient already enrolled.    Phoned Patient for ACM update and to discuss cc plan of care.  Patient states she is feeling fine.  She is prepared for her ICD procedure scheduled tomorrow.  She was encouraged to write questions for the Cardiologist down so she doesn't forget them.  Patient's Sister is taking her to the hospital.  Patient stopped taking her Baby Aspirin and Plavix a week prior to the procedure as advised.  She held her Lasix

## 2024-01-31 ENCOUNTER — HOSPITAL ENCOUNTER (OUTPATIENT)
Age: 84
Discharge: HOME | End: 2024-02-01
Attending: INTERNAL MEDICINE | Admitting: INTERNAL MEDICINE
Payer: MEDICARE

## 2024-01-31 DIAGNOSIS — Z45.02 ICD (IMPLANTABLE CARDIOVERTER-DEFIBRILLATOR) BATTERY DEPLETION: Primary | ICD-10-CM

## 2024-01-31 DIAGNOSIS — I42.9 CARDIOMYOPATHY, UNSPECIFIED TYPE (HCC): ICD-10-CM

## 2024-01-31 PROBLEM — Z95.810 S/P ICD (INTERNAL CARDIAC DEFIBRILLATOR) PROCEDURE: Status: ACTIVE | Noted: 2024-01-31

## 2024-01-31 LAB
BUN BLD-MCNC: 32 MG/DL (ref 8–26)
ECHO BSA: 1.92 M2
EGFR, POC: 32 ML/MIN/1.73M2
GLUCOSE BLD-MCNC: 148 MG/DL (ref 65–105)
GLUCOSE BLD-MCNC: 61 MG/DL (ref 65–105)
GLUCOSE BLD-MCNC: 73 MG/DL (ref 74–100)
HCT VFR BLD AUTO: 43 % (ref 36–46)
PLATELET # BLD AUTO: 264 K/UL (ref 138–453)
POC CREATININE: 1.6 MG/DL (ref 0.51–1.19)
POC HEMOGLOBIN (CALC): 14.7 G/DL (ref 12–16)
POTASSIUM BLD-SCNC: 4.6 MMOL/L (ref 3.5–4.5)
SARS-COV-2 RDRP RESP QL NAA+PROBE: NOT DETECTED
SODIUM BLD-SCNC: 145 MMOL/L (ref 138–146)
SPECIMEN DESCRIPTION: NORMAL

## 2024-01-31 PROCEDURE — 6370000000 HC RX 637 (ALT 250 FOR IP): Performed by: INTERNAL MEDICINE

## 2024-01-31 PROCEDURE — 6360000002 HC RX W HCPCS: Performed by: INTERNAL MEDICINE

## 2024-01-31 PROCEDURE — 2709999900 HC NON-CHARGEABLE SUPPLY: Performed by: INTERNAL MEDICINE

## 2024-01-31 PROCEDURE — 84295 ASSAY OF SERUM SODIUM: CPT

## 2024-01-31 PROCEDURE — 7100000011 HC PHASE II RECOVERY - ADDTL 15 MIN: Performed by: INTERNAL MEDICINE

## 2024-01-31 PROCEDURE — 84132 ASSAY OF SERUM POTASSIUM: CPT

## 2024-01-31 PROCEDURE — 87635 SARS-COV-2 COVID-19 AMP PRB: CPT

## 2024-01-31 PROCEDURE — 99153 MOD SED SAME PHYS/QHP EA: CPT | Performed by: INTERNAL MEDICINE

## 2024-01-31 PROCEDURE — 84520 ASSAY OF UREA NITROGEN: CPT

## 2024-01-31 PROCEDURE — 33249 INSJ/RPLCMT DEFIB W/LEAD(S): CPT | Performed by: INTERNAL MEDICINE

## 2024-01-31 PROCEDURE — C1722 AICD, SINGLE CHAMBER: HCPCS | Performed by: INTERNAL MEDICINE

## 2024-01-31 PROCEDURE — 82947 ASSAY GLUCOSE BLOOD QUANT: CPT

## 2024-01-31 PROCEDURE — 2500000003 HC RX 250 WO HCPCS: Performed by: INTERNAL MEDICINE

## 2024-01-31 PROCEDURE — C1777 LEAD, AICD, ENDO SINGLE COIL: HCPCS | Performed by: INTERNAL MEDICINE

## 2024-01-31 PROCEDURE — C1889 IMPLANT/INSERT DEVICE, NOC: HCPCS | Performed by: INTERNAL MEDICINE

## 2024-01-31 PROCEDURE — 85049 AUTOMATED PLATELET COUNT: CPT

## 2024-01-31 PROCEDURE — C1892 INTRO/SHEATH,FIXED,PEEL-AWAY: HCPCS | Performed by: INTERNAL MEDICINE

## 2024-01-31 PROCEDURE — 85014 HEMATOCRIT: CPT

## 2024-01-31 PROCEDURE — 2580000003 HC RX 258: Performed by: INTERNAL MEDICINE

## 2024-01-31 PROCEDURE — 99152 MOD SED SAME PHYS/QHP 5/>YRS: CPT | Performed by: INTERNAL MEDICINE

## 2024-01-31 PROCEDURE — 82565 ASSAY OF CREATININE: CPT

## 2024-01-31 PROCEDURE — 7100000010 HC PHASE II RECOVERY - FIRST 15 MIN: Performed by: INTERNAL MEDICINE

## 2024-01-31 DEVICE — ICD DVPA2D1 COBALT XT VR MRI DF1 USA
Type: IMPLANTABLE DEVICE | Status: FUNCTIONAL
Brand: COBALT™ XT VR MRI SURESCAN™

## 2024-01-31 DEVICE — ENVELOPE CMRM6133 ABSORB LRG MR
Type: IMPLANTABLE DEVICE | Status: FUNCTIONAL
Brand: TYRX™

## 2024-01-31 DEVICE — LEAD 693565 SPRINT QUATTRO US MRI
Type: IMPLANTABLE DEVICE | Status: FUNCTIONAL
Brand: SPRINT QUATTRO SECURE S MRI™ SURESCAN™

## 2024-01-31 RX ORDER — MIDAZOLAM HYDROCHLORIDE 1 MG/ML
INJECTION INTRAMUSCULAR; INTRAVENOUS PRN
Status: DISCONTINUED | OUTPATIENT
Start: 2024-01-31 | End: 2024-01-31 | Stop reason: HOSPADM

## 2024-01-31 RX ORDER — SODIUM CHLORIDE 9 MG/ML
INJECTION, SOLUTION INTRAVENOUS CONTINUOUS
Status: DISCONTINUED | OUTPATIENT
Start: 2024-01-31 | End: 2024-02-01 | Stop reason: HOSPADM

## 2024-01-31 RX ORDER — SODIUM CHLORIDE 0.9 % (FLUSH) 0.9 %
5-40 SYRINGE (ML) INJECTION EVERY 12 HOURS SCHEDULED
Status: DISCONTINUED | OUTPATIENT
Start: 2024-01-31 | End: 2024-02-01 | Stop reason: HOSPADM

## 2024-01-31 RX ORDER — LISINOPRIL 20 MG/1
20 TABLET ORAL 2 TIMES DAILY
Status: DISCONTINUED | OUTPATIENT
Start: 2024-01-31 | End: 2024-02-01 | Stop reason: HOSPADM

## 2024-01-31 RX ORDER — ASCORBIC ACID 500 MG
1000 TABLET ORAL DAILY
Status: DISCONTINUED | OUTPATIENT
Start: 2024-01-31 | End: 2024-02-01 | Stop reason: HOSPADM

## 2024-01-31 RX ORDER — DEXTROSE MONOHYDRATE 25 G/50ML
INJECTION, SOLUTION INTRAVENOUS PRN
Status: DISCONTINUED | OUTPATIENT
Start: 2024-01-31 | End: 2024-01-31 | Stop reason: HOSPADM

## 2024-01-31 RX ORDER — FUROSEMIDE 20 MG/1
20 TABLET ORAL DAILY
Status: DISCONTINUED | OUTPATIENT
Start: 2024-01-31 | End: 2024-02-01 | Stop reason: HOSPADM

## 2024-01-31 RX ORDER — INSULIN GLARGINE 100 [IU]/ML
5 INJECTION, SOLUTION SUBCUTANEOUS NIGHTLY PRN
Status: DISCONTINUED | OUTPATIENT
Start: 2024-01-31 | End: 2024-02-01 | Stop reason: HOSPADM

## 2024-01-31 RX ORDER — ONDANSETRON 2 MG/ML
4 INJECTION INTRAMUSCULAR; INTRAVENOUS EVERY 6 HOURS PRN
Status: DISCONTINUED | OUTPATIENT
Start: 2024-01-31 | End: 2024-02-01 | Stop reason: HOSPADM

## 2024-01-31 RX ORDER — ACETAMINOPHEN 325 MG/1
650 TABLET ORAL EVERY 4 HOURS PRN
Status: DISCONTINUED | OUTPATIENT
Start: 2024-01-31 | End: 2024-02-01 | Stop reason: HOSPADM

## 2024-01-31 RX ORDER — SODIUM CHLORIDE 9 MG/ML
INJECTION, SOLUTION INTRAVENOUS PRN
Status: DISCONTINUED | OUTPATIENT
Start: 2024-01-31 | End: 2024-02-01 | Stop reason: HOSPADM

## 2024-01-31 RX ORDER — SODIUM BICARBONATE 650 MG/1
650 TABLET ORAL 2 TIMES DAILY
Status: DISCONTINUED | OUTPATIENT
Start: 2024-01-31 | End: 2024-02-01 | Stop reason: HOSPADM

## 2024-01-31 RX ORDER — NITROGLYCERIN 0.4 MG/1
0.4 TABLET SUBLINGUAL PRN
Status: DISCONTINUED | OUTPATIENT
Start: 2024-01-31 | End: 2024-02-01 | Stop reason: HOSPADM

## 2024-01-31 RX ORDER — ATORVASTATIN CALCIUM 40 MG/1
40 TABLET, FILM COATED ORAL DAILY
Status: DISCONTINUED | OUTPATIENT
Start: 2024-01-31 | End: 2024-02-01 | Stop reason: HOSPADM

## 2024-01-31 RX ORDER — METOPROLOL SUCCINATE 100 MG/1
100 TABLET, EXTENDED RELEASE ORAL DAILY
Status: DISCONTINUED | OUTPATIENT
Start: 2024-01-31 | End: 2024-02-01 | Stop reason: HOSPADM

## 2024-01-31 RX ORDER — ACETAMINOPHEN 325 MG/1
650 TABLET ORAL ONCE
Status: COMPLETED | OUTPATIENT
Start: 2024-01-31 | End: 2024-01-31

## 2024-01-31 RX ORDER — SODIUM CHLORIDE 0.9 % (FLUSH) 0.9 %
5-40 SYRINGE (ML) INJECTION PRN
Status: DISCONTINUED | OUTPATIENT
Start: 2024-01-31 | End: 2024-02-01 | Stop reason: HOSPADM

## 2024-01-31 RX ORDER — DEXTROSE MONOHYDRATE 100 MG/ML
INJECTION, SOLUTION INTRAVENOUS CONTINUOUS PRN
Status: DISCONTINUED | OUTPATIENT
Start: 2024-01-31 | End: 2024-02-01 | Stop reason: HOSPADM

## 2024-01-31 RX ORDER — TRAMADOL HYDROCHLORIDE 50 MG/1
25 TABLET ORAL 3 TIMES DAILY PRN
Status: DISCONTINUED | OUTPATIENT
Start: 2024-01-31 | End: 2024-02-01 | Stop reason: HOSPADM

## 2024-01-31 RX ORDER — DOCUSATE SODIUM 100 MG/1
100 CAPSULE, LIQUID FILLED ORAL 2 TIMES DAILY
Status: DISCONTINUED | OUTPATIENT
Start: 2024-01-31 | End: 2024-02-01 | Stop reason: HOSPADM

## 2024-01-31 RX ORDER — AMLODIPINE BESYLATE 10 MG/1
10 TABLET ORAL DAILY
Status: DISCONTINUED | OUTPATIENT
Start: 2024-01-31 | End: 2024-02-01 | Stop reason: HOSPADM

## 2024-01-31 RX ORDER — ASPIRIN 81 MG/1
162 TABLET ORAL DAILY
Status: DISCONTINUED | OUTPATIENT
Start: 2024-01-31 | End: 2024-02-01 | Stop reason: HOSPADM

## 2024-01-31 RX ORDER — LEVOTHYROXINE SODIUM 88 UG/1
88 TABLET ORAL DAILY
Status: DISCONTINUED | OUTPATIENT
Start: 2024-01-31 | End: 2024-02-01 | Stop reason: HOSPADM

## 2024-01-31 RX ORDER — CLOPIDOGREL BISULFATE 75 MG/1
75 TABLET ORAL DAILY
Status: DISCONTINUED | OUTPATIENT
Start: 2024-01-31 | End: 2024-02-01 | Stop reason: HOSPADM

## 2024-01-31 RX ADMIN — Medication 1000 MG: at 21:23

## 2024-01-31 RX ADMIN — DESMOPRESSIN ACETATE 40 MG: 0.2 TABLET ORAL at 21:23

## 2024-01-31 RX ADMIN — SODIUM BICARBONATE 650 MG: 648 TABLET ORAL at 21:23

## 2024-01-31 RX ADMIN — ACETAMINOPHEN 650 MG: 325 TABLET ORAL at 14:14

## 2024-01-31 RX ADMIN — VANCOMYCIN HYDROCHLORIDE 1000 MG: 1 INJECTION, POWDER, LYOPHILIZED, FOR SOLUTION INTRAVENOUS at 11:45

## 2024-01-31 RX ADMIN — SODIUM CHLORIDE: 9 INJECTION, SOLUTION INTRAVENOUS at 10:30

## 2024-01-31 RX ADMIN — ACETAMINOPHEN 650 MG: 325 TABLET ORAL at 19:49

## 2024-01-31 RX ADMIN — LISINOPRIL 20 MG: 20 TABLET ORAL at 21:23

## 2024-01-31 RX ADMIN — DOCUSATE SODIUM 100 MG: 100 CAPSULE, LIQUID FILLED ORAL at 21:23

## 2024-01-31 RX ADMIN — INSULIN GLARGINE 5 UNITS: 100 INJECTION, SOLUTION SUBCUTANEOUS at 21:22

## 2024-01-31 ASSESSMENT — PAIN SCALES - GENERAL: PAINLEVEL_OUTOF10: 10

## 2024-01-31 NOTE — BRIEF OP NOTE
Brief Postoperative Note      Patient: Charisse Marquis  YOB: 1940  MRN: [id number]    DATE OF PROCEDURE:   1/31/2024    Pre-Op Diagnosis Codes:     * Cardiomyopathy, unspecified type (HCC) [I42.9]    Post-Op Diagnosis:  Same       Procedure(s):  chaudhary / icd implant (medtronic)    Surgeon(s):  Brayden Haas MD    Assistant:  None    Access:  Left subclavian vein ( 9F)    Anesthesia: IV Sedation: IV Versed    Estimated Blood Loss (mL):  10 mL    Complications:  None    Specimens:  None      Implants:  Medtronic Dundee XT VR MRI                    Medtronic [id number] lead at RVA      Implant Name Type Inv. Item Serial No.  Lot No. LRB No. Used Action   LEAD PACE AD 8.6FR L65CM VENT MINH EPTFE DF1 CONN ACT FIX - [id number] Cardiac Lead LEAD PACE AD 8.6FR L65CM VENT MINH EPTFE DF1 CONN ACT FIX [id number] MEDTRONIC CARDIAC RTHYM MGT-WD  N/A 1 Implanted   ICD COBALT XT VR MRI DF1 SGL CHAMBER - [id number] Cardiac ICD/CRT-D ICD COBALT XT VR MRI DF1 SGL CHAMBER [id number] MEDTRONIC CARDIAC RTHYM MGT-WD  N/A 1 Implanted   ENVELOPE PACEMKR L W2.9XL3.3IN ABSRB ANTIBACT TYRX - [id number]  ENVELOPE PACEMKR L W2.9XL3.3IN ABSRB ANTIBACT TYRX  MEDTRONIC CARDIAC RTHYM MGT-WD [id number] N/A 1 Implanted         FINDINGS:  All impedances and thresholds at the RVA were stable and WNL.  The Medtronic Dundee XT VR MRI ICD is programmed in single-zone tachyarrhythmia detection, with  msec ( 188 bpm),  with all shock energies programmed to 40 J in reversed polarity ( B->AX).    PLAN:  Bedrest overnight with CXR and bedside ICD evaluation AM 2/1, then discharge if no complications.  Plan for f/u in one week at [facility].      Electronically signed by Brayden Haas MD on 1/31/2024 at 2:58 PM

## 2024-01-31 NOTE — PROGRESS NOTES
Patient admitted, consent signed and questions answered. Patient ready for procedure. Call light to reach with side rails up 2 of 2.  Family at bedside with patient.  History and physical needs to be completed.

## 2024-01-31 NOTE — PROGRESS NOTES
Received post ICD procedure to PCC room 10. Assessment obtained. Restrictions reviewed with patient. Post procedure pathway initiated.  Left chest site soft , dressing dry and intact.  No hematoma noted.  Family at side.  Patient without complaints.

## 2024-01-31 NOTE — DISCHARGE INSTRUCTIONS
DISCHARGE INSTRUCTIONS FOR NEW PACEMAKER/ICD    HOME CARE:    You will likely go home with steri strips over your incision.      Surgical dressing should be removed in AM after surgery and left off.     Keep the incision clean and dry. Steri strips will fall off on their own if used                No showering until seen by MD in office in one week  /  sponge bathing only                        SLING NEEDS TO BE WORN FOR FIRST WEEK AND FURTHER RESTRICTIONS WILL BE GIVEN AT WEEK FOLLOW UP APT     Tylenol may be used for relief of tenderness around the implant site.     Also avoid anything that will rub against the incision.                May Drive in 2 weeks                May Shower in 1 Week                Resume Plavix on Friday Feb 2nd                Resume ASA on Friday Feb 2nd    Normally the device may seem to bulge slightly under your skin.  The bulge may be more prominent right after surgery but will become less noticeable the next two weeks.     CHANGES TO NOTIFY OUR OFFICE ABOUT:     Increased swelling and/or tenderness.   Increased redness of the pocket or incision.   Drainage from the incision.   A pimple that develops along the incision.   A dark or light colored thread (suture) works its way through the incision.   You develop a fever and do not have a cold or the flu.   Notify the doctor if you experience the symptoms you had prior to implant.    AVOID LIFTING OBJECTS HEAVIER THAN 10 LBS OR RAISING THE ARM ON THE IMPLANTED SIDE ABOVE THE SHOULDER.     Avoid activities that may result in high impact or stress at the incision site.    IF YOU ARE ON BLOOD THINNERS, CHECK WITH YOUR DOCTOR WHEN TO RESUME YOUR MEDICATION.    CARRY DEVICE I.D. AND HOME MEDICATION LIST WITH YOU AT ALL TIMES.   NOTIFY ANY MEDICAL PERSONNEL THAT YOU HAVE A DEVICE BEFORE ANY PROCEDURES.    THESE ARE GENERAL GUIDELINES AFTER IMPLANTATION.  IF THERE ARE ANY EXCEPTIONS TO THESE INSTRUCTIONS, THEY WILL BE REVIEWED WITH YOU ON AN

## 2024-01-31 NOTE — H&P
Luisito Eduardo DO   Alcohol Swabs (B-D SINGLE USE SWABS REGULAR) PADS 1 box by Other route 3 times daily as needed (When pt checks glucose) 12/29/22   Luisito Eduardo DO   Blood Glucose Calibration (OT ULTRA/FASTTK CNTRL SOLN) SOLN 1 Product by Does not apply route as needed (use as directed for calibration of device) 12/29/22   Luisito Eduardo DO   TRUEplus Lancets 33G MISC USE ONE TIME DAILY 12/29/22   Luisito Eduardo DO   Blood Glucose Monitoring Suppl (TRUE METRIX AIR GLUCOSE METER) w/Device KIT USE AS DIRECTED 12/29/22   Luisito Eduardo DO   amLODIPine (NORVASC) 10 MG tablet Take 1 tablet by mouth daily    Olivia Barrera MD   acetaminophen (TYLENOL) 500 MG tablet Take 2 tablets by mouth every 8 hours as needed for Pain 11/23/20   Vimal Zimmerman MD   aspirin EC 81 MG EC tablet Take 2 tablets by mouth daily 11/23/20   Vimal Zimmerman MD   B-D INS SYR ULTRAFINE 1CC/30G 30G X 1/2\" 1 ML MISC USE 1 SYRINGE EVERY DAY 2/28/18   Randee Colbert MD   Compression Stockings MISC by Does not apply route 20-40 mmHg. 11/7/17   Meng Lee MD   Multiple Vitamins-Minerals (OCUVITE ADULT 50+ PO) Take 1 tablet by mouth 2 times daily     Olivia Barrera MD   Green Tea, Camillia sinensis, 315 MG CAPS Take 1 tablet by mouth daily    Olivia Barrera MD   CINNAMON PO Take 2,000 mg by mouth daily    Olivia Barrera MD   Ascorbic Acid (VITAMIN C WITH VIVIENNE HIPS) 1000 MG tablet Take 1 tablet by mouth daily    Olivia Barrera MD   Omega-3 Fatty Acids (FISH OIL) 1200 MG CAPS Take 1,200 mg by mouth daily    Olivia Barrera MD   docusate sodium (COLACE) 100 MG capsule Take 1 capsule by mouth 2 times daily    Olivia Barrera MD       Allergies:  Patient has no known allergies.    Social History:   reports that she quit smoking about 28 years ago. Her smoking use included cigarettes. She started smoking about 68 years ago. She has a 80.0 pack-year smoking  S1 and S2. Murmurs:  1-2/6 holosystolic murmur at LLSB  Jugular venous pulsation Normal  The carotid upstroke is normal in amplitude and contour without delay or bruit  Peripheral pulses are symmetrical and full   Abdomen:  No masses or tenderness  Bowel sounds present  Extremities:   No Cyanosis or Clubbing   Lower extremity edema: None   Skin: Warm and dry  Neurological:  Alert and oriented.  Moves all extremities well  No abnormalities of mood, affect, memory, mentation, or behavior are noted    DATA:    Diagnostics:      EKG: Sinus rhythm, 65 bpm; 1st degree AV block; anteroseptal infarction      CARDIAC CATHETERIZATION ( 12/18/23)       Multivessel coronary artery disease    Patent radial to RPDA grafts    Patent LAD stents    Moderate nonobstructive disease in LCX unchanged from last angiogram       MUGA SCAN ( 10/16/23)    Cardiac Protocol Anterior, 45 degree JON and LLAT planar acquisition was performed.   LVEF 29%  Pt has a life vest on, and wants to get out of it.  I did have her schedule a F/U apt with DR to go over next steps.   Study Details The left ventricle function was severely decreased. EF 29%.       Labs:     CBC:   Recent Labs     01/31/24  1018        BMP:   Recent Labs     01/31/24  1013   CREATININE 1.6*     BNP: No results for input(s): \"BNP\" in the last 72 hours.  PT/INR: No results for input(s): \"PROTIME\", \"INR\" in the last 72 hours.  APTT:No results for input(s): \"APTT\" in the last 72 hours.  CARDIAC ENZYMES:No results for input(s): \"CKTOTAL\", \"CKMB\", \"CKMBINDEX\", \"TROPONINI\" in the last 72 hours.  FASTING LIPID PANEL:  Lab Results   Component Value Date/Time    HDL 59 06/22/2023 05:33 AM    TRIG 83 06/22/2023 05:33 AM     LIVER PROFILE:No results for input(s): \"AST\", \"ALT\", \"LABALBU\" in the last 72 hours.      IMPRESSION:    Ischemic cardiomyopathy, LVEF 29%; stable with NYHA FC II status with no CHF today,  on GDMT.  Type II DM, controlled  Essential HTN, controlled  CKD Stage

## 2024-02-01 ENCOUNTER — APPOINTMENT (OUTPATIENT)
Dept: GENERAL RADIOLOGY | Age: 84
End: 2024-02-01
Attending: INTERNAL MEDICINE
Payer: MEDICARE

## 2024-02-01 VITALS
HEART RATE: 72 BPM | BODY MASS INDEX: 38.22 KG/M2 | DIASTOLIC BLOOD PRESSURE: 59 MMHG | HEIGHT: 60 IN | RESPIRATION RATE: 22 BRPM | OXYGEN SATURATION: 98 % | SYSTOLIC BLOOD PRESSURE: 138 MMHG | TEMPERATURE: 98.3 F | WEIGHT: 194.67 LBS

## 2024-02-01 LAB
ANION GAP SERPL CALCULATED.3IONS-SCNC: 10 MMOL/L (ref 9–17)
BUN SERPL-MCNC: 26 MG/DL (ref 8–23)
CALCIUM SERPL-MCNC: 9.1 MG/DL (ref 8.6–10.4)
CHLORIDE SERPL-SCNC: 111 MMOL/L (ref 98–107)
CO2 SERPL-SCNC: 19 MMOL/L (ref 20–31)
CREAT SERPL-MCNC: 1.4 MG/DL (ref 0.5–0.9)
EKG ATRIAL RATE: 64 BPM
EKG P AXIS: 26 DEGREES
EKG P-R INTERVAL: 246 MS
EKG Q-T INTERVAL: 458 MS
EKG QRS DURATION: 124 MS
EKG QTC CALCULATION (BAZETT): 472 MS
EKG R AXIS: -39 DEGREES
EKG T AXIS: 41 DEGREES
EKG VENTRICULAR RATE: 64 BPM
ERYTHROCYTE [DISTWIDTH] IN BLOOD BY AUTOMATED COUNT: 15.1 % (ref 11.8–14.4)
GFR SERPL CREATININE-BSD FRML MDRD: 38 ML/MIN/1.73M2
GLUCOSE BLD-MCNC: 153 MG/DL (ref 65–105)
GLUCOSE BLD-MCNC: 70 MG/DL (ref 65–105)
GLUCOSE SERPL-MCNC: 65 MG/DL (ref 70–99)
HCT VFR BLD AUTO: 38.7 % (ref 36.3–47.1)
HGB BLD-MCNC: 11.8 G/DL (ref 11.9–15.1)
MCH RBC QN AUTO: 28.5 PG (ref 25.2–33.5)
MCHC RBC AUTO-ENTMCNC: 30.5 G/DL (ref 28.4–34.8)
MCV RBC AUTO: 93.5 FL (ref 82.6–102.9)
NRBC BLD-RTO: 0 PER 100 WBC
PLATELET # BLD AUTO: 184 K/UL (ref 138–453)
PMV BLD AUTO: 12.1 FL (ref 8.1–13.5)
POTASSIUM SERPL-SCNC: 4.8 MMOL/L (ref 3.7–5.3)
RBC # BLD AUTO: 4.14 M/UL (ref 3.95–5.11)
SODIUM SERPL-SCNC: 140 MMOL/L (ref 135–144)
WBC OTHER # BLD: 7.5 K/UL (ref 3.5–11.3)

## 2024-02-01 PROCEDURE — 2580000003 HC RX 258: Performed by: INTERNAL MEDICINE

## 2024-02-01 PROCEDURE — 71045 X-RAY EXAM CHEST 1 VIEW: CPT

## 2024-02-01 PROCEDURE — 6360000002 HC RX W HCPCS: Performed by: INTERNAL MEDICINE

## 2024-02-01 PROCEDURE — 85027 COMPLETE CBC AUTOMATED: CPT

## 2024-02-01 PROCEDURE — 82947 ASSAY GLUCOSE BLOOD QUANT: CPT

## 2024-02-01 PROCEDURE — 36415 COLL VENOUS BLD VENIPUNCTURE: CPT

## 2024-02-01 PROCEDURE — 80048 BASIC METABOLIC PNL TOTAL CA: CPT

## 2024-02-01 PROCEDURE — 99239 HOSP IP/OBS DSCHRG MGMT >30: CPT | Performed by: NURSE PRACTITIONER

## 2024-02-01 PROCEDURE — 93010 ELECTROCARDIOGRAM REPORT: CPT | Performed by: INTERNAL MEDICINE

## 2024-02-01 PROCEDURE — 93005 ELECTROCARDIOGRAM TRACING: CPT | Performed by: INTERNAL MEDICINE

## 2024-02-01 PROCEDURE — 6370000000 HC RX 637 (ALT 250 FOR IP): Performed by: INTERNAL MEDICINE

## 2024-02-01 RX ORDER — TRAMADOL HYDROCHLORIDE 50 MG/1
25 TABLET ORAL 3 TIMES DAILY PRN
Qty: 9 TABLET | Refills: 0 | Status: SHIPPED | OUTPATIENT
Start: 2024-02-01 | End: 2024-02-04

## 2024-02-01 RX ADMIN — SODIUM CHLORIDE, PRESERVATIVE FREE 10 ML: 5 INJECTION INTRAVENOUS at 08:46

## 2024-02-01 RX ADMIN — Medication 1000 MG: at 07:37

## 2024-02-01 RX ADMIN — METOPROLOL SUCCINATE 100 MG: 100 TABLET, FILM COATED, EXTENDED RELEASE ORAL at 08:46

## 2024-02-01 RX ADMIN — AMLODIPINE BESYLATE 10 MG: 10 TABLET ORAL at 08:46

## 2024-02-01 RX ADMIN — TRAMADOL HYDROCHLORIDE 25 MG: 50 TABLET, COATED ORAL at 02:26

## 2024-02-01 RX ADMIN — DOCUSATE SODIUM 100 MG: 100 CAPSULE, LIQUID FILLED ORAL at 08:45

## 2024-02-01 RX ADMIN — OXYCODONE HYDROCHLORIDE AND ACETAMINOPHEN 1000 MG: 500 TABLET ORAL at 08:46

## 2024-02-01 RX ADMIN — FUROSEMIDE 20 MG: 20 TABLET ORAL at 08:46

## 2024-02-01 RX ADMIN — LISINOPRIL 20 MG: 20 TABLET ORAL at 08:46

## 2024-02-01 RX ADMIN — LEVOTHYROXINE SODIUM 88 MCG: 88 TABLET ORAL at 08:45

## 2024-02-01 RX ADMIN — SODIUM BICARBONATE 650 MG: 648 TABLET ORAL at 08:45

## 2024-02-01 ASSESSMENT — PAIN SCALES - GENERAL: PAINLEVEL_OUTOF10: 7

## 2024-02-01 ASSESSMENT — PAIN DESCRIPTION - LOCATION: LOCATION: HEAD

## 2024-02-01 NOTE — CARE COORDINATION
Case Management Assessment  Initial Evaluation    Date/Time of Evaluation: 2/1/2024 8:04 AM  Assessment Completed by: Janneth Gonzalez    If patient is discharged prior to next notation, then this note serves as note for discharge by case management.    Patient Name: Charisse Marquis                   YOB: 1940  Diagnosis: Cardiomyopathy, unspecified type (HCC) [I42.9]  S/P ICD (internal cardiac defibrillator) procedure [Z95.810]  ICD (implantable cardioverter-defibrillator) battery depletion [Z45.02]                   Date / Time: 1/31/2024  9:28 AM    Patient Admission Status: Outpatient in a bed   Readmission Risk (Low < 19, Mod (19-27), High > 27): Readmission Risk Score: 13.2    Current PCP: Luisito Eduardo, DO  PCP verified by CM? (P) Yes    Chart Reviewed: Yes      History Provided by: (P) Patient  Patient Orientation: (P) Alert and Oriented    Patient Cognition: (P) Alert    Hospitalization in the last 30 days (Readmission):  No    If yes, Readmission Assessment in  Navigator will be completed.    Advance Directives:      Code Status: Full Code   Patient's Primary Decision Maker is:      Primary Decision Maker: Erlinda Marquis - Brother/Sister - [phone]    Secondary Decision Maker: Wesly Marquis - Niece/Nephew - [phone]    Discharge Planning:    Patient lives with: (P) Alone Type of Home: (P) House  Primary Care Giver: (P) Self  Patient Support Systems include: (P) Family Members   Current Financial resources: (P) Other (Comment) ([facility] Medicare)  Current community resources: (P) None  Current services prior to admission: (P) Durable Medical Equipment            Current DME: (P) Walker, Cane            Type of Home Care services:  (P) None    ADLS  Prior functional level: (P) Independent in ADLs/IADLs  Current functional level: (P) Independent in ADLs/IADLs    PT AM-PAC:   /24  OT AM-PAC:   /24    Family can provide assistance at DC: (P) No  Would you like Case

## 2024-02-01 NOTE — PLAN OF CARE
Problem: Chronic Conditions and Co-morbidities  Goal: Patient's chronic conditions and co-morbidity symptoms are monitored and maintained or improved  2/1/2024 1244 by Sofia Hawkins RN  Outcome: Adequate for Discharge  1/31/2024 2254 by Anatoliy Draper RN  Outcome: Progressing     Problem: Discharge Planning  Goal: Discharge to home or other facility with appropriate resources  2/1/2024 1244 by Sofia Hawkins RN  Outcome: Adequate for Discharge  1/31/2024 2254 by Anatoliy Draper RN  Outcome: Progressing     Problem: ABCDS Injury Assessment  Goal: Absence of physical injury  2/1/2024 1244 by Sofia Hawkins RN  Outcome: Adequate for Discharge  1/31/2024 2254 by Anatoliy Draper RN  Outcome: Progressing     Problem: Safety - Adult  Goal: Free from fall injury  Outcome: Adequate for Discharge     Problem: Pain  Goal: Verbalizes/displays adequate comfort level or baseline comfort level  Outcome: Adequate for Discharge

## 2024-02-01 NOTE — DISCHARGE SUMMARY
[facility]  Discharge Note                 Name:  Charisse Marquis  YOB: 1940  Social Security Number:  [id number]  Medical Record Number:  [id number]    Date of Admission:  1/31/2024  Date of Discharge:  2/1/2024    Admitting physician: Brayden Haas MD    Discharge Attending: MARIO CURRAN - CNP, CNP  Primary Care Physician: Luisito Eduardo DO  Consultants: Cardiology  Discharge to Home  Stable Condition   HOSPITAL ADMISSION PROBLEM LIST:  Patient Active Problem List   Diagnosis    DM (diabetes mellitus) (HCC)    Secondary hypertension    Hypercholesteremia    CAD (coronary artery disease)    Hypothyroidism    Lumbar spondylosis with myelopathy    S/P cardiac cath 8/19/14-Dr. covarrubias    CKD (chronic kidney disease) stage 3, GFR 30-59 ml/min ([facility])    Morbid obesity with BMI of 40.0-44.9, adult ([facility])    At high risk for hyperkalemia    Edema    COPD without exacerbation ([facility])    Secondary hyperparathyroidism ([facility])    Carotid stenosis, asymptomatic    Claudication in peripheral vascular disease ([facility])    Nonproliferative diabetic retinopathy of both eyes ([facility])    PAD (peripheral artery disease) ([facility])    Type 2 diabetes mellitus with stage 3 chronic kidney disease, with long-term current use of insulin ([facility])    Coronary artery disease involving coronary bypass graft of native heart without angina pectoris    Essential hypertension    Localized edema    DM (diabetes mellitus), type 2 with peripheral vascular complications ([facility])    Mild nonproliferative diabetic retinopathy associated with type 2 diabetes mellitus ([facility])    Panlobular emphysema ([facility])    S/P drug eluting coronary stent placement-OM1 3/26/18-Dr. covarrubias    CAD S/P percutaneous coronary angioplasty    Persistent proteinuria    Lymphedema of right lower extremity    Chronic bilateral low back pain without sciatica    Deficiency of vitamin B12    Wrist arthritis    Gastroesophageal reflux disease

## 2024-02-02 ENCOUNTER — CARE COORDINATION (OUTPATIENT)
Dept: CARE COORDINATION | Age: 84
End: 2024-02-02

## 2024-02-02 NOTE — CARE COORDINATION
Ambulatory Care Coordination Note  2024    Patient Current Location:  Home: [address] [place], [place] [id number]     ACM contacted the patient by telephone. Verified name and  with patient as identifiers. Provided introduction to self, and explanation of the ACM role.     Challenges to be reviewed by the provider   Additional needs identified to be addressed with provider: No  none               Method of communication with provider: staff message.    ACM: Yuki Singh RN    CC Plan:       Review medications with Patient     2.   Review RPM readings:  BP:  143/81;  HR;  82 & 93; P.O.:  93%    weight:  190.8 lb      3.   Patient is post left heart cath, coronary angiography on 24 for decrease in IVEF to 15 - 20% in 2023.     4. Review upcoming appointments with Patient:  Future Appointments   Date Time Provider Department Center   2024 11:30 AM Laverne Lutz MD [facility] None   2024 11:30 AM SCHEDULE, MATI THORNE [facility]   2024  8:20 AM Liz[facility] Pinto [facility]   3/5/2024  2:45 PM Luisito Tao DPM [facility]   3/21/2024 11:45 AM [facility]   4/3/2024 11:00 AM Gigi Maurer MD [facility]     5.  Patient is S/P ICD procedure on 2024.  She is to follow up with PCP in one week for a wound check and Cardiologist in 4 weeks.  Review AVS Patient instructions with Patient.    Discharge medications:    START taking these medications       traMADol 50 MG tablet  Commonly known as: ULTRAM  Take 0.5 tablets by mouth 3 times daily as needed for Pain for up to 3 days. Max Daily Amount: 75 mg     Offered patient enrollment in the Remote Patient Monitoring (RPM) program for in-home monitoring: Yes, patient already enrolled.    Patient states she is feeling well.  She verbalized her Patient post procedure instructions to Writer.  She is following her activity restrictions. She resumed

## 2024-02-05 LAB — ECHO BSA: 1.92 M2

## 2024-02-05 NOTE — PROCEDURES
[address] [place] [address]                            CARDIAC CATHETERIZATION    PATIENT NAME: DONITA CARRILLO            :        1940  MED REC NO:   [id number]                             ROOM:       0540  ACCOUNT NO:   [id number]                           ADMIT DATE: 2024  PROVIDER:     Brayden Haas MD    DATE OF PROCEDURE:  2024    PROCEDURES:  1.  Implantation of ICD, single chamber.  2.  Conscious sedation.  3.  Intraoperative lead testing.    PREOPERATIVE DIAGNOSIS:  Cardiomyopathy.    POSTOPERATIVE DIAGNOSIS:  Cardiomyopathy.    PULSE GENERATOR:  Medtronic Worcester XT VR MRI, serial number [id number],  implanted on 2024.    LEAD:  RV lead is Medtronic 6935-65, serial number [id number], implanted  on 2024.    COMPLICATIONS:  None.    EBL:  10 mL.    ACCESS:  Left subclavian vein.    DESCRIPTION OF PROCEDURE:  After informed consent was obtained, the  patient was brought to the cardiac catheterization laboratory in the  fasting, unsedated state.  She was placed on the fluoroscopy table where  she was prepped and draped in sterile fashion for left pectoral device  implantation.  Conscious sedation was then administered in the form of  IV Versed and fentanyl and she remained stable during the case with O2  saturations never falling below 90%.    A total of 20 mL 1% aqueous lidocaine with epinephrine was used to  infiltrate subcutaneous tissues of the left infraclavicular fossa.   Using a standard 15-scalpel blade, a 2.5-inch incision was developed  parallel to the left clavicle and approximately 1.5 inches inferior to  it.  The depth of the incision was then carried down to the plane of the  prepectoral fascia using blunt dissection and Bovie cautery.  An  additional 40 mL of 1% aqueous lidocaine with epinephrine was then used  to infiltrate subcutaneous tissues along the plane of the

## 2024-02-06 ENCOUNTER — CARE COORDINATION (OUTPATIENT)
Dept: CASE MANAGEMENT | Age: 84
End: 2024-02-06

## 2024-02-06 NOTE — CARE COORDINATION
Remote Alert Monitoring Note  Rpm alert to be reviewed by the provider   red alert   pulse ox reading (88)   Additional needs to be addressed by PCP: No                    Date/Time:  2024 9:56 AM  Patient Current Location: Home: [address] [place] [address]  LPN contacted patient by telephone. Verified patients name and  as identifiers.  Background: COPD, CHF  Refer to 911 immediately if:  Patient unresponsive or unable to provide history  Change in cognition or sudden confusion  Patient unable to respond in complete sentences  Intense chest pain/tightness  Any concern for any clinical emergency  Red Alert: Provider response time of 1 hr required for any red alert requiring intervention  Yellow Alert: Provider response time of 3hr required for any escalated yellow alert    O2 Triage  Are you having any Chest Pain? no   Are you having any Shortness of Breath? no   Swelling in your hands or feet? no     Are you having any other health concerns or issues? no      Clinical Interventions: Reviewed and followed up on alerts and treatments-spoke to patient she denies chest pain, SOB, dizziness, she states her pulse ox is taking along time to read today, she states she does have cold hands and is trying to warm them up. Patient is speaking in clear full sentences and no audible SOB noted at this time. Will continue to monitor and wait for new readings     Plan/Follow Up: Will continue to review, monitor and address alerts with follow up based on severity of symptoms and risk factors.

## 2024-02-09 ENCOUNTER — TELEPHONE (OUTPATIENT)
Dept: FAMILY MEDICINE CLINIC | Age: 84
End: 2024-02-09

## 2024-02-09 NOTE — TELEPHONE ENCOUNTER
----- Message from Herebrt Davenport sent at 2/9/2024 10:08 AM EST -----  Subject: Message to Provider    QUESTIONS  Information for Provider? patient needs a call back to reschedule her   annual wellness visit after 2/14 patient can drive again.  ---------------------------------------------------------------------------  --------------  CALL BACK INFO  [phone]; OK to leave message on voicemail  ---------------------------------------------------------------------------  --------------  SCRIPT ANSWERS  Relationship to Patient? Self

## 2024-02-13 ENCOUNTER — CARE COORDINATION (OUTPATIENT)
Dept: CARE COORDINATION | Age: 84
End: 2024-02-13

## 2024-02-13 NOTE — CARE COORDINATION
Ambulatory Care Coordination Note  2/13/2024      ACM attempted to contact the patient by telephone.  Message left on voice mail requesting return call.  Writer's contact information provided.    Method of communication with provider: staff message.    ACM: Yuki Singh RN    CC Plan:       Review medications with Patient    2.   Patient is S/P ICD procedure on 1/31/2024.    3.  Plan to discontinue RPM if Patient is comfortable.    4.  Patient kept appointment with Dr. Lutz on 2/7/2024.  Review medications with Patient    5.  Patient kept follow up appointment with [facility] for wound care appointment on 2/8/2024.    Offered patient enrollment in the Remote Patient Monitoring (RPM) program for in-home monitoring: Yes, patient already enrolled.    Phoned Patient for ACM update and to discuss cc plan of care.      Lab Results       None            Care Coordination Interventions    Referral from Primary Care Provider: No  Suggested Interventions and Community Resources  Diabetes Education: Completed  Fall Risk Prevention: Declined  Disease Specific Clinic: Completed (Comment: Dr. Pappas, Vascular;  Dr. Lutz, Nephrology; Dr. Maurer, Cardiology)  Registered Dietician: Completed (Comment: Merlyn Sung RD, [facility])  Specialty Services Referral: Completed (Comment: Patient is currently wearing a Life Vest)  Transportation Support: Declined  Zone Management Tools: Completed          Goals Addressed    None         Future Appointments   Date Time Provider Department Center   3/5/2024  2:45 PM Luisito Tao DPM [facility]   3/21/2024 11:45 AM Palmer Pappas MD heartvasc [facility]   3/22/2024 11:00 AM Brayden Haas MD [facility]   4/3/2024 11:00 AM Gigi Maurer MD [facility]   4/8/2024  2:40 PM Lizbeth Pinto MD [facility]   8/7/2024 11:50 AM Laverne Lutz MD [facility] None

## 2024-02-19 ENCOUNTER — CARE COORDINATION (OUTPATIENT)
Dept: CARE COORDINATION | Age: 84
End: 2024-02-19

## 2024-02-19 DIAGNOSIS — Z79.4 TYPE 2 DIABETES MELLITUS WITH STAGE 3A CHRONIC KIDNEY DISEASE, WITH LONG-TERM CURRENT USE OF INSULIN (HCC): ICD-10-CM

## 2024-02-19 DIAGNOSIS — I50.21 ACUTE SYSTOLIC HEART FAILURE (HCC): Primary | ICD-10-CM

## 2024-02-19 DIAGNOSIS — E53.8 DEFICIENCY OF VITAMIN B12: ICD-10-CM

## 2024-02-19 DIAGNOSIS — E11.22 TYPE 2 DIABETES MELLITUS WITH STAGE 3A CHRONIC KIDNEY DISEASE, WITH LONG-TERM CURRENT USE OF INSULIN (HCC): ICD-10-CM

## 2024-02-19 DIAGNOSIS — E03.9 HYPOTHYROIDISM, UNSPECIFIED TYPE: ICD-10-CM

## 2024-02-19 DIAGNOSIS — N18.31 TYPE 2 DIABETES MELLITUS WITH STAGE 3A CHRONIC KIDNEY DISEASE, WITH LONG-TERM CURRENT USE OF INSULIN (HCC): ICD-10-CM

## 2024-02-19 DIAGNOSIS — J43.1 PANLOBULAR EMPHYSEMA (HCC): ICD-10-CM

## 2024-02-19 NOTE — CARE COORDINATION
Ambulatory Care Coordination Note  2024    Patient Current Location:  Home: [address] [place] [address]     ACM contacted the patient by telephone. Verified name and  with patient as identifiers. Provided introduction to self, and explanation of the ACM role.     Challenges to be reviewed by the provider   Additional needs identified to be addressed with provider: No  none               Method of communication with provider: staff message.    ACM: Yuki Singh RN    CC Plan:       Review medications with Patient     2.   Patient is S/P ICD procedure on 2024.     3.  Plan to discontinue RPM if Patient is comfortable.     4.  Patient kept appointment with Dr. Lutz on 2024.  Review medications with Patient     5.  Patient kept follow up appointment with [facility] for wound care appointment on 2024.    6.  Review RPM readings:  114/66;  HR:  81 & 85:  P.O.:  95%; weight:  187.4 lb    7.  Plan to graduate Patient from ACM and RPM if no concerns today.    Offered patient enrollment in the Remote Patient Monitoring (RPM) program for in-home monitoring: Yes, patient already enrolled.    Phoned Patient for ACM update and to discuss cc plan of care.  Patient states she is feeling well.  She denies concerns today.    Patient is ready for graduation from RPM.  Writer discussed graduating Patient from [facility] as well.  She is in agreement.      Patient states she returned her life vest.  She states she still has a bandage over her surgical incision.  She is s/p ICD procedure on 2024.    Patient denies chest discomfort and swelling of her lower extremities.      Writer plans to graduate Patient from AC today.  She has Writer's contact information in event that she has future questions or concerns.     Remote Patient Monitoring Graduation      Date/Time:  2024 4:55 PM  Patient Current Location: Home: [address] [place] [address]  Patient has graduated from the Remote Patient

## 2024-02-19 NOTE — TELEPHONE ENCOUNTER
Patient called and states she changed her insurance from [facility] to [facility] abd needs new scripts sent over. Scripts are pended.

## 2024-02-20 ENCOUNTER — CARE COORDINATION (OUTPATIENT)
Dept: PRIMARY CARE CLINIC | Age: 84
End: 2024-02-20

## 2024-02-20 RX ORDER — LEVOTHYROXINE SODIUM 88 UG/1
88 TABLET ORAL DAILY
Qty: 90 TABLET | Refills: 3 | Status: SHIPPED | OUTPATIENT
Start: 2024-02-20

## 2024-02-20 RX ORDER — BLOOD SUGAR DIAGNOSTIC
1 STRIP MISCELLANEOUS DAILY
Qty: 100 EACH | Refills: 1 | Status: SHIPPED | OUTPATIENT
Start: 2024-02-20

## 2024-02-20 RX ORDER — CYANOCOBALAMIN 1000 UG/ML
INJECTION, SOLUTION INTRAMUSCULAR; SUBCUTANEOUS
Qty: 2 ML | Refills: 2 | Status: SHIPPED | OUTPATIENT
Start: 2024-02-20

## 2024-02-20 RX ORDER — GLUCOSAMINE HCL/CHONDROITIN SU 500-400 MG
CAPSULE ORAL
Qty: 200 STRIP | Refills: 2 | Status: SHIPPED | OUTPATIENT
Start: 2024-02-20

## 2024-02-20 RX ORDER — SYRINGE WITH NEEDLE, 1 ML 25GX5/8"
SYRINGE, EMPTY DISPOSABLE MISCELLANEOUS
Qty: 2 EACH | Refills: 5 | Status: SHIPPED | OUTPATIENT
Start: 2024-02-20

## 2024-02-20 NOTE — CARE COORDINATION
CCSS placed call to patient to arrange RPM kit  through [employer].     Reviewed with patient how to pack equipment in original packing.     Verified patient's availability to schedule [employer]  time.     [employer]  time requested. Anticipated  date range 2-4 business days

## 2024-03-05 ENCOUNTER — OFFICE VISIT (OUTPATIENT)
Dept: PODIATRY | Age: 84
End: 2024-03-05
Payer: MEDICARE

## 2024-03-05 VITALS — HEIGHT: 60 IN | BODY MASS INDEX: 36.71 KG/M2 | WEIGHT: 187 LBS

## 2024-03-05 DIAGNOSIS — M79.671 PAIN IN BOTH FEET: ICD-10-CM

## 2024-03-05 DIAGNOSIS — B35.1 DERMATOPHYTOSIS OF NAIL: ICD-10-CM

## 2024-03-05 DIAGNOSIS — I73.9 PERIPHERAL VASCULAR DISEASE (HCC): ICD-10-CM

## 2024-03-05 DIAGNOSIS — D23.72 BENIGN NEOPLASM OF SKIN OF LOWER LIMB, INCLUDING HIP, LEFT: ICD-10-CM

## 2024-03-05 DIAGNOSIS — D23.71 BENIGN NEOPLASM OF SKIN OF LOWER LIMB, INCLUDING HIP, RIGHT: ICD-10-CM

## 2024-03-05 DIAGNOSIS — E11.51 TYPE II DIABETES MELLITUS WITH PERIPHERAL CIRCULATORY DISORDER (HCC): Primary | ICD-10-CM

## 2024-03-05 DIAGNOSIS — M79.672 PAIN IN BOTH FEET: ICD-10-CM

## 2024-03-05 DIAGNOSIS — R26.2 TROUBLE WALKING: ICD-10-CM

## 2024-03-05 PROCEDURE — 11721 DEBRIDE NAIL 6 OR MORE: CPT | Performed by: PODIATRIST

## 2024-03-05 PROCEDURE — 17110 DESTRUCTION B9 LES UP TO 14: CPT | Performed by: PODIATRIST

## 2024-03-05 NOTE — PROGRESS NOTES
[facility] PODIATRY [address]  [address]  [facility] [address]  Dept: [phone]  Dept Fax: [phone]    DIABETIC PROGRESS NOTE  Date of patient's visit: 3/5/2024  Patient's Name:  Charisse Marquis YOB: 1940            Patient Care Team:  Luisito Eduardo DO as PCP - General (Family Medicine)  [facility]  Luisito Tao DPM as Physician (Podiatry)  Yuki Singh, RN as Ambulatory Care Manager  Laverne uLtz MD as Consulting Physician (Nephrology)  Milad Manzanares MD as Consulting Physician (Cardiology)  Palmer Pappas MD as Surgeon (Vascular Surgery)  Melida Hinojosa APRN - CNP as Nurse Practitioner (Certified Nurse Practitioner)          Chief Complaint   Patient presents with    Diabetes     Diabetic foot care    Peripheral Neuropathy     Bilateral feet    Fall     Pt fell one week ago, toes 3 through 5 are sore on the left foot       Subjective:   Charisse Marquis comes to clinic for Diabetes (Diabetic foot care), Peripheral Neuropathy (Bilateral feet), and Fall (Pt fell one week ago, toes 3 through 5 are sore on the left foot)    she is a diabetic and states that she is doing well.  Pt currently has complaint of thickened, elongated nails that they cannot manage by themselves.   Pt's primary care physician is Luisito Eduardo DO last seen 08/07/2023   Pt's last blood sugar was 91 this morning.    Pt also relates to painful skin spots to the bottom of both feet.  Pt has tried pads and changing shoes but it has note helped the pain    Pt has a new complaint of none tdoay .       Lab Results   Component Value Date    LABA1C 5.6 06/21/2023      Complains of numbness in the feet bilat.  Past Medical History:   Diagnosis Date    Abnormal cardiovascular stress test 02/2021    LARGE ANTERIOR INFARCTION / LARGE AREA OF INFERIOR LATERAL

## 2024-03-15 ENCOUNTER — TELEPHONE (OUTPATIENT)
Dept: CARDIOTHORACIC SURGERY | Age: 84
End: 2024-03-15

## 2024-03-18 DIAGNOSIS — Z79.4 TYPE 2 DIABETES MELLITUS WITH STAGE 3A CHRONIC KIDNEY DISEASE, WITH LONG-TERM CURRENT USE OF INSULIN (HCC): ICD-10-CM

## 2024-03-18 DIAGNOSIS — N18.31 TYPE 2 DIABETES MELLITUS WITH STAGE 3A CHRONIC KIDNEY DISEASE, WITH LONG-TERM CURRENT USE OF INSULIN (HCC): ICD-10-CM

## 2024-03-18 DIAGNOSIS — Z98.61 CAD S/P PERCUTANEOUS CORONARY ANGIOPLASTY: Primary | ICD-10-CM

## 2024-03-18 DIAGNOSIS — I73.9 PAD (PERIPHERAL ARTERY DISEASE) (HCC): ICD-10-CM

## 2024-03-18 DIAGNOSIS — E53.8 DEFICIENCY OF VITAMIN B12: ICD-10-CM

## 2024-03-18 DIAGNOSIS — E11.22 TYPE 2 DIABETES MELLITUS WITH STAGE 3A CHRONIC KIDNEY DISEASE, WITH LONG-TERM CURRENT USE OF INSULIN (HCC): ICD-10-CM

## 2024-03-18 DIAGNOSIS — I25.10 CAD S/P PERCUTANEOUS CORONARY ANGIOPLASTY: Primary | ICD-10-CM

## 2024-03-18 RX ORDER — CYANOCOBALAMIN 1000 UG/ML
INJECTION, SOLUTION INTRAMUSCULAR; SUBCUTANEOUS
Qty: 2 ML | Refills: 2 | OUTPATIENT
Start: 2024-03-18

## 2024-03-18 RX ORDER — GLUCOSAMINE HCL/CHONDROITIN SU 500-400 MG
CAPSULE ORAL
Qty: 200 STRIP | Refills: 2 | OUTPATIENT
Start: 2024-03-18

## 2024-03-25 ENCOUNTER — HOSPITAL ENCOUNTER (OUTPATIENT)
Dept: VASCULAR LAB | Age: 84
Discharge: HOME | End: 2024-03-27
Attending: SURGERY
Payer: MEDICARE

## 2024-03-25 DIAGNOSIS — I73.9 PERIPHERAL VASCULAR DISEASE, UNSPECIFIED (HCC): Primary | ICD-10-CM

## 2024-03-25 DIAGNOSIS — E53.8 DEFICIENCY OF VITAMIN B12: ICD-10-CM

## 2024-03-25 DIAGNOSIS — I73.9 PERIPHERAL VASCULAR DISEASE, UNSPECIFIED (HCC): ICD-10-CM

## 2024-03-25 LAB
VAS LEFT ABI: 0.26
VAS LEFT ARM BP: 129 MMHG
VAS LEFT DORSALIS PEDIS BP: 34 MMHG
VAS LEFT PTA BP: 37 MMHG
VAS LEFT TBI: 0.19
VAS LEFT TOE PRESSURE: 27 MMHG
VAS RIGHT ABI: 0.31
VAS RIGHT ARM BP: 144 MMHG
VAS RIGHT DORSALIS PEDIS BP: 41 MMHG
VAS RIGHT PTA BP: 44 MMHG
VAS RIGHT TBI: 0.19
VAS RIGHT TOE PRESSURE: 27 MMHG

## 2024-03-25 PROCEDURE — 93923 UPR/LXTR ART STDY 3+ LVLS: CPT

## 2024-03-25 PROCEDURE — 93923 UPR/LXTR ART STDY 3+ LVLS: CPT | Performed by: SURGERY

## 2024-03-25 RX ORDER — CYANOCOBALAMIN 1000 UG/ML
INJECTION, SOLUTION INTRAMUSCULAR; SUBCUTANEOUS
Qty: 2 ML | Refills: 2 | OUTPATIENT
Start: 2024-03-25

## 2024-03-25 RX ORDER — BLOOD SUGAR DIAGNOSTIC
1 STRIP MISCELLANEOUS DAILY
Qty: 100 EACH | Refills: 3 | OUTPATIENT
Start: 2024-03-25

## 2024-03-25 NOTE — TELEPHONE ENCOUNTER
4/4/2024  2:30 PM Palmer Pappas MD heartvasc [facility]   4/8/2024  2:40 PM [facility]   4/17/2024  9:45 AM Zackery Lockett DO [facility]   6/11/2024 10:30 AM [facility]   8/7/2024 11:50 AM Laverne Lutz MD [facility] None   9/27/2024 11:00 AM Brayden Haas MD [facility]            Patient Active Problem List:     DM (diabetes mellitus) ([facility])     Secondary hypertension     Hypercholesteremia     CAD (coronary artery disease)     Hypothyroidism     Lumbar spondylosis with myelopathy     S/P cardiac cath 8/19/14-Dr. covarrubias     CKD (chronic kidney disease) stage 3, GFR 30-59 ml/min ([facility])     Morbid obesity with BMI of 40.0-44.9, adult ([facility])     At high risk for hyperkalemia     Edema     COPD without exacerbation ([facility])     Secondary hyperparathyroidism ([facility])     Carotid stenosis, asymptomatic     Claudication in peripheral vascular disease ([facility])     Nonproliferative diabetic retinopathy of both eyes ([facility])     PAD (peripheral artery disease) ([facility])     Type 2 diabetes mellitus with stage 3 chronic kidney disease, with long-term current use of insulin ([facility])     Coronary artery disease involving coronary bypass graft of native heart without angina pectoris     Essential hypertension     Localized edema     DM (diabetes mellitus), type 2 with peripheral vascular complications ([facility])     Mild nonproliferative diabetic retinopathy associated with type 2 diabetes mellitus ([facility])     Panlobular emphysema ([facility])     S/P drug eluting coronary stent placement-OM1 3/26/18-Dr. covarrubias     CAD S/P percutaneous coronary angioplasty     Persistent proteinuria     Lymphedema of right lower extremity     Chronic bilateral low back pain without sciatica     Deficiency of vitamin B12     Wrist arthritis     Gastroesophageal reflux disease without esophagitis     Neck sprain     Acute on chronic systolic congestive heart failure (HCC)

## 2024-04-03 DIAGNOSIS — I73.9 PAD (PERIPHERAL ARTERY DISEASE) (HCC): ICD-10-CM

## 2024-04-04 ENCOUNTER — OFFICE VISIT (OUTPATIENT)
Dept: VASCULAR SURGERY | Age: 84
End: 2024-04-04
Payer: MEDICARE

## 2024-04-04 VITALS
BODY MASS INDEX: 37.3 KG/M2 | DIASTOLIC BLOOD PRESSURE: 79 MMHG | RESPIRATION RATE: 18 BRPM | HEART RATE: 85 BPM | SYSTOLIC BLOOD PRESSURE: 163 MMHG | WEIGHT: 190 LBS | HEIGHT: 60 IN | OXYGEN SATURATION: 99 %

## 2024-04-04 DIAGNOSIS — I73.9 PAD (PERIPHERAL ARTERY DISEASE) (HCC): Primary | ICD-10-CM

## 2024-04-04 PROCEDURE — 3078F DIAST BP <80 MM HG: CPT | Performed by: SURGERY

## 2024-04-04 PROCEDURE — 99214 OFFICE O/P EST MOD 30 MIN: CPT | Performed by: SURGERY

## 2024-04-04 PROCEDURE — 1123F ACP DISCUSS/DSCN MKR DOCD: CPT | Performed by: SURGERY

## 2024-04-04 PROCEDURE — 3077F SYST BP >= 140 MM HG: CPT | Performed by: SURGERY

## 2024-04-04 NOTE — PROGRESS NOTES
Division of Vascular Surgery        Follow Up      Chief Complaint:      Peripheral arterial disease follow up    History of Present Illness:      Charisse Marquis is a 84 y.o. woman who presents for yearly surveillance of her peripheral arterial disease and claudication.   Overall doing well, denies symptoms of ischemic rest pain, no wounds on her feet.  Does get intermittent claudication in her calves after walking for about 10 minutes, does not limit her day to day activities, resting helps and she can get back to walking.  She did have a fall a few days ago, bumped her toes, no wounds has started to heal up.    (4/13/23) Charisse Marquis is a 83 y.o. woman who presents for her yearly surveillance regarding her peripheral arterial disease.   She is doing well, denies symptoms suggestive of claudication or ischemic rest pain.  She does not have any open wounds or sores on her feet.  She does have bilateral peripheral neuropathy.  She checks her feet daily and takes good care of them.  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  She follows up with podiatry every few months for nail clippings and foot care.       (3/4/22) Charisse Marquis is a 82 y.o. woman who presents for her yearly surveillance regarding her peripheral arterial disease.  She continues to do well and denies any activity limiting pain in her legs.  Every now and then if she over does it her upper thighs will bother her.  She denies any symptoms suggestive of ischemic rest pain, she does not have any open wounds or sores on her feet.  She has been trying to stay active, but admits to slowing down because of COVID.  Hoping to get moving again now that the weather has shown signs of warming back up.     Medical History:     Past Medical History:   Diagnosis Date    Abnormal cardiovascular stress test 02/2021    LARGE ANTERIOR INFARCTION / LARGE AREA OF INFERIOR LATERAL ISCHEMIA WITH REDUCED

## 2024-04-05 DIAGNOSIS — N18.31 TYPE 2 DIABETES MELLITUS WITH STAGE 3A CHRONIC KIDNEY DISEASE, WITH LONG-TERM CURRENT USE OF INSULIN (HCC): ICD-10-CM

## 2024-04-05 DIAGNOSIS — E53.8 DEFICIENCY OF VITAMIN B12: ICD-10-CM

## 2024-04-05 DIAGNOSIS — E11.22 TYPE 2 DIABETES MELLITUS WITH STAGE 3A CHRONIC KIDNEY DISEASE, WITH LONG-TERM CURRENT USE OF INSULIN (HCC): ICD-10-CM

## 2024-04-05 DIAGNOSIS — Z79.4 TYPE 2 DIABETES MELLITUS WITH STAGE 3A CHRONIC KIDNEY DISEASE, WITH LONG-TERM CURRENT USE OF INSULIN (HCC): ICD-10-CM

## 2024-04-05 NOTE — TELEPHONE ENCOUNTER
Patient called stating [facility] is telling patient that they can not refill the pended medications. So the refills that were sent in February will not be refilled with them, so a new script needs to be sent to [facility]. Please address pended medication. These are not duplicate request.

## 2024-04-08 DIAGNOSIS — E53.8 DEFICIENCY OF VITAMIN B12: ICD-10-CM

## 2024-04-08 DIAGNOSIS — E11.9 TYPE 2 DIABETES MELLITUS WITHOUT COMPLICATION, UNSPECIFIED WHETHER LONG TERM INSULIN USE (HCC): ICD-10-CM

## 2024-04-08 RX ORDER — DIPHENHYDRAMINE HCL 25 MG
TABLET ORAL
Qty: 1 KIT | Refills: 0 | Status: SHIPPED | OUTPATIENT
Start: 2024-04-08

## 2024-04-08 RX ORDER — BLOOD SUGAR DIAGNOSTIC
1 STRIP MISCELLANEOUS DAILY
Qty: 100 EACH | Refills: 5 | Status: SHIPPED | OUTPATIENT
Start: 2024-04-08

## 2024-04-08 RX ORDER — GLUCOSAMINE HCL/CHONDROITIN SU 500-400 MG
CAPSULE ORAL
Qty: 200 STRIP | Refills: 2 | Status: SHIPPED | OUTPATIENT
Start: 2024-04-08

## 2024-04-08 RX ORDER — CYANOCOBALAMIN 1000 UG/ML
INJECTION, SOLUTION INTRAMUSCULAR; SUBCUTANEOUS
Qty: 2 ML | Refills: 2 | Status: SHIPPED | OUTPATIENT
Start: 2024-04-08

## 2024-04-08 RX ORDER — CYANOCOBALAMIN 1000 UG/ML
INJECTION, SOLUTION INTRAMUSCULAR; SUBCUTANEOUS
Qty: 2 ML | Refills: 2 | Status: SHIPPED | OUTPATIENT
Start: 2024-04-08 | End: 2024-04-08 | Stop reason: SDUPTHER

## 2024-04-15 RX ORDER — DIPHENHYDRAMINE HCL 25 MG
TABLET ORAL
Qty: 1 KIT | Refills: 0 | Status: SHIPPED | OUTPATIENT
Start: 2024-04-15

## 2024-05-02 ASSESSMENT — ENCOUNTER SYMPTOMS
ABDOMINAL PAIN: 0
COLOR CHANGE: 0
BACK PAIN: 1
SHORTNESS OF BREATH: 0
CHEST TIGHTNESS: 0
ALLERGIC/IMMUNOLOGIC NEGATIVE: 1

## 2024-05-09 DIAGNOSIS — E53.8 DEFICIENCY OF VITAMIN B12: ICD-10-CM

## 2024-05-09 RX ORDER — SYRINGE WITH NEEDLE, 1 ML 25GX5/8"
SYRINGE, EMPTY DISPOSABLE MISCELLANEOUS
Qty: 2 EACH | Refills: 5 | Status: SHIPPED | OUTPATIENT
Start: 2024-05-09

## 2024-05-09 RX ORDER — CALCIUM CITRATE/VITAMIN D3 200MG-6.25
1 TABLET ORAL DAILY
Qty: 100 EACH | Refills: 3 | Status: SHIPPED | OUTPATIENT
Start: 2024-05-09

## 2024-05-09 NOTE — TELEPHONE ENCOUNTER
Last visit: 8/7/23  Last Med refill:   Does patient have enough medication for 72 hours: No:     Next Visit Date:  Future Appointments   Date Time Provider Department Center   6/10/2024  1:40 PM Lizbeth Pinto MD [facility]   6/11/2024 10:30 AM Luisito Tao DPM [facility]   8/7/2024 11:50 AM Laverne Lutz MD [facility]   9/27/2024 11:00 AM Brayden Haas MD [facility]       Health Maintenance   Topic Date Due    Respiratory Syncytial Virus (RSV) Pregnant or age 60 yrs+ (1 - 1-dose 60+ series) Never done    COVID-19 Vaccine (4 - 2023-24 season) 09/01/2023    Annual Wellness Visit (Medicare Advantage)  01/01/2024    Lipids  06/22/2024    Depression Screen  07/03/2024    Flu vaccine (Season Ended) 08/01/2024    DTaP/Tdap/Td vaccine (2 - Td or Tdap) 07/02/2029    DEXA (modify frequency per FRAX score)  Completed    Shingles vaccine  Completed    Pneumococcal 65+ years Vaccine  Completed    Hepatitis A vaccine  Aged Out    Hepatitis B vaccine  Aged Out    Hib vaccine  Aged Out    Polio vaccine  Aged Out    Meningococcal (ACWY) vaccine  Aged Out       Hemoglobin A1C (%)   Date Value   06/21/2023 5.6   09/30/2022 5.8   03/31/2022 6.5 (H)             ( goal A1C is < 7)   No components found for: \"LABMICR\"  No components found for: \"LDLCHOLESTEROL\", \"LDLCALC\"    (goal LDL is <100)   AST (U/L)   Date Value   06/21/2023 16     ALT (U/L)   Date Value   06/21/2023 11     BUN (mg/dL)   Date Value   02/01/2024 26 (H)     BP Readings from Last 3 Encounters:   04/17/24 132/72   04/04/24 (!) 163/79   03/22/24 (!) 140/78          (goal 120/80)    All Future Testing planned in [facility]  Lab Frequency Next Occurrence   XR Cardiac Cath Procedure Once 12/13/2023   Vitamin D 25 Hydroxy Once 05/07/2024   Vascular lower arterial complete physiologic Doppler at rest Once 04/03/2025   CBC every 3 months    Albumin every 3 months    Basic Metabolic Panel every 3 months    Phosphorus every 3

## 2024-05-15 ENCOUNTER — TELEPHONE (OUTPATIENT)
Dept: FAMILY MEDICINE CLINIC | Age: 84
End: 2024-05-15

## 2024-05-15 DIAGNOSIS — E11.9 TYPE 2 DIABETES MELLITUS WITHOUT COMPLICATION, UNSPECIFIED WHETHER LONG TERM INSULIN USE (HCC): ICD-10-CM

## 2024-05-15 DIAGNOSIS — E11.51 DM (DIABETES MELLITUS), TYPE 2 WITH PERIPHERAL VASCULAR COMPLICATIONS (HCC): ICD-10-CM

## 2024-05-15 RX ORDER — GLUCOSAM/CHON-MSM1/C/MANG/BOSW 500-416.6
TABLET ORAL
Qty: 100 EACH | Refills: 3 | Status: SHIPPED | OUTPATIENT
Start: 2024-05-15

## 2024-05-15 RX ORDER — BLOOD SUGAR DIAGNOSTIC
1 STRIP MISCELLANEOUS DAILY
Qty: 100 EACH | Refills: 5 | Status: SHIPPED | OUTPATIENT
Start: 2024-05-15

## 2024-05-15 RX ORDER — BLOOD SUGAR DIAGNOSTIC
1 STRIP MISCELLANEOUS DAILY
Qty: 100 EACH | Refills: 1 | Status: SHIPPED | OUTPATIENT
Start: 2024-05-15

## 2024-05-15 NOTE — TELEPHONE ENCOUNTER
PC from pt stating they received their other orders but the [facility] did not have the test strips needed, writer did confirm [facility] has them in stock. Script pended once more and pharmacy changed for selected encounter. Please review and advise.

## 2024-05-15 NOTE — TELEPHONE ENCOUNTER
Patient called in this morning about the refills for her True Metrix strips and needles for her insulin, they were sent to the pharmacy on 5/9/2024. Writer informed patient for this  theat they were sent to [facility].

## 2024-05-16 NOTE — TELEPHONE ENCOUNTER
Pt already had prior supplies, [facility] pharmacy filled them for them 5/9/24, pt was unable to get True Metrix Test Strips which are pended in the encounter due to pharmacy supply. Pt did confirm [facility] pharmacy has them in stock they just need the script. Pharmacy is set within this encounter and medication is pended if you can sign order from this Phone Encounter please.

## 2024-05-17 RX ORDER — CALCIUM CITRATE/VITAMIN D3 200MG-6.25
1 TABLET ORAL DAILY
Qty: 100 EACH | Refills: 3 | Status: SHIPPED | OUTPATIENT
Start: 2024-05-17

## 2024-05-17 NOTE — TELEPHONE ENCOUNTER
Writer spoke with the patient informed her that the tests strips were sent to the [facility] pharmacy.

## 2024-06-10 ENCOUNTER — OFFICE VISIT (OUTPATIENT)
Dept: FAMILY MEDICINE CLINIC | Age: 84
End: 2024-06-10
Payer: MEDICARE

## 2024-06-10 VITALS
HEART RATE: 58 BPM | BODY MASS INDEX: 37.5 KG/M2 | HEIGHT: 60 IN | SYSTOLIC BLOOD PRESSURE: 147 MMHG | WEIGHT: 191 LBS | DIASTOLIC BLOOD PRESSURE: 65 MMHG

## 2024-06-10 DIAGNOSIS — N18.30 TYPE 2 DIABETES MELLITUS WITH STAGE 3 CHRONIC KIDNEY DISEASE, WITH LONG-TERM CURRENT USE OF INSULIN, UNSPECIFIED WHETHER STAGE 3A OR 3B CKD (HCC): ICD-10-CM

## 2024-06-10 DIAGNOSIS — E11.22 TYPE 2 DIABETES MELLITUS WITH STAGE 3 CHRONIC KIDNEY DISEASE, WITH LONG-TERM CURRENT USE OF INSULIN, UNSPECIFIED WHETHER STAGE 3A OR 3B CKD (HCC): ICD-10-CM

## 2024-06-10 DIAGNOSIS — Z79.4 TYPE 2 DIABETES MELLITUS WITH STAGE 3 CHRONIC KIDNEY DISEASE, WITH LONG-TERM CURRENT USE OF INSULIN, UNSPECIFIED WHETHER STAGE 3A OR 3B CKD (HCC): ICD-10-CM

## 2024-06-10 DIAGNOSIS — Z00.00 MEDICARE ANNUAL WELLNESS VISIT, SUBSEQUENT: Primary | ICD-10-CM

## 2024-06-10 PROBLEM — I51.89 SEVERE LEFT VENTRICULAR SYSTOLIC DYSFUNCTION: Status: ACTIVE | Noted: 2024-06-10

## 2024-06-10 PROBLEM — I51.9 SEVERE LEFT VENTRICULAR SYSTOLIC DYSFUNCTION: Status: ACTIVE | Noted: 2024-06-10

## 2024-06-10 PROCEDURE — 3078F DIAST BP <80 MM HG: CPT

## 2024-06-10 PROCEDURE — G0439 PPPS, SUBSEQ VISIT: HCPCS

## 2024-06-10 PROCEDURE — 3077F SYST BP >= 140 MM HG: CPT

## 2024-06-10 PROCEDURE — 1123F ACP DISCUSS/DSCN MKR DOCD: CPT

## 2024-06-10 RX ORDER — INSULIN GLARGINE 100 [IU]/ML
INJECTION, SOLUTION SUBCUTANEOUS
Qty: 30 ML | Refills: 5 | Status: SHIPPED | OUTPATIENT
Start: 2024-06-10 | End: 2024-06-10 | Stop reason: SDUPTHER

## 2024-06-10 RX ORDER — INSULIN GLARGINE 100 [IU]/ML
10 INJECTION, SOLUTION SUBCUTANEOUS NIGHTLY
Qty: 30 ML | Refills: 5 | Status: SHIPPED | OUTPATIENT
Start: 2024-06-10

## 2024-06-10 RX ORDER — ISOPROPYL ALCOHOL 0.75 G/1
1 SWAB TOPICAL 3 TIMES DAILY PRN
Qty: 1 EACH | Refills: 5 | Status: SHIPPED | OUTPATIENT
Start: 2024-06-10 | End: 2024-09-18

## 2024-06-10 RX ORDER — BLOOD-GLUCOSE METER
1 EACH MISCELLANEOUS DAILY
Qty: 1 KIT | Refills: 0 | Status: SHIPPED | OUTPATIENT
Start: 2024-06-10

## 2024-06-10 RX ORDER — GLUCOSAM/CHON-MSM1/C/MANG/BOSW 500-416.6
TABLET ORAL
Qty: 100 EACH | Refills: 3 | Status: SHIPPED | OUTPATIENT
Start: 2024-06-10

## 2024-06-10 ASSESSMENT — PATIENT HEALTH QUESTIONNAIRE - PHQ9
SUM OF ALL RESPONSES TO PHQ QUESTIONS 1-9: 0
SUM OF ALL RESPONSES TO PHQ QUESTIONS 1-9: 0
1. LITTLE INTEREST OR PLEASURE IN DOING THINGS: NOT AT ALL
SUM OF ALL RESPONSES TO PHQ QUESTIONS 1-9: 0
SUM OF ALL RESPONSES TO PHQ9 QUESTIONS 1 & 2: 0
2. FEELING DOWN, DEPRESSED OR HOPELESS: NOT AT ALL
SUM OF ALL RESPONSES TO PHQ QUESTIONS 1-9: 0

## 2024-06-10 ASSESSMENT — LIFESTYLE VARIABLES
HOW MANY STANDARD DRINKS CONTAINING ALCOHOL DO YOU HAVE ON A TYPICAL DAY: 1 OR 2
HOW OFTEN DO YOU HAVE A DRINK CONTAINING ALCOHOL: MONTHLY OR LESS

## 2024-06-10 NOTE — PROGRESS NOTES
Attending Physician Statement  I  have discussed the care of Charisse Marquis including pertinent history and exam findings with the resident. I agree with the assessment, plan and orders as documented by the resident.      BP (!) 147/65 (Site: Left Upper Arm, Position: Sitting, Cuff Size: Medium Adult)   Pulse 58   Ht 1.524 m (5')   Wt 86.6 kg (191 lb)   BMI 37.30 kg/m²    BP Readings from Last 3 Encounters:   06/10/24 (!) 147/65   04/17/24 132/72   04/04/24 (!) 163/79     Wt Readings from Last 3 Encounters:   06/10/24 86.6 kg (191 lb)   04/17/24 88 kg (194 lb)   04/04/24 86.2 kg (190 lb)          Diagnosis Orders   1. Medicare annual wellness visit, subsequent        2. Type 2 diabetes mellitus with stage 3 chronic kidney disease, with long-term current use of insulin, unspecified whether stage 3a or 3b CKD (HCC)  Blood Glucose Monitoring Suppl (ONE TOUCH ULTRA 2) w/Device KIT    Alcohol Swabs (B-D SINGLE USE SWABS REGULAR) PADS    TRUEplus Lancets 33G MISC    insulin glargine (LANTUS) 100 UNIT/ML injection vial    DISCONTINUED: insulin glargine (LANTUS) 100 UNIT/ML injection vial              Luisito Eduardo DO 6/10/2024 3:09 PM      
Pneumococcal 65+ years Vaccine  Completed    Hepatitis A vaccine  Aged Out    Hepatitis B vaccine  Aged Out    Hib vaccine  Aged Out    Polio vaccine  Aged Out    Meningococcal (ACWY) vaccine  Aged Out             
 Fam Hx

## 2024-06-14 ENCOUNTER — TELEPHONE (OUTPATIENT)
Dept: FAMILY MEDICINE CLINIC | Age: 84
End: 2024-06-14

## 2024-06-14 DIAGNOSIS — Z79.4 TYPE 2 DIABETES MELLITUS WITH STAGE 3 CHRONIC KIDNEY DISEASE, WITH LONG-TERM CURRENT USE OF INSULIN, UNSPECIFIED WHETHER STAGE 3A OR 3B CKD (HCC): ICD-10-CM

## 2024-06-14 DIAGNOSIS — E11.22 TYPE 2 DIABETES MELLITUS WITH STAGE 3 CHRONIC KIDNEY DISEASE, WITH LONG-TERM CURRENT USE OF INSULIN, UNSPECIFIED WHETHER STAGE 3A OR 3B CKD (HCC): ICD-10-CM

## 2024-06-14 DIAGNOSIS — N18.30 TYPE 2 DIABETES MELLITUS WITH STAGE 3 CHRONIC KIDNEY DISEASE, WITH LONG-TERM CURRENT USE OF INSULIN, UNSPECIFIED WHETHER STAGE 3A OR 3B CKD (HCC): ICD-10-CM

## 2024-06-14 NOTE — TELEPHONE ENCOUNTER
Patient called office would need a RX sent to pharmacy for control solutions and test strips for (ONE TOUCH ULTRA 2) please send into [facility]. Thank you.

## 2024-06-17 RX ORDER — BLOOD-GLUCOSE METER
1 EACH MISCELLANEOUS DAILY
Qty: 1 KIT | Refills: 0 | Status: SHIPPED | OUTPATIENT
Start: 2024-06-17

## 2024-06-18 DIAGNOSIS — E11.22 TYPE 2 DIABETES MELLITUS WITH STAGE 3 CHRONIC KIDNEY DISEASE, WITH LONG-TERM CURRENT USE OF INSULIN, UNSPECIFIED WHETHER STAGE 3A OR 3B CKD (HCC): ICD-10-CM

## 2024-06-18 DIAGNOSIS — N18.30 TYPE 2 DIABETES MELLITUS WITH STAGE 3 CHRONIC KIDNEY DISEASE, WITH LONG-TERM CURRENT USE OF INSULIN, UNSPECIFIED WHETHER STAGE 3A OR 3B CKD (HCC): ICD-10-CM

## 2024-06-18 DIAGNOSIS — Z79.4 TYPE 2 DIABETES MELLITUS WITH STAGE 3 CHRONIC KIDNEY DISEASE, WITH LONG-TERM CURRENT USE OF INSULIN, UNSPECIFIED WHETHER STAGE 3A OR 3B CKD (HCC): ICD-10-CM

## 2024-06-18 NOTE — TELEPHONE ENCOUNTER
The test strips that you have sent are incorrect would need to be for a one touch ultra 2 meter. The order would need to be  when sent to pharmacy and the patient would need control solutions as well.

## 2024-06-18 NOTE — TELEPHONE ENCOUNTER
Received Medical clarification for her Lantus, it had to different directions on it. Can you please send a new script  to [facility]. Please Advise

## 2024-06-22 RX ORDER — INSULIN GLARGINE 100 [IU]/ML
10 INJECTION, SOLUTION SUBCUTANEOUS NIGHTLY
Qty: 30 ML | Refills: 5 | Status: SHIPPED | OUTPATIENT
Start: 2024-06-22

## 2024-06-24 ENCOUNTER — TELEPHONE (OUTPATIENT)
Dept: FAMILY MEDICINE CLINIC | Age: 84
End: 2024-06-24

## 2024-06-24 NOTE — TELEPHONE ENCOUNTER
Patient called stating her One Touch Ultra  # 2 test strips went to the wrong pharmacy , her testing strips and testing solution needs to go to [facility].  Please advise and thank you

## 2024-06-26 ENCOUNTER — TELEPHONE (OUTPATIENT)
Dept: FAMILY MEDICINE CLINIC | Age: 84
End: 2024-06-26

## 2024-06-26 NOTE — TELEPHONE ENCOUNTER
Patient called to inform you that the patient wanted her one touch ultra 2 , her test solution to go [facility].

## 2024-07-02 ENCOUNTER — OFFICE VISIT (OUTPATIENT)
Dept: PODIATRY | Age: 84
End: 2024-07-02
Payer: MEDICARE

## 2024-07-02 VITALS — WEIGHT: 191 LBS | HEIGHT: 60 IN | BODY MASS INDEX: 37.5 KG/M2

## 2024-07-02 DIAGNOSIS — B35.1 DERMATOPHYTOSIS OF NAIL: ICD-10-CM

## 2024-07-02 DIAGNOSIS — M79.671 PAIN IN BOTH FEET: ICD-10-CM

## 2024-07-02 DIAGNOSIS — I73.9 PERIPHERAL VASCULAR DISEASE (HCC): ICD-10-CM

## 2024-07-02 DIAGNOSIS — D23.72 BENIGN NEOPLASM OF SKIN OF LOWER LIMB, INCLUDING HIP, LEFT: ICD-10-CM

## 2024-07-02 DIAGNOSIS — E11.51 TYPE II DIABETES MELLITUS WITH PERIPHERAL CIRCULATORY DISORDER (HCC): Primary | ICD-10-CM

## 2024-07-02 DIAGNOSIS — D23.71 BENIGN NEOPLASM OF SKIN OF LOWER LIMB, INCLUDING HIP, RIGHT: ICD-10-CM

## 2024-07-02 DIAGNOSIS — R26.2 TROUBLE WALKING: ICD-10-CM

## 2024-07-02 DIAGNOSIS — M79.672 PAIN IN BOTH FEET: ICD-10-CM

## 2024-07-02 PROCEDURE — 11721 DEBRIDE NAIL 6 OR MORE: CPT | Performed by: PODIATRIST

## 2024-07-02 PROCEDURE — 17110 DESTRUCTION B9 LES UP TO 14: CPT | Performed by: PODIATRIST

## 2024-07-09 NOTE — PROGRESS NOTES
lisinopril (PRINIVIL;ZESTRIL) 20 MG tablet Take 1 tablet by mouth in the morning and at bedtime 180 tablet 3    dapagliflozin (FARXIGA) 5 MG tablet Take 1 tablet by mouth every morning 90 tablet 5    Cranberry 500 MG TABS Take 1 tablet by mouth daily      Multiple Vitamins-Minerals (QC WOMENS DAILY MULTIVITAMIN) TABS Take 1 tablet by mouth daily      acetaminophen (TYLENOL) 500 MG tablet Take 2 tablets by mouth every 8 hours as needed for Pain 270 tablet 1    aspirin EC 81 MG EC tablet Take 2 tablets by mouth daily 30 tablet 5    Multiple Vitamins-Minerals (OCUVITE ADULT 50+ PO) Take 1 tablet by mouth 2 times daily       Green Tea, Camillia sinensis, 315 MG CAPS Take 1 tablet by mouth daily      CINNAMON PO Take 2,000 mg by mouth daily      Ascorbic Acid (VITAMIN C WITH VIVIENNE HIPS) 1000 MG tablet Take 1 tablet by mouth daily      Omega-3 Fatty Acids (FISH OIL) 1200 MG CAPS Take 1,200 mg by mouth daily      docusate sodium (COLACE) 100 MG capsule Take 1 capsule by mouth 2 times daily       No current facility-administered medications on file prior to visit.       Review of Systems    Review of Systems:   History obtained from chart review and the patient  General ROS: negative for - chills, fatigue, fever, night sweats or weight gain  Constitutional: Negative for chills, diaphoresis, fatigue, fever and unexpected weight change.  Musculoskeletal: Positive for arthralgias, gait problem and joint swelling.  Neurological ROS: negative for - behavioral changes, confusion, headaches or seizures. Negative for weakness and numbness.   Dermatological ROS: negative for - mole changes, rash  Cardiovascular: Negative for leg swelling.   Gastrointestinal: Negative for constipation, diarrhea, nausea and vomiting.        Objective:  Dermatologic Exam:  Skin lesion present to the right and left plantar foot with a central core and petchaie noted to the lesions periphery.  Pain on palpation of the lesion    Skin is thin, with flaky

## 2024-07-30 ENCOUNTER — HOSPITAL ENCOUNTER (OUTPATIENT)
Age: 84
Discharge: HOME | End: 2024-07-30
Payer: MEDICARE

## 2024-07-30 DIAGNOSIS — N18.32 TYPE 2 DIABETES MELLITUS WITH STAGE 3B CHRONIC KIDNEY DISEASE, WITH LONG-TERM CURRENT USE OF INSULIN (HCC): ICD-10-CM

## 2024-07-30 DIAGNOSIS — E11.22 TYPE 2 DIABETES MELLITUS WITH STAGE 3B CHRONIC KIDNEY DISEASE, WITH LONG-TERM CURRENT USE OF INSULIN (HCC): ICD-10-CM

## 2024-07-30 DIAGNOSIS — R80.1 PERSISTENT PROTEINURIA: ICD-10-CM

## 2024-07-30 DIAGNOSIS — I25.5 ISCHEMIC CARDIOMYOPATHY: ICD-10-CM

## 2024-07-30 DIAGNOSIS — N25.81 SECONDARY HYPERPARATHYROIDISM (HCC): ICD-10-CM

## 2024-07-30 DIAGNOSIS — N18.32 STAGE 3B CHRONIC KIDNEY DISEASE (HCC): ICD-10-CM

## 2024-07-30 DIAGNOSIS — Z79.4 TYPE 2 DIABETES MELLITUS WITH STAGE 3B CHRONIC KIDNEY DISEASE, WITH LONG-TERM CURRENT USE OF INSULIN (HCC): ICD-10-CM

## 2024-07-30 LAB
25(OH)D3 SERPL-MCNC: 35.7 NG/ML (ref 30–100)
ALBUMIN SERPL-MCNC: 4.1 G/DL (ref 3.5–5.2)
ANION GAP SERPL CALCULATED.3IONS-SCNC: 9 MMOL/L (ref 9–16)
BUN SERPL-MCNC: 37 MG/DL (ref 8–23)
CALCIUM SERPL-MCNC: 10.1 MG/DL (ref 8.6–10.4)
CHLORIDE SERPL-SCNC: 106 MMOL/L (ref 98–107)
CO2 SERPL-SCNC: 25 MMOL/L (ref 20–31)
CREAT SERPL-MCNC: 1.9 MG/DL (ref 0.5–0.9)
CREAT UR-MCNC: 41.1 MG/DL (ref 28–217)
ERYTHROCYTE [DISTWIDTH] IN BLOOD BY AUTOMATED COUNT: 14.6 % (ref 11.8–14.4)
GFR, ESTIMATED: 26 ML/MIN/1.73M2
GLUCOSE SERPL-MCNC: 121 MG/DL (ref 74–99)
HCT VFR BLD AUTO: 41.4 % (ref 36.3–47.1)
HGB BLD-MCNC: 13.3 G/DL (ref 11.9–15.1)
MCH RBC QN AUTO: 29 PG (ref 25.2–33.5)
MCHC RBC AUTO-ENTMCNC: 32.1 G/DL (ref 28.4–34.8)
MCV RBC AUTO: 90.2 FL (ref 82.6–102.9)
NRBC BLD-RTO: 0 PER 100 WBC
PHOSPHATE SERPL-MCNC: 3.8 MG/DL (ref 2.5–4.5)
PLATELET # BLD AUTO: 248 K/UL (ref 138–453)
PMV BLD AUTO: 11.8 FL (ref 8.1–13.5)
POTASSIUM SERPL-SCNC: 4.7 MMOL/L (ref 3.7–5.3)
PTH-INTACT SERPL-MCNC: 270 PG/ML (ref 15–65)
RBC # BLD AUTO: 4.59 M/UL (ref 3.95–5.11)
SODIUM SERPL-SCNC: 140 MMOL/L (ref 136–145)
TOTAL PROTEIN, URINE: 28 MG/DL
URINE TOTAL PROTEIN CREATININE RATIO: 0.68
WBC OTHER # BLD: 7.8 K/UL (ref 3.5–11.3)

## 2024-07-30 PROCEDURE — 84156 ASSAY OF PROTEIN URINE: CPT

## 2024-07-30 PROCEDURE — 85027 COMPLETE CBC AUTOMATED: CPT

## 2024-07-30 PROCEDURE — 82306 VITAMIN D 25 HYDROXY: CPT

## 2024-07-30 PROCEDURE — 82570 ASSAY OF URINE CREATININE: CPT

## 2024-07-30 PROCEDURE — 36415 COLL VENOUS BLD VENIPUNCTURE: CPT

## 2024-07-30 PROCEDURE — 80048 BASIC METABOLIC PNL TOTAL CA: CPT

## 2024-07-30 PROCEDURE — 82040 ASSAY OF SERUM ALBUMIN: CPT

## 2024-07-30 PROCEDURE — 83970 ASSAY OF PARATHORMONE: CPT

## 2024-07-30 PROCEDURE — 84100 ASSAY OF PHOSPHORUS: CPT

## 2024-08-07 PROBLEM — E83.52 HYPERCALCEMIA: Status: ACTIVE | Noted: 2024-08-07

## 2024-08-09 DIAGNOSIS — Z79.4 TYPE 2 DIABETES MELLITUS WITH STAGE 3 CHRONIC KIDNEY DISEASE, WITH LONG-TERM CURRENT USE OF INSULIN, UNSPECIFIED WHETHER STAGE 3A OR 3B CKD (HCC): ICD-10-CM

## 2024-08-09 DIAGNOSIS — E11.22 TYPE 2 DIABETES MELLITUS WITH STAGE 3 CHRONIC KIDNEY DISEASE, WITH LONG-TERM CURRENT USE OF INSULIN, UNSPECIFIED WHETHER STAGE 3A OR 3B CKD (HCC): ICD-10-CM

## 2024-08-09 DIAGNOSIS — N18.30 TYPE 2 DIABETES MELLITUS WITH STAGE 3 CHRONIC KIDNEY DISEASE, WITH LONG-TERM CURRENT USE OF INSULIN, UNSPECIFIED WHETHER STAGE 3A OR 3B CKD (HCC): ICD-10-CM

## 2024-08-09 RX ORDER — ISOPROPYL ALCOHOL 0.75 G/1
1 SWAB TOPICAL 3 TIMES DAILY PRN
Qty: 1 EACH | Refills: 5 | Status: SHIPPED | OUTPATIENT
Start: 2024-08-09 | End: 2024-11-17

## 2024-08-09 RX ORDER — GLUCOSAM/CHON-MSM1/C/MANG/BOSW 500-416.6
TABLET ORAL
Qty: 100 EACH | Refills: 3 | Status: SHIPPED | OUTPATIENT
Start: 2024-08-09

## 2024-08-09 RX ORDER — BLOOD-GLUCOSE METER
1 KIT MISCELLANEOUS DAILY
Qty: 1 KIT | Refills: 0 | Status: SHIPPED | OUTPATIENT
Start: 2024-08-09

## 2024-08-09 RX ORDER — GLUCOSAMINE HCL/CHONDROITIN SU 500-400 MG
CAPSULE ORAL
Qty: 100 STRIP | Refills: 0 | Status: SHIPPED | OUTPATIENT
Start: 2024-08-09

## 2024-09-09 ENCOUNTER — TELEPHONE (OUTPATIENT)
Dept: FAMILY MEDICINE CLINIC | Age: 84
End: 2024-09-09

## 2024-09-19 ENCOUNTER — OFFICE VISIT (OUTPATIENT)
Dept: PODIATRY | Age: 84
End: 2024-09-19
Payer: MEDICARE

## 2024-09-19 VITALS — HEIGHT: 60 IN | WEIGHT: 194 LBS | BODY MASS INDEX: 38.09 KG/M2

## 2024-09-19 DIAGNOSIS — B35.1 DERMATOPHYTOSIS OF NAIL: ICD-10-CM

## 2024-09-19 DIAGNOSIS — E11.51 TYPE II DIABETES MELLITUS WITH PERIPHERAL CIRCULATORY DISORDER (HCC): Primary | ICD-10-CM

## 2024-09-19 DIAGNOSIS — M79.672 PAIN IN BOTH FEET: ICD-10-CM

## 2024-09-19 DIAGNOSIS — M79.671 PAIN IN BOTH FEET: ICD-10-CM

## 2024-09-19 DIAGNOSIS — I73.9 PERIPHERAL VASCULAR DISEASE (HCC): ICD-10-CM

## 2024-09-19 PROCEDURE — 11721 DEBRIDE NAIL 6 OR MORE: CPT | Performed by: PODIATRIST

## 2024-11-04 ENCOUNTER — OFFICE VISIT (OUTPATIENT)
Dept: FAMILY MEDICINE CLINIC | Age: 84
End: 2024-11-04
Payer: MEDICARE

## 2024-11-04 VITALS
BODY MASS INDEX: 38.79 KG/M2 | HEART RATE: 67 BPM | WEIGHT: 197.6 LBS | SYSTOLIC BLOOD PRESSURE: 123 MMHG | HEIGHT: 60 IN | DIASTOLIC BLOOD PRESSURE: 59 MMHG

## 2024-11-04 DIAGNOSIS — M25.532 LEFT WRIST PAIN: ICD-10-CM

## 2024-11-04 DIAGNOSIS — I50.40 COMBINED SYSTOLIC AND DIASTOLIC CONGESTIVE HEART FAILURE, UNSPECIFIED HF CHRONICITY (HCC): ICD-10-CM

## 2024-11-04 DIAGNOSIS — Z23 IMMUNIZATION DUE: ICD-10-CM

## 2024-11-04 DIAGNOSIS — N18.30 TYPE 2 DIABETES MELLITUS WITH STAGE 3 CHRONIC KIDNEY DISEASE, WITH LONG-TERM CURRENT USE OF INSULIN, UNSPECIFIED WHETHER STAGE 3A OR 3B CKD (HCC): Primary | ICD-10-CM

## 2024-11-04 DIAGNOSIS — Z79.4 TYPE 2 DIABETES MELLITUS WITH STAGE 3 CHRONIC KIDNEY DISEASE, WITH LONG-TERM CURRENT USE OF INSULIN, UNSPECIFIED WHETHER STAGE 3A OR 3B CKD (HCC): Primary | ICD-10-CM

## 2024-11-04 DIAGNOSIS — Z23 INFLUENZA VACCINE NEEDED: ICD-10-CM

## 2024-11-04 DIAGNOSIS — Z13.220 NEED FOR LIPID SCREENING: ICD-10-CM

## 2024-11-04 DIAGNOSIS — E11.22 TYPE 2 DIABETES MELLITUS WITH STAGE 3 CHRONIC KIDNEY DISEASE, WITH LONG-TERM CURRENT USE OF INSULIN, UNSPECIFIED WHETHER STAGE 3A OR 3B CKD (HCC): Primary | ICD-10-CM

## 2024-11-04 PROCEDURE — 90656 IIV3 VACC NO PRSV 0.5 ML IM: CPT

## 2024-11-04 SDOH — ECONOMIC STABILITY: FOOD INSECURITY: WITHIN THE PAST 12 MONTHS, YOU WORRIED THAT YOUR FOOD WOULD RUN OUT BEFORE YOU GOT MONEY TO BUY MORE.: NEVER TRUE

## 2024-11-04 SDOH — ECONOMIC STABILITY: FOOD INSECURITY: WITHIN THE PAST 12 MONTHS, THE FOOD YOU BOUGHT JUST DIDN'T LAST AND YOU DIDN'T HAVE MONEY TO GET MORE.: NEVER TRUE

## 2024-11-04 SDOH — ECONOMIC STABILITY: INCOME INSECURITY: HOW HARD IS IT FOR YOU TO PAY FOR THE VERY BASICS LIKE FOOD, HOUSING, MEDICAL CARE, AND HEATING?: NOT HARD AT ALL

## 2024-11-04 ASSESSMENT — PATIENT HEALTH QUESTIONNAIRE - PHQ9
SUM OF ALL RESPONSES TO PHQ QUESTIONS 1-9: 0
2. FEELING DOWN, DEPRESSED OR HOPELESS: NOT AT ALL
SUM OF ALL RESPONSES TO PHQ QUESTIONS 1-9: 0
1. LITTLE INTEREST OR PLEASURE IN DOING THINGS: NOT AT ALL
SUM OF ALL RESPONSES TO PHQ9 QUESTIONS 1 & 2: 0

## 2024-11-04 ASSESSMENT — ENCOUNTER SYMPTOMS
VOMITING: 0
RHINORRHEA: 0
SHORTNESS OF BREATH: 0
NAUSEA: 0
DIARRHEA: 0
CONSTIPATION: 0
WHEEZING: 0
TROUBLE SWALLOWING: 0
ABDOMINAL PAIN: 0

## 2024-11-04 NOTE — PROGRESS NOTES
Attending Physician Statement  I  have discussed the care of Charisse Marquis including pertinent history and exam findings with the resident. I agree with the assessment, plan and orders as documented by the resident.      BP (!) 123/59 (Site: Right Upper Arm, Position: Sitting, Cuff Size: Large Adult)   Pulse 67   Ht 1.524 m (5')   Wt 89.6 kg (197 lb 9.6 oz)   BMI 38.59 kg/m²    BP Readings from Last 3 Encounters:   11/04/24 (!) 123/59   09/27/24 118/70   08/07/24 (!) 128/50     Wt Readings from Last 3 Encounters:   11/04/24 89.6 kg (197 lb 9.6 oz)   09/27/24 88.5 kg (195 lb)   09/19/24 88 kg (194 lb)          Diagnosis Orders   1. Type 2 diabetes mellitus with stage 3 chronic kidney disease, with long-term current use of insulin, unspecified whether stage 3a or 3b CKD (HCC)  Hemoglobin A1C      2. Left wrist pain  XR HAND LEFT (2 VIEWS)      3. Influenza vaccine needed  Influenza, AFLURIA Trivalent, (age 3 y+), IM, Preservative Free, 0.5mL      4. Combined systolic and diastolic congestive heart failure, unspecified HF chronicity (HCC)        5. Need for lipid screening  Lipid Panel      6. Immunization due  respiratory syncytial vaccine, adjuvanted (AREXVY) 120 MCG/0.5ML injection              Luisito Eduardo DO 11/4/2024 11:41 AM      
Visit Information    Have you changed or started any medications since your last visit including any over-the-counter medicines, vitamins, or herbal medicines? no   Are you having any side effects from any of your medications? -  no  Have you stopped taking any of your medications? Is so, why? -  no    Have you seen any other physician or provider since your last visit? No  Have you had any other diagnostic tests since your last visit? No  Have you been seen in the emergency room and/or had an admission to a hospital since we last saw you? No  Have you had your routine dental cleaning in the past 6 months? no    Have you activated your [facility] account? If not, what are your barriers? Yes     Patient Care Team:  Lizbeth Pinto MD as PCP - General (Family Medicine)  Ron Garcia MD as PCP - Empaneled Provider  Luisito Tao DPM as Physician (Podiatry)  Yuki Singh RN as Ambulatory Care Manager  Laverne Lutz MD as Consulting Physician (Nephrology)  Milad Manzanares MD as Consulting Physician (Cardiology)  Palmer Pappas MD as Surgeon (Vascular Surgery)  Melida Hinojosa APRN - CNP as Nurse Practitioner (Certified Nurse Practitioner)    Medical History Review  Past Medical, Family, and Social History reviewed and does not contribute to the patient presenting condition    Health Maintenance   Topic Date Due    Respiratory Syncytial Virus (RSV) Pregnant or age 60 yrs+ (1 - 1-dose 60+ series) Never done    Lipids  06/22/2024    Flu vaccine (1) 08/01/2024    COVID-19 Vaccine (4 - 2023-24 season) 09/01/2024    Depression Screen  06/10/2025    DTaP/Tdap/Td vaccine (2 - Td or Tdap) 07/02/2029    DEXA (modify frequency per FRAX score)  Completed    Annual Wellness Visit (Medicare Advantage)  Completed    Shingles vaccine  Completed    Pneumococcal 65+ years Vaccine  Completed    Hepatitis A vaccine  Aged Out    Hepatitis B vaccine  Aged Out    Hib vaccine  Aged Out    Polio vaccine  Aged Out    
trouble swallowing.    Respiratory:  Negative for shortness of breath and wheezing.    Cardiovascular:  Negative for chest pain, palpitations and leg swelling.   Gastrointestinal:  Negative for abdominal pain, constipation, diarrhea, nausea and vomiting.   Genitourinary:  Negative for dysuria and flank pain.   Musculoskeletal:  Positive for arthralgias (Left wrist pain).   Neurological:  Negative for light-headedness and headaches.   Psychiatric/Behavioral:  Negative for agitation and behavioral problems.                  The patient has a   Family History   Problem Relation Age of Onset    Heart Attack Mother     Heart Disease Mother        Objective:    BP (!) 123/59 (Site: Right Upper Arm, Position: Sitting, Cuff Size: Large Adult)   Pulse 67   Ht 1.524 m (5')   Wt 89.6 kg (197 lb 9.6 oz)   BMI 38.59 kg/m²    BP Readings from Last 3 Encounters:   11/04/24 (!) 123/59   09/27/24 118/70   08/07/24 (!) 128/50       Physical Exam  Cardiovascular:      Rate and Rhythm: Normal rate and regular rhythm.      Heart sounds: Normal heart sounds.   Pulmonary:      Effort: Pulmonary effort is normal.      Breath sounds: Normal breath sounds.   Abdominal:      General: There is no distension.      Palpations: Abdomen is soft.      Tenderness: There is no abdominal tenderness. There is no guarding.   Musculoskeletal:         General: Tenderness (L wrist pain) present. No swelling, deformity or signs of injury.      Right lower leg: No edema.      Left lower leg: No edema.   Skin:     General: Skin is warm and dry.   Psychiatric:         Mood and Affect: Mood normal.         Behavior: Behavior normal.         Lab Results   Component Value Date    WBC 7.8 07/30/2024    HGB 13.3 07/30/2024    HCT 41.4 07/30/2024     07/30/2024    CHOL 123 06/22/2023    TRIG 83 06/22/2023    HDL 59 06/22/2023    ALT 11 06/21/2023    AST 16 06/21/2023     07/30/2024    K 4.7 07/30/2024     07/30/2024    CREATININE 1.9 (H)

## 2024-11-04 NOTE — PATIENT INSTRUCTIONS
Thank you for letting us take care of you today. We hope all your questions were addressed. If a question was overlooked or something else comes to mind after you return home, please contact a member of your Care Team listed below.      Your Care Team at [facility] is Team #  Mick Chávez M.D. (Faculty)  Lizbeth Pinto M.D. (Resident)  Tonja Saleh M.D. (Resident)   Tania Solomon M.D. (Resident)  Bishnu Delvalle M.D. (Resident)  Marie Jack M.D. (Resident)  Bibiana Grigsby., [facility]  Sylwia Christopher, [facility]  Beatris Gonzalez, [facility]  Kevin Ferrer, JANAE Rojas, [facility]  Saadia Salvador, [facility]  Alexandrea Morejon, JANAE Garcia (LJ) CHANA Herrera (Clinical Practice Manager)  Yesica Sigala [facility] (Clinical Pharmacist)     Office phone number: [phone]    If you need to get in right away due to illness, please be advised we have \"Same Day\" appointments available Monday-Friday. Please call us at [phone] option #3 to schedule your \"Same Day\" appointment.

## 2024-11-12 ENCOUNTER — HOSPITAL ENCOUNTER (OUTPATIENT)
Age: 84
Discharge: HOME | End: 2024-11-12
Payer: MEDICARE

## 2024-11-12 ENCOUNTER — HOSPITAL ENCOUNTER (OUTPATIENT)
Dept: GENERAL RADIOLOGY | Age: 84
Discharge: HOME | End: 2024-11-14
Payer: MEDICARE

## 2024-11-12 ENCOUNTER — HOSPITAL ENCOUNTER (OUTPATIENT)
Age: 84
Discharge: HOME | End: 2024-11-14
Payer: MEDICARE

## 2024-11-12 DIAGNOSIS — N25.81 SECONDARY HYPERPARATHYROIDISM (HCC): ICD-10-CM

## 2024-11-12 DIAGNOSIS — E11.22 TYPE 2 DIABETES MELLITUS WITH STAGE 3 CHRONIC KIDNEY DISEASE, WITH LONG-TERM CURRENT USE OF INSULIN, UNSPECIFIED WHETHER STAGE 3A OR 3B CKD (HCC): ICD-10-CM

## 2024-11-12 DIAGNOSIS — N18.30 TYPE 2 DIABETES MELLITUS WITH STAGE 3 CHRONIC KIDNEY DISEASE, WITH LONG-TERM CURRENT USE OF INSULIN, UNSPECIFIED WHETHER STAGE 3A OR 3B CKD (HCC): ICD-10-CM

## 2024-11-12 DIAGNOSIS — E83.52 HYPERCALCEMIA: ICD-10-CM

## 2024-11-12 DIAGNOSIS — Z13.220 NEED FOR LIPID SCREENING: ICD-10-CM

## 2024-11-12 DIAGNOSIS — Z95.5 S/P DRUG ELUTING CORONARY STENT PLACEMENT: ICD-10-CM

## 2024-11-12 DIAGNOSIS — E11.3293 NONPROLIFERATIVE DIABETIC RETINOPATHY OF BOTH EYES (HCC): ICD-10-CM

## 2024-11-12 DIAGNOSIS — Z79.4 TYPE 2 DIABETES MELLITUS WITH STAGE 3 CHRONIC KIDNEY DISEASE, WITH LONG-TERM CURRENT USE OF INSULIN, UNSPECIFIED WHETHER STAGE 3A OR 3B CKD (HCC): ICD-10-CM

## 2024-11-12 DIAGNOSIS — M25.532 LEFT WRIST PAIN: ICD-10-CM

## 2024-11-12 DIAGNOSIS — N18.32 STAGE 3B CHRONIC KIDNEY DISEASE (HCC): ICD-10-CM

## 2024-11-12 DIAGNOSIS — E66.01 SEVERE OBESITY (BMI 35.0-39.9) WITH COMORBIDITY: ICD-10-CM

## 2024-11-12 DIAGNOSIS — R80.1 PERSISTENT PROTEINURIA: ICD-10-CM

## 2024-11-12 LAB
ALBUMIN SERPL-MCNC: 4.2 G/DL (ref 3.5–5.2)
ANION GAP SERPL CALCULATED.3IONS-SCNC: 11 MMOL/L (ref 9–16)
BUN SERPL-MCNC: 38 MG/DL (ref 8–23)
CA-I BLD-SCNC: 1.35 MMOL/L (ref 1.13–1.33)
CALCIUM SERPL-MCNC: 9.8 MG/DL (ref 8.6–10.4)
CHLORIDE SERPL-SCNC: 104 MMOL/L (ref 98–107)
CHOLEST SERPL-MCNC: 118 MG/DL (ref 0–199)
CHOLESTEROL/HDL RATIO: 2.9
CO2 SERPL-SCNC: 24 MMOL/L (ref 20–31)
CREAT SERPL-MCNC: 1.8 MG/DL (ref 0.6–0.9)
CREAT UR-MCNC: 38.6 MG/DL (ref 28–217)
ERYTHROCYTE [DISTWIDTH] IN BLOOD BY AUTOMATED COUNT: 15.3 % (ref 11.8–14.4)
EST. AVERAGE GLUCOSE BLD GHB EST-MCNC: 120 MG/DL
GFR, ESTIMATED: 28 ML/MIN/1.73M2
GLUCOSE SERPL-MCNC: 83 MG/DL (ref 74–99)
HBA1C MFR BLD: 5.8 % (ref 4–6)
HCT VFR BLD AUTO: 43.6 % (ref 36.3–47.1)
HDLC SERPL-MCNC: 41 MG/DL
HGB BLD-MCNC: 13.3 G/DL (ref 11.9–15.1)
LDLC SERPL CALC-MCNC: 40 MG/DL (ref 0–100)
MCH RBC QN AUTO: 28.1 PG (ref 25.2–33.5)
MCHC RBC AUTO-ENTMCNC: 30.5 G/DL (ref 28.4–34.8)
MCV RBC AUTO: 92 FL (ref 82.6–102.9)
NRBC BLD-RTO: 0 PER 100 WBC
PHOSPHATE SERPL-MCNC: 3.8 MG/DL (ref 2.5–4.5)
PLATELET # BLD AUTO: 308 K/UL (ref 138–453)
PMV BLD AUTO: 12.4 FL (ref 8.1–13.5)
POTASSIUM SERPL-SCNC: 4.6 MMOL/L (ref 3.7–5.3)
PTH-INTACT SERPL-MCNC: 335 PG/ML (ref 15–65)
RBC # BLD AUTO: 4.74 M/UL (ref 3.95–5.11)
SODIUM SERPL-SCNC: 139 MMOL/L (ref 136–145)
TOTAL PROTEIN, URINE: 19 MG/DL
TRIGL SERPL-MCNC: 187 MG/DL
URINE TOTAL PROTEIN CREATININE RATIO: 0.49 (ref 0–0.2)
VLDLC SERPL CALC-MCNC: 37 MG/DL (ref 1–30)
WBC OTHER # BLD: 9.3 K/UL (ref 3.5–11.3)

## 2024-11-12 PROCEDURE — 85027 COMPLETE CBC AUTOMATED: CPT

## 2024-11-12 PROCEDURE — 80048 BASIC METABOLIC PNL TOTAL CA: CPT

## 2024-11-12 PROCEDURE — 84156 ASSAY OF PROTEIN URINE: CPT

## 2024-11-12 PROCEDURE — 36415 COLL VENOUS BLD VENIPUNCTURE: CPT

## 2024-11-12 PROCEDURE — 73120 X-RAY EXAM OF HAND: CPT

## 2024-11-12 PROCEDURE — 82570 ASSAY OF URINE CREATININE: CPT

## 2024-11-12 PROCEDURE — 84100 ASSAY OF PHOSPHORUS: CPT

## 2024-11-12 PROCEDURE — 83036 HEMOGLOBIN GLYCOSYLATED A1C: CPT

## 2024-11-12 PROCEDURE — 80061 LIPID PANEL: CPT

## 2024-11-12 PROCEDURE — 82330 ASSAY OF CALCIUM: CPT

## 2024-11-12 PROCEDURE — 82040 ASSAY OF SERUM ALBUMIN: CPT

## 2024-11-12 PROCEDURE — 83970 ASSAY OF PARATHORMONE: CPT

## 2024-11-15 ENCOUNTER — HOSPITAL ENCOUNTER (OUTPATIENT)
Age: 84
Discharge: HOME | End: 2024-11-15
Payer: MEDICARE

## 2024-11-15 DIAGNOSIS — N18.32 STAGE 3B CHRONIC KIDNEY DISEASE (HCC): ICD-10-CM

## 2024-11-15 LAB
ANION GAP SERPL CALCULATED.3IONS-SCNC: 9 MMOL/L (ref 9–16)
BUN SERPL-MCNC: 43 MG/DL (ref 8–23)
CALCIUM SERPL-MCNC: 10.3 MG/DL (ref 8.6–10.4)
CALCIUM UR-MCNC: 1.1 MG/DL
CHLORIDE SERPL-SCNC: 105 MMOL/L (ref 98–107)
CO2 SERPL-SCNC: 25 MMOL/L (ref 20–31)
CREAT SERPL-MCNC: 2.2 MG/DL (ref 0.6–0.9)
CREAT UR-MCNC: 49.5 MG/DL (ref 28–217)
GFR, ESTIMATED: 22 ML/MIN/1.73M2
GLUCOSE SERPL-MCNC: 102 MG/DL (ref 74–99)
POTASSIUM SERPL-SCNC: 5.2 MMOL/L (ref 3.7–5.3)
PTH-INTACT SERPL-MCNC: 329 PG/ML (ref 15–65)
SODIUM SERPL-SCNC: 139 MMOL/L (ref 136–145)

## 2024-11-15 PROCEDURE — 82570 ASSAY OF URINE CREATININE: CPT

## 2024-11-15 PROCEDURE — 82340 ASSAY OF CALCIUM IN URINE: CPT

## 2024-11-15 PROCEDURE — 83970 ASSAY OF PARATHORMONE: CPT

## 2024-11-15 PROCEDURE — 80048 BASIC METABOLIC PNL TOTAL CA: CPT

## 2024-11-21 ENCOUNTER — HOSPITAL ENCOUNTER (OUTPATIENT)
Age: 84
Setting detail: SPECIMEN
Discharge: HOME | End: 2024-11-21

## 2024-11-21 ENCOUNTER — OFFICE VISIT (OUTPATIENT)
Dept: PODIATRY | Age: 84
End: 2024-11-21
Payer: MEDICARE

## 2024-11-21 ENCOUNTER — HOSPITAL ENCOUNTER (OUTPATIENT)
Age: 84
Discharge: HOME | End: 2024-11-21

## 2024-11-21 VITALS — WEIGHT: 197 LBS | HEIGHT: 60 IN | BODY MASS INDEX: 38.68 KG/M2

## 2024-11-21 DIAGNOSIS — E11.3293 NONPROLIFERATIVE DIABETIC RETINOPATHY OF BOTH EYES (HCC): ICD-10-CM

## 2024-11-21 DIAGNOSIS — I73.9 PERIPHERAL VASCULAR DISEASE (HCC): ICD-10-CM

## 2024-11-21 DIAGNOSIS — E83.52 HYPERCALCEMIA: ICD-10-CM

## 2024-11-21 DIAGNOSIS — R80.1 PERSISTENT PROTEINURIA: ICD-10-CM

## 2024-11-21 DIAGNOSIS — E11.51 TYPE II DIABETES MELLITUS WITH PERIPHERAL CIRCULATORY DISORDER (HCC): Primary | ICD-10-CM

## 2024-11-21 DIAGNOSIS — N18.32 STAGE 3B CHRONIC KIDNEY DISEASE (HCC): ICD-10-CM

## 2024-11-21 DIAGNOSIS — M79.672 PAIN IN BOTH FEET: ICD-10-CM

## 2024-11-21 DIAGNOSIS — Z95.5 S/P DRUG ELUTING CORONARY STENT PLACEMENT: ICD-10-CM

## 2024-11-21 DIAGNOSIS — E66.01 SEVERE OBESITY (BMI 35.0-39.9) WITH COMORBIDITY: ICD-10-CM

## 2024-11-21 DIAGNOSIS — B35.1 DERMATOPHYTOSIS OF NAIL: ICD-10-CM

## 2024-11-21 DIAGNOSIS — N25.81 SECONDARY HYPERPARATHYROIDISM (HCC): ICD-10-CM

## 2024-11-21 DIAGNOSIS — M79.671 PAIN IN BOTH FEET: ICD-10-CM

## 2024-11-21 LAB
CALCIUM UR-MCNC: 1 MG/DL
CALCIUM, URINE: 13 MG/24 H (ref 100–300)
COLLECT DURATION TIME SPEC: 24 H
COLLECT DURATION TIME SPEC: 24 H
CREATINE 24H UR-MRATE: 857 MG/24 H (ref 740–1570)
CREATININE URINE: 67.5 MG/DL
SPECIMEN VOL UR: 1270 ML
SPECIMEN VOL UR: 1270 ML

## 2024-11-21 PROCEDURE — 11721 DEBRIDE NAIL 6 OR MORE: CPT | Performed by: PODIATRIST

## 2024-11-24 ENCOUNTER — CLINICAL DOCUMENTATION (OUTPATIENT)
Dept: NEPHROLOGY | Age: 84
End: 2024-11-24

## 2024-11-24 DIAGNOSIS — E83.52 HYPERCALCEMIA: ICD-10-CM

## 2024-11-24 DIAGNOSIS — E83.52 FHH (FAMILIAL HYPOCALCIURIC HYPERCALCEMIA): Primary | ICD-10-CM

## 2024-11-26 NOTE — PROGRESS NOTES
[x] [x] [x] [x] [x] [x] [x] [x] [x] [x]  5 4 3 2 1 1 2 3 4 5                         Right                                        Left        Visual inspection:  Deformity: hammertoe deformity zbigniew feet  amputation: absent  Skin lesions: absent  Edema: right- 2+ pitting edema, left- 2+ pitting edema    Sensory exam:  Monofilament sensation: abnormal - 6/10 via SW 5.07/10g monofilament to the plantar foot bilateral feet    Pulses: abnormal - 1/4 dorsalis pedis pulse and 0/4 Posterior tibial pulse,   Pinprick: Impaired  Proprioception: Impaired  Vibration (128 Hz): Impaired       DM with PVD       [x]Yes    []No      Assessment:  84 y.o. female with:   Diagnosis Orders   1. Type II diabetes mellitus with peripheral circulatory disorder (HCC)        2. Dermatophytosis of nail        3. Pain in both feet        4. Peripheral vascular disease (HCC)                Q7   []Yes    []No                Q8   [x]Yes    []No                     Q9   []Yes    []No    Plan:   Pt was evaluated and examined. Patient was given personalized discharge instructions.    Continue to use the compression stockings to help with the lower leg swelling and continue to elevate the legs at night     nails 1-10 were debrided sharply in length and thickness with a nipper and , without incident. Pt will follow up in 9 weeks or sooner if any problems arise. Diagnosis was discussed with the pt and all of their questions were answered in detail. Proper foot hygiene and care was discussed with the pt. Informed patient on proper diabetic foot care and importance of tight glycemic control.  Patient to check feet daily and contact the office with any questions/problems/concerns.   Other comorbidity noted and will be managed by PCP.  11/21/2024    Electronically signed by Luisito Tao DPM on 11/26/2024 at 7:45 AM  11/21/2024

## 2024-12-16 ENCOUNTER — HOSPITAL ENCOUNTER (OUTPATIENT)
Age: 84
Setting detail: SPECIMEN
Discharge: HOME | End: 2024-12-16

## 2024-12-16 ENCOUNTER — OFFICE VISIT (OUTPATIENT)
Dept: FAMILY MEDICINE CLINIC | Age: 84
End: 2024-12-16
Payer: MEDICARE

## 2024-12-16 VITALS
HEART RATE: 69 BPM | HEIGHT: 60 IN | DIASTOLIC BLOOD PRESSURE: 68 MMHG | WEIGHT: 204.6 LBS | SYSTOLIC BLOOD PRESSURE: 142 MMHG | BODY MASS INDEX: 40.17 KG/M2

## 2024-12-16 DIAGNOSIS — Z79.4 TYPE 2 DIABETES MELLITUS WITH STAGE 3 CHRONIC KIDNEY DISEASE, WITH LONG-TERM CURRENT USE OF INSULIN, UNSPECIFIED WHETHER STAGE 3A OR 3B CKD (HCC): ICD-10-CM

## 2024-12-16 DIAGNOSIS — I10 ESSENTIAL HYPERTENSION: ICD-10-CM

## 2024-12-16 DIAGNOSIS — E11.22 TYPE 2 DIABETES MELLITUS WITH STAGE 3 CHRONIC KIDNEY DISEASE, WITH LONG-TERM CURRENT USE OF INSULIN, UNSPECIFIED WHETHER STAGE 3A OR 3B CKD (HCC): ICD-10-CM

## 2024-12-16 DIAGNOSIS — M87.039 AVASCULAR NECROSIS OF SCAPHOID: Primary | ICD-10-CM

## 2024-12-16 DIAGNOSIS — N18.30 TYPE 2 DIABETES MELLITUS WITH STAGE 3 CHRONIC KIDNEY DISEASE, WITH LONG-TERM CURRENT USE OF INSULIN, UNSPECIFIED WHETHER STAGE 3A OR 3B CKD (HCC): ICD-10-CM

## 2024-12-16 LAB
CREAT UR-MCNC: 29.5 MG/DL (ref 28–217)
MICROALBUMIN UR-MCNC: 218 MG/L (ref 0–20)
MICROALBUMIN/CREAT UR-RTO: 739 MCG/MG CREAT (ref 0–25)

## 2024-12-16 PROCEDURE — G8427 DOCREV CUR MEDS BY ELIG CLIN: HCPCS

## 2024-12-16 PROCEDURE — 3077F SYST BP >= 140 MM HG: CPT

## 2024-12-16 PROCEDURE — 1090F PRES/ABSN URINE INCON ASSESS: CPT

## 2024-12-16 PROCEDURE — 3078F DIAST BP <80 MM HG: CPT

## 2024-12-16 PROCEDURE — 1036F TOBACCO NON-USER: CPT

## 2024-12-16 PROCEDURE — 99213 OFFICE O/P EST LOW 20 MIN: CPT

## 2024-12-16 PROCEDURE — 1126F AMNT PAIN NOTED NONE PRSNT: CPT

## 2024-12-16 PROCEDURE — G8482 FLU IMMUNIZE ORDER/ADMIN: HCPCS

## 2024-12-16 PROCEDURE — 1123F ACP DISCUSS/DSCN MKR DOCD: CPT

## 2024-12-16 PROCEDURE — 3044F HG A1C LEVEL LT 7.0%: CPT

## 2024-12-16 PROCEDURE — 1159F MED LIST DOCD IN RCRD: CPT

## 2024-12-16 PROCEDURE — G8417 CALC BMI ABV UP PARAM F/U: HCPCS

## 2024-12-16 PROCEDURE — G8399 PT W/DXA RESULTS DOCUMENT: HCPCS

## 2024-12-16 ASSESSMENT — PATIENT HEALTH QUESTIONNAIRE - PHQ9
SUM OF ALL RESPONSES TO PHQ QUESTIONS 1-9: 0
1. LITTLE INTEREST OR PLEASURE IN DOING THINGS: NOT AT ALL
SUM OF ALL RESPONSES TO PHQ QUESTIONS 1-9: 0
SUM OF ALL RESPONSES TO PHQ QUESTIONS 1-9: 0
SUM OF ALL RESPONSES TO PHQ9 QUESTIONS 1 & 2: 0
SUM OF ALL RESPONSES TO PHQ QUESTIONS 1-9: 0
2. FEELING DOWN, DEPRESSED OR HOPELESS: NOT AT ALL

## 2024-12-16 NOTE — PROGRESS NOTES
Attending Physician Statement  I  have discussed the care of Charisse Marquis including pertinent history and exam findings with the resident. I agree with the assessment, plan and orders as documented by the resident.      BP (!) 142/68 (Site: Right Upper Arm, Position: Sitting, Cuff Size: Medium Adult)   Pulse 69   Ht 1.524 m (5')   Wt 92.8 kg (204 lb 9.6 oz)   BMI 39.96 kg/m²    BP Readings from Last 3 Encounters:   12/16/24 (!) 142/68   11/27/24 136/62   11/04/24 (!) 123/59     Wt Readings from Last 3 Encounters:   12/16/24 92.8 kg (204 lb 9.6 oz)   11/27/24 88.9 kg (196 lb)   11/21/24 89.4 kg (197 lb)          Diagnosis Orders   1. Avascular necrosis of scaphoid  [facility] [place]      2. Essential hypertension  DME Order for (Specify) as OP      3. Type 2 diabetes mellitus with stage 3 chronic kidney disease, with long-term current use of insulin, unspecified whether stage 3a or 3b CKD (HCC)  Microalbumin, Ur              Luisito Eduardo DO 12/16/2024 2:35 PM      
vaccine  Aged Out    Hib vaccine  Aged Out    Polio vaccine  Aged Out    Meningococcal (ACWY) vaccine  Aged Out       
understanding of this note.

## 2024-12-16 NOTE — PATIENT INSTRUCTIONS
Thank you for letting us take care of you today. We hope all your questions were addressed. If a question was overlooked or something else comes to mind after you return home, please contact a member of your Care Team listed below.      Your Care Team at [facility] is Team #  Mick Chávez M.D. (Faculty)  Lizbeth Pinto M.D. (Resident)  Tonja Saleh M.D. (Resident)   Tania Solomon M.D. (Resident)  Bishnu Delvalle M.D. (Resident)  Marie Jack M.D. (Resident)  Bibiana Grigsby., [facility]  Sylwia Christopher, [facility]  Beatris Gonzalez, [facility]  Isaias Rojas, [facility]  Saadia Salvador, [facility]  Alexandrea Morejon, [facility]  Ronak Garcia (LJ) CHANA Herrera (Clinical Practice Manager)  Yesica Sigala [facility] (Clinical Pharmacist)     Office phone number: [phone]    If you need to get in right away due to illness, please be advised we have \"Same Day\" appointments available Monday-Friday. Please call us at [phone] option #3 to schedule your \"Same Day\" appointment.

## 2024-12-19 ENCOUNTER — OFFICE VISIT (OUTPATIENT)
Dept: ORTHOPEDIC SURGERY | Age: 84
End: 2024-12-19
Payer: MEDICARE

## 2024-12-19 VITALS — WEIGHT: 204 LBS | BODY MASS INDEX: 40.05 KG/M2 | HEIGHT: 60 IN

## 2024-12-19 DIAGNOSIS — M87.242: Primary | ICD-10-CM

## 2024-12-19 DIAGNOSIS — E53.8 DEFICIENCY OF VITAMIN B12: ICD-10-CM

## 2024-12-19 PROCEDURE — 99204 OFFICE O/P NEW MOD 45 MIN: CPT | Performed by: PHYSICIAN ASSISTANT

## 2024-12-19 PROCEDURE — G8427 DOCREV CUR MEDS BY ELIG CLIN: HCPCS | Performed by: PHYSICIAN ASSISTANT

## 2024-12-19 PROCEDURE — G8482 FLU IMMUNIZE ORDER/ADMIN: HCPCS | Performed by: PHYSICIAN ASSISTANT

## 2024-12-19 PROCEDURE — 1159F MED LIST DOCD IN RCRD: CPT | Performed by: PHYSICIAN ASSISTANT

## 2024-12-19 PROCEDURE — 1090F PRES/ABSN URINE INCON ASSESS: CPT | Performed by: PHYSICIAN ASSISTANT

## 2024-12-19 PROCEDURE — G8417 CALC BMI ABV UP PARAM F/U: HCPCS | Performed by: PHYSICIAN ASSISTANT

## 2024-12-19 PROCEDURE — G8399 PT W/DXA RESULTS DOCUMENT: HCPCS | Performed by: PHYSICIAN ASSISTANT

## 2024-12-19 PROCEDURE — 1123F ACP DISCUSS/DSCN MKR DOCD: CPT | Performed by: PHYSICIAN ASSISTANT

## 2024-12-19 PROCEDURE — 1125F AMNT PAIN NOTED PAIN PRSNT: CPT | Performed by: PHYSICIAN ASSISTANT

## 2024-12-19 PROCEDURE — 1036F TOBACCO NON-USER: CPT | Performed by: PHYSICIAN ASSISTANT

## 2024-12-19 NOTE — TELEPHONE ENCOUNTER
Last visit: 12/16/24  Last Med refill: 4/8/24  Does patient have enough medication for 72 hours: no    Next Visit Date:  Future Appointments   Date Time Provider Department Center   1/30/2025  9:30 AM Shen Correa DO [facility]   2/27/2025 11:15 AM Luisito Tao DPM [facility]   4/17/2025 11:00 AM Luisito Eduardo DO [facility]   6/11/2025  1:30 PM Laverne Lutz MD [facility] None       Health Maintenance   Topic Date Due    COVID-19 Vaccine (5 - 2023-24 season) 01/10/2025    Lipids  11/12/2025    GFR test (Diabetes, CKD 3-4, OR last GFR 15-59)  11/15/2025    Diabetic Alb to Cr ratio (uACR) test  12/16/2025    Depression Screen  12/16/2025    DTaP/Tdap/Td vaccine (2 - Td or Tdap) 07/02/2029    DEXA (modify frequency per FRAX score)  Completed    Annual Wellness Visit (Medicare Advantage)  Completed    Flu vaccine  Completed    Shingles vaccine  Completed    Pneumococcal 65+ years Vaccine  Completed    Respiratory Syncytial Virus (RSV) Pregnant or age 60 yrs+  Completed    Hepatitis A vaccine  Aged Out    Hepatitis B vaccine  Aged Out    Hib vaccine  Aged Out    Polio vaccine  Aged Out    Meningococcal (ACWY) vaccine  Aged Out       Hemoglobin A1C (%)   Date Value   11/12/2024 5.8   06/21/2023 5.6   09/30/2022 5.8             ( goal A1C is < 7)   No components found for: \"LABMICR\"  No components found for: \"LDLCHOLESTEROL\", \"LDLCALC\"    (goal LDL is <100)   AST (U/L)   Date Value   06/21/2023 16     ALT (U/L)   Date Value   06/21/2023 11     BUN (mg/dL)   Date Value   11/15/2024 43 (H)     BP Readings from Last 3 Encounters:   12/16/24 (!) 142/68   11/27/24 136/62   11/04/24 (!) 123/59          (goal 120/80)    All Future Testing planned in [facility]  Lab Frequency Next Occurrence   Vascular lower arterial complete physiologic Doppler at rest Once 04/03/2025   CBC every 3 months    Albumin every 3 months    Basic Metabolic Panel every 3 months    Phosphorus every 3

## 2024-12-19 NOTE — PROGRESS NOTES
Impression  EXAMINATION:  TWO XRAY VIEWS OF THE LEFT HAND     11/12/2024 11:00 am     COMPARISON:  None.     HISTORY:  ORDERING SYSTEM PROVIDED HISTORY: Left wrist pain  TECHNOLOGIST PROVIDED HISTORY:  L wrist pain since 1.5 months     FINDINGS:  Degenerative changes seen throughout the interphalangeal joints.  Associated  erosive changes seen in the 3rd D IP joint and 3rd and 4th PIP joints.  No  acute fractures.  Scaphoid not well evaluated but appears to be of increased  density.     IMPRESSION:  Degenerative changes in the interphalangeal joints with changes suggesting  erosive osteoarthritis     Increased density of the scaphoid could be related to an old fracture and  possibly avascular necrosis          ASSESSMENT:  84 y.o. female with 2-month history of the left wrist pain consistent with probable AVN and possible remote fracture of the left scaphoid.    PLAN:  Clinical course is discussed at length with the patient.  We discussed both nonoperative and operative intervention.  Given that patient reports her pain is intermittent and overall tolerable would advise proceeding conservatively at this time with close serial follow-up.  Thumb spica brace is provided to patient to be worn with activity  Home exercise program is provided to patient  Voltaren gel electronically sent to patient pharmacy  Follow up in 6 weeks with x-rays of the left wrist to include navicular view    Orders Placed This Encounter   Medications    diclofenac sodium (VOLTAREN) 1 % GEL     Sig: Apply 2 g topically 4 times daily     Dispense:  150 g     Refill:  1       No orders of the defined types were placed in this encounter.       Electronically signed by MARRY Webster on 12/19/2024 at 9:15 AM    This note is created with the assistance of a speech recognition program.  While intending to generate a document that actually reflects the content of the visit, the document can still have some errors including those of syntax and sound

## 2024-12-20 RX ORDER — CYANOCOBALAMIN 1000 UG/ML
INJECTION, SOLUTION INTRAMUSCULAR; SUBCUTANEOUS
Qty: 2 ML | Refills: 2 | Status: SHIPPED | OUTPATIENT
Start: 2024-12-20

## 2024-12-30 DIAGNOSIS — E11.51 DM (DIABETES MELLITUS), TYPE 2 WITH PERIPHERAL VASCULAR COMPLICATIONS (HCC): ICD-10-CM

## 2024-12-30 RX ORDER — BLOOD SUGAR DIAGNOSTIC
1 STRIP MISCELLANEOUS DAILY
Qty: 100 EACH | Refills: 1 | Status: SHIPPED | OUTPATIENT
Start: 2024-12-30

## 2024-12-30 NOTE — TELEPHONE ENCOUNTER
3 months    PTH, Intact every 3 months    Calcium, Ionized every 3 months    Protein / creatinine ratio, urine every 3 months                Patient Active Problem List:     DM (diabetes mellitus) ([facility])     Secondary hypertension     Hypercholesteremia     CAD (coronary artery disease)     Hypothyroidism     Lumbar spondylosis with myelopathy     S/P cardiac cath 8/19/14-Dr. covarrubias     CKD (chronic kidney disease), stage IV ([facility])     Morbid obesity with BMI of 40.0-44.9, adult     At high risk for hyperkalemia     Edema     COPD without exacerbation ([facility])     FHH (familial hypocalciuric hypercalcemia)     Carotid stenosis, asymptomatic     Claudication in peripheral vascular disease ([facility])     Nonproliferative diabetic retinopathy of both eyes ([facility])     PAD (peripheral artery disease) ([facility])     Type 2 diabetes mellitus with stage 3 chronic kidney disease, with long-term current use of insulin ([facility])     Coronary artery disease involving coronary bypass graft of native heart without angina pectoris     Essential hypertension     Localized edema     DM (diabetes mellitus), type 2 with peripheral vascular complications ([facility])     Mild nonproliferative diabetic retinopathy associated with type 2 diabetes mellitus ([facility])     Panlobular emphysema ([facility])     S/P drug eluting coronary stent placement-OM1 3/26/18-Dr. covarrubias     CAD S/P percutaneous coronary angioplasty     Persistent proteinuria     Lymphedema of right lower extremity     Chronic bilateral low back pain without sciatica     Deficiency of vitamin B12     Wrist arthritis     Gastroesophageal reflux disease without esophagitis     Neck sprain     Acute on chronic systolic congestive heart failure (HCC)     Mitral valve insufficiency     Ischemic cardiomyopathy     Acute systolic heart failure (HCC)     ICD (implantable cardioverter-defibrillator) in place     S/P ICD (internal cardiac defibrillator) procedure     ICD (implantable cardioverter-defibrillator) battery

## 2025-01-02 ENCOUNTER — TELEPHONE (OUTPATIENT)
Dept: FAMILY MEDICINE CLINIC | Age: 85
End: 2025-01-02

## 2025-01-02 DIAGNOSIS — Z79.4 TYPE 2 DIABETES MELLITUS WITH STAGE 3 CHRONIC KIDNEY DISEASE, WITH LONG-TERM CURRENT USE OF INSULIN, UNSPECIFIED WHETHER STAGE 3A OR 3B CKD (HCC): ICD-10-CM

## 2025-01-02 DIAGNOSIS — N18.30 TYPE 2 DIABETES MELLITUS WITH STAGE 3 CHRONIC KIDNEY DISEASE, WITH LONG-TERM CURRENT USE OF INSULIN, UNSPECIFIED WHETHER STAGE 3A OR 3B CKD (HCC): ICD-10-CM

## 2025-01-02 DIAGNOSIS — E11.22 TYPE 2 DIABETES MELLITUS WITH STAGE 3 CHRONIC KIDNEY DISEASE, WITH LONG-TERM CURRENT USE OF INSULIN, UNSPECIFIED WHETHER STAGE 3A OR 3B CKD (HCC): ICD-10-CM

## 2025-01-02 NOTE — TELEPHONE ENCOUNTER
Pharmacy sent over RX for lancet once [facility] [place] [facility]. Please if you could place. It would not allow writer to pend as was a RX back in 6/2024 for the kit and supplies. Per pharmacy need refills on the lancets.         Please address the medication refill and close the encounter.  If I can be of assistance, please route to the applicable pool.      Thank you.      Last visit: 12-  Last Med refill: 6/17/2024  Does patient have enough medication for 72 hours: No:     Next Visit Date:  Future Appointments   Date Time Provider Department Center   1/30/2025  9:30 AM Shen Correa, DO [facility]   2/27/2025 11:15 AM Luisito Tao DPM [facility]   4/17/2025 11:00 AM Luisito Eduardo DO [facility]   6/11/2025  1:30 PM Laverne Lutz MD [facility]       Health Maintenance   Topic Date Due    Annual Wellness Visit (Medicare Advantage)  01/01/2025    COVID-19 Vaccine (5 - 2023-24 season) 01/10/2025    Lipids  11/12/2025    GFR test (Diabetes, CKD 3-4, OR last GFR 15-59)  11/15/2025    Diabetic Alb to Cr ratio (uACR) test  12/16/2025    Depression Screen  12/16/2025    DTaP/Tdap/Td vaccine (2 - Td or Tdap) 07/02/2029    DEXA (modify frequency per FRAX score)  Completed    Flu vaccine  Completed    Shingles vaccine  Completed    Pneumococcal 65+ years Vaccine  Completed    Respiratory Syncytial Virus (RSV) Pregnant or age 60 yrs+  Completed    Hepatitis A vaccine  Aged Out    Hepatitis B vaccine  Aged Out    Hib vaccine  Aged Out    Polio vaccine  Aged Out    Meningococcal (ACWY) vaccine  Aged Out       Hemoglobin A1C (%)   Date Value   11/12/2024 5.8   06/21/2023 5.6   09/30/2022 5.8             ( goal A1C is < 7)   No components found for: \"LABMICR\"  No components found for: \"LDLCHOLESTEROL\", \"LDLCALC\"    (goal LDL is <100)   AST (U/L)   Date Value   06/21/2023 16     ALT (U/L)   Date Value   06/21/2023 11     BUN (mg/dL)   Date Value   11/15/2024 43 (H)     BP

## 2025-01-08 RX ORDER — GLUCOSAM/CHON-MSM1/C/MANG/BOSW 500-416.6
TABLET ORAL
Qty: 100 EACH | Refills: 3 | Status: SHIPPED | OUTPATIENT
Start: 2025-01-08 | End: 2025-01-09 | Stop reason: SDUPTHER

## 2025-01-09 DIAGNOSIS — E11.22 TYPE 2 DIABETES MELLITUS WITH STAGE 3 CHRONIC KIDNEY DISEASE, WITH LONG-TERM CURRENT USE OF INSULIN, UNSPECIFIED WHETHER STAGE 3A OR 3B CKD (HCC): ICD-10-CM

## 2025-01-09 DIAGNOSIS — Z79.4 TYPE 2 DIABETES MELLITUS WITH STAGE 3 CHRONIC KIDNEY DISEASE, WITH LONG-TERM CURRENT USE OF INSULIN, UNSPECIFIED WHETHER STAGE 3A OR 3B CKD (HCC): ICD-10-CM

## 2025-01-09 DIAGNOSIS — N18.30 TYPE 2 DIABETES MELLITUS WITH STAGE 3 CHRONIC KIDNEY DISEASE, WITH LONG-TERM CURRENT USE OF INSULIN, UNSPECIFIED WHETHER STAGE 3A OR 3B CKD (HCC): ICD-10-CM

## 2025-01-09 RX ORDER — GLUCOSAM/CHON-MSM1/C/MANG/BOSW 500-416.6
TABLET ORAL
Qty: 200 EACH | Refills: 1 | Status: SHIPPED | OUTPATIENT
Start: 2025-01-09

## 2025-01-09 NOTE — TELEPHONE ENCOUNTER
Last visit: 12/16/24  Last Med refill: 1/8/25  Does patient have enough medication for 72 hours: no  Script needed clarification on once a day or twice a day  Next Visit Date:  Future Appointments   Date Time Provider Department Center   1/30/2025  9:30 AM Shen Correa DO [facility]   2/27/2025 11:15 AM Luisito Tao DPM [facility]   4/17/2025 11:00 AM Luisito Eduardo DO [facility]   6/11/2025  1:30 PM Laverne Lutz MD [facility] None       Health Maintenance   Topic Date Due    Annual Wellness Visit (Medicare Advantage)  01/01/2025    COVID-19 Vaccine (5 - 2023-24 season) 01/10/2025    Lipids  11/12/2025    GFR test (Diabetes, CKD 3-4, OR last GFR 15-59)  11/15/2025    Diabetic Alb to Cr ratio (uACR) test  12/16/2025    Depression Screen  12/16/2025    DTaP/Tdap/Td vaccine (2 - Td or Tdap) 07/02/2029    DEXA (modify frequency per FRAX score)  Completed    Flu vaccine  Completed    Shingles vaccine  Completed    Pneumococcal 65+ years Vaccine  Completed    Respiratory Syncytial Virus (RSV) Pregnant or age 60 yrs+  Completed    Hepatitis A vaccine  Aged Out    Hepatitis B vaccine  Aged Out    Hib vaccine  Aged Out    Polio vaccine  Aged Out    Meningococcal (ACWY) vaccine  Aged Out       Hemoglobin A1C (%)   Date Value   11/12/2024 5.8   06/21/2023 5.6   09/30/2022 5.8             ( goal A1C is < 7)   No components found for: \"LABMICR\"  No components found for: \"LDLCHOLESTEROL\", \"LDLCALC\"    (goal LDL is <100)   AST (U/L)   Date Value   06/21/2023 16     ALT (U/L)   Date Value   06/21/2023 11     BUN (mg/dL)   Date Value   11/15/2024 43 (H)     BP Readings from Last 3 Encounters:   12/16/24 (!) 142/68   11/27/24 136/62   11/04/24 (!) 123/59          (goal 120/80)    All Future Testing planned in [facility]  Lab Frequency Next Occurrence   Vascular lower arterial complete physiologic Doppler at rest Once 04/03/2025   CBC every 3 months    Albumin every 3 months

## 2025-01-30 ENCOUNTER — OFFICE VISIT (OUTPATIENT)
Dept: ORTHOPEDIC SURGERY | Age: 85
End: 2025-01-30

## 2025-01-30 VITALS — HEIGHT: 60 IN | BODY MASS INDEX: 40.05 KG/M2 | WEIGHT: 204 LBS

## 2025-01-30 DIAGNOSIS — M87.242: Primary | ICD-10-CM

## 2025-01-30 NOTE — PROGRESS NOTES
[facility]  [address]  [place] [address]  Dept: [phone]  Dept Fax: [phone]        [facility] Follow Up      Subjective:   Date of Injury:   Unknown; originally presented to us for vague left wrist pain in November of last year    Charisse Marquis is a 85 y.o. year old female who presents to the clinic today due to a vague and occasional pain to her left wrist that has been present since approximately November of last year.  The patient states that she frequently falls and this could be the result of her pain now.  The patient states that the pain to her left wrist is only occasional and comes about with certain movements of her wrist.  The patient is unable to reproduce the pain at today's visit.  The patient was last seen in our office on 12/19/2024 by MARRY Webster that diagnosed the patient with having a remote scaphoid fracture of the left wrist.  At that time she was provided a spica thumb splint and Voltaren gel to manage her pain.  The patient states that this has helped however she finds that the brace unfortunately sometimes slides down her wrist and makes driving difficult.  The patient denies any recent or additional trauma to the wrist since her last visit.      Review of Systems  Gen: no fever, chills, malaise  CV: no chest pain or palpitations  Resp: no cough or shortness of breath  GI: no nausea, vomiting, diarrhea, or constipation  Neuro: no numbness, tingling, or weakness  Msk: Occasional left wrist pain  10 remaining systems reviewed and negative    Objective :   There were no vitals filed for this visit.Body mass index is 39.84 kg/m².  General: No acute distress, resting comfortably in the clinic  Neuro: alert. oriented  Eyes: Extra-ocular muscles intact  Pulm: Respirations unlabored and regular.  Skin: warm, well perfused  Psych:   Patient has good fund of knowledge and

## 2025-02-27 ENCOUNTER — OFFICE VISIT (OUTPATIENT)
Dept: PODIATRY | Age: 85
End: 2025-02-27

## 2025-02-27 VITALS — HEIGHT: 60 IN | BODY MASS INDEX: 40.05 KG/M2 | WEIGHT: 204 LBS

## 2025-02-27 DIAGNOSIS — D23.72 BENIGN NEOPLASM OF SKIN OF LOWER LIMB, INCLUDING HIP, LEFT: ICD-10-CM

## 2025-02-27 DIAGNOSIS — M79.671 PAIN IN BOTH FEET: ICD-10-CM

## 2025-02-27 DIAGNOSIS — M79.672 PAIN IN BOTH FEET: ICD-10-CM

## 2025-02-27 DIAGNOSIS — B35.1 DERMATOPHYTOSIS OF NAIL: ICD-10-CM

## 2025-02-27 DIAGNOSIS — D23.71 BENIGN NEOPLASM OF SKIN OF LOWER LIMB, INCLUDING HIP, RIGHT: ICD-10-CM

## 2025-02-27 DIAGNOSIS — E11.51 TYPE II DIABETES MELLITUS WITH PERIPHERAL CIRCULATORY DISORDER (HCC): Primary | ICD-10-CM

## 2025-02-27 DIAGNOSIS — R26.2 TROUBLE WALKING: ICD-10-CM

## 2025-02-27 DIAGNOSIS — I73.9 PERIPHERAL VASCULAR DISEASE: ICD-10-CM

## 2025-02-27 NOTE — PROGRESS NOTES
[facility] [address]  [address]  [facility] [address]  Dept: [phone]  Dept Fax: [phone]     FOOT PAIN & BENIGN NEOPLASM PROGRESS NOTE  Date of patient's visit: 2/27/2025  Patient's Name:  Charisse Marquis YOB: 1940            Patient Care Team:  Lizbeth Pinto MD as PCP - General (Family Medicine)  Ron Garcia [facility]  Luisito Tao DPMITCHELL as Physician (Podiatry)  Laverne Lutz MD as Consulting Physician (Nephrology)  Milad Manzanares MD as Consulting Physician (Cardiology)  Palmer Pappas MD as Surgeon (Vascular Surgery)  Melida Hinojosa APRN - CNP as Nurse Practitioner (Certified Nurse Practitioner)          Chief Complaint   Patient presents with    Nail Problem     Toenail trim    Foot Pain     Bilateral foot    Benign Neoplasm     Bilateral foot       Subjective:   This Charisse Marquis comes to clinic for foot and nail care.  Pt currently has complaint of thickened, painful, elongated nails that he/she cannot manage by themselves.  Pt. Relates pain to nails with shoe gear.  Pt's primary care physician is Lizbeth Pinto MDlast seen 12/16/24.    Past Medical History:   Diagnosis Date    Abnormal cardiovascular stress test 02/2021    LARGE ANTERIOR INFARCTION / LARGE AREA OF INFERIOR LATERAL ISCHEMIA WITH REDUCED EF 38%    Abnormal stress test 07/18/2014    going to do cath- not urgent just abnormal    Ambulates with cane     PRN    Arthritis     At high risk for hyperkalemia 10/22/2014    From type 4 RTA    Back pain     CAD (coronary artery disease)     CHF with unknown LVEF ([facility]) 06/21/2023    Circulation problem     decreased circulation to  zbigniew extremities    CKD (chronic kidney disease) stage 3, GFR 30-59 ml/min ([facility]) 10/22/2014    From diabetic and ischemic nephrosclerosis, baseline 1.4, GFR 40-45 ml/min, UPC 0.3    Claudication in peripheral

## 2025-03-14 DIAGNOSIS — I73.9 PAD (PERIPHERAL ARTERY DISEASE): ICD-10-CM

## 2025-04-10 ENCOUNTER — HOSPITAL ENCOUNTER (OUTPATIENT)
Dept: VASCULAR LAB | Age: 85
Discharge: HOME | End: 2025-04-12
Attending: SURGERY
Payer: MEDICARE

## 2025-04-10 ENCOUNTER — OFFICE VISIT (OUTPATIENT)
Dept: VASCULAR SURGERY | Age: 85
End: 2025-04-10
Payer: MEDICARE

## 2025-04-10 VITALS
HEART RATE: 85 BPM | DIASTOLIC BLOOD PRESSURE: 60 MMHG | OXYGEN SATURATION: 95 % | WEIGHT: 199 LBS | RESPIRATION RATE: 16 BRPM | SYSTOLIC BLOOD PRESSURE: 141 MMHG | HEIGHT: 60 IN | BODY MASS INDEX: 39.07 KG/M2

## 2025-04-10 DIAGNOSIS — I73.9 PAD (PERIPHERAL ARTERY DISEASE): Primary | ICD-10-CM

## 2025-04-10 PROCEDURE — G8399 PT W/DXA RESULTS DOCUMENT: HCPCS | Performed by: SURGERY

## 2025-04-10 PROCEDURE — 1159F MED LIST DOCD IN RCRD: CPT | Performed by: SURGERY

## 2025-04-10 PROCEDURE — 1036F TOBACCO NON-USER: CPT | Performed by: SURGERY

## 2025-04-10 PROCEDURE — 99213 OFFICE O/P EST LOW 20 MIN: CPT | Performed by: SURGERY

## 2025-04-10 PROCEDURE — 3078F DIAST BP <80 MM HG: CPT | Performed by: SURGERY

## 2025-04-10 PROCEDURE — G8427 DOCREV CUR MEDS BY ELIG CLIN: HCPCS | Performed by: SURGERY

## 2025-04-10 PROCEDURE — 1126F AMNT PAIN NOTED NONE PRSNT: CPT | Performed by: SURGERY

## 2025-04-10 PROCEDURE — 3077F SYST BP >= 140 MM HG: CPT | Performed by: SURGERY

## 2025-04-10 PROCEDURE — 1123F ACP DISCUSS/DSCN MKR DOCD: CPT | Performed by: SURGERY

## 2025-04-10 PROCEDURE — G8417 CALC BMI ABV UP PARAM F/U: HCPCS | Performed by: SURGERY

## 2025-04-10 PROCEDURE — 93923 UPR/LXTR ART STDY 3+ LVLS: CPT

## 2025-04-10 PROCEDURE — 1090F PRES/ABSN URINE INCON ASSESS: CPT | Performed by: SURGERY

## 2025-04-10 NOTE — PROGRESS NOTES
Division of Vascular Surgery        Follow Up      Chief Complaint:      I feel good    History of Present Illness:      Charisse Marquis is a 85 y.o. woman who presents for yearly surveillance of her peripheral arterial disease.  She has significantly reduced JORY and blunted PVR waveforms, but overall remains asymptomatic.  Does get intermittent claudication if she walks long distances, nothing that limits her day to day activities.  She does not have any rest pain or wounds on her feet.  She does wear compression to help with chronic swelling that she will develop.  She is compliant with her medications and does not smoke.       (4/4/24) Charisse Marquis is a 84 y.o. woman who presents for yearly surveillance of her peripheral arterial disease and claudication.   Overall doing well, denies symptoms of ischemic rest pain, no wounds on her feet.  Does get intermittent claudication in her calves after walking for about 10 minutes, does not limit her day to day activities, resting helps and she can get back to walking.  She did have a fall a few days ago, bumped her toes, no wounds has started to heal up.     (4/13/23) Charisse Marquis is a 83 y.o. woman who presents for her yearly surveillance regarding her peripheral arterial disease.   She is doing well, denies symptoms suggestive of claudication or ischemic rest pain.  She does not have any open wounds or sores on her feet.  She does have bilateral peripheral neuropathy.  She checks her feet daily and takes good care of them.  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  She follows up with podiatry every few months for nail clippings and foot care.        (3/4/22) Charisse Marquis is a 82 y.o. woman who presents for her yearly surveillance regarding her peripheral arterial disease.  She continues to do well and denies any activity limiting pain in her legs.  Every now and then if she over does it

## 2025-04-11 LAB
VAS LEFT ABI: 0.24
VAS LEFT ARM BP: 158 MMHG
VAS LEFT PTA BP: 38 MMHG
VAS LEFT TBI: 0
VAS LEFT TOE PRESSURE: 0 MMHG
VAS RIGHT ABI: 0.17
VAS RIGHT ARM BP: 149 MMHG
VAS RIGHT DORSALIS PEDIS BP: 21 MMHG
VAS RIGHT PTA BP: 27 MMHG
VAS RIGHT TBI: 0.59
VAS RIGHT TOE PRESSURE: 94 MMHG

## 2025-04-21 DIAGNOSIS — E03.9 HYPOTHYROIDISM, UNSPECIFIED TYPE: ICD-10-CM

## 2025-04-22 RX ORDER — LEVOTHYROXINE SODIUM 88 UG/1
88 TABLET ORAL DAILY
Qty: 90 TABLET | Refills: 0 | Status: SHIPPED | OUTPATIENT
Start: 2025-04-22

## 2025-04-22 NOTE — TELEPHONE ENCOUNTER
for: \"LDLCHOLESTEROL\", \"LDLCALC\"    (goal LDL is <100)   AST (U/L)   Date Value   06/21/2023 16     ALT (U/L)   Date Value   06/21/2023 11     BUN (mg/dL)   Date Value   11/15/2024 43 (H)     BP Readings from Last 3 Encounters:   04/10/25 (!) 141/60   03/28/25 104/72   12/16/24 (!) 142/68          (goal 120/80)    All Future Testing planned in [facility]  Lab Frequency Next Occurrence   CBC every 3 months    Albumin every 3 months    Basic Metabolic Panel every 3 months    Phosphorus every 3 months    PTH, Intact every 3 months    Calcium, Ionized every 3 months    Protein / creatinine ratio, urine every 3 months                Patient Active Problem List:     DM (diabetes mellitus) (HCC)     Secondary hypertension     Hypercholesteremia     CAD (coronary artery disease)     Hypothyroidism     Lumbar spondylosis with myelopathy     S/P cardiac cath 8/19/14-Dr. covarrubias     CKD (chronic kidney disease), stage IV ([facility])     Morbid obesity with BMI of 40.0-44.9, adult     At high risk for hyperkalemia     Edema     COPD without exacerbation (HCC)     FHH (familial hypocalciuric hypercalcemia)     Carotid stenosis, asymptomatic     Claudication in peripheral vascular disease     Nonproliferative diabetic retinopathy of both eyes     PAD (peripheral artery disease)     Type 2 diabetes mellitus with stage 3 chronic kidney disease, with long-term current use of insulin (HCC)     Coronary artery disease involving coronary bypass graft of native heart without angina pectoris     Essential hypertension     Localized edema     DM (diabetes mellitus), type 2 with peripheral vascular complications (HCC)     Mild nonproliferative diabetic retinopathy associated with type 2 diabetes mellitus     Panlobular emphysema ([facility])     S/P drug eluting coronary stent placement-OM1 3/26/18-Dr. covarrubias     CAD S/P percutaneous coronary angioplasty     Persistent proteinuria     Lymphedema of right lower extremity     Chronic bilateral low back pain

## 2025-04-25 ASSESSMENT — ENCOUNTER SYMPTOMS
COLOR CHANGE: 0
BACK PAIN: 1
ABDOMINAL PAIN: 0
ALLERGIC/IMMUNOLOGIC NEGATIVE: 1
SHORTNESS OF BREATH: 0
CHEST TIGHTNESS: 0

## 2025-05-01 ENCOUNTER — OFFICE VISIT (OUTPATIENT)
Dept: PODIATRY | Age: 85
End: 2025-05-01

## 2025-05-01 VITALS — HEIGHT: 60 IN | BODY MASS INDEX: 40.05 KG/M2 | WEIGHT: 204 LBS

## 2025-05-01 DIAGNOSIS — B35.1 DERMATOPHYTOSIS OF NAIL: ICD-10-CM

## 2025-05-01 DIAGNOSIS — I73.9 PERIPHERAL VASCULAR DISEASE: ICD-10-CM

## 2025-05-01 DIAGNOSIS — M79.671 PAIN IN BOTH FEET: ICD-10-CM

## 2025-05-01 DIAGNOSIS — M79.672 PAIN IN BOTH FEET: ICD-10-CM

## 2025-05-01 DIAGNOSIS — E11.51 TYPE II DIABETES MELLITUS WITH PERIPHERAL CIRCULATORY DISORDER (HCC): Primary | ICD-10-CM

## 2025-05-01 DIAGNOSIS — R26.2 TROUBLE WALKING: ICD-10-CM

## 2025-05-01 NOTE — PROGRESS NOTES
[facility] PODIATRY [address]  [address]  [facility] [address]  Dept: [phone]  Dept Fax: [phone]    DIABETIC PROGRESS NOTE  Date of patient's visit: 5/1/2025  Patient's Name:  Charisse Marquis YOB: 1940            Patient Care Team:  Luisito Eduardo DO as PCP - General (Family Medicine)  [facility]  Luisito Tao DPMITCHELL as Physician (Podiatry)  Laverne Lutz MD as Consulting Physician (Nephrology)  Milad Manzanares MD as Consulting Physician (Cardiology)  Palmer Pappas MD as Surgeon (Vascular Surgery)  Melida Hinojosa APRN - CNP as Nurse Practitioner (Certified Nurse Practitioner)          Chief Complaint   Patient presents with    Diabetes     Diabetic foot care - BS 99    Peripheral Neuropathy     Bilateral foot       Subjective:   Charisse Marquis comes to clinic for Diabetes (Diabetic foot care - BS 99) and Peripheral Neuropathy (Bilateral foot)    she is a diabetic and states that needs toenails trimmed today.  Pt currently has complaint of thickened, elongated nails that they cannot manage by themselves.   Pt's primary care physician is Luisito Eduardo DO last seen 4/10/2025   Pt's last blood sugar was 99-today.    Pt has a new complaint of none today .        Lab Results   Component Value Date    LABA1C 5.8 11/12/2024      Complains of numbness in the feet bilat.  Past Medical History:   Diagnosis Date    Abnormal cardiovascular stress test 02/2021    LARGE ANTERIOR INFARCTION / LARGE AREA OF INFERIOR LATERAL ISCHEMIA WITH REDUCED EF 38%    Abnormal stress test 07/18/2014    going to do cath- not urgent just abnormal    Ambulates with cane     PRN    Arthritis     At high risk for hyperkalemia 10/22/2014    From type 4 RTA    Back pain     CAD (coronary artery disease)     CHF with unknown LVEF (HCC) 06/21/2023    Circulation

## 2025-05-12 ENCOUNTER — HOSPITAL ENCOUNTER (OUTPATIENT)
Age: 85
Discharge: HOME | End: 2025-05-12
Payer: MEDICARE

## 2025-05-12 DIAGNOSIS — E83.52 HYPERCALCEMIA: ICD-10-CM

## 2025-05-12 DIAGNOSIS — N18.32 STAGE 3B CHRONIC KIDNEY DISEASE (HCC): ICD-10-CM

## 2025-05-12 DIAGNOSIS — E66.01 SEVERE OBESITY (BMI 35.0-39.9) WITH COMORBIDITY (HCC): ICD-10-CM

## 2025-05-12 DIAGNOSIS — R80.1 PERSISTENT PROTEINURIA: ICD-10-CM

## 2025-05-12 DIAGNOSIS — E11.3293 NONPROLIFERATIVE DIABETIC RETINOPATHY OF BOTH EYES (HCC): ICD-10-CM

## 2025-05-12 DIAGNOSIS — Z95.5 S/P DRUG ELUTING CORONARY STENT PLACEMENT: ICD-10-CM

## 2025-05-12 DIAGNOSIS — N25.81 SECONDARY HYPERPARATHYROIDISM: ICD-10-CM

## 2025-05-12 LAB
ALBUMIN SERPL-MCNC: 3.9 G/DL (ref 3.5–5.2)
ANION GAP SERPL CALCULATED.3IONS-SCNC: 12 MMOL/L (ref 9–16)
BUN SERPL-MCNC: 36 MG/DL (ref 8–23)
CA-I BLD-SCNC: 1.26 MMOL/L (ref 1.13–1.33)
CALCIUM SERPL-MCNC: 10.2 MG/DL (ref 8.6–10.4)
CHLORIDE SERPL-SCNC: 109 MMOL/L (ref 98–107)
CO2 SERPL-SCNC: 19 MMOL/L (ref 20–31)
CREAT SERPL-MCNC: 1.9 MG/DL (ref 0.6–0.9)
CREAT UR-MCNC: 87.4 MG/DL (ref 28–217)
ERYTHROCYTE [DISTWIDTH] IN BLOOD BY AUTOMATED COUNT: 15.1 % (ref 11.8–14.4)
GFR, ESTIMATED: 26 ML/MIN/1.73M2
GLUCOSE SERPL-MCNC: 101 MG/DL (ref 74–99)
HCT VFR BLD AUTO: 41.8 % (ref 36.3–47.1)
HGB BLD-MCNC: 13.3 G/DL (ref 11.9–15.1)
MCH RBC QN AUTO: 28.4 PG (ref 25.2–33.5)
MCHC RBC AUTO-ENTMCNC: 31.8 G/DL (ref 28.4–34.8)
MCV RBC AUTO: 89.3 FL (ref 82.6–102.9)
NRBC BLD-RTO: 0 PER 100 WBC
PHOSPHATE SERPL-MCNC: 3.9 MG/DL (ref 2.5–4.5)
PLATELET # BLD AUTO: 324 K/UL (ref 138–453)
PMV BLD AUTO: 12 FL (ref 8.1–13.5)
POTASSIUM SERPL-SCNC: 5.2 MMOL/L (ref 3.7–5.3)
PTH-INTACT SERPL-MCNC: 450 PG/ML (ref 17.9–58.6)
RBC # BLD AUTO: 4.68 M/UL (ref 3.95–5.11)
SODIUM SERPL-SCNC: 140 MMOL/L (ref 136–145)
TOTAL PROTEIN, URINE: 56 MG/DL
URINE TOTAL PROTEIN CREATININE RATIO: 0.64 (ref 0–0.2)
WBC OTHER # BLD: 8.4 K/UL (ref 3.5–11.3)

## 2025-05-12 PROCEDURE — 84100 ASSAY OF PHOSPHORUS: CPT

## 2025-05-12 PROCEDURE — 36415 COLL VENOUS BLD VENIPUNCTURE: CPT

## 2025-05-12 PROCEDURE — 82570 ASSAY OF URINE CREATININE: CPT

## 2025-05-12 PROCEDURE — 85027 COMPLETE CBC AUTOMATED: CPT

## 2025-05-12 PROCEDURE — 83970 ASSAY OF PARATHORMONE: CPT

## 2025-05-12 PROCEDURE — 80048 BASIC METABOLIC PNL TOTAL CA: CPT

## 2025-05-12 PROCEDURE — 82040 ASSAY OF SERUM ALBUMIN: CPT

## 2025-05-12 PROCEDURE — 82330 ASSAY OF CALCIUM: CPT

## 2025-05-12 PROCEDURE — 84156 ASSAY OF PROTEIN URINE: CPT

## 2025-05-16 ENCOUNTER — HOSPITAL ENCOUNTER (OUTPATIENT)
Age: 85
Discharge: HOME | End: 2025-05-16
Payer: MEDICARE

## 2025-05-16 DIAGNOSIS — N18.4 CKD (CHRONIC KIDNEY DISEASE), STAGE IV (HCC): ICD-10-CM

## 2025-05-16 LAB — 25(OH)D3 SERPL-MCNC: 31.3 NG/ML (ref 30–100)

## 2025-05-16 PROCEDURE — 36415 COLL VENOUS BLD VENIPUNCTURE: CPT

## 2025-05-16 PROCEDURE — 82306 VITAMIN D 25 HYDROXY: CPT

## 2025-05-19 ENCOUNTER — RESULTS FOLLOW-UP (OUTPATIENT)
Dept: FAMILY MEDICINE CLINIC | Age: 85
End: 2025-05-19

## 2025-05-19 ENCOUNTER — OFFICE VISIT (OUTPATIENT)
Age: 85
End: 2025-05-19
Payer: MEDICARE

## 2025-05-19 VITALS
WEIGHT: 203.4 LBS | HEART RATE: 66 BPM | DIASTOLIC BLOOD PRESSURE: 60 MMHG | HEIGHT: 60 IN | BODY MASS INDEX: 39.93 KG/M2 | OXYGEN SATURATION: 97 % | SYSTOLIC BLOOD PRESSURE: 128 MMHG

## 2025-05-19 DIAGNOSIS — N25.81 SECONDARY HYPERPARATHYROIDISM: ICD-10-CM

## 2025-05-19 DIAGNOSIS — M81.0 AGE RELATED OSTEOPOROSIS, UNSPECIFIED PATHOLOGICAL FRACTURE PRESENCE: ICD-10-CM

## 2025-05-19 DIAGNOSIS — N18.4 CKD (CHRONIC KIDNEY DISEASE), STAGE IV (HCC): ICD-10-CM

## 2025-05-19 DIAGNOSIS — E11.22 TYPE 2 DIABETES MELLITUS WITH STAGE 4 CHRONIC KIDNEY DISEASE, WITH LONG-TERM CURRENT USE OF INSULIN (HCC): ICD-10-CM

## 2025-05-19 DIAGNOSIS — N18.4 TYPE 2 DIABETES MELLITUS WITH STAGE 4 CHRONIC KIDNEY DISEASE, WITH LONG-TERM CURRENT USE OF INSULIN (HCC): ICD-10-CM

## 2025-05-19 DIAGNOSIS — Z79.4 TYPE 2 DIABETES MELLITUS WITH STAGE 4 CHRONIC KIDNEY DISEASE, WITH LONG-TERM CURRENT USE OF INSULIN (HCC): ICD-10-CM

## 2025-05-19 DIAGNOSIS — E03.9 HYPOTHYROIDISM, UNSPECIFIED TYPE: Primary | ICD-10-CM

## 2025-05-19 DIAGNOSIS — M87.039 AVASCULAR NECROSIS OF SCAPHOID (HCC): ICD-10-CM

## 2025-05-19 LAB — HBA1C MFR BLD: 5.8 %

## 2025-05-19 PROCEDURE — 99214 OFFICE O/P EST MOD 30 MIN: CPT | Performed by: FAMILY MEDICINE

## 2025-05-19 PROCEDURE — 1159F MED LIST DOCD IN RCRD: CPT | Performed by: FAMILY MEDICINE

## 2025-05-19 PROCEDURE — 1036F TOBACCO NON-USER: CPT | Performed by: FAMILY MEDICINE

## 2025-05-19 PROCEDURE — G8399 PT W/DXA RESULTS DOCUMENT: HCPCS | Performed by: FAMILY MEDICINE

## 2025-05-19 PROCEDURE — 1123F ACP DISCUSS/DSCN MKR DOCD: CPT | Performed by: FAMILY MEDICINE

## 2025-05-19 PROCEDURE — 1090F PRES/ABSN URINE INCON ASSESS: CPT | Performed by: FAMILY MEDICINE

## 2025-05-19 PROCEDURE — G8427 DOCREV CUR MEDS BY ELIG CLIN: HCPCS | Performed by: FAMILY MEDICINE

## 2025-05-19 PROCEDURE — 83036 HEMOGLOBIN GLYCOSYLATED A1C: CPT | Performed by: FAMILY MEDICINE

## 2025-05-19 PROCEDURE — 3074F SYST BP LT 130 MM HG: CPT | Performed by: FAMILY MEDICINE

## 2025-05-19 PROCEDURE — 3044F HG A1C LEVEL LT 7.0%: CPT | Performed by: FAMILY MEDICINE

## 2025-05-19 PROCEDURE — G8417 CALC BMI ABV UP PARAM F/U: HCPCS | Performed by: FAMILY MEDICINE

## 2025-05-19 PROCEDURE — 3078F DIAST BP <80 MM HG: CPT | Performed by: FAMILY MEDICINE

## 2025-05-19 PROCEDURE — 1126F AMNT PAIN NOTED NONE PRSNT: CPT | Performed by: FAMILY MEDICINE

## 2025-05-19 RX ORDER — INSULIN GLARGINE 100 [IU]/ML
5 INJECTION, SOLUTION SUBCUTANEOUS NIGHTLY
Qty: 10 ML | Refills: 0 | Status: SHIPPED | OUTPATIENT
Start: 2025-05-19

## 2025-05-19 RX ORDER — BLOOD-GLUCOSE CONTROL, NORMAL
EACH MISCELLANEOUS
COMMUNITY
Start: 2025-05-16

## 2025-05-19 SDOH — ECONOMIC STABILITY: FOOD INSECURITY: WITHIN THE PAST 12 MONTHS, THE FOOD YOU BOUGHT JUST DIDN'T LAST AND YOU DIDN'T HAVE MONEY TO GET MORE.: NEVER TRUE

## 2025-05-19 SDOH — ECONOMIC STABILITY: FOOD INSECURITY: WITHIN THE PAST 12 MONTHS, YOU WORRIED THAT YOUR FOOD WOULD RUN OUT BEFORE YOU GOT MONEY TO BUY MORE.: NEVER TRUE

## 2025-05-19 ASSESSMENT — PATIENT HEALTH QUESTIONNAIRE - PHQ9
SUM OF ALL RESPONSES TO PHQ QUESTIONS 1-9: 0
2. FEELING DOWN, DEPRESSED OR HOPELESS: NOT AT ALL
1. LITTLE INTEREST OR PLEASURE IN DOING THINGS: NOT AT ALL

## 2025-05-19 NOTE — PROGRESS NOTES
[facility]  [facility]  [address]  [place] [address]     Date of Visit: 2025  Patient Name: Charisse Marquis   Patient :  1940       Charisse Marquis is a 85 y.o. female who presents today for an general visit to be evaluated for the following condition(s):  Chief Complaint   Patient presents with    Diabetes     A1c check        Charisse is a 86 yo female with history of being a former smoker (80 pack year history, stopped in ), CAD with CABG ( CABG with multiple stents,  stents LAD and circumflex, last cath 2024 with AICD placed shortly after), Mitral valve insufficiency, HFrEF (29% 2023 Nuclear study), severe PAD, secondary HTN?, secondary hyperparathyroidism, mixed hyperlipidemia, FHH, well controlled T2DM with CKD stage 4 and non-proliferative retinopathy, COPD/panlobar emphysema, Hypothyroidism, Lumbar spondylosis/Chronic LBP, multiple falls, osteoporosis (L. Forearm, osteopenia with left hip,  DEXA scan) and recent diagnosis of L. Scaphoid avascular necrosis p/today to establish care with writer and her diabetes.  We also reviewed her medication list at length.      Regarding her left wrist pain, Charisse reports intermittent pain, particularly when moving her thumb in certain ways. She states, \"It's my thumb that I move my thumb funny, you know, correct, different way and then I can feel the pain.\" The pain is not constant but occurs with specific movements. She wears a brace to limit movement and reduce pain. She denies any recent falls or injuries.    Regarding her diabetes management, Charisse reports taking insulin lispro at night, adjusting the dose between 5-10 units based on her blood sugar readings. She checks her blood sugar in the morning and after meals, with fasting levels usually around  mg/dL. Post-meal readings sometimes reach the upper 200s, but are typically between 150-200 mg/dL. She denies any

## 2025-05-19 NOTE — PROGRESS NOTES
Visit Information    Have you changed or started any medications since your last visit including any over-the-counter medicines, vitamins, or herbal medicines? no   Have you stopped taking any of your medications? Is so, why? -  no  Are you having any side effects from any of your medications? - no    Have you seen any other physician or provider since your last visit?  yes - Podiatry, nephrology, cardiology   Have you had any other diagnostic tests since your last visit?  yes - xray, Doppler, EKG   Have you been seen in the emergency room and/or had an admission in a hospital since we last saw you?  no   Have you had your routine dental cleaning in the past 6 months?  no     Do you have an active MyChart account? If no, what is the barrier?  Yes    Patient Care Team:  Luisito Eduardo,  as PCP - General (Family Medicine)  Luisito Eduardo,  as PCP - Empaneled Provider  Luisito Tao DPM as Physician (Podiatry)  Laverne Lutz MD as Consulting Physician (Nephrology)  Milad Manzanares MD as Consulting Physician (Cardiology)  Palmer Pappas MD as Surgeon (Vascular Surgery)  Melida Hinojosa APRN - CNP as Nurse Practitioner (Certified Nurse Practitioner)    Medical History Review  Past Medical, Family, and Social History reviewed and does contribute to the patient presenting condition    Health Maintenance   Topic Date Due    Annual Wellness Visit (Medicare Advantage)  01/01/2025    COVID-19 Vaccine (5 - 2024-25 season) 01/10/2025    Lipids  11/12/2025    Diabetic Alb to Cr ratio (uACR) test  12/16/2025    Depression Screen  12/16/2025    GFR test (Diabetes, CKD 3-4, OR last GFR 15-59)  05/12/2026    DTaP/Tdap/Td vaccine (2 - Td or Tdap) 07/02/2029    DEXA (modify frequency per FRAX score)  Completed    Flu vaccine  Completed    Shingles vaccine  Completed    Pneumococcal 50+ years Vaccine  Completed    Respiratory Syncytial Virus (RSV) Pregnant or age 60 yrs+  Completed    Hepatitis

## 2025-05-19 NOTE — PATIENT INSTRUCTIONS
Thank you for letting us take care of you today. We hope all your questions were addressed. If a question was overlooked or something else comes to mind after you return home, please contact a member of your Care Team listed below.      Your Care Team at [facility] is Team #  Mick Chávez M.D. (Faculty)  Lizbeth Pinto M.D. (Resident)  Tonja Saleh M.D. (Resident)   Tania Solomon M.D. (Resident)  Bishnu Delvalle M.D. (Resident)  Marie Jack M.D. (Resident)  Bibiana Grigsby., [facility]  Sylwia Christopher, [facility]  Beatris Gonzalez, [facility]  Isaias Rojas, [facility]  Saadia Salvador, [facility]  Alexandrea Morejon, [facility]  Ronak Garcia (LJ) CHANA Herrera (Clinical Practice Manager)  Yesica Sigala [facility] (Clinical Pharmacist)     Office phone number: [phone]    If you need to get in right away due to illness, please be advised we have \"Same Day\" appointments available Monday-Friday. Please call us at [phone] option #3 to schedule your \"Same Day\" appointment.

## 2025-05-27 ENCOUNTER — HOSPITAL ENCOUNTER (OUTPATIENT)
Age: 85
Discharge: HOME | End: 2025-05-27
Payer: MEDICARE

## 2025-05-27 DIAGNOSIS — E03.9 HYPOTHYROIDISM, UNSPECIFIED TYPE: ICD-10-CM

## 2025-05-27 LAB
T4 FREE SERPL-MCNC: 1 NG/DL (ref 0.92–1.68)
TSH SERPL DL<=0.05 MIU/L-ACNC: 15.8 UIU/ML (ref 0.27–4.2)

## 2025-05-27 PROCEDURE — 84439 ASSAY OF FREE THYROXINE: CPT

## 2025-05-27 PROCEDURE — 84443 ASSAY THYROID STIM HORMONE: CPT

## 2025-05-27 PROCEDURE — 36415 COLL VENOUS BLD VENIPUNCTURE: CPT

## 2025-05-29 ASSESSMENT — ENCOUNTER SYMPTOMS
RESPIRATORY NEGATIVE: 1
BACK PAIN: 1

## 2025-06-02 ENCOUNTER — CLINICAL DOCUMENTATION (OUTPATIENT)
Dept: NEPHROLOGY | Age: 85
End: 2025-06-02

## 2025-06-03 NOTE — PROGRESS NOTES
Labs 5/12/2025: Sodium 140 potassium 4.2 chloride 109 bicarb 19 BUN 36 creatinine 1.9 calcium 10.2  vitamin D 25-hydroxy 31.3.  Patient has FHH therefore using vitamin D receptor blockers not a good option.  She is also has osteoporosis so would recommend using denosumab.  Will leave the decision up to her primary physician.

## 2025-06-10 DIAGNOSIS — M81.0 AGE RELATED OSTEOPOROSIS, UNSPECIFIED PATHOLOGICAL FRACTURE PRESENCE: Primary | ICD-10-CM

## 2025-06-10 DIAGNOSIS — E83.52 FHH (FAMILIAL HYPOCALCIURIC HYPERCALCEMIA): ICD-10-CM

## 2025-06-10 NOTE — PROGRESS NOTES
Patient chart reviewed with Nephrology and discussed, patient may benefit from Denosumab and as previously reviewed with the patient will refer her to Endocrinology for further review and candidacy for this treatment.  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  Appreciate nephrology recommendations.     MA team to notify patient of previously discussed endocrinology referral for new medication to help her bones (Denosumab) and prevent further fractures/osteonecrosis (wrist).  MA team to let writer know if any questions or concerns.

## 2025-06-25 NOTE — PROGRESS NOTES
Medicare Annual Wellness Visit    Charisse Marquis is here for COPD (Follow up), Medicare AWV, and Congestive Heart Failure (Check up)    Assessment & Plan     Medicare Annual wellness visit, Subsequent   Assessment: Up to date on most preventive care. No longer meets criteria for routine mammograms or colonoscopies. COVID-19 vaccination recommended.  States issues with anger on HRA.  High fall risk.  Highest risks associated with cardiovascular disease and metabolic disease.  Barriers include lack of safe areas for exercise, multiple dietary restraints and back pain.  Unclear if patient needs 162 mg of aspirin or B12 injections as opposed to PO.    Plan: Recommend COVID-19 vaccination through local pharmacy. Discuss advance care planning; patient wishes for full code status and given living will information. Reinforced fall prevention strategies. Encourage increased physical activity as tolerated with goal of 150 min/week of moderate intensity exercise.  Referral to nutritionist for dietary restrictions and elevated BMI.  Notified cardiology team of patient aspirin dosing and will follow their recs regarding 162 mg vs 81 mg.  Patient declines further work up or clarification of B12 route/need.  Will follow up on patient anger at future visit, patient states low concern regarding this currently.      Diabetes Mellitus Type 2 with Stage 4 CKD and Non-proliferative Retinopathy  Assessment: Blood glucose control generally good with fasting morning sugars within target range. Occasional evening elevations (190-200 mg/dL). Current regimen includes Lantus and Farxiga 5 mg. Stage 4 CKD requires careful medication adjustments, if any.  Patient BG reviewed for the last 37 days and last 7 days shows the following: average AM glucose is approximately 100 with evening sugar 162 over the last month but 139 over the last 7 days.  Total averages are 136 and 120 respectively.    Plan: Continue current diabetes medications.

## 2025-06-26 ENCOUNTER — OFFICE VISIT (OUTPATIENT)
Age: 85
End: 2025-06-26
Payer: MEDICARE

## 2025-06-26 VITALS
HEIGHT: 60 IN | BODY MASS INDEX: 36.4 KG/M2 | HEART RATE: 63 BPM | DIASTOLIC BLOOD PRESSURE: 60 MMHG | SYSTOLIC BLOOD PRESSURE: 130 MMHG | WEIGHT: 185.4 LBS

## 2025-06-26 DIAGNOSIS — E66.01 CLASS 2 SEVERE OBESITY DUE TO EXCESS CALORIES WITH SERIOUS COMORBIDITY AND BODY MASS INDEX (BMI) OF 36.0 TO 36.9 IN ADULT (HCC): ICD-10-CM

## 2025-06-26 DIAGNOSIS — J44.9 CHRONIC OBSTRUCTIVE PULMONARY DISEASE, UNSPECIFIED COPD TYPE (HCC): ICD-10-CM

## 2025-06-26 DIAGNOSIS — N18.4 CKD (CHRONIC KIDNEY DISEASE), STAGE IV (HCC): ICD-10-CM

## 2025-06-26 DIAGNOSIS — Z91.81 AT HIGH RISK FOR FALLS: ICD-10-CM

## 2025-06-26 DIAGNOSIS — Z71.82 EXERCISE COUNSELING: ICD-10-CM

## 2025-06-26 DIAGNOSIS — E03.9 HYPOTHYROIDISM, UNSPECIFIED TYPE: ICD-10-CM

## 2025-06-26 DIAGNOSIS — Z71.3 NUTRITIONAL COUNSELING: ICD-10-CM

## 2025-06-26 DIAGNOSIS — E11.22 TYPE 2 DIABETES MELLITUS WITH STAGE 4 CHRONIC KIDNEY DISEASE, WITH LONG-TERM CURRENT USE OF INSULIN (HCC): ICD-10-CM

## 2025-06-26 DIAGNOSIS — G47.33 OSA (OBSTRUCTIVE SLEEP APNEA): ICD-10-CM

## 2025-06-26 DIAGNOSIS — Z79.4 TYPE 2 DIABETES MELLITUS WITH STAGE 4 CHRONIC KIDNEY DISEASE, WITH LONG-TERM CURRENT USE OF INSULIN (HCC): ICD-10-CM

## 2025-06-26 DIAGNOSIS — Z00.00 MEDICARE ANNUAL WELLNESS VISIT, SUBSEQUENT: Primary | ICD-10-CM

## 2025-06-26 DIAGNOSIS — M81.0 AGE RELATED OSTEOPOROSIS, UNSPECIFIED PATHOLOGICAL FRACTURE PRESENCE: ICD-10-CM

## 2025-06-26 DIAGNOSIS — E66.812 CLASS 2 SEVERE OBESITY DUE TO EXCESS CALORIES WITH SERIOUS COMORBIDITY AND BODY MASS INDEX (BMI) OF 36.0 TO 36.9 IN ADULT (HCC): ICD-10-CM

## 2025-06-26 DIAGNOSIS — N18.4 TYPE 2 DIABETES MELLITUS WITH STAGE 4 CHRONIC KIDNEY DISEASE, WITH LONG-TERM CURRENT USE OF INSULIN (HCC): ICD-10-CM

## 2025-06-26 DIAGNOSIS — I50.22 CHRONIC HFREF (HEART FAILURE WITH REDUCED EJECTION FRACTION) (HCC): ICD-10-CM

## 2025-06-26 PROCEDURE — 1036F TOBACCO NON-USER: CPT | Performed by: FAMILY MEDICINE

## 2025-06-26 PROCEDURE — G0439 PPPS, SUBSEQ VISIT: HCPCS | Performed by: FAMILY MEDICINE

## 2025-06-26 PROCEDURE — G8417 CALC BMI ABV UP PARAM F/U: HCPCS | Performed by: FAMILY MEDICINE

## 2025-06-26 PROCEDURE — 3078F DIAST BP <80 MM HG: CPT | Performed by: FAMILY MEDICINE

## 2025-06-26 PROCEDURE — 99214 OFFICE O/P EST MOD 30 MIN: CPT | Performed by: FAMILY MEDICINE

## 2025-06-26 PROCEDURE — 1090F PRES/ABSN URINE INCON ASSESS: CPT | Performed by: FAMILY MEDICINE

## 2025-06-26 PROCEDURE — 1123F ACP DISCUSS/DSCN MKR DOCD: CPT | Performed by: FAMILY MEDICINE

## 2025-06-26 PROCEDURE — 3044F HG A1C LEVEL LT 7.0%: CPT | Performed by: FAMILY MEDICINE

## 2025-06-26 PROCEDURE — 1159F MED LIST DOCD IN RCRD: CPT | Performed by: FAMILY MEDICINE

## 2025-06-26 PROCEDURE — 3074F SYST BP LT 130 MM HG: CPT | Performed by: FAMILY MEDICINE

## 2025-06-26 PROCEDURE — G8399 PT W/DXA RESULTS DOCUMENT: HCPCS | Performed by: FAMILY MEDICINE

## 2025-06-26 PROCEDURE — 3023F SPIROM DOC REV: CPT | Performed by: FAMILY MEDICINE

## 2025-06-26 PROCEDURE — G8427 DOCREV CUR MEDS BY ELIG CLIN: HCPCS | Performed by: FAMILY MEDICINE

## 2025-06-26 ASSESSMENT — PATIENT HEALTH QUESTIONNAIRE - PHQ9
SUM OF ALL RESPONSES TO PHQ QUESTIONS 1-9: 0
1. LITTLE INTEREST OR PLEASURE IN DOING THINGS: NOT AT ALL
2. FEELING DOWN, DEPRESSED OR HOPELESS: NOT AT ALL

## 2025-06-26 ASSESSMENT — LIFESTYLE VARIABLES
HOW OFTEN DO YOU HAVE A DRINK CONTAINING ALCOHOL: NEVER
HOW MANY STANDARD DRINKS CONTAINING ALCOHOL DO YOU HAVE ON A TYPICAL DAY: PATIENT DOES NOT DRINK

## 2025-06-26 NOTE — PATIENT INSTRUCTIONS
Thank you for letting us take care of you today. We hope all your questions were addressed. If a question was overlooked or something else comes to mind after you return home, please contact a member of your Care Team listed below.      Your Care Team at [facility] is Team #  Mick Chávez M.D. (Faculty)  Lizbeth Pinto M.D. (Resident)  Tonja Saleh M.D. (Resident)   Tania Solomon M.D. (Resident)  Bishnu Delvalle M.D. (Resident)  Marie Jack M.D. (Resident)  Bibiana Grigsby., [facility]  Sylwia Christopher, [facility]  Beatris Gonzalez, [facility]  Isaias Rojas, [facility]  Saadia Salvador, [facility]  Alexandrea Morejon, [facility]  Ronak Garcia (LJ) CHANA Herrera (Clinical Practice Manager)  Yesica Sigala [facility] (Clinical Pharmacist)     Office phone number: [phone]    If you need to get in right away due to illness, please be advised we have \"Same Day\" appointments available Monday-Friday. Please call us at [phone] option #3 to schedule your \"Same Day\" appointment.

## 2025-06-26 NOTE — PROGRESS NOTES
Visit Information    Have you changed or started any medications since your last visit including any over-the-counter medicines, vitamins, or herbal medicines? no   Are you having any side effects from any of your medications? -  no  Have you stopped taking any of your medications? Is so, why? -  no    Have you seen any other physician or provider since your last visit? Yes - Records Obtained  Have you had any other diagnostic tests since your last visit? No  Have you been seen in the emergency room and/or had an admission to a hospital since we last saw you? No  Have you had your routine dental cleaning in the past 6 months? Yes     Have you activated your [employer] account? If not, what are your barriers? Yes     Patient Care Team:  Mick Chávez MD as PCP - General (Family Medicine)  Mick Chávez MD as PCP - Empaneled Provider  Luisito Tao DPM as Physician (Podiatry)  Laverne Lutz MD as Consulting Physician (Nephrology)  Milad Manzanares MD as Consulting Physician (Cardiology)  Palmer Pappas MD as Surgeon (Vascular Surgery)  Melida Hinojosa APRN - CNP as Nurse Practitioner (Certified Nurse Practitioner)    Medical History Review  Past Medical, Family, and Social History reviewed and does not contribute to the patient presenting condition    Health Maintenance   Topic Date Due    Annual Wellness Visit (Medicare Advantage)  01/01/2025    COVID-19 Vaccine (5 - 2024-25 season) 01/10/2025    Lipids  11/12/2025    Diabetic Alb to Cr ratio (uACR) test  12/16/2025    GFR test (Diabetes, CKD 3-4, OR last GFR 15-59)  05/12/2026    Depression Screen  05/19/2026    DTaP/Tdap/Td vaccine (2 - Td or Tdap) 07/02/2029    DEXA (modify frequency per FRAX score)  Completed    Flu vaccine  Completed    Shingles vaccine  Completed    Pneumococcal 50+ years Vaccine  Completed    Respiratory Syncytial Virus (RSV) Pregnant or age 60 yrs+  Completed    Hepatitis A vaccine  Aged Out    Hepatitis B

## 2025-06-27 ENCOUNTER — TELEPHONE (OUTPATIENT)
Age: 85
End: 2025-06-27

## 2025-06-27 DIAGNOSIS — N25.81 SECONDARY HYPERPARATHYROIDISM: Primary | ICD-10-CM

## 2025-06-27 DIAGNOSIS — M81.0 AGE RELATED OSTEOPOROSIS, UNSPECIFIED PATHOLOGICAL FRACTURE PRESENCE: ICD-10-CM

## 2025-06-27 NOTE — TELEPHONE ENCOUNTER
Thank you for letting me know and all your help!  Please let patient know sorry for the confusion and order placed for the Endocrinology in [place].  If patient has any other preference or for a specific clinic (e.g. [facility], [facility], etc), please let me know.

## 2025-06-30 DIAGNOSIS — Z91.81 AT HIGH RISK FOR FALLS: ICD-10-CM

## 2025-06-30 DIAGNOSIS — I50.22 CHRONIC HFREF (HEART FAILURE WITH REDUCED EJECTION FRACTION) (HCC): ICD-10-CM

## 2025-06-30 DIAGNOSIS — R29.6 MULTIPLE FALLS: ICD-10-CM

## 2025-06-30 DIAGNOSIS — M81.0 AGE RELATED OSTEOPOROSIS, UNSPECIFIED PATHOLOGICAL FRACTURE PRESENCE: Primary | ICD-10-CM

## 2025-07-01 ENCOUNTER — HOSPITAL ENCOUNTER (OUTPATIENT)
Dept: SLEEP CENTER | Age: 85
Discharge: HOME | End: 2025-07-03
Attending: FAMILY MEDICINE
Payer: MEDICARE

## 2025-07-01 DIAGNOSIS — G47.33 OSA (OBSTRUCTIVE SLEEP APNEA): ICD-10-CM

## 2025-07-01 DIAGNOSIS — E03.9 HYPOTHYROIDISM, UNSPECIFIED TYPE: ICD-10-CM

## 2025-07-01 PROBLEM — I50.22 CHRONIC HFREF (HEART FAILURE WITH REDUCED EJECTION FRACTION) (HCC): Status: ACTIVE | Noted: 2023-06-21

## 2025-07-01 PROCEDURE — 95810 POLYSOM 6/> YRS 4/> PARAM: CPT

## 2025-07-01 RX ORDER — CALCIUM CITRATE/VITAMIN D3 200MG-6.25
1 TABLET ORAL DAILY
Qty: 100 EACH | Refills: 3 | Status: SHIPPED | OUTPATIENT
Start: 2025-07-01

## 2025-07-01 RX ORDER — LEVOTHYROXINE SODIUM 88 UG/1
88 TABLET ORAL DAILY
Qty: 90 TABLET | Refills: 3 | Status: SHIPPED | OUTPATIENT
Start: 2025-07-01

## 2025-07-01 NOTE — TELEPHONE ENCOUNTER
Last visit: 6-26-25  Last Med refill:   Does patient have enough medication for 72 hours: No:     Next Visit Date:  Future Appointments   Date Time Provider Department Center   7/1/2025  7:30 PM STAZ SLEEP RM 5 [facility] [facility]   7/3/2025 10:00 AM STV NUTRITION COUNSELING RM 1 [facility] DIET [facility]   8/14/2025 11:15 AM Luisito Tao DPM [facility]   8/22/2025  2:30 PM Mick Chávez MD [facility] E[facility] DE[facility]   8/27/2025  2:30 PM Laverne Lutz MD [facility]   4[facility]16/2026 11:00 [facility]       Health Maintenance   Topic Date Due    COVID-19 Vaccine (5 - 2024-25 season) 01/10/2025    Flu vaccine (1) 08/01/2025    Lipids  11/12/2025    Diabetic Alb to Cr ratio (uACR) test  12/16/2025    GFR test (Diabetes, CKD 3-4, OR last GFR 15-59)  05/12/2026    Depression Screen  06/26/2026    DTaP/Tdap/Td vaccine (2 - Td or Tdap) 07/02/2029    DEXA (modify frequency per FRAX score)  Completed    Annual Wellness Visit (Medicare Advantage)  Completed    Shingles vaccine  Completed    Pneumococcal 50+ years Vaccine  Completed    Respiratory Syncytial Virus (RSV) Pregnant or age 60 yrs+  Completed    Hepatitis A vaccine  Aged Out    Hepatitis B vaccine  Aged Out    Hib vaccine  Aged Out    Polio vaccine  Aged Out    Meningococcal (ACWY) vaccine  Aged Out    Meningococcal B vaccine  Aged Out       Hemoglobin A1C (%)   Date Value   05/19/2025 5.8   11/12/2024 5.8   06/21/2023 5.6             ( goal A1C is < 7)   No components found for: \"LABMICR\"  No components found for: \"LDLCHOLESTEROL\", \"LDLCALC\"    (goal LDL is <100)   AST (U/L)   Date Value   06/21/2023 16     ALT (U/L)   Date Value   06/21/2023 11     BUN (mg/dL)   Date Value   05/12/2025 36 (H)     BP Readings from Last 3 Encounters:   06/26/25 130/60   05/19/25 128/60   04/10/25 (!) 141/60          (goal 120/80)    All Future Testing planned in [facility]  Lab Frequency Next Occurrence   Vascular duplex lower

## 2025-07-01 NOTE — TELEPHONE ENCOUNTER
Last visit: 06/26/25  Last Med refill: 04/22/25  Does patient have enough medication for 72 hours: No:     Next Visit Date:  Future Appointments   Date Time Provider Department Center   7/1/2025  7:30 PM STAZ SLEEP RM 5 [facility]   7/3/2025 10:00 AM STV NUTRITION COUNSELING RM 1 [facility]VZ DIET [facility]   8/14/2025 11:15 AM Luisito Tao DPM [facility]   8/22/2025  2:30 PM [facility]   8/27/2025  2:30 PM Laverne Lutz MD [facility] None   4[facility]16/2026 11:00 [facility]       Health Maintenance   Topic Date Due    Annual Wellness Visit (Medicare Advantage)  01/01/2025    COVID-19 Vaccine (5 - 2024-25 season) 01/10/2025    Flu vaccine (1) 08/01/2025    Lipids  11/12/2025    Diabetic Alb to Cr ratio (uACR) test  12/16/2025    GFR test (Diabetes, CKD 3-4, OR last GFR 15-59)  05/12/2026    Depression Screen  06/26/2026    DTaP/Tdap/Td vaccine (2 - Td or Tdap) 07/02/2029    DEXA (modify frequency per FRAX score)  Completed    Shingles vaccine  Completed    Pneumococcal 50+ years Vaccine  Completed    Respiratory Syncytial Virus (RSV) Pregnant or age 60 yrs+  Completed    Hepatitis A vaccine  Aged Out    Hepatitis B vaccine  Aged Out    Hib vaccine  Aged Out    Polio vaccine  Aged Out    Meningococcal (ACWY) vaccine  Aged Out    Meningococcal B vaccine  Aged Out       Hemoglobin A1C (%)   Date Value   05/19/2025 5.8   11/12/2024 5.8   06/21/2023 5.6             ( goal A1C is < 7)   No components found for: \"LABMICR\"  No components found for: \"LDLCHOLESTEROL\", \"LDLCALC\"    (goal LDL is <100)   AST (U/L)   Date Value   06/21/2023 16     ALT (U/L)   Date Value   06/21/2023 11     BUN (mg/dL)   Date Value   05/12/2025 36 (H)     BP Readings from Last 3 Encounters:   06/26/25 130/60   05/19/25 128/60   04/10/25 (!) 141/60          (goal 120/80)    All Future Testing planned in [facility]  Lab Frequency Next Occurrence   Vascular

## 2025-07-01 NOTE — TELEPHONE ENCOUNTER
Writer called patient to inform her that her Handicap Annie is ready for . Patient states that she will pick it up tomorrow. Writer informed patient that script will be at  for her.

## 2025-07-01 NOTE — TELEPHONE ENCOUNTER
Thank you for letting me know and I agree that patient would benefit from a handicap placard.  Please see encounter from 6/26/2025 as patient is high risk with history of multiple falls, osteoporosis, osteonecrosis, HFrEF, etc.  Will provide patient with 1 year placard especially as provider will no longer be with clinic at this time and patient to have this reevaluated by end of July 2026 on July 30, 2026.  MA team to inform patient of the above and please let writer know if any questions or concerns.

## 2025-07-02 VITALS
WEIGHT: 185 LBS | OXYGEN SATURATION: 97 % | HEART RATE: 59 BPM | BODY MASS INDEX: 36.32 KG/M2 | RESPIRATION RATE: 18 BRPM | HEIGHT: 60 IN

## 2025-07-02 ASSESSMENT — SLEEP AND FATIGUE QUESTIONNAIRES
HOW LIKELY ARE YOU TO NOD OFF OR FALL ASLEEP WHILE SITTING QUIETLY AFTER LUNCH WITHOUT ALCOHOL: WOULD NEVER DOZE
HOW LIKELY ARE YOU TO NOD OFF OR FALL ASLEEP WHILE SITTING INACTIVE IN A PUBLIC PLACE: WOULD NEVER DOZE
HOW LIKELY ARE YOU TO NOD OFF OR FALL ASLEEP WHILE WATCHING TV: HIGH CHANCE OF DOZING
HOW LIKELY ARE YOU TO NOD OFF OR FALL ASLEEP WHEN YOU ARE A PASSENGER IN A CAR FOR AN HOUR WITHOUT A BREAK: SLIGHT CHANCE OF DOZING
HOW LIKELY ARE YOU TO NOD OFF OR FALL ASLEEP WHILE SITTING AND READING: SLIGHT CHANCE OF DOZING
ESS TOTAL SCORE: 5
HOW LIKELY ARE YOU TO NOD OFF OR FALL ASLEEP WHILE SITTING AND TALKING TO SOMEONE: WOULD NEVER DOZE
HOW LIKELY ARE YOU TO NOD OFF OR FALL ASLEEP IN A CAR, WHILE STOPPED FOR A FEW MINUTES IN TRAFFIC: WOULD NEVER DOZE
HOW LIKELY ARE YOU TO NOD OFF OR FALL ASLEEP WHILE LYING DOWN TO REST IN THE AFTERNOON WHEN CIRCUMSTANCES PERMIT: WOULD NEVER DOZE

## 2025-07-03 ENCOUNTER — HOSPITAL ENCOUNTER (OUTPATIENT)
Dept: NUTRITION | Age: 85
Discharge: HOME | End: 2025-07-03
Attending: FAMILY MEDICINE
Payer: MEDICARE

## 2025-07-03 PROCEDURE — 97802 MEDICAL NUTRITION INDIV IN: CPT

## 2025-07-03 NOTE — PROGRESS NOTES
Nutrition Note    86 yo F seen for CKD nutrition education.     Pt requested information on sodium, potassium, and calcium.     Reviewed label reading for sodium/potassium. Reviewed daily limits for each: Sodium: 1500 to 2000 mg and potassium: 2500 mg. Discussed current foods client typically eats and high/low sodium/potassium options.     Pt reports that she already follows a low sodium eating plan, has now become more aware of labels. Emphasized she attempt to choose options that are < 140 mg sodium or < 5%.     Pt reports that she has already cut out bananas, does drink milk regularly, dislikes honeydew/cantaloupe, or drink orange juice. Discussed options to french fries if eating out and methods to reduce sodium in potatoes when cooking at home.     Pt also questioned how much calcium she should get in a day. Encouraged her to look at the label and avoid foods high in calcium. Pt also reports that she takes a Women's One A Day vitamin. Encouraged her to discuss multivitamin and additional supplements (cinnamon, cranberry, green tea, [employer] vitamin) with Dr. Lutz as a more kidney-friendly supplement approach will likely be recommended.     Provided pt with heart-healthy label reading, Low Sodium Diet, and Diet for CKD handouts from [facility].     Contact number provided for additional questions:  [phone]    Electronically signed by Luzma Dooley, MS, RD, LD on 7/3/25 at 10:57 AM EDT    Contact:   Luzma Dooley, JUNIOR, LD

## 2025-07-09 ENCOUNTER — TELEPHONE (OUTPATIENT)
Age: 85
End: 2025-07-09

## 2025-07-09 DIAGNOSIS — Z79.4 TYPE 2 DIABETES MELLITUS WITH STAGE 3 CHRONIC KIDNEY DISEASE, WITH LONG-TERM CURRENT USE OF INSULIN, UNSPECIFIED WHETHER STAGE 3A OR 3B CKD (HCC): ICD-10-CM

## 2025-07-09 DIAGNOSIS — N18.30 TYPE 2 DIABETES MELLITUS WITH STAGE 3 CHRONIC KIDNEY DISEASE, WITH LONG-TERM CURRENT USE OF INSULIN, UNSPECIFIED WHETHER STAGE 3A OR 3B CKD (HCC): ICD-10-CM

## 2025-07-09 DIAGNOSIS — E11.22 TYPE 2 DIABETES MELLITUS WITH STAGE 3 CHRONIC KIDNEY DISEASE, WITH LONG-TERM CURRENT USE OF INSULIN, UNSPECIFIED WHETHER STAGE 3A OR 3B CKD (HCC): ICD-10-CM

## 2025-07-09 RX ORDER — AVOBENZONE, HOMOSALATE, OCTISALATE, OCTOCRYLENE 30; 40; 45; 26 MG/ML; MG/ML; MG/ML; MG/ML
1 CREAM TOPICAL DAILY
Qty: 100 EACH | Refills: 5 | Status: SHIPPED | OUTPATIENT
Start: 2025-07-09

## 2025-07-09 RX ORDER — GLUCOSAMINE HCL/CHONDROITIN SU 500-400 MG
CAPSULE ORAL
Qty: 100 STRIP | Refills: 0 | Status: SHIPPED | OUTPATIENT
Start: 2025-07-09

## 2025-07-09 RX ORDER — BLOOD-GLUCOSE METER
1 KIT MISCELLANEOUS DAILY
Qty: 1 KIT | Refills: 0 | Status: SHIPPED | OUTPATIENT
Start: 2025-07-09

## 2025-07-09 NOTE — TELEPHONE ENCOUNTER
Thank you for letting me know and I have placed orders for generic order regarding lancets, blood glucose meter, blood glucose testing strips with instructions on each for preference of Accu-Chek or as covered by insurance.  Pharmacy also okay to substitute as per insurance or cost.      If you could please confirm with pharmacy that prescriptions sent are accurate/covered or let writer know if any concerns with prescriptions, would appreciate that.  Please let writer know if any other questions or concerns

## 2025-07-09 NOTE — TELEPHONE ENCOUNTER
Writer received order for test strips (one touch ultra). Writer seen test strips for true metrix was order on 7-1-2025. Writer spoke to patient, per patient received an notice that as of 8-1-25 will no longer be able to use the one touch ultra meters any longer.     Please if applicable call in a new glucometer, accu check with separate orders for test strips and lancets. Thank you. ( The accu check is the one the patient is requesting per letter choice form insurance).       Please address the medication refill and close the encounter.  If I can be of assistance, please route to the applicable pool.      Thank you.    Last visit: 6-26-25  Last Med refill:   Does patient have enough medication for 72 hours: No:     Next Visit Date:  Future Appointments   Date Time Provider Department Center   8/14/2025 11:15 AM Luisito Tao DPM [facility]   8/22/2025  2:30 PM Mick Chávez MD [facility] ECC DEP   8/27/2025  2:30 PM Laverne Lutz MD [facility]   4/16/2026 11:00 [facility]       Health Maintenance   Topic Date Due    COVID-19 Vaccine (5 - 2024-25 season) 01/10/2025    Flu vaccine (1) 08/01/2025    Lipids  11/12/2025    Diabetic Alb to Cr ratio (uACR) test  12/16/2025    GFR test (Diabetes, CKD 3-4, OR last GFR 15-59)  05/12/2026    Depression Screen  06/26/2026    DTaP/Tdap/Td vaccine (2 - Td or Tdap) 07/02/2029    DEXA (modify frequency per FRAX score)  Completed    Annual Wellness Visit (Medicare Advantage)  Completed    Shingles vaccine  Completed    Pneumococcal 50+ years Vaccine  Completed    Respiratory Syncytial Virus (RSV) Pregnant or age 60 yrs+  Completed    Hepatitis A vaccine  Aged Out    Hepatitis B vaccine  Aged Out    Hib vaccine  Aged Out    Polio vaccine  Aged Out    Meningococcal (ACWY) vaccine  Aged Out    Meningococcal B vaccine  Aged Out       Hemoglobin A1C (%)   Date Value   05/19/2025 5.8   11/12/2024 5.8   06/21/2023

## 2025-07-14 ENCOUNTER — RESULTS FOLLOW-UP (OUTPATIENT)
Age: 85
End: 2025-07-14

## 2025-07-14 DIAGNOSIS — G47.33 OSA (OBSTRUCTIVE SLEEP APNEA): Primary | ICD-10-CM

## 2025-07-22 ENCOUNTER — HOSPITAL ENCOUNTER (OUTPATIENT)
Dept: SLEEP CENTER | Age: 85
Discharge: HOME | End: 2025-07-24
Attending: FAMILY MEDICINE
Payer: MEDICARE

## 2025-07-22 ENCOUNTER — TELEPHONE (OUTPATIENT)
Age: 85
End: 2025-07-22

## 2025-07-22 VITALS — HEIGHT: 60 IN | BODY MASS INDEX: 36.32 KG/M2 | WEIGHT: 185 LBS

## 2025-07-22 DIAGNOSIS — G47.33 OSA (OBSTRUCTIVE SLEEP APNEA): ICD-10-CM

## 2025-07-22 PROCEDURE — 95811 POLYSOM 6/>YRS CPAP 4/> PARM: CPT

## 2025-07-22 NOTE — TELEPHONE ENCOUNTER
Patient called requesting accu check guide solution to be sent to her pharmacy to be able to check her sugars.

## 2025-08-01 PROBLEM — G47.33 OSA (OBSTRUCTIVE SLEEP APNEA): Status: ACTIVE | Noted: 2025-08-01

## 2025-08-05 ENCOUNTER — RESULTS FOLLOW-UP (OUTPATIENT)
Age: 85
End: 2025-08-05

## 2025-08-05 DIAGNOSIS — G47.33 OSA (OBSTRUCTIVE SLEEP APNEA): Primary | ICD-10-CM

## 2025-08-08 ENCOUNTER — TELEPHONE (OUTPATIENT)
Age: 85
End: 2025-08-08

## 2025-08-08 DIAGNOSIS — I50.22 CHRONIC HFREF (HEART FAILURE WITH REDUCED EJECTION FRACTION) (HCC): ICD-10-CM

## 2025-08-08 DIAGNOSIS — N18.30 TYPE 2 DIABETES MELLITUS WITH STAGE 3 CHRONIC KIDNEY DISEASE, WITH LONG-TERM CURRENT USE OF INSULIN, UNSPECIFIED WHETHER STAGE 3A OR 3B CKD (HCC): Primary | ICD-10-CM

## 2025-08-08 DIAGNOSIS — E11.22 TYPE 2 DIABETES MELLITUS WITH STAGE 3 CHRONIC KIDNEY DISEASE, WITH LONG-TERM CURRENT USE OF INSULIN, UNSPECIFIED WHETHER STAGE 3A OR 3B CKD (HCC): Primary | ICD-10-CM

## 2025-08-08 DIAGNOSIS — E03.9 HYPOTHYROIDISM, UNSPECIFIED TYPE: ICD-10-CM

## 2025-08-08 DIAGNOSIS — N18.4 CKD (CHRONIC KIDNEY DISEASE), STAGE IV (HCC): ICD-10-CM

## 2025-08-08 DIAGNOSIS — Z79.4 TYPE 2 DIABETES MELLITUS WITH STAGE 3 CHRONIC KIDNEY DISEASE, WITH LONG-TERM CURRENT USE OF INSULIN, UNSPECIFIED WHETHER STAGE 3A OR 3B CKD (HCC): Primary | ICD-10-CM

## 2025-08-14 ENCOUNTER — OFFICE VISIT (OUTPATIENT)
Dept: PODIATRY | Age: 85
End: 2025-08-14

## 2025-08-14 VITALS — BODY MASS INDEX: 36.32 KG/M2 | HEIGHT: 60 IN | WEIGHT: 185 LBS

## 2025-08-14 DIAGNOSIS — D23.71 BENIGN NEOPLASM OF SKIN OF LOWER LIMB, INCLUDING HIP, RIGHT: ICD-10-CM

## 2025-08-14 DIAGNOSIS — B35.1 DERMATOPHYTOSIS OF NAIL: ICD-10-CM

## 2025-08-14 DIAGNOSIS — M79.672 PAIN IN BOTH FEET: ICD-10-CM

## 2025-08-14 DIAGNOSIS — E11.51 TYPE II DIABETES MELLITUS WITH PERIPHERAL CIRCULATORY DISORDER (HCC): Primary | ICD-10-CM

## 2025-08-14 DIAGNOSIS — M79.671 PAIN IN BOTH FEET: ICD-10-CM

## 2025-08-14 DIAGNOSIS — D23.72 BENIGN NEOPLASM OF SKIN OF LOWER LIMB, INCLUDING HIP, LEFT: ICD-10-CM

## 2025-08-14 DIAGNOSIS — R26.2 TROUBLE WALKING: ICD-10-CM

## 2025-08-14 DIAGNOSIS — I73.9 PERIPHERAL VASCULAR DISEASE: ICD-10-CM

## 2025-08-21 ENCOUNTER — HOSPITAL ENCOUNTER (OUTPATIENT)
Dept: LAB | Age: 85
Discharge: HOME | End: 2025-08-21
Payer: MEDICARE

## 2025-08-21 LAB
ALBUMIN SERPL-MCNC: 3.8 G/DL (ref 3.5–5.2)
ANION GAP SERPL CALCULATED.3IONS-SCNC: 11 MMOL/L (ref 9–16)
BUN SERPL-MCNC: 36 MG/DL (ref 8–23)
CA-I BLD-SCNC: 1.3 MMOL/L (ref 1.13–1.33)
CALCIUM SERPL-MCNC: 10.2 MG/DL (ref 8.6–10.4)
CHLORIDE SERPL-SCNC: 108 MMOL/L (ref 98–107)
CO2 SERPL-SCNC: 23 MMOL/L (ref 20–31)
CREAT SERPL-MCNC: 1.8 MG/DL (ref 0.6–0.9)
CREAT UR-MCNC: 72.2 MG/DL (ref 28–217)
ERYTHROCYTE [DISTWIDTH] IN BLOOD BY AUTOMATED COUNT: 14.8 % (ref 11.8–14.4)
EST. AVERAGE GLUCOSE BLD GHB EST-MCNC: 123 MG/DL
GFR, ESTIMATED: 28 ML/MIN/1.73M2
GLUCOSE SERPL-MCNC: 102 MG/DL (ref 74–99)
HBA1C MFR BLD: 5.9 % (ref 4–6)
HCT VFR BLD AUTO: 38.8 % (ref 36.3–47.1)
HGB BLD-MCNC: 12.1 G/DL (ref 11.9–15.1)
MCH RBC QN AUTO: 28.1 PG (ref 25.2–33.5)
MCHC RBC AUTO-ENTMCNC: 31.2 G/DL (ref 28.4–34.8)
MCV RBC AUTO: 90 FL (ref 82.6–102.9)
NRBC BLD-RTO: 0 PER 100 WBC
PHOSPHATE SERPL-MCNC: 3.6 MG/DL (ref 2.5–4.5)
PLATELET # BLD AUTO: 280 K/UL (ref 138–453)
PMV BLD AUTO: 12.2 FL (ref 8.1–13.5)
POTASSIUM SERPL-SCNC: 4.7 MMOL/L (ref 3.7–5.3)
PTH-INTACT SERPL-MCNC: 295 PG/ML (ref 17.9–58.6)
RBC # BLD AUTO: 4.31 M/UL (ref 3.95–5.11)
SODIUM SERPL-SCNC: 142 MMOL/L (ref 136–145)
T4 FREE SERPL-MCNC: 1.2 NG/DL (ref 0.92–1.68)
TOTAL PROTEIN, URINE: 71 MG/DL
TSH SERPL DL<=0.05 MIU/L-ACNC: 5.28 UIU/ML (ref 0.27–4.2)
URINE TOTAL PROTEIN CREATININE RATIO: 0.98 (ref 0–0.2)
WBC OTHER # BLD: 8.4 K/UL (ref 3.5–11.3)

## 2025-08-21 PROCEDURE — 36415 COLL VENOUS BLD VENIPUNCTURE: CPT

## 2025-08-21 PROCEDURE — 84156 ASSAY OF PROTEIN URINE: CPT

## 2025-08-21 PROCEDURE — 80048 BASIC METABOLIC PNL TOTAL CA: CPT

## 2025-08-21 PROCEDURE — 82570 ASSAY OF URINE CREATININE: CPT

## 2025-08-21 PROCEDURE — 85027 COMPLETE CBC AUTOMATED: CPT

## 2025-08-21 PROCEDURE — 82330 ASSAY OF CALCIUM: CPT

## 2025-08-21 PROCEDURE — 83036 HEMOGLOBIN GLYCOSYLATED A1C: CPT

## 2025-08-21 PROCEDURE — 82040 ASSAY OF SERUM ALBUMIN: CPT

## 2025-08-21 PROCEDURE — 84443 ASSAY THYROID STIM HORMONE: CPT

## 2025-08-21 PROCEDURE — 84439 ASSAY OF FREE THYROXINE: CPT

## 2025-08-21 PROCEDURE — 83970 ASSAY OF PARATHORMONE: CPT

## 2025-08-21 PROCEDURE — 84100 ASSAY OF PHOSPHORUS: CPT

## 2025-08-29 DIAGNOSIS — E11.51 DM (DIABETES MELLITUS), TYPE 2 WITH PERIPHERAL VASCULAR COMPLICATIONS (HCC): ICD-10-CM

## 2025-08-29 RX ORDER — SYRINGE AND NEEDLE,INSULIN,1ML 31 GX5/16"
SYRINGE, EMPTY DISPOSABLE MISCELLANEOUS
Qty: 100 EACH | Refills: 1 | Status: SHIPPED | OUTPATIENT
Start: 2025-08-29

## (undated) DEVICE — GUIDEWIRE 35/260/FC/PTFE/3J: Brand: GUIDEWIRE

## (undated) DEVICE — INTENDED FOR TISSUE SEPARATION, AND OTHER PROCEDURES THAT REQUIRE A SHARP SURGICAL BLADE TO PUNCTURE OR CUT.: Brand: BARD-PARKER ® CARBON RIB-BACK BLADES

## (undated) DEVICE — SUTURE VCRL SZ 4-0 L18IN ABSRB UD L19MM PS-2 3/8 CIR PRIM J496H

## (undated) DEVICE — INTRODUCER SHTH L13CM OD7FR SH ORNG HUB SEAMLESS SAFSHTH

## (undated) DEVICE — ANGIOGRAPHIC CATHETER: Brand: EXPO™

## (undated) DEVICE — SUTURE PERMA-HAND SZ 0 L30IN NONABSORBABLE BLK L36MM CT-1 424H

## (undated) DEVICE — STRAP ARMBRD W1.5XL32IN FOAM STR YET SFT W/ HK AND LOOP

## (undated) DEVICE — SURGICAL PROCEDURE TRAY CRD CATH SVMMC

## (undated) DEVICE — SUTURE VCRL 2-0 L27IN ABSRB CT BRAID COAT UD J275H

## (undated) DEVICE — Device

## (undated) DEVICE — SUTURE VCRL + SZ 3-0 L27IN ABSRB WHT CT-1 1/2 CIR VCP258H

## (undated) DEVICE — GLIDESHEATH SLENDER STAINLESS STEEL KIT: Brand: GLIDESHEATH SLENDER

## (undated) DEVICE — KIT INTRO 9FR L13CM DIA0.118IN SPLITTABLE HEMSTAT ROBUST

## (undated) DEVICE — RADIFOCUS GLIDEWIRE: Brand: GLIDEWIRE